# Patient Record
Sex: MALE | Race: BLACK OR AFRICAN AMERICAN | NOT HISPANIC OR LATINO | Employment: FULL TIME | URBAN - METROPOLITAN AREA
[De-identification: names, ages, dates, MRNs, and addresses within clinical notes are randomized per-mention and may not be internally consistent; named-entity substitution may affect disease eponyms.]

---

## 2017-05-25 ENCOUNTER — TRANSFERRED RECORDS (OUTPATIENT)
Dept: HEALTH INFORMATION MANAGEMENT | Facility: CLINIC | Age: 32
End: 2017-05-25

## 2020-03-02 ENCOUNTER — HEALTH MAINTENANCE LETTER (OUTPATIENT)
Age: 35
End: 2020-03-02

## 2021-01-20 NOTE — TELEPHONE ENCOUNTER
REFERRAL INFORMATION:    Referring Provider:  N/A    Referring Clinic:  N/A    Reason for Visit/Diagnosis: New Surg, BMI 52       FUTURE VISIT INFORMATION:    Appointment Date: 1/26/2021    Appointment Time: 10:30 AM      NOTES RECORD STATUS  DETAILS   OFFICE NOTE from Referring Provider N/A    OFFICE NOTE from Other Specialists Internal 5/30/12 Office visit with Dr. Joel Wegener ([facility] DISCHARGE SUMMARY/ ED VISITS  N/A    OPERATIVE REPORT N/A    ENDOSCOPY (EGD)  N/A    PERTINENT LABS Internal    PATHOLOGY REPORTS (RELATED) N/A    IMAGING (CT, MRI, US, XR)  N/A

## 2021-01-25 NOTE — PROGRESS NOTES
"Bigg Montez is a 35 year old who is being evaluated via a billable video visit.      The patient has been notified of following:     \"This video visit will be conducted via a call between you and your physician/provider. We have found that certain health care needs can be provided without the need for an in-person physical exam.  This service lets us provide the care you need with a video conversation.  If a prescription is necessary we can send it directly to your pharmacy.  If lab work is needed we can place an order for that and you can then stop by our lab to have the test done at a later time.    Video visits are billed at different rates depending on your insurance coverage.  Please reach out to your insurance provider with any questions.    If during the course of the call the physician/provider feels a video visit is not appropriate, you will not be charged for this service.\"    Patient has given verbal consent for Video visit? Yes  How would you like to obtain your AVS? MyChart  If you are dropped from the video visit, the video invite should be resent to: Text to cell phone: [phone]  Will anyone else be joining your video visit? No  {If patient encounters technical issues they should call [phone] :[id number]}      Video-Visit Details    Type of service:  Video Visit    Video Start Time: {video visit start/end time:152948}   Video End Time: {video visit start/end time:152948}    Originating Location (pt. Location): Home    Distant Location (provider location):  [facility] [place]     Platform used for Video Visit: [employer]    During this virtual visit the patient is located in [place], patient verifies this as the location during the entirety of this visit.     New Bariatric Nutrition Consultation Note    Reason For Visit: Nutrition Assessment    Bigg Montez is a 35 year old presenting today for new bariatric nutrition consult.   Pt is interested in laparoscopic " "sleeve gastrectomy with TBD.  Patient is accompanied by self.  This is pt's first of 3 required nutrition visits prior to surgery.     Pt referred by Brianda Chan NP on January 25, 2021.  Patient with Co-morbidities of obesity including:  Type II DM no  Renal Failure no  Sleep apnea yes  Hypertension yes   Dyslipidemia no  Joint pain no  Back pain no  GERD no   Prediabetes yes    H/o gout    Support System Reviewed With Patient 1/21/2021   Who do you have in your support network that can be available to help you for the first 2 weeks after surgery? My wife   Who can you count on for support throughout your weight loss surgery journey? Kenny ceja       ANTHROPOMETRICS:  Initial weight (12/2018 with PCP): 386 lbs    Estimated body mass index is 51.99 kg/m  as calculated from the following:    Height as of an earlier encounter on 1/26/21: 1.816 m (5' 11.5\").    Weight as of an earlier encounter on 1/26/21: 171.5 kg (378 lb).    Required weight loss goal pre-op: 38 lbs from initial consult weight (goal weight 348 lbs or less before surgery)       1/21/2021   I have tried the following methods to lose weight Watching portions or calories, Exercise, Weight Watchers, Slimfast, Physician directed program       Weight Loss Questions Reviewed With Patient 1/21/2021   How long have you been overweight? Since puberty       SUPPLEMENT INFORMATION:  ***    NUTRITION HISTORY:  Working with RD - decreasing sugar intake, IF ***  Recall Diet Questions Reviewed With Patient 1/21/2021   Describe what you typically consume for breakfast (typical or most recent): Frosted flakes   Describe what you typically consume for lunch (typical or most recent): Burger   Describe what you typically consume for supper (typical or most recent): Chili   Describe what you typically consume as snacks (typical or most recent): Sweets or cookies   How many ounces of water, or other low calorie drinks, do you drink daily (8 oz=1 glass)? 48 oz   How many " ounces of caffeine (coffee, tea, pop) do you drink daily (8 oz=1 glass)? 24 oz   How many ounces of juice, pop, sweet tea, sports drinks, protein drinks, other sweetened drinks, do you drink daily (8 oz=1 glass)? 16 oz   How many ounces of milk do you drink daily (8 oz=1 glass) 8 oz   Please indicate the type of milk: 2%   How often do you drink alcohol? 2-4 times a month   If you do drink alcohol, how many drinks might you have in a day? (one drink = 5 oz. wine, 1 can/bottle of beer, 1 shot liquor) 3 or 4       Eating Habits 1/21/2021   Do you have any dietary restrictions? No   Do you currently binge eat (eat a large amount of food in a short time)? Yes   Are you an emotional eater? Yes   Do you get up to eat after falling asleep? No   What foods do you crave? Sandwiches       ADDITIONAL INFORMATION:  ***    Dining Out History Reviewed With Patient 1/21/2021   How often do you dine out? Nearly every day.   Where do you dine out? (select all that apply) fast food chains, take out   What types of food do you order when you dine out? Protien rice dishes       Physical Activity Reviewed With Patient 1/21/2021   How often do you exercise? 1 to 2 times per week   What is the duration of your exercise (in minutes)? 60+ Minutes   What types of exercise do you do? gym membership, weightlifting   What keeps you from being more active?  Lack of Time         NUTRITION DIAGNOSIS:  Obesity r/t long history of self-monitoring deficit and excessive energy intake aeb BMI >30 kg/m2.    INTERVENTION:  Intervention Provided/Education Provided on post-op diet guidelines, vitamins/minerals essential post-operatively, GI anatomy of bariatric surgeries, ways to help prepare for post-op diet guidelines pre-operatively, portion/calorie-control, mindful eating and sources of protein.  Patient demonstrates understanding. Provided pt with list of goals RD contact information.      Questions Reviewed With Patient 1/21/2021   How ready are you  to make changes regarding your weight? Number 1 = Not ready at all to make changes up to 10 = very ready. 10   How confident are you that you can change? 1 = Not confident that you will be successful making changes up to 10 = very confident. 10       Expected Engagement: { :[id number]}    GOALS:  Relating To Eating:  {UC SURGERY-GOALS RELATED EATIN}    Relating to beverages:  {UC SURGERY GOALS RELATING BEVERAGE:655262522}    Relating to dietary supplements:  {UC SURGERY GOALS DIETARY SUP:377145306}    Relating to activity:  {UC SURGERY GOALS RELATING ACTVITY:637179451}    Relating to cravings:  {UC SURGERY GOALS RELATING CRAVIN}      Time spent with patient: *** minutes.  Angela Granger RD, LD

## 2021-01-26 ENCOUNTER — VIRTUAL VISIT (OUTPATIENT)
Dept: ENDOCRINOLOGY | Facility: CLINIC | Age: 36
End: 2021-01-26
Payer: COMMERCIAL

## 2021-01-26 ENCOUNTER — PRE VISIT (OUTPATIENT)
Dept: ENDOCRINOLOGY | Facility: CLINIC | Age: 36
End: 2021-01-26

## 2021-01-26 VITALS — HEIGHT: 72 IN | BODY MASS INDEX: 42.66 KG/M2 | WEIGHT: 315 LBS

## 2021-01-26 DIAGNOSIS — E66.9 OBESITY: ICD-10-CM

## 2021-01-26 DIAGNOSIS — E66.01 MORBID OBESITY (H): ICD-10-CM

## 2021-01-26 DIAGNOSIS — E66.01 CLASS 3 SEVERE OBESITY DUE TO EXCESS CALORIES WITH SERIOUS COMORBIDITY AND BODY MASS INDEX (BMI) OF 50.0 TO 59.9 IN ADULT (H): Primary | ICD-10-CM

## 2021-01-26 DIAGNOSIS — M1A.09X0 CHRONIC GOUT OF MULTIPLE SITES, UNSPECIFIED CAUSE: ICD-10-CM

## 2021-01-26 DIAGNOSIS — Z71.3 NUTRITIONAL COUNSELING: Primary | ICD-10-CM

## 2021-01-26 DIAGNOSIS — I10 ESSENTIAL HYPERTENSION: ICD-10-CM

## 2021-01-26 DIAGNOSIS — G47.33 OSA (OBSTRUCTIVE SLEEP APNEA): ICD-10-CM

## 2021-01-26 DIAGNOSIS — E66.813 CLASS 3 SEVERE OBESITY DUE TO EXCESS CALORIES WITH SERIOUS COMORBIDITY AND BODY MASS INDEX (BMI) OF 50.0 TO 59.9 IN ADULT (H): Primary | ICD-10-CM

## 2021-01-26 DIAGNOSIS — Z13.6 CARDIOVASCULAR SCREENING; LDL GOAL LESS THAN 160: ICD-10-CM

## 2021-01-26 PROCEDURE — 97802 MEDICAL NUTRITION INDIV IN: CPT | Mod: GT | Performed by: DIETITIAN, REGISTERED

## 2021-01-26 PROCEDURE — 99205 OFFICE O/P NEW HI 60 MIN: CPT | Mod: GT | Performed by: NURSE PRACTITIONER

## 2021-01-26 RX ORDER — TOBRAMYCIN AND DEXAMETHASONE 3; 1 MG/ML; MG/ML
1 SUSPENSION/ DROPS OPHTHALMIC
COMMUNITY
Start: 2019-05-24 | End: 2021-01-26

## 2021-01-26 RX ORDER — DIAZEPAM 10 MG
TABLET ORAL
COMMUNITY
Start: 2020-11-11 | End: 2021-01-26

## 2021-01-26 RX ORDER — LOSARTAN POTASSIUM 100 MG/1
100 TABLET ORAL DAILY
COMMUNITY
Start: 2020-11-10 | End: 2023-02-27

## 2021-01-26 RX ORDER — COLCHICINE 0.6 MG/1
0.6 TABLET ORAL DAILY
COMMUNITY
Start: 2020-11-10 | End: 2023-02-27

## 2021-01-26 ASSESSMENT — MIFFLIN-ST. JEOR: SCORE: 2679.66

## 2021-01-26 ASSESSMENT — PAIN SCALES - GENERAL: PAINLEVEL: MILD PAIN (2)

## 2021-01-26 NOTE — ASSESSMENT & PLAN NOTE
Discussed possibility of increased gout risk with surgery. Patient to discuss with primary care provider about prevention. No prednisone 6 weeks before surgery or 3 months after (used PRN). No naproxen or other NSAID 6 mo after surgery (used PRN).

## 2021-01-26 NOTE — NURSING NOTE
"Chief Complaint   Patient presents with     Consult     New zaheer.       Vitals:    01/26/21 1005   Weight: (!) 171.5 kg (378 lb)   Height: 1.816 m (5' 11.5\")       Body mass index is 51.99 kg/m .                            ESTRELLA ROBERSON, EMT    "

## 2021-01-26 NOTE — PATIENT INSTRUCTIONS
Nutrition Goals:  Relating To Eating:  Consume 3 meals per day  Use the Plate Method to structure your plate   - Reduce rice portions, aim for 1 cup or less. Mix with 50% brown rice.   Avoid snacking between  Eat slowly (20-30 minutes per meal), chewing foods well (25 chews per bite/applesauce consistency)    Relating to beverages:  Take small, frequent sips of fluid all day long between meals  Separate fluids from meals by 30 minutes before, during, and after eating    Relating to dietary supplements:  Start daily multivitamin     Relating to activity:  Increase activity as able    The Plate Method  [email or web address]    Protein Sources for Weight Loss  [email or web address]     Carbohydrates  [email or web address]     Mindful Eating  [email or web address]     Summary of Volumetrics Eating Plan  [email or web address]     Diet Guidelines after Weight Loss Surgery  [email or web address]     *Protein Shake Criteria: no more than 210 Calories, at least 20 grams of protein, and less than 10 grams of sugar     Meal Replacement Shake Options:    Health VL smoothie (160 Calories, 20 g protein)   Premier Protein (160 Calories, 30 g protein)  Slim Fast Advanced Nutrition (180 Calories, 20 g protein)  Muscle Milk, lactose-free, 17 oz bottle (210 Calories, 30 g protein)  Integrated Supplements, no artificial sugars (110 Calories, 20 g protein)  Genepro, unflavored protein powder (60 Calories, 30 g protein)    Meal Replacement Bar Options:   Health [facility] Protein Shake (160 Calories, 15 g protein)  Quest Protein Bars (190 Calories, 20 g protein)  Built Bar (170 Calories, 15-20 g protein)  One Protein Bar (210 calories, 20 g protein)  Vida Signature Protein Bar ([employer]) (190 Calories, 21 g protein)  Pure Protein Bars (180 Calories, 21 g protein)    Frozen Meal Replacements  Healthy Choice  Lean Cuisine  Atkins Meals  Smart Ones

## 2021-01-26 NOTE — ASSESSMENT & PLAN NOTE
Working towards bariatric surgery. Needs 38lb weight loss before surgery. Defer medications for now.

## 2021-01-26 NOTE — LETTER
"2021       RE: Bigg Montez  [address]  [place] [address]     Dear Colleague,    Thank you for referring your patient, Bigg Montez, to the [facility]NAG[facility]INIC [place] at [facility]. Please see a copy of my visit note below.    Darshan is a 35 year old who is being evaluated via a billable video visit.      How would you like to obtain your AVS? MyChart  If the video visit is dropped, the invitation should be resent by: Text to cell phone: [phone]  Will anyone else be joining your video visit? No      Video Start Time: 1036  Video-Visit Details    Type of service:  Video Visit    Video End Time:1106    Originating Location (pt. Location): Home    Distant Location (provider location):  [facility] WE[facility]NAGEMENT [facility]     Platform used for Video Visit: [employer] [place] [place]     New Bariatric Surgery Consultation Note    2021    RE: Bigg Montez  MR#: [id number]  : 1985      Referring provider:       2021   Who referred you? Dr chambers       Chief Complaint/Reason for visit: evaluation for possible weight loss surgery    Dear Wegener, Joel Daniel Irwin, MD (General),    I had the pleasure of seeing your patient, Bigg Montez, to evaluate his obesity and consider him for possible weight loss surgery. As you know, Bigg Montez is 35 year old.  He has a height of 5' 11.5\", a weight of 378 lbs 0 oz, and calculated Body mass index is 51.99 kg/m .    Assessment & Plan   Problem List Items Addressed This Visit        Respiratory    TARCEE (obstructive sleep apnea)       Digestive    Class 3 severe obesity due to excess calories with serious comorbidity and body mass index (BMI) of 50.0 to 59.9 in adult (H) - Primary     Working towards bariatric surgery. Needs 38lb weight loss before surgery. Defer medications for now.          Relevant Orders    CBC with platelets    Comprehensive metabolic panel "    Hemoglobin A1c    Lipid panel reflex to direct LDL Fasting    Parathyroid Hormone Intact    Vitamin D Deficiency    TSH with free T4 reflex       Endocrine    Gout     Discussed possibility of increased gout risk with surgery. Patient to discuss with primary care provider about prevention. No prednisone 6 weeks before surgery or 3 months after (used PRN). No naproxen or other NSAID 6 mo after surgery (used PRN).             Circulatory    Essential hypertension    Relevant Medications    losartan (COZAAR) 100 MG tablet       Other    CARDIOVASCULAR SCREENING; LDL GOAL LESS THAN 160           Review of external notes as documented above     61 minutes spent on the date of the encounter doing chart review, history and exam, documentation and further activities as noted above    Bigg Montez is a 35 year old male who presents to clinic today for the following health issues         HISTORY OF PRESENT ILLNESS:  Weight Loss History Reviewed with Patient 1/21/2021   How long have you been overweight? Since puberty   What is the most that you have ever weighed? 398   What is the most weight you have lost? 35   I have tried the following methods to lose weight Watching portions or calories, Exercise, Weight Watchers, Slimfast, Physician directed program   Have you ever had weight loss surgery? No     Has been considering surgery for 3 years   Difficulty losing weight and maintaining weight   Has been working with sister in law who is a dietitian x 6 months   Wants to be able to keep up with kids- 5 months to 10 years old - 4 kids     CO-MORBIDITIES OF OBESITY INCLUDE:     1/21/2021   I have the following health issues associated with obesity: High Blood Pressure      primary care [facility]   Pre-diabetes  Gout- prednisone and naproxen PRN, colchicine, allopurinol both PRN also, has also been adjusting diet   HTN-  Sleep apnea- wears CPAP nightly 4-5 hours of sleep   Reflux- rare - spicy foods and  tomato based products     PAST MEDICAL HISTORY:  No past medical history on file.  No history of blood clots     PAST SURGICAL HISTORY:  Past Surgical History:   Procedure Laterality Date     VASECTOMY N/A 2020       FAMILY HISTORY:   No family history on file.    SOCIAL HISTORY:   Social History Questions Reviewed With Patient 1/21/2021   Which best describes your employment status (select all that apply) I work full-time   Which best describes your marital status:    Do you have children? Yes   Who do you have in your support network that can be available to help you for the first 2 weeks after surgery? My wife   Who can you count on for support throughout your weight loss surgery journey? Kenny ceja   Can you afford 3 meals a day?  Yes   Can you afford 50-60 dollars a month for vitamins? 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 2-4 times a month   If you do drink alcohol, how many drinks might you have in a day? (one drink = 5 oz. wine, 1 can/bottle of beer, 1 shot liquor) 3 or 4   Do you currently use any of the following Nicotine products? cigars   Have you ever used any of the following nicotine products? Cigars   Have you or are you currently using street drugs or prescription strength medication for which you do not have a prescription for? No   Do you have a history of chemical dependency (alcohol or drug abuse)? No     Cigars some days  PSYCHOLOGICAL HISTORY:   Psychological History Reviewed With Patient 1/21/2021   Have you ever attempted suicide? Never.   Have you had thoughts of suicide in the past year? No   Have you ever been hospitalized for mental illness or a suicide attempt? Never.   Do you have a history of chronic pain? No   Have you ever been diagnosed with fibromyalgia? No   Are you currently seeing a therapist or counselor?  No   Are you  currently seeing a psychiatrist? No       ROS:     1/21/2021   Skin:  None of the above   HEENT: None of these   Musculoskeletal: Joint Pain, Arthritis   Cardiovascular: Shortness of breath with activity   Pulmonary: Snoring, People have told me I stop breathing while asleep   Gastrointestinal: None of the above   Genitourinary: None of the above   Hematological: None of the above   Neurological: None of the above       EATING BEHAVIORS:     1/21/2021   Have you or anyone else thought that you had an eating disorder? Yes   If you answered yes to the previous eating disorder question, select the types that apply from this list: Binge Eating   Do you currently binge eat (eat a large amount of food in a short time)? Yes   Are you an emotional eater? Yes   Do you get up to eat after falling asleep? No     Has been working with RD - family member, decreased sugar  Has been intermittent fasting     EXERCISE:     1/21/2021   How often do you exercise? 1 to 2 times per week   What is the duration of your exercise (in minutes)? 60+ Minutes   What types of exercise do you do? gym membership, weightlifting   What keeps you from being more active?  Lack of Time       MEDICATIONS:  Current Outpatient Medications   Medication Sig Dispense Refill     allopurinol (ZYLOPRIM) 300 MG tablet Take 1 tablet (300 mg) by mouth daily 90 tablet 1     colchicine (COLCYRS) 0.6 MG tablet Take 0.6 mg by mouth       losartan (COZAAR) 100 MG tablet Take 100 mg by mouth       naproxen (NAPROSYN) 500 MG tablet Take 1 tablet (500 mg) by mouth 2 times daily (with meals) 180 tablet 2       ALLERGIES:  Allergies   Allergen Reactions     Cats Other (See Comments)     No Clinical Screening - See Comments Unknown     seasonal     Seasonal Allergies        Office Visit on 12/13/2013   Component Date Value Ref Range Status     Sodium 12/13/2013 143  133 - 144 mmol/L Final     Potassium 12/13/2013 4.2  3.4 - 5.3 mmol/L Final     Chloride 12/13/2013 105  94 -  "109 mmol/L Final     Carbon Dioxide 12/13/2013 30  20 - 32 mmol/L Final     Anion Gap 12/13/2013 9  6 - 17 mmol/L Final     Glucose 12/13/2013 90  60 - 99 mg/dL Final     Urea Nitrogen 12/13/2013 12  5 - 24 mg/dL Final     Creatinine 12/13/2013 1.20  0.66 - 1.25 mg/dL Final     GFR Estimate 12/13/2013 72  >60 mL/min/1.7m2 Final     GFR Estimate If Black 12/13/2013 87  >60 mL/min/1.7m2 Final     Calcium 12/13/2013 9.1  8.5 - 10.4 mg/dL Final     Uric Acid 12/13/2013 10.6* 3.5 - 8.5 mg/dL Final       PHYSICAL EXAM:  Objective    Ht 1.816 m (5' 11.5\")   Wt (!) 171.5 kg (378 lb)   BMI 51.99 kg/m    Vitals - Patient Reported  Pain Score: Mild Pain (2)  Pain Loc: Low Back      Physical Exam   GENERAL: Healthy, alert and no distress  EYES: Eyes grossly normal to inspection.  No discharge or erythema, or obvious scleral/conjunctival abnormalities.  RESP: No audible wheeze, cough, or visible cyanosis.  No visible retractions or increased work of breathing.    SKIN: Visible skin clear. No significant rash, abnormal pigmentation or lesions.  NEURO: Cranial nerves grossly intact.  Mentation and speech appropriate for age.  PSYCH: Mentation appears normal, affect normal/bright, judgement and insight intact, normal speech and appearance well-groomed.      In summary, Bigg Montez has Class III obesity with a body mass index of Body mass index is 51.99 kg/m . kg/m2 and the comorbidities stated above. He completed an informational seminar and is a possible candidate for the laparoscopic gastric sleeve.  He will have to complete the following pre-requisites:    Starting weight 12/2018-0 386 with PCP. Received weight loss goal of 38 lb prior to surgery. 248 day of surgery   Dietitian x 3   Have preoperative laboratory tests drawn.  Psychological Evaluation with MMPI and clearance for weight loss surgery.  Letter of clearance from the following primary care (manages TRACEE, HTN, Gout, pre-diabetes). Labs are ordered with him " ([facility]) next week   Smoking cessation - cigars, active but not daily - need nicotine test   Defer medications for now per patient preference     Today in the office we discussed gastric sleeve surgery. Preoperative, perioperative, and postoperative processes, management, and follow up were addressed.  Risks and benefits were outlined including the risk of death, staple line leak (1-2%), PE, DVT, ulcer, worsening GERD, N/V, stricture, hernia, wound infection, weight regain, and vitamin deficiencies. I emphasized exercise and activity along with appropriate food choice as the main foundation for weight loss with surgery providing surgical reinforcement of this.  All questions were answered.  A goal sheet and support group handout were given to the patient.      MEDICATIONS:  Continue current medications without change  CONSULTATION/REFERRAL to - psych consult, may need sleep consult if primary care not comfortable clearing for surgery   FUTURE LABS:       - Schedule a fasting blood draw - will fax to [facility] - has labs next week with PCP   FUTURE APPOINTMENTS:       - Follow-up visit in 2 months       Once the patient has completed the requirements in their task list and there are no further recommendations, the pt will be allowed to see the surgeon of her choice for consultation on the laparoscopic gastric sleeve surgery. Patient verbalizes understanding of the process to surgery and expectations for the postoperative period including the need for lifelong lifestyle changes, vitamin supplementation, and laboratory monitoring.    Sincerely,     Brianda Chan NP      Bariatric Task List  Status:  Is patient a candidate for bariatric surgery?:  patient is a candidate for bariatric surgery -     Cleared to schedule surgeon consult?:    -     Status:    -     Surgeon: Karey  -     Tentative surgery month/year: 4/2021  -        Insurance: Insurance:  [employer]  -     [employer]: PCP Recommendation and  Medical Clearance:    -     HP Referral:  Needed -     Other:    -        Patient Info: Initial Weight:  386 -     Date of Initial Weight/Height:  12/1/2018 - pcp, has been working on weight loss    Goal Weight (lbs):    -     Required Weight Loss:    -     Surgery Type:  sleeve gastrectomy -     Multidisciplinary Meeting:    -        Dietician Visits: Structured weight loss required by insurance?:  structured weight loss required -     Dietician Visit 1:  Needed -     Dietician Visit 2:  Needed -     Dietician Visit 3:  Needed -     Dietician Visit 4:    -     Dietician Visit 5:    -     Dietician Visit 6:    -     Dietician Visit additional:    -     Clearance from dietician to see surgeon?:    -     Dietician Notes:    -        Psychological Evaluation: Psych eval:  Needed -     Therapist letter of support:    -     Psychiatrist letter of support:    -     Establish care with therapist:    -     Complete eating disorder evaluation:    -     Letter of clearance from therapist/eating disorder program:    -     Other:    -        Lab Work: Complete Blood Count:  Needed -     Comprehensive Metabolic Panel:  Needed -     Vitamin D:  Needed -     Hgb A1c:  Needed -     PTH:  Needed -     H. pylori:    -     TSH:  Needed -     Nicotine Testing:    -     Other:  Needed - lipids      Consults/ Clearance: Sleep Medicine:  Needed - patient going to discuss with pcp, who manages cpap   Cardiac:    -     Pain:   -     Dental:    -     Endocrine:    -     Gastroenterology:    -     Vascular Medicine:    -     Hematology:    -     Medical Weight Management:   -     Physical Therapy/Exercise:    -     Nephrology:    -     Neurology:    -     Pulmonology:    -     Rheumatology:    -     Other    -     Other    -     Other    -           Testing: UGI:    -     EGD:    -     Other:    -        PCP: Establish care with PCP:  Completed -     Follow up with PCP:    -     PCP letter of support:  Needed -        Smoking: Quit tobacco  "use (3 months smoke free)?:  Needed -     Quit date:    - cigars       Patient Education:  Information Session:  Completed -     Given \"Making your decision\" handout?:    -     Given support group information?:    -     Attended support group?:    -     Support plan in place?:    -     Research consents signed?:    -        Additional Surgery Requirements: Review Coag plan:    -     HgA1c <8:    -     Inpatient pain consult:    -     Final nicotine screen:    -     Dental work complete:    -     Birth control plan:    -     Other Requirements:    -     Other Requirements:    -        Final Tasks:  Before surgery online class:  Needed -     Before surgery online class website link:  [email or web address]   After surgery online class:    -     After surgery online class website link:  [email or web address]   Nurse visit for weigh-in and information:  Needed -     Pre-assessment clinic visit with anesthesia team for H&P:    -     Final labs (Hgb, plt, T&S, UA):  Needed -        Notes:   -         "

## 2021-01-26 NOTE — PATIENT INSTRUCTIONS
"It was a pleasure meeting with you today.    Thank you for allowing us the privilege of caring for you. We hope we provided you with the excellent service you deserve.   Please let us know if there is anything else we can do for you so that we can be sure you are leaving completely satisfied with your care experience.    To ensure the quality of our services you may be receiving a patient satisfaction survey from an independent patient satisfaction monitoring company.    The greatest compliment you can give is a \"Likely to Recommend\"      Your visit was with Brianda Chan NP today.     Instructions per today's visit:   -talk to primary care about: gout after surgery, clearance for sleep apnea -for anesthesia team    MEDICATIONS:  Continue current medications without change  CONSULTATION/REFERRAL to - psych consult (see ClipMine message), may need sleep consult if primary care not comfortable clearing for surgery   FUTURE LABS:       - Schedule a fasting blood draw - will fax to [facility] - has labs next week with PCP   FUTURE APPOINTMENTS:       - Follow-up visit in 2 months       - See Dr. Eden (surgeon) next month        - dietitian monthly until surgery       To schedule appointments with our team, please call [phone] option #1    Please call during clinic hours Monday through Friday 8:00a - 4:00p if you have questions or you can contact us via [employer] at anytime.      Nurses: [phone]  Fax: [phone]  Surgery Scheduler: [phone]    Please call the hospital at [phone] to speak with our on call MDs if you have urgent needs after hours, during weekends, or holidays.    **Please note if you need to go to the Emergency Room for any reason in the first 90 days after surgery it is important to go to the [facility] ER on the [place] on [facility] [address] off of the [address] off [address]    *For patients who are currently enrolled in our program and have not yet had weight loss " surgery please note*:    You must be at your required goal weight at the time of your Anesthesia clinic visit as well as the day of surgery or your surgery will be canceled. You will not be able to obtain a new surgery date until you have met your goal weight.    Lab results will be communicated through My Chart or letter (if My Chart not used). Please call the clinic if you have not received communication after 1 week or if you have any questions.?    Main follow-up parameters for all bariatric patients     Patients who have undergone Bariatric surgery:    1. Follow-up interval during the first year: ~1 week, 1 month, 3 month, 6 month,12 months.  Every 6 months until 5 years; and then annually thereafter.  The goal of follow-up sessions is to ensure that food intake behavior (choice of food and eating speed) and exercise/activity level are set appropriately.    2. Yearly lab tests ordered by our clinic.      3. The bariatric team should be aware and potentially evaluate all adverse gastrointestinal symptoms (dysphagia, abdominal pain, nausea, vomiting, diarrhea, heartburn, reflux, etc), which can be a sign of complication.  The bariatric surgeons perform general surgery procedures in addition to bariatric surgery (laparoscopic cholecystectomy, etc.)    4. Inability to tolerate textured food (chicken, steak, fish, eggs, beans) is NOT normal and may need to be evaluated by endoscopy performed by the bariatric surgeon.    _____________________________________________________________________________________________________________________________    Meal Replacement Products:    Here is the link to our new e-store where you can purchase our meal replacement products    [facility] E-Store  [email or web address]    The one week starter kit is a great way to sample a variety of products and see what works for you.    If you want more information about the product go to: [email or web address].YesGraph    Free  Shipping for orders over $75     Benefits of meal replacements products:    Portion and calorie control  Improved nutrition  Structured eating  Simplified food choices  Avoid contact with trigger foods  ______________________________________________________________________________________________________________________________    Healthy Lifestyle Support Group   [facility] Weight Management Program  Virtual Support Group Now Available    60 minutes of small group guided discussion, support and resources    Led by Health , Paola Kwok    For questions, and to receive the invite information, contact Paola at [email or web address]    All our welcome!    One Friday per month, 12:30pm to 1:30pm  SELF COMPASSION: July 31st  CREATING MY WELLNESS VISION: August 28th  MINDFULNESS PRACTICES FOR A CALM BODY & MIND: September 25th  MINDFUL EATING TOOLS: October 30th  HEALTHY& HAPPY HOLIDAYS: November 20th  OPEN FORUM: December 18th  _______________________________________________________________________________________________________________________________    Connections: Bariatric Care support group  Due to the Covid-19 restrictions, we will be doing our support group virtually using Microsoft Teams. You will need to get an invitation to the group from Terry Nix, Ph.D., LP at [email or web address] and to learn about using Microsoft Teams. The next meeting is on Tuesday, July 14 between 6:30 and 8 PM and there will be no formal speaker.    This group meets the second Tuesday of each month from 6:30 to 8 PM and will be held again at [facility] in the [address] [place] [address] (A-B room) when the Covid-19 restrictions are lifted. The group is led by Terry Nix, Ph.D., Licensed Psychologist, [facility] Weight Management [facility]. There is no cost for group participation and is open to all [facility] (and those external to this program) pre- and post-operative  bariatric surgery patients as well as their support system.   _________________________________________________________________________________________________________________________________      Interested in working with a health ?  Health coaches work with you to improve your overall health and wellbeing.  They look at the whole person, and may involve discussion of different areas of life, including, but not limited to the four pillars of health (sleep, exercise, nutrition, and stress management). Discuss with your care team if you would like to start working a health .     Health Coaching-3 Pack:      $99 for three health coaching visits    Visits may be done in person or via phone    Coaching is a partnership between the  and the client; Coaches do not prescribe or diagnose    Coaching helps inspire the client to reach his/her personal goals   __________________________________________________________________________________________________________________________________    [place] [facility] Get Moving Program    Our team of physical therapists is trained to help you understand and take control of your condition. They will perform a thorough evaluation to determine your ability for activity and develop a customized plan to fit your goals and physical ability.  Scheduling: Unsure if the Get Moving program is right for you? Discuss the program with your medical provider or diabetes educator. You can also call us at [phone] to ask questions or schedule an appointment.   [facility] Get Moving Program  ______________________________________________________________________________________________________________________  Bluetooth Scale:    We hope to provide you with high quality telephone and virtual healthcare visits while social distancing for COVID-19 is necessary, as well as in the future when virtual visits may be more convenient for you.     Our technology team made it possible  for Bluetooth scales to send weight measurements to our electronic medical record. This allows weights from you weighing at home to securely flow into the medical record, which will improve telephone and virtual visits.   Additionally, studies have shown that adults actually lose more weight when their weights are automatically sent to someone else, and also that this process is not stressful for those adults.    Below is a link for purchasing the scale, with a discount code for our patients. You may call your insurance company to see if they will reimburse you for the cost of the scale, as a piece of durable medical equipment. The scales only go up to a weight of 400 pounds. This is an issue and we are working with the developer on increasing this. We found no scales that go over 400lb that have blue-tooth for connecting to Pa[employer]ie.net.    Scale to purchase: the [employer]  Body  Scale: [email or web address]/body/[email or web address]=aw.ds    Discount Code: We have a discount code for our patients to bring the cost down to $50, the code is: [place]_Scale_20%off    Steps to link the scale to Paixie.net via an Android Phone (you can always disconnect at any point in the future):  1. The order must be placed first before the patient can access Track My Health within [employer].  2. Download Google Fit eze from the Google Play Store   a. Log in or register using your Google account   3. Download the Paixie.net eze from Google Play Store  a. Select add organization   b. Search for [employer] and select it   c. Log into Paixie.net  d. Select Track My Health   e. Select the green connect my account button   f. When prompted log into your Google account   g. Select okay to confirm the account   4. Download the [employer] Health Mate eze from Google Play Store  a. [place] for [employer]   b. Go to profile   c. Tap google fit under the Apps section  d. Select the option to activate  Google Fit integration   e. Select the same Google account   f. Select okay to confirm the account  g.   Steps to link the scale to [employer] via an iPhone (you can always disconnect at any point in the future):  **Note Scandit is not available for download on an iPad**  1. The order must be placed first before the patient can access Track My Health within [employer].  2. Locate the Health moises on your iPhone.  a. Set up your Apple Health account as prompted  b. The Sources page will show Apps that communicate with your Health moises. Once all steps are completed, you should see Sophia Search and JamStarhart listed under the Apps section and your iPhone under the devices section.  i. Select Health Mate  1. Under 'ALLOW  HEALTH MATE  TO WRITE DATA ensure the toggle is on for Weight.  2. This will allow the scale to add your weight to the Apple Health  ii. Select MyChart  1. Under 'ALLOW  Collect.it  TO READ DATA ensure the toggle is on for Weight.  2. This allows Game[employer]ound to grab the weight from Scandit so your provider can see your weights.  3. Download the Game[employer] moises from the Moises Store   a. Select add organization                                                  b. Search for [employer] and select it  c. Log into GameGround  d. Select Track My Health   e. Select the green connect my account button   f. Follow prompts to link your device to [employer].  4. Download the Withings health mate moises in the Moises Store   a. [place] for disco volante   b. Go to profile   c. iPling Health under the Apps section  d. If prompted to allow access with the Health Moises, toggle weight on for read and write access.       Bariatric Task List  Status:  Is patient a candidate for bariatric surgery?:  patient is a candidate for bariatric surgery -     Cleared to schedule surgeon consult?:    -     Status:    -     Surgeon: Karey  -     Tentative surgery month/year: 4/2021  -        Insurance: Insurance:  [employer]  -     Cigna: PCP Recommendation and  Medical Clearance:    -     HP Referral:  Needed -     Other:    -        Patient Info: Initial Weight:  386 -     Date of Initial Weight/Height:  12/1/2018 - pcp, has been working on weight loss    Goal Weight (lbs):    -     Required Weight Loss:    -     Surgery Type:  sleeve gastrectomy -     Multidisciplinary Meeting:    -        Dietician Visits: Structured weight loss required by insurance?:  structured weight loss required -     Dietician Visit 1:  Needed -     Dietician Visit 2:  Needed -     Dietician Visit 3:  Needed -     Dietician Visit 4:    -     Dietician Visit 5:    -     Dietician Visit 6:    -     Dietician Visit additional:    -     Clearance from dietician to see surgeon?:    -     Dietician Notes:    -        Psychological Evaluation: Psych eval:  Needed -     Therapist letter of support:    -     Psychiatrist letter of support:    -     Establish care with therapist:    -     Complete eating disorder evaluation:    -     Letter of clearance from therapist/eating disorder program:    -     Other:    -        Lab Work: Complete Blood Count:  Needed -     Comprehensive Metabolic Panel:  Needed -     Vitamin D:  Needed -     Hgb A1c:  Needed -     PTH:  Needed -     H. pylori:    -     TSH:  Needed -     Nicotine Testing:    -     Other:  Needed - lipids      Consults/ Clearance: Sleep Medicine:  Needed - patient going to discuss with pcp, who manages cpap   Cardiac:    -     Pain:   -     Dental:    -     Endocrine:    -     Gastroenterology:    -     Vascular Medicine:    -     Hematology:    -     Medical Weight Management:   -     Physical Therapy/Exercise:    -     Nephrology:    -     Neurology:    -     Pulmonology:    -     Rheumatology:    -     Other    -     Other    -     Other    -           Testing: UGI:    -     EGD:    -     Other:    -        PCP: Establish care with PCP:  Completed -     Follow up with PCP:    -     PCP letter of support:  Needed -        Smoking: Quit tobacco  "use (3 months smoke free)?:  Needed -     Quit date:    - cigars       Patient Education:  Information Session:  Completed -     Given \"Making your decision\" handout?:    -     Given support group information?:    -     Attended support group?:    -     Support plan in place?:    -     Research consents signed?:    -        Additional Surgery Requirements: Review Coag plan:    -     HgA1c <8:    -     Inpatient pain consult:    -     Final nicotine screen:    -     Dental work complete:    -     Birth control plan:    -     Other Requirements:    -     Other Requirements:    -        Final Tasks:  Before surgery online class:  Needed -     Before surgery online class website link:  [email or web address]   After surgery online class:    -     After surgery online class website link:  [email or web address]   Nurse visit for weigh-in and information:  Needed -     Pre-assessment clinic visit with anesthesia team for H&P:    -     Final labs (Hgb, plt, T&S, UA):  Needed -        Notes:   -          "

## 2021-01-26 NOTE — PROGRESS NOTES
"Darshan is a 35 year old who is being evaluated via a billable video visit.      How would you like to obtain your AVS? MyChart  If the video visit is dropped, the invitation should be resent by: Text to cell phone: [phone]  Will anyone else be joining your video visit? No      Video Start Time: 1036  Video-Visit Details    Type of service:  Video Visit    Video End Time:1106    Originating Location (pt. Location): Home    Distant Location (provider location):  [facility] [place]     Platform used for Video Visit: Mingleverse     New Bariatric Surgery Consultation Note    2021    RE: Bigg Montez  MR#: [id number]  : 1985      Referring provider:       2021   Who referred you? Dr chamebrs       Chief Complaint/Reason for visit: evaluation for possible weight loss surgery    Dear Wegener, Ryan Calles MD (General),    I had the pleasure of seeing your patient, Bigg Montez, to evaluate his obesity and consider him for possible weight loss surgery. As you know, Bigg Montez is 35 year old.  He has a height of 5' 11.5\", a weight of 378 lbs 0 oz, and calculated Body mass index is 51.99 kg/m .    Assessment & Plan   Problem List Items Addressed This Visit        Respiratory    TRACEE (obstructive sleep apnea)       Digestive    Class 3 severe obesity due to excess calories with serious comorbidity and body mass index (BMI) of 50.0 to 59.9 in adult (H) - Primary     Working towards bariatric surgery. Needs 38lb weight loss before surgery. Defer medications for now.          Relevant Orders    CBC with platelets    Comprehensive metabolic panel    Hemoglobin A1c    Lipid panel reflex to direct LDL Fasting    Parathyroid Hormone Intact    Vitamin D Deficiency    TSH with free T4 reflex       Endocrine    Gout     Discussed possibility of increased gout risk with surgery. Patient to discuss with primary care provider about prevention. No prednisone 6 weeks " before surgery or 3 months after (used PRN). No naproxen or other NSAID 6 mo after surgery (used PRN).             Circulatory    Essential hypertension    Relevant Medications    losartan (COZAAR) 100 MG tablet       Other    CARDIOVASCULAR SCREENING; LDL GOAL LESS THAN 160           Review of external notes as documented above     61 minutes spent on the date of the encounter doing chart review, history and exam, documentation and further activities as noted above    Bigg Montez is a 35 year old male who presents to clinic today for the following health issues         HISTORY OF PRESENT ILLNESS:  Weight Loss History Reviewed with Patient 1/21/2021   How long have you been overweight? Since puberty   What is the most that you have ever weighed? 398   What is the most weight you have lost? 35   I have tried the following methods to lose weight Watching portions or calories, Exercise, Weight Watchers, Slimfast, Physician directed program   Have you ever had weight loss surgery? No     Has been considering surgery for 3 years   Difficulty losing weight and maintaining weight   Has been working with sister in law who is a dietitian x 6 months   Wants to be able to keep up with kids- 5 months to 10 years old - 4 kids     CO-MORBIDITIES OF OBESITY INCLUDE:     1/21/2021   I have the following health issues associated with obesity: High Blood Pressure      primary care bony [facility]   Pre-diabetes  Gout- prednisone and naproxen PRN, colchicine, allopurinol both PRN also, has also been adjusting diet   HTN-  Sleep apnea- wears CPAP nightly 4-5 hours of sleep   Reflux- rare - spicy foods and tomato based products     PAST MEDICAL HISTORY:  No past medical history on file.  No history of blood clots     PAST SURGICAL HISTORY:  Past Surgical History:   Procedure Laterality Date     VASECTOMY N/A 2020       FAMILY HISTORY:   No family history on file.    SOCIAL HISTORY:   Social History Questions Reviewed  With Patient 1/21/2021   Which best describes your employment status (select all that apply) I work full-time   Which best describes your marital status:    Do you have children? Yes   Who do you have in your support network that can be available to help you for the first 2 weeks after surgery? My wife   Who can you count on for support throughout your weight loss surgery journey? Kenny valadezazar   Can you afford 3 meals a day?  Yes   Can you afford 50-60 dollars a month for vitamins? 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 2-4 times a month   If you do drink alcohol, how many drinks might you have in a day? (one drink = 5 oz. wine, 1 can/bottle of beer, 1 shot liquor) 3 or 4   Do you currently use any of the following Nicotine products? cigars   Have you ever used any of the following nicotine products? Cigars   Have you or are you currently using street drugs or prescription strength medication for which you do not have a prescription for? No   Do you have a history of chemical dependency (alcohol or drug abuse)? No     Cigars some days  PSYCHOLOGICAL HISTORY:   Psychological History Reviewed With Patient 1/21/2021   Have you ever attempted suicide? Never.   Have you had thoughts of suicide in the past year? No   Have you ever been hospitalized for mental illness or a suicide attempt? Never.   Do you have a history of chronic pain? No   Have you ever been diagnosed with fibromyalgia? No   Are you currently seeing a therapist or counselor?  No   Are you currently seeing a psychiatrist? No       ROS:     1/21/2021   Skin:  None of the above   HEENT: None of these   Musculoskeletal: Joint Pain, Arthritis   Cardiovascular: Shortness of breath with activity   Pulmonary: Snoring, People have told me I stop breathing while asleep   Gastrointestinal: None of the above    Genitourinary: None of the above   Hematological: None of the above   Neurological: None of the above       EATING BEHAVIORS:     1/21/2021   Have you or anyone else thought that you had an eating disorder? Yes   If you answered yes to the previous eating disorder question, select the types that apply from this list: Binge Eating   Do you currently binge eat (eat a large amount of food in a short time)? Yes   Are you an emotional eater? Yes   Do you get up to eat after falling asleep? No     Has been working with RD - family member, decreased sugar  Has been intermittent fasting     EXERCISE:     1/21/2021   How often do you exercise? 1 to 2 times per week   What is the duration of your exercise (in minutes)? 60+ Minutes   What types of exercise do you do? gym membership, weightlifting   What keeps you from being more active?  Lack of Time       MEDICATIONS:  Current Outpatient Medications   Medication Sig Dispense Refill     allopurinol (ZYLOPRIM) 300 MG tablet Take 1 tablet (300 mg) by mouth daily 90 tablet 1     colchicine (COLCYRS) 0.6 MG tablet Take 0.6 mg by mouth       losartan (COZAAR) 100 MG tablet Take 100 mg by mouth       naproxen (NAPROSYN) 500 MG tablet Take 1 tablet (500 mg) by mouth 2 times daily (with meals) 180 tablet 2       ALLERGIES:  Allergies   Allergen Reactions     Cats Other (See Comments)     No Clinical Screening - See Comments Unknown     seasonal     Seasonal Allergies        Office Visit on 12/13/2013   Component Date Value Ref Range Status     Sodium 12/13/2013 143  133 - 144 mmol/L Final     Potassium 12/13/2013 4.2  3.4 - 5.3 mmol/L Final     Chloride 12/13/2013 105  94 - 109 mmol/L Final     Carbon Dioxide 12/13/2013 30  20 - 32 mmol/L Final     Anion Gap 12/13/2013 9  6 - 17 mmol/L Final     Glucose 12/13/2013 90  60 - 99 mg/dL Final     Urea Nitrogen 12/13/2013 12  5 - 24 mg/dL Final     Creatinine 12/13/2013 1.20  0.66 - 1.25 mg/dL Final     GFR Estimate 12/13/2013 72  >60  "mL/min/1.7m2 Final     GFR Estimate If Black 12/13/2013 87  >60 mL/min/1.7m2 Final     Calcium 12/13/2013 9.1  8.5 - 10.4 mg/dL Final     Uric Acid 12/13/2013 10.6* 3.5 - 8.5 mg/dL Final       PHYSICAL EXAM:  Objective    Ht 1.816 m (5' 11.5\")   Wt (!) 171.5 kg (378 lb)   BMI 51.99 kg/m    Vitals - Patient Reported  Pain Score: Mild Pain (2)  Pain Loc: Low Back      Physical Exam   GENERAL: Healthy, alert and no distress  EYES: Eyes grossly normal to inspection.  No discharge or erythema, or obvious scleral/conjunctival abnormalities.  RESP: No audible wheeze, cough, or visible cyanosis.  No visible retractions or increased work of breathing.    SKIN: Visible skin clear. No significant rash, abnormal pigmentation or lesions.  NEURO: Cranial nerves grossly intact.  Mentation and speech appropriate for age.  PSYCH: Mentation appears normal, affect normal/bright, judgement and insight intact, normal speech and appearance well-groomed.      In summary, Bigg Montez has Class III obesity with a body mass index of Body mass index is 51.99 kg/m . kg/m2 and the comorbidities stated above. He completed an informational seminar and is a possible candidate for the laparoscopic gastric sleeve.  He will have to complete the following pre-requisites:    Starting weight 12/2018-0 386 with PCP. Received weight loss goal of 38 lb prior to surgery. 248 day of surgery   Dietitian x 3   Have preoperative laboratory tests drawn.  Psychological Evaluation with MMPI and clearance for weight loss surgery.  Letter of clearance from the following primary care (manages TRACEE, HTN, Gout, pre-diabetes). [facility] are ordered with him ([facility]) next week   Smoking cessation - cigars, active but not daily - need nicotine test   Defer medications for now per patient preference     Today in the office we discussed gastric sleeve surgery. Preoperative, perioperative, and postoperative processes, management, and follow up were addressed.  " Risks and benefits were outlined including the risk of death, staple line leak (1-2%), PE, DVT, ulcer, worsening GERD, N/V, stricture, hernia, wound infection, weight regain, and vitamin deficiencies. I emphasized exercise and activity along with appropriate food choice as the main foundation for weight loss with surgery providing surgical reinforcement of this.  All questions were answered.  A goal sheet and support group handout were given to the patient.      MEDICATIONS:  Continue current medications without change  CONSULTATION/REFERRAL to - psych consult, may need sleep consult if primary care not comfortable clearing for surgery   FUTURE LABS:       - Schedule a fasting blood draw - will fax to [employer] - has labs next week with PCP   FUTURE APPOINTMENTS:       - Follow-up visit in 2 months       Once the patient has completed the requirements in their task list and there are no further recommendations, the pt will be allowed to see the surgeon of her choice for consultation on the laparoscopic gastric sleeve surgery. Patient verbalizes understanding of the process to surgery and expectations for the postoperative period including the need for lifelong lifestyle changes, vitamin supplementation, and laboratory monitoring.    Sincerely,     Brianda Chan NP      Bariatric Task List  Status:  Is patient a candidate for bariatric surgery?:  patient is a candidate for bariatric surgery -     Cleared to schedule surgeon consult?:    -     Status:    -     Surgeon: Karey  -     Tentative surgery month/year: 4/2021  -        Insurance: Insurance:  [facility]  -     Heidena: PCP Recommendation and Medical Clearance:    -     HP Referral:  Needed -     Other:    -        Patient Info: Initial Weight:  386 -     Date of Initial Weight/Height:  12/1/2018 - pcp, has been working on weight loss    Goal Weight (lbs):    -     Required Weight Loss:    -     Surgery Type:  sleeve gastrectomy -    "  Multidisciplinary Meeting:    -        Dietician Visits: Structured weight loss required by insurance?:  structured weight loss required -     Dietician Visit 1:  Needed -     Dietician Visit 2:  Needed -     Dietician Visit 3:  Needed -     Dietician Visit 4:    -     Dietician Visit 5:    -     Dietician Visit 6:    -     Dietician Visit additional:    -     Clearance from dietician to see surgeon?:    -     Dietician Notes:    -        Psychological Evaluation: Psych eval:  Needed -     Therapist letter of support:    -     Psychiatrist letter of support:    -     Establish care with therapist:    -     Complete eating disorder evaluation:    -     Letter of clearance from therapist/eating disorder program:    -     Other:    -        Lab Work: Complete Blood Count:  Needed -     Comprehensive Metabolic Panel:  Needed -     Vitamin D:  Needed -     Hgb A1c:  Needed -     PTH:  Needed -     H. pylori:    -     TSH:  Needed -     Nicotine Testing:    -     Other:  Needed - lipids      Consults/ Clearance: Sleep Medicine:  Needed - patient going to discuss with pcp, who manages cpap   Cardiac:    -     Pain:   -     Dental:    -     Endocrine:    -     Gastroenterology:    -     Vascular Medicine:    -     Hematology:    -     Medical Weight Management:   -     Physical Therapy/Exercise:    -     Nephrology:    -     Neurology:    -     Pulmonology:    -     Rheumatology:    -     Other    -     Other    -     Other    -           Testing: UGI:    -     EGD:    -     Other:    -        PCP: Establish care with PCP:  Completed -     Follow up with PCP:    -     PCP letter of support:  Needed -        Smoking: Quit tobacco use (3 months smoke free)?:  Needed -     Quit date:    - cigars       Patient Education:  Information Session:  Completed -     Given \"Making your decision\" handout?:    -     Given support group information?:    -     Attended support group?:    -     Support plan in place?:    -     Research " consents signed?:    -        Additional Surgery Requirements: Review Coag plan:    -     HgA1c <8:    -     Inpatient pain consult:    -     Final nicotine screen:    -     Dental work complete:    -     Birth control plan:    -     Other Requirements:    -     Other Requirements:    -        Final Tasks:  Before surgery online class:  Needed -     Before surgery online class website link:  [email or web address]   After surgery online class:    -     After surgery online class website link:  [email or web address]   Nurse visit for weigh-in and information:  Needed -     Pre-assessment clinic visit with anesthesia team for H&P:    -     Final labs (Hgb, plt, T&S, UA):  Needed -        Notes:   -

## 2021-01-26 NOTE — Clinical Note
NBS. Dr. Eden. I will send packet information since Brandi is out. Patient thinks PCP will sign off on sleep and wants to discuss with him before seeing sleep consult again. TARI

## 2021-01-26 NOTE — LETTER
"1/26/2021       RE: Bigg Montez  [address]  [place] [address]     Dear Colleague,    Thank you for referring your patient, Bigg Montez, to the [facility] [place] at [facility]. Please see a copy of my visit note below.    Bigg Montez is a 35 year old who is being evaluated via a billable video visit.      The patient has been notified of following:     \"This video visit will be conducted via a call between you and your physician/provider. We have found that certain health care needs can be provided without the need for an in-person physical exam.  This service lets us provide the care you need with a video conversation.  If a prescription is necessary we can send it directly to your pharmacy.  If lab work is needed we can place an order for that and you can then stop by our lab to have the test done at a later time.    Video visits are billed at different rates depending on your insurance coverage.  Please reach out to your insurance provider with any questions.    If during the course of the call the physician/provider feels a video visit is not appropriate, you will not be charged for this service.\"    Patient has given verbal consent for Video visit? Yes  How would you like to obtain your AVS? MyChart  If you are dropped from the video visit, the video invite should be resent to: Text to cell phone: [phone]  Will anyone else be joining your video visit? No        Video-Visit Details    Type of service:  Video Visit    Video Start Time: 12:02 PM  Video End Time: 12:34 PM    Originating Location (pt. Location): Home    Distant Location (provider location):  [facility] [place]     Platform used for Video Visit: Imaxio    During this virtual visit the patient is located in [place], patient verifies this as the location during the entirety of this visit.     New Bariatric Nutrition " "Consultation Note    Reason For Visit: Nutrition Assessment    Bigg Montez is a 35 year old presenting today for new bariatric nutrition consult.   Pt is interested in laparoscopic sleeve gastrectomy with TBD.  Patient is accompanied by self.  This is pt's first of 3 required nutrition visits prior to surgery.     Pt referred by Brianda Chan NP on January 25, 2021.  Patient with Co-morbidities of obesity including:  Type II DM no  Renal Failure no  Sleep apnea yes  Hypertension yes   Dyslipidemia no  Joint pain no  Back pain no  GERD no   Prediabetes yes    H/o gout    Support System Reviewed With Patient 1/21/2021   Who do you have in your support network that can be available to help you for the first 2 weeks after surgery? My wife   Who can you count on for support throughout your weight loss surgery journey? Kenny ceja       ANTHROPOMETRICS:  Initial weight (12/2018 with PCP): 386 lbs    Estimated body mass index is 51.99 kg/m  as calculated from the following:    Height as of an earlier encounter on 1/26/21: 1.816 m (5' 11.5\").    Weight as of an earlier encounter on 1/26/21: 171.5 kg (378 lb).    Required weight loss goal pre-op: 38 lbs from initial consult weight (goal weight 348 lbs or less before surgery)       1/21/2021   I have tried the following methods to lose weight Watching portions or calories, Exercise, Weight Watchers, Slimfast, Physician directed program       Weight Loss Questions Reviewed With Patient 1/21/2021   How long have you been overweight? Since puberty       SUPPLEMENT INFORMATION:  Has a daily MVI, stopped for a while to see if flaring gout, plans to restart.      NUTRITION HISTORY:  No known food allergies/intolerances  Working with family member who is an RD - decreasing sugar intake, IF for 21 days (felt better while following). Avoid beef and seafood, and limiting alcohol for gout. Eating a lot of chicken. Does not care for beans, yogurt, cottage cheese or tofu. Enjoys " fried foods, but trying to avoid. Avoiding bread, but does eat a lot of rice (typically occupies most of plate). Using white rice.  Aiming for 30 grams protein per meal.  Typically having 2 meals per day. Meal are at 1pm and 6 pm.   Does not like sugar substitutes    Recall Diet Questions Reviewed With Patient 1/21/2021   Describe what you typically consume for breakfast (typical or most recent): Frosted flakes; steel cut oats with 1 tbsp brown sugar    Describe what you typically consume for lunch (typical or most recent): Burger   Describe what you typically consume for supper (typical or most recent): Chili    Describe what you typically consume as snacks (typical or most recent): Sweets or cookies   How many ounces of water, or other low calorie drinks, do you drink daily (8 oz=1 glass)?  oz (water)   How many ounces of caffeine (coffee, tea, pop) do you drink daily (8 oz=1 glass)? 24 oz (coffee - black mostly, occ latte)    How many ounces of juice, pop, sweet tea, sports drinks, protein drinks, other sweetened drinks, do you drink daily (8 oz=1 glass)? 16 oz (occ arnold gorman 2-3 times per week)   How many ounces of milk do you drink daily (8 oz=1 glass) 8 oz   Please indicate the type of milk: 2%   How often do you drink alcohol? 2-4 times a month (2-3 bourbon on Sunday)   If you do drink alcohol, how many drinks might you have in a day? (one drink = 5 oz. wine, 1 can/bottle of beer, 1 shot liquor) 3 or 4       Eating Habits 1/21/2021   Do you have any dietary restrictions? No   Do you currently binge eat (eat a large amount of food in a short time)? Yes   Are you an emotional eater? Yes - usually when stressed out/off schedule, fast-food   Do you get up to eat after falling asleep? No   What foods do you crave? Sandwiches       ADDITIONAL INFORMATION:  Works as a .     Dining Out History Reviewed With Patient 1/21/2021   How often do you dine out? Nearly every day.   Where do you dine  out? (select all that apply) fast food chains, take out   What types of food do you order when you dine out? Protien rice dishes       Physical Activity Reviewed With Patient 1/21/2021   How often do you exercise? 1 to 2 times per week   What is the duration of your exercise (in minutes)? 60+ Minutes   What types of exercise do you do? gym membership, weightlifting   What keeps you from being more active?  Lack of Time         NUTRITION DIAGNOSIS:  Obesity r/t long history of self-monitoring deficit and excessive energy intake aeb BMI >30 kg/m2.    INTERVENTION:  Intervention Provided/Education Provided on post-op diet guidelines, vitamins/minerals essential post-operatively, GI anatomy of bariatric surgeries, ways to help prepare for post-op diet guidelines pre-operatively, portion/calorie-control, mindful eating and sources of protein. Provided education on the Volumetrics, the Plate Method and meal replacements for pre-op weight loss.  Patient demonstrates understanding. Provided pt with list of goals RD contact information.      Questions Reviewed With Patient 1/21/2021   How ready are you to make changes regarding your weight? Number 1 = Not ready at all to make changes up to 10 = very ready. 10   How confident are you that you can change? 1 = Not confident that you will be successful making changes up to 10 = very confident. 10       Expected Engagement: good    GOALS:  Relating To Eating:  Consume 3 meals per day  Use the Plate Method to structure your plate   - Reduce rice portions, aim for 1 cup or less. Mix with 50% brown rice.   Avoid snacking between  Eat slowly (20-30 minutes per meal), chewing foods well (25 chews per bite/applesauce consistency)    Relating to beverages:  Take small, frequent sips of fluid all day long between meals  Separate fluids from meals by 30 minutes before, during, and after eating    Relating to dietary supplements:  Start daily multivitamin     Relating to  activity:  Increase activity as able    The Plate Method  [email or web address]    Protein Sources for Weight Loss  [email or web address]     Carbohydrates  [email or web address]     Mindful Eating  [email or web address]     Summary of Volumetrics Eating Plan  [email or web address]     Diet Guidelines after Weight Loss Surgery  [email or web address]     *Protein Shake Criteria: no more than 210 Calories, at least 20 grams of protein, and less than 10 grams of sugar     Meal Replacement Shake Options:   [facility] smoothie (160 Calories, 20 g protein)   Premier Protein (160 Calories, 30 g protein)  Slim Fast Advanced Nutrition (180 Calories, 20 g protein)  Muscle Milk, lactose-free, 17 oz bottle (210 Calories, 30 g protein)  Integrated Supplements, no artificial sugars (110 Calories, 20 g protein)  Genepro, unflavored protein powder (60 Calories, 30 g protein)    Meal Replacement Bar Options:  [facility] Protein Shake (160 Calories, 15 g protein)  Quest Protein Bars (190 Calories, 20 g protein)  Built Bar (170 Calories, 15-20 g protein)  One Protein Bar (210 calories, 20 g protein)  [place] Signature Protein Bar ([employer]) (190 Calories, 21 g protein)  Pure Protein Bars (180 Calories, 21 g protein)    Frozen Meal Replacements  Healthy Choice  Lean Cuisine  Atkins Meals  Smart Ones    Angela Granger, BRO, LD

## 2021-01-26 NOTE — PROGRESS NOTES
"Bigg Montez is a 35 year old who is being evaluated via a billable video visit.      The patient has been notified of following:     \"This video visit will be conducted via a call between you and your physician/provider. We have found that certain health care needs can be provided without the need for an in-person physical exam.  This service lets us provide the care you need with a video conversation.  If a prescription is necessary we can send it directly to your pharmacy.  If lab work is needed we can place an order for that and you can then stop by our lab to have the test done at a later time.    Video visits are billed at different rates depending on your insurance coverage.  Please reach out to your insurance provider with any questions.    If during the course of the call the physician/provider feels a video visit is not appropriate, you will not be charged for this service.\"    Patient has given verbal consent for Video visit? Yes  How would you like to obtain your AVS? MyChart  If you are dropped from the video visit, the video invite should be resent to: Text to cell phone: [phone]  Will anyone else be joining your video visit? No        Video-Visit Details    Type of service:  Video Visit    Video Start Time: 12:02 PM  Video End Time: 12:34 PM    Originating Location (pt. Location): Home    Distant Location (provider location):  [facility] [place]     Platform used for Video Visit: [employer]    During this virtual visit the patient is located in [place], patient verifies this as the location during the entirety of this visit.     New Bariatric Nutrition Consultation Note    Reason For Visit: Nutrition Assessment    Bigg Montez is a 35 year old presenting today for new bariatric nutrition consult.   Pt is interested in laparoscopic sleeve gastrectomy with TBD.  Patient is accompanied by self.  This is pt's first of 3 required nutrition visits prior to surgery. " "    Pt referred by Brianda Chan NP on January 25, 2021.  Patient with Co-morbidities of obesity including:  Type II DM no  Renal Failure no  Sleep apnea yes  Hypertension yes   Dyslipidemia no  Joint pain no  Back pain no  GERD no   Prediabetes yes    H/o gout    Support System Reviewed With Patient 1/21/2021   Who do you have in your support network that can be available to help you for the first 2 weeks after surgery? My wife   Who can you count on for support throughout your weight loss surgery journey? Kenny ceja       ANTHROPOMETRICS:  Initial weight (12/2018 with PCP): 386 lbs    Estimated body mass index is 51.99 kg/m  as calculated from the following:    Height as of an earlier encounter on 1/26/21: 1.816 m (5' 11.5\").    Weight as of an earlier encounter on 1/26/21: 171.5 kg (378 lb).    Required weight loss goal pre-op: 38 lbs from initial consult weight (goal weight 348 lbs or less before surgery)       1/21/2021   I have tried the following methods to lose weight Watching portions or calories, Exercise, Weight Watchers, Slimfast, Physician directed program       Weight Loss Questions Reviewed With Patient 1/21/2021   How long have you been overweight? Since puberty       SUPPLEMENT INFORMATION:  Has a daily MVI, stopped for a while to see if flaring gout, plans to restart.      NUTRITION HISTORY:  No known food allergies/intolerances  Working with family member who is an RD - decreasing sugar intake, IF for 21 days (felt better while following). Avoid beef and seafood, and limiting alcohol for gout. Eating a lot of chicken. Does not care for beans, yogurt, cottage cheese or tofu. Enjoys fried foods, but trying to avoid. Avoiding bread, but does eat a lot of rice (typically occupies most of plate). Using white rice.  Aiming for 30 grams protein per meal.  Typically having 2 meals per day. Meal are at 1pm and 6 pm.   Does not like sugar substitutes    Recall Diet Questions Reviewed With Patient " 1/21/2021   Describe what you typically consume for breakfast (typical or most recent): Frosted flakes; steel cut oats with 1 tbsp brown sugar    Describe what you typically consume for lunch (typical or most recent): Burger   Describe what you typically consume for supper (typical or most recent): Chili    Describe what you typically consume as snacks (typical or most recent): Sweets or cookies   How many ounces of water, or other low calorie drinks, do you drink daily (8 oz=1 glass)?  oz (water)   How many ounces of caffeine (coffee, tea, pop) do you drink daily (8 oz=1 glass)? 24 oz (coffee - black mostly, occ latte)    How many ounces of juice, pop, sweet tea, sports drinks, protein drinks, other sweetened drinks, do you drink daily (8 oz=1 glass)? 16 oz (occ arnold gorman 2-3 times per week)   How many ounces of milk do you drink daily (8 oz=1 glass) 8 oz   Please indicate the type of milk: 2%   How often do you drink alcohol? 2-4 times a month (2-3 bourbon on Sunday)   If you do drink alcohol, how many drinks might you have in a day? (one drink = 5 oz. wine, 1 can/bottle of beer, 1 shot liquor) 3 or 4       Eating Habits 1/21/2021   Do you have any dietary restrictions? No   Do you currently binge eat (eat a large amount of food in a short time)? Yes   Are you an emotional eater? Yes - usually when stressed out/off schedule, fast-food   Do you get up to eat after falling asleep? No   What foods do you crave? Sandwiches       ADDITIONAL INFORMATION:  Works as a .     Dining Out History Reviewed With Patient 1/21/2021   How often do you dine out? Nearly every day.   Where do you dine out? (select all that apply) fast food chains, take out   What types of food do you order when you dine out? Protien rice dishes       Physical Activity Reviewed With Patient 1/21/2021   How often do you exercise? 1 to 2 times per week   What is the duration of your exercise (in minutes)? 60+ Minutes   What  types of exercise do you do? gym membership, weightlifting   What keeps you from being more active?  Lack of Time         NUTRITION DIAGNOSIS:  Obesity r/t long history of self-monitoring deficit and excessive energy intake aeb BMI >30 kg/m2.    INTERVENTION:  Intervention Provided/Education Provided on post-op diet guidelines, vitamins/minerals essential post-operatively, GI anatomy of bariatric surgeries, ways to help prepare for post-op diet guidelines pre-operatively, portion/calorie-control, mindful eating and sources of protein. Provided education on the Volumetrics, the Plate Method and meal replacements for pre-op weight loss.  Patient demonstrates understanding. Provided pt with list of goals RD contact information.      Questions Reviewed With Patient 1/21/2021   How ready are you to make changes regarding your weight? Number 1 = Not ready at all to make changes up to 10 = very ready. 10   How confident are you that you can change? 1 = Not confident that you will be successful making changes up to 10 = very confident. 10       Expected Engagement: good    GOALS:  Relating To Eating:  Consume 3 meals per day  Use the Plate Method to structure your plate   - Reduce rice portions, aim for 1 cup or less. Mix with 50% brown rice.   Avoid snacking between  Eat slowly (20-30 minutes per meal), chewing foods well (25 chews per bite/applesauce consistency)    Relating to beverages:  Take small, frequent sips of fluid all day long between meals  Separate fluids from meals by 30 minutes before, during, and after eating    Relating to dietary supplements:  Start daily multivitamin     Relating to activity:  Increase activity as able    The Plate Method  [email or web address]    Protein Sources for Weight Loss  [email or web address]     Carbohydrates  [email or web address]     Mindful Eating  [email or web address]     Summary of Volumetrics Eating  Plan  [email or web address]     Diet Guidelines after Weight Loss Surgery  [email or web address]     *Protein Shake Criteria: no more than 210 Calories, at least 20 grams of protein, and less than 10 grams of sugar     Meal Replacement Shake Options:   [facility] smoothie (160 Calories, 20 g protein)   Premier Protein (160 Calories, 30 g protein)  Slim Fast Advanced Nutrition (180 Calories, 20 g protein)  Muscle Milk, lactose-free, 17 oz bottle (210 Calories, 30 g protein)  Integrated Supplements, no artificial sugars (110 Calories, 20 g protein)  Genepro, unflavored protein powder (60 Calories, 30 g protein)    Meal Replacement Bar Options:  [facility] Protein Shake (160 Calories, 15 g protein)  Quest Protein Bars (190 Calories, 20 g protein)  Built Bar (170 Calories, 15-20 g protein)  One Protein Bar (210 calories, 20 g protein)  [place] Signature Protein Bar ([employer]co) (190 Calories, 21 g protein)  Pure Protein Bars (180 Calories, 21 g protein)    Frozen Meal Replacements  Healthy Choice  Lean Cuisine  Atkins Meals  Smart Ones    Angela Granger RD, LD

## 2021-01-28 ENCOUNTER — TELEPHONE (OUTPATIENT)
Dept: ENDOCRINOLOGY | Facility: CLINIC | Age: 36
End: 2021-01-28

## 2021-01-28 NOTE — TELEPHONE ENCOUNTER
Called [employer] to check if policy has bariatric surgery coverage, no one available to discuss benefits and call was disconnected.   Called patient to see if he had checked if there was coverage and he said there was.   I discussed the [employer] phone calls (5) which need to be completed as part of their criteria for bariatric surgery. States the best time for [employer] to connect with him would be after 3 p.m. [employer] referral submitted electronically today.

## 2021-02-23 ENCOUNTER — TELEPHONE (OUTPATIENT)
Dept: ENDOCRINOLOGY | Facility: CLINIC | Age: 36
End: 2021-02-23

## 2021-02-23 NOTE — TELEPHONE ENCOUNTER
Pt was not on [facility] at time of 1:30 pm visit. Called pt x 2. No answer. Left video visit open until 1:45 pm. Pt did not join. Provided pt with number to reschedule in voicemail and [employer] Message.    Angela Granger RD, LD

## 2021-10-18 ENCOUNTER — TELEPHONE (OUTPATIENT)
Dept: ENDOCRINOLOGY | Facility: CLINIC | Age: 36
End: 2021-10-18

## 2021-10-18 NOTE — TELEPHONE ENCOUNTER
Patient no show for today's video visit. Sent text with link to join. Called pt, no answer. Sent [employer] message, no response. Closed video after 15 mins. Provided pt with number to reschedule.     Angela Granger RD, LD

## 2021-10-19 ENCOUNTER — VIRTUAL VISIT (OUTPATIENT)
Dept: ENDOCRINOLOGY | Facility: CLINIC | Age: 36
End: 2021-10-19
Payer: COMMERCIAL

## 2021-10-19 DIAGNOSIS — E66.9 OBESITY: ICD-10-CM

## 2021-10-19 DIAGNOSIS — E66.01 MORBID OBESITY (H): ICD-10-CM

## 2021-10-19 DIAGNOSIS — Z71.3 NUTRITIONAL COUNSELING: Primary | ICD-10-CM

## 2021-10-19 PROCEDURE — 97803 MED NUTRITION INDIV SUBSEQ: CPT | Mod: GT | Performed by: DIETITIAN, REGISTERED

## 2021-10-19 NOTE — LETTER
"10/19/2021       RE: Bigg Montez  [address]  [place] [address]     Dear Colleague,    Thank you for referring your patient, Bigg Montez, to the [facility] [place] at [facility]. Please see a copy of my visit note below.    Bigg Montez is a 35 year old who is being evaluated via a billable video visit.      The patient has been notified of following:     \"This video visit will be conducted via a call between you and your physician/provider. We have found that certain health care needs can be provided without the need for an in-person physical exam.  This service lets us provide the care you need with a video conversation.  If a prescription is necessary we can send it directly to your pharmacy.  If lab work is needed we can place an order for that and you can then stop by our lab to have the test done at a later time.    Video visits are billed at different rates depending on your insurance coverage.  Please reach out to your insurance provider with any questions.    If during the course of the call the physician/provider feels a video visit is not appropriate, you will not be charged for this service.\"    Patient has given verbal consent for Video visit? Yes  How would you like to obtain your AVS? MyChart  If you are dropped from the video visit, the video invite should be resent to: Text to cell phone: [phone]  Will anyone else be joining your video visit? No        Video-Visit Details    Type of service:  Video Visit    Video Start Time: 8:32 AM  Video End Time: 8:59 AM    Originating Location (pt. Location): Home    Distant Location (provider location):  [facility] [place]     Platform used for Video Visit: [employer]    During this virtual visit the patient is located in [place], patient verifies this as the location during the entirety of this visit.     Bariatric Nutrition " "Consultation Note    Reason For Visit: Nutrition reassessment    Bigg Montez is a 35 year old presenting today for return bariatric nutrition consult.  Pt is interested in laparoscopic sleeve gastrectomy with TBD.  Patient is accompanied by self. This is pt's 2nd of 3 required nutrition visits prior to surgery.     Pt referred by Brianda Chan NP on January 25, 2021.  Patient with Co-morbidities of obesity including:  Type II DM no  Renal Failure no  Sleep apnea yes  Hypertension yes   Dyslipidemia no  Joint pain no  Back pain no  GERD no   Prediabetes yes    H/o gout. Pt reports having a blood clot in mid-sept.     Support System Reviewed With Patient 1/21/2021   Who do you have in your support network that can be available to help you for the first 2 weeks after surgery? My wife   Who can you count on for support throughout your weight loss surgery journey? Kenny ceja       ANTHROPOMETRICS:  Initial weight (12/2018 with PCP): 386 lbs    Estimated body mass index is 51.99 kg/m  as calculated from the following:    Height as of an earlier encounter on 1/26/21: 1.816 m (5' 11.5\").    Weight as of an earlier encounter on 1/26/21: 171.5 kg (378 lb).    Most recent weight check (pt reported, check in mid-Sept): 400 lbs     Required weight loss goal pre-op: 38 lbs from initial consult weight (goal weight 348 lbs or less before surgery)       1/21/2021   I have tried the following methods to lose weight Watching portions or calories, Exercise, Weight Watchers, Slimfast, Physician directed program       Weight Loss Questions Reviewed With Patient 1/21/2021   How long have you been overweight? Since puberty       SUPPLEMENT INFORMATION:  Has a daily MVI (Men's one a day with omega3) triggered gout flare, stopped taking.      NUTRITION HISTORY:  No known food allergies/intolerances    1/26/21 - Working with family member who is an RD - decreasing sugar intake, IF for 21 days (felt better while following). Avoid beef " and seafood, and limiting alcohol for gout. Eating a lot of chicken. Does not care for beans, yogurt, cottage cheese or tofu. Enjoys fried foods, but trying to avoid. Avoiding bread, but does eat a lot of rice (typically occupies most of plate). Using white rice.  Aiming for 30 grams protein per meal.  Typically having 2 meals per day. Meal are at 1pm and 6 pm.   Does not like sugar substitutes.    Today - Pt reports since experiencing blood clot and seeing wt went up to 400 lbs he has started making changes to diet again.  Focusing on less carbs, more water, increasing protein. Snacking on small portion mixed nuts if needed. Trying to stop eating before 7 pm. Eating 2-3 meals + 1 snack per day. Trying to eat breakfast more consistently. Had breakfast twice per week this last week.     Diet Recall:  B: breakfast taco from t-bell or skips; 2 apples    L (11:30 am): 2 cups chicken and veggies with 1.5 cup rice    PM snack: 1/2 cup mixed nuts  D  (5pm): fajitas - 3 flour tortillas + 2.5 cups meat + veggies; salad    Beverages: water (64 oz/day), occ soda       Progress Towards Previous Goals:  Relating To Eating:  Consume 3 meals per day - Improving  Use the Plate Method to structure your plate - Improving   - Reduce rice portions, aim for 1 cup or less. Mix with 50% brown rice.   Avoid snacking between - Improving  Eat slowly (20-30 minutes per meal), chewing foods well (25 chews per bite/applesauce consistency) - Improving     Relating to beverages:  Take small, frequent sips of fluid all day long between meals - Improving  Separate fluids from meals by 30 minutes before, during, and after eating - Not met    Relating to dietary supplements:  Start daily multivitamin - Not met    Relating to activity:  Increase activity as able  Very busy with work, and 4 kids, often feeling exhausted. Difficult at this time to work in increased physical activity.     Recall Diet Questions Reviewed With Patient 1/21/2021   Describe  what you typically consume for breakfast (typical or most recent): Frosted flakes; steel cut oats with 1 tbsp brown sugar    Describe what you typically consume for lunch (typical or most recent): Burger   Describe what you typically consume for supper (typical or most recent): Chili    Describe what you typically consume as snacks (typical or most recent): Sweets or cookies   How many ounces of water, or other low calorie drinks, do you drink daily (8 oz=1 glass)?  oz (water)   How many ounces of caffeine (coffee, tea, pop) do you drink daily (8 oz=1 glass)? 24 oz (coffee - black mostly, occ latte)    How many ounces of juice, pop, sweet tea, sports drinks, protein drinks, other sweetened drinks, do you drink daily (8 oz=1 glass)? 16 oz (occ arnold gorman 2-3 times per week)   How many ounces of milk do you drink daily (8 oz=1 glass) 8 oz   Please indicate the type of milk: 2%   How often do you drink alcohol? 2-4 times a month (2-3 bourbon on Sunday)   If you do drink alcohol, how many drinks might you have in a day? (one drink = 5 oz. wine, 1 can/bottle of beer, 1 shot liquor) 3 or 4       Eating Habits 1/21/2021   Do you have any dietary restrictions? No   Do you currently binge eat (eat a large amount of food in a short time)? Yes   Are you an emotional eater? Yes - usually when stressed out/off schedule, fast-food   Do you get up to eat after falling asleep? No   What foods do you crave? Sandwiches       ADDITIONAL INFORMATION:  Works as a .     Dining Out History Reviewed With Patient 1/21/2021   How often do you dine out? Nearly every day.   Where do you dine out? (select all that apply) fast food chains, take out   What types of food do you order when you dine out? Protien rice dishes       Physical Activity Reviewed With Patient 1/21/2021   How often do you exercise? 1 to 2 times per week   What is the duration of your exercise (in minutes)? 60+ Minutes   What types of exercise do you  do? gym membership, weightlifting   What keeps you from being more active?  Lack of Time         NUTRITION DIAGNOSIS:  Obesity r/t long history of self-monitoring deficit and excessive energy intake aeb BMI >30 kg/m2. - continues    INTERVENTION:  Intervention Provided/Education Provided:  - Reviewed post-op diet guidelines, vitamins/minerals essential post-operatively, ways to help prepare for post-op diet guidelines pre-operatively, portion/calorie-control, mindful eating and sources of protein.   - Praised pt on the changes he has re-focused on this past month. Encouraged continued progress towards changes.   - Discussed meal planning strategy for breakfast and lunch.   Patient demonstrates understanding. Provided pt with list of goals RD contact information.      Questions Reviewed With Patient 1/21/2021   How ready are you to make changes regarding your weight? Number 1 = Not ready at all to make changes up to 10 = very ready. 10   How confident are you that you can change? 1 = Not confident that you will be successful making changes up to 10 = very confident. 10       Expected Engagement: good    GOALS:  Relating To Eating:  Consume 3 meals per day   - May use protein shake/bar as meal replacement  Use the Plate Method to structure your meals   - Reduce rice portions, aim for 1 cup or less. Mix with 50% brown rice.     - Fill remainder of plate with lean protein and non-starchy veggies.   Avoid snacking between meals as able.   Eat slowly (20-30 minutes per meal), chewing foods well (25 chews per bite/applesauce consistency)    Relating to beverages:  Take small, frequent sips of fluid all day long between meals  Separate fluids from meals by 30 minutes before, during, and after eating    Relating to activity:  Increase activity as able    The Plate Method  [email or web address]    Protein Sources for Weight Loss  [email or web address]     Carbohydrates  [email or web address]      Mindful Eating  [email or web address]     Summary of Volumetrics Eating Plan  [email or web address]     Diet Guidelines after Weight Loss Surgery  [email or web address]     Meal Replacement Shake Options:   *Protein Shake Criteria: no more than 210 Calories, at least 20 grams of protein, and less than 10 grams of sugar   [facility] smoothie (160 Calories, 20 g protein)   Premier Protein (160 Calories, 30 g protein)  Slim Fast Advanced Nutrition (180 Calories, 20 g protein)  Muscle Milk, lactose-free, 17 oz bottle (210 Calories, 30 g protein)  Integrated Supplements, no artificial sugars (110 Calories, 20 g protein)  Genepro, unflavored protein powder (60 Calories, 30 g protein)  Boost/Ensure Max (160 calories, 30 gm protein)   [place] Core Power (170 calories, 26 gm protein)    Meal Replacement Bar Options:  [facility] Protein Shake (160 Calories, 15 g protein)  Quest Protein Bars (190 Calories, 20 g protein)  Built Bar (170 Calories, 15-20 g protein)  One Protein Bar (210 calories, 20 g protein)  [place] Signature Protein Bar ([employer]) (190 Calories, 21 g protein)  Pure Protein Bars (180 Calories, 21 g protein)      Angela Granger RD, LD

## 2021-10-19 NOTE — PROGRESS NOTES
"Bigg Montez is a 35 year old who is being evaluated via a billable video visit.      The patient has been notified of following:     \"This video visit will be conducted via a call between you and your physician/provider. We have found that certain health care needs can be provided without the need for an in-person physical exam.  This service lets us provide the care you need with a video conversation.  If a prescription is necessary we can send it directly to your pharmacy.  If lab work is needed we can place an order for that and you can then stop by our lab to have the test done at a later time.    Video visits are billed at different rates depending on your insurance coverage.  Please reach out to your insurance provider with any questions.    If during the course of the call the physician/provider feels a video visit is not appropriate, you will not be charged for this service.\"    Patient has given verbal consent for Video visit? Yes  How would you like to obtain your AVS? MyChart  If you are dropped from the video visit, the video invite should be resent to: Text to cell phone: [phone]  Will anyone else be joining your video visit? No        Video-Visit Details    Type of service:  Video Visit    Video Start Time: 8:32 AM  Video End Time: 8:59 AM    Originating Location (pt. Location): Home    Distant Location (provider location):  [facility] [place]     Platform used for Video Visit: The [employer]    During this virtual visit the patient is located in [place], patient verifies this as the location during the entirety of this visit.     Bariatric Nutrition Consultation Note    Reason For Visit: Nutrition reassessment    Bigg Montez is a 35 year old presenting today for return bariatric nutrition consult.  Pt is interested in laparoscopic sleeve gastrectomy with TBD.  Patient is accompanied by self. This is pt's 2nd of 3 required nutrition visits prior to surgery.     Pt " "referred by Brianda Chan NP on January 25, 2021.  Patient with Co-morbidities of obesity including:  Type II DM no  Renal Failure no  Sleep apnea yes  Hypertension yes   Dyslipidemia no  Joint pain no  Back pain no  GERD no   Prediabetes yes    H/o gout. Pt reports having a blood clot in mid-sept.     Support System Reviewed With Patient 1/21/2021   Who do you have in your support network that can be available to help you for the first 2 weeks after surgery? My wife   Who can you count on for support throughout your weight loss surgery journey? Kenny ceja       ANTHROPOMETRICS:  Initial weight (12/2018 with PCP): 386 lbs    Estimated body mass index is 51.99 kg/m  as calculated from the following:    Height as of an earlier encounter on 1/26/21: 1.816 m (5' 11.5\").    Weight as of an earlier encounter on 1/26/21: 171.5 kg (378 lb).    Most recent weight check (pt reported, check in mid-Sept): 400 lbs     Required weight loss goal pre-op: 38 lbs from initial consult weight (goal weight 348 lbs or less before surgery)       1/21/2021   I have tried the following methods to lose weight Watching portions or calories, Exercise, Weight Watchers, Slimfast, Physician directed program       Weight Loss Questions Reviewed With Patient 1/21/2021   How long have you been overweight? Since puberty       SUPPLEMENT INFORMATION:  Has a daily MVI (Men's one a day with omega3) triggered gout flare, stopped taking.      NUTRITION HISTORY:  No known food allergies/intolerances    1/26/21 - Working with family member who is an RD - decreasing sugar intake, IF for 21 days (felt better while following). Avoid beef and seafood, and limiting alcohol for gout. Eating a lot of chicken. Does not care for beans, yogurt, cottage cheese or tofu. Enjoys fried foods, but trying to avoid. Avoiding bread, but does eat a lot of rice (typically occupies most of plate). Using white rice.  Aiming for 30 grams protein per meal.  Typically having 2 " meals per day. Meal are at 1pm and 6 pm.   Does not like sugar substitutes.    Today - Pt reports since experiencing blood clot and seeing wt went up to 400 lbs he has started making changes to diet again.  Focusing on less carbs, more water, increasing protein. Snacking on small portion mixed nuts if needed. Trying to stop eating before 7 pm. Eating 2-3 meals + 1 snack per day. Trying to eat breakfast more consistently. Had breakfast twice per week this last week.     Diet Recall:  B: breakfast taco from t-bell or skips; 2 apples    L (11:30 am): 2 cups chicken and veggies with 1.5 cup rice    PM snack: 1/2 cup mixed nuts  D  (5pm): fajitas - 3 flour tortillas + 2.5 cups meat + veggies; salad    Beverages: water (64 oz/day), occ soda       Progress Towards Previous Goals:  Relating To Eating:  Consume 3 meals per day - Improving  Use the Plate Method to structure your plate - Improving   - Reduce rice portions, aim for 1 cup or less. Mix with 50% brown rice.   Avoid snacking between - Improving  Eat slowly (20-30 minutes per meal), chewing foods well (25 chews per bite/applesauce consistency) - Improving     Relating to beverages:  Take small, frequent sips of fluid all day long between meals - Improving  Separate fluids from meals by 30 minutes before, during, and after eating - Not met    Relating to dietary supplements:  Start daily multivitamin - Not met    Relating to activity:  Increase activity as able  Very busy with work, and 4 kids, often feeling exhausted. Difficult at this time to work in increased physical activity.     Recall Diet Questions Reviewed With Patient 1/21/2021   Describe what you typically consume for breakfast (typical or most recent): Frosted flakes; steel cut oats with 1 tbsp brown sugar    Describe what you typically consume for lunch (typical or most recent): Rich   Describe what you typically consume for supper (typical or most recent): Chili    Describe what you typically consume  as snacks (typical or most recent): Sweets or cookies   How many ounces of water, or other low calorie drinks, do you drink daily (8 oz=1 glass)?  oz (water)   How many ounces of caffeine (coffee, tea, pop) do you drink daily (8 oz=1 glass)? 24 oz (coffee - black mostly, occ latte)    How many ounces of juice, pop, sweet tea, sports drinks, protein drinks, other sweetened drinks, do you drink daily (8 oz=1 glass)? 16 oz (occ arnold gorman 2-3 times per week)   How many ounces of milk do you drink daily (8 oz=1 glass) 8 oz   Please indicate the type of milk: 2%   How often do you drink alcohol? 2-4 times a month (2-3 bourbon on Sunday)   If you do drink alcohol, how many drinks might you have in a day? (one drink = 5 oz. wine, 1 can/bottle of beer, 1 shot liquor) 3 or 4       Eating Habits 1/21/2021   Do you have any dietary restrictions? No   Do you currently binge eat (eat a large amount of food in a short time)? Yes   Are you an emotional eater? Yes - usually when stressed out/off schedule, fast-food   Do you get up to eat after falling asleep? No   What foods do you crave? Sandwiches       ADDITIONAL INFORMATION:  Works as a .     Dining Out History Reviewed With Patient 1/21/2021   How often do you dine out? Nearly every day.   Where do you dine out? (select all that apply) fast food chains, take out   What types of food do you order when you dine out? Protien rice dishes       Physical Activity Reviewed With Patient 1/21/2021   How often do you exercise? 1 to 2 times per week   What is the duration of your exercise (in minutes)? 60+ Minutes   What types of exercise do you do? gym membership, weightlifting   What keeps you from being more active?  Lack of Time         NUTRITION DIAGNOSIS:  Obesity r/t long history of self-monitoring deficit and excessive energy intake aeb BMI >30 kg/m2. - continues    INTERVENTION:  Intervention Provided/Education Provided:  - Reviewed post-op diet  guidelines, vitamins/minerals essential post-operatively, ways to help prepare for post-op diet guidelines pre-operatively, portion/calorie-control, mindful eating and sources of protein.   - Praised pt on the changes he has re-focused on this past month. Encouraged continued progress towards changes.   - Discussed meal planning strategy for breakfast and lunch.   Patient demonstrates understanding. Provided pt with list of goals RD contact information.      Questions Reviewed With Patient 1/21/2021   How ready are you to make changes regarding your weight? Number 1 = Not ready at all to make changes up to 10 = very ready. 10   How confident are you that you can change? 1 = Not confident that you will be successful making changes up to 10 = very confident. 10       Expected Engagement: good    GOALS:  Relating To Eating:  Consume 3 meals per day   - May use protein shake/bar as meal replacement  Use the Plate Method to structure your meals   - Reduce rice portions, aim for 1 cup or less. Mix with 50% brown rice.     - Fill remainder of plate with lean protein and non-starchy veggies.   Avoid snacking between meals as able.   Eat slowly (20-30 minutes per meal), chewing foods well (25 chews per bite/applesauce consistency)    Relating to beverages:  Take small, frequent sips of fluid all day long between meals  Separate fluids from meals by 30 minutes before, during, and after eating    Relating to activity:  Increase activity as able    The Plate Method  [email or web address]    Protein Sources for Weight Loss  [email or web address]     Carbohydrates  [email or web address]     Mindful Eating  [email or web address]     Summary of Volumetrics Eating Plan  [email or web address]     Diet Guidelines after Weight Loss Surgery  [email or web address]     Meal Replacement Shake Options:   *Protein Shake Criteria: no more than 210 Calories, at least 20 grams of protein,  and less than 10 grams of sugar   [facility] smoothie (160 Calories, 20 g protein)   Premier Protein (160 Calories, 30 g protein)  Slim Fast Advanced Nutrition (180 Calories, 20 g protein)  Muscle Milk, lactose-free, 17 oz bottle (210 Calories, 30 g protein)  Integrated Supplements, no artificial sugars (110 Calories, 20 g protein)  Genepro, unflavored protein powder (60 Calories, 30 g protein)  Boost/Ensure Max (160 calories, 30 gm protein)   Eagle Alpha Core Power (170 calories, 26 gm protein)    Meal Replacement Bar Options:  [facility] Protein Shake (160 Calories, 15 g protein)  Quest Protein Bars (190 Calories, 20 g protein)  Built Bar (170 Calories, 15-20 g protein)  One Protein Bar (210 calories, 20 g protein)  Silva Signature Protein Bar ([employer]) (190 Calories, 21 g protein)  Pure Protein Bars (180 Calories, 21 g protein)      Angela Granger RD, LD

## 2021-10-19 NOTE — PATIENT INSTRUCTIONS
Paul Sextno,    Follow-up with RD in 1 month.     Thank you,    Angela Granger, RD, LD  If you would like to schedule or reschedule an appointment with the RD, please call [phone]    Nutrition Goals  Relating To Eating:  Consume 3 meals per day   - May use protein shake/bar as meal replacement  Use the Plate Method to structure your meals   - Reduce rice portions, aim for 1 cup or less. Mix with 50% brown rice.     - Fill remainder of plate with lean protein and non-starchy veggies.   Avoid snacking between meals as able.   Eat slowly (20-30 minutes per meal), chewing foods well (25 chews per bite/applesauce consistency)    Relating to beverages:  Take small, frequent sips of fluid all day long between meals  Separate fluids from meals by 30 minutes before, during, and after eating    Relating to activity:  Increase activity as able    The Plate Method  [email or web address]    Protein Sources for Weight Loss  [email or web address]     Carbohydrates  [email or web address]     Mindful Eating  [email or web address]     Summary of Volumetrics Eating Plan  [email or web address]     Diet Guidelines after Weight Loss Surgery  [email or web address]     Meal Replacement Shake Options:   *Protein Shake Criteria: no more than 210 Calories, at least 20 grams of protein, and less than 10 grams of sugar   [facility] smoothie (160 Calories, 20 g protein)   Premier Protein (160 Calories, 30 g protein)  Slim Fast Advanced Nutrition (180 Calories, 20 g protein)  Muscle Milk, lactose-free, 17 oz bottle (210 Calories, 30 g protein)  Integrated Supplements, no artificial sugars (110 Calories, 20 g protein)  Genepro, unflavored protein powder (60 Calories, 30 g protein)  Boost/Ensure Max (160 calories, 30 gm protein)   Fairlife Core Power (170 calories, 26 gm protein)    Meal Replacement Bar Options:  [facility] Protein Shake (160 Calories, 15 g protein)  Quest Protein Bars (190  Calories, 20 g protein)  Built Bar (170 Calories, 15-20 g protein)  One Protein Bar (210 calories, 20 g protein)  Silva Signature Protein Bar ([employer]) (190 Calories, 21 g protein)  Pure Protein Bars (180 Calories, 21 g protein)      Interested in working with a health ?  Health coaches work with you to improve your overall health and wellbeing.  They look at the whole person, and may involve discussion of different areas of life, including, but not limited to the four pillars of health (sleep, exercise, nutrition, and stress management). Discuss with your care team if you would like to start working a health .    Health Coaching-3 Pack:    $99 for three health coaching visits    Visits may be done in person or via phone    Coaching is a partnership between the  and the client; Coaches do not prescribe or diagnose    Coaching helps inspire the client to reach his/her personal goals      COMPREHENSIVE WEIGHT MANAGEMENT PROGRAM  VIRTUAL SUPPORT GROUPS    For Support Group Information:      We offer support groups for patients who are working on weight loss and considering, preparing for or have had weight loss surgery.   There is no cost for this opportunity.  You are invited to attend the?Virtual Support Groups?provided by any of the following locations:    1. [facility] via Microsoft Teams with Cynthia Mcacll RN  2.   [place] via [employer] with Terry Nix, PhD, LP  3.   [place] via [employer] with Paola Franks RN  4.   [facility] via Microsoft Teams with Paola Kwok [facility]    The following Support Group information can also be found on our website:  [email or web address]      [facility] Weight Loss Surgery Support Group    [facility] Weight Loss Surgery Support Group  The support group is a patient-lead forum that meets monthly to share experiences, encouragement and education. It is open to  "those who have had weight loss surgery, are scheduled for surgery, and those who are considering surgery.   WHEN: This group meets on the 3rd Wednesday of each month from 5:00PM - 6:00PM virtually using Microsoft Teams.   FACILITATOR: Led by Cynthia Mccall, the program's Clinical Coordinator.   TO REGISTER: Please contact the clinic via [employer] D[employer] by Chantilly Lace or call the nurse line directly at [phone] to inform our staff that you would like an invite sent to you and to let us know the email you would like the invite sent to. Prior to the meeting, a link with directions on how to join the meeting will be sent to you.    2021 Meetings  August 18: \"Let's Talk\" a time for the group to share.  September 15: \"Let's Talk\" a time for the group to share.  October 20: \"Let's Talk\" a time for the group to share.  November 17: Jacey Calvert RD, HELEN \"Protein, Metabolism and Meal Planning\"  December 15: Gonzalo Sewell RD, LD will speak, \"Recipe Modification\"    [facility] Support Groups    Connections: Bariatric Care Support Group?  This is open to all [facility] (and those external to this program) pre- and post- operative bariatric surgery patients as well as their support system.   WHEN: This group meets the 2nd Tuesday of each month from 6:30 PM - 8:00 PM virtually using Microsoft Teams.   FACILITATOR: Led by Terry Nix, Ph.D who is a Licensed Psychologist with the [facility] Comprehensive Weight Management Program.   TO REGISTER: Please send an email to Terry Nix, Ph.D., LP at?[email or web address]?if you would like an invitation to the group and to learn about using Microsoft Teams.    2021 Meetings  August 10: Open Forum  September 14: Guest Speaker: Paola Franks, RN,CBN, CIC, [facility] Comprehensive Weight Management Program, \"Your Hospital Stay and Post-Operative Compliance\"  October 12: Open Forum  November 9: Guest Speaker: Shalonda Briscoe RD, LD, [facility] " "[facility] Comprehensive Weight Management Program,\"Holiday Eating\".  December 14: Guest Speaker Elva Garvin MD, MPH, [facility], [facility], \"Body Contouring Surgery for Bariatric Surgery Patients\"     Connections: Post-Operative Bariatric Surgery Support Group  This is a support group for [facility] bariatric patients (and those external to [facility]) who have had bariatric surgery and are at least 3 months post-surgery.  WHEN: This support group meets the 4th Wednesday of the month from 11:00 AM - 12:00 PM virtually using Microsoft Teams.   FACILITATOR: Led by Certified Bariatric Nurse, Paola Franks RN.   TO REGISTER: Please send an email to Paola at [email or web address] if you would like an invitation to the group and to learn about using Myfacepage.      [facility] Healthy Lifestyle Virtual Support Group    Healthy Lifestyle Virtual Support Group?  This is 60 minutes of small group guided discussion, support and resources. All are welcome who want a healthy lifestyle.  WHEN: This group meets monthly on a Friday from 12:30 PM - to 1:30 PM virtually using Microsoft Teams.   FACILITATOR: Led by National Board Certified Health , Paola Kwok [facility].   TO REGISTER: Please send an email to Paola [email or web address] to receive monthly invites to the group or if you have any questions about having a health .  Prior to the meeting, a link with directions on how to join the meeting will be sent to you.    2021 Meetings  August 27: Open Forum  September 24: Sleep and the 7 Types of Rest  October 29: Open Forum  November 19: Gratitude     December 10: Open Forum                            "

## 2021-11-05 ENCOUNTER — TELEPHONE (OUTPATIENT)
Dept: ENDOCRINOLOGY | Facility: CLINIC | Age: 36
End: 2021-11-05

## 2021-11-05 NOTE — TELEPHONE ENCOUNTER
LVM that 11/16 video appt with Brianda Chan needs rescheduling due to provider unavailable at that time. R/S to next available with Brianda or any of the [facility] providers. Sent mychart and mailed letter.

## 2021-11-16 ENCOUNTER — TELEPHONE (OUTPATIENT)
Dept: ENDOCRINOLOGY | Facility: CLINIC | Age: 36
End: 2021-11-16

## 2021-11-16 NOTE — TELEPHONE ENCOUNTER
Patient no show for today's video visit. Sent text with link to join. Called pt, no answer. Sent [employer]ir message, no response. Closed video after 15 mins. Provided pt with number to reschedule.     Angela Granger RD, LD

## 2021-12-14 NOTE — PROGRESS NOTES
"Bigg Montez is a 35 year old who is being evaluated via a billable video visit.      The patient has been notified of following:     \"This video visit will be conducted via a call between you and your physician/provider. We have found that certain health care needs can be provided without the need for an in-person physical exam.  This service lets us provide the care you need with a video conversation.  If a prescription is necessary we can send it directly to your pharmacy.  If lab work is needed we can place an order for that and you can then stop by our lab to have the test done at a later time.    Video visits are billed at different rates depending on your insurance coverage.  Please reach out to your insurance provider with any questions.    If during the course of the call the physician/provider feels a video visit is not appropriate, you will not be charged for this service.\"    Patient has given verbal consent for Video visit? Yes  How would you like to obtain your AVS? MyChart  If you are dropped from the video visit, the video invite should be resent to: Text to cell phone: [phone]  Will anyone else be joining your video visit? No        Video-Visit Details    Type of service:  Video Visit    Video Start Time: 10:00 AM   Video End Time: 10:23  AM    Originating Location (pt. Location): Home    Distant Location (provider location):  [facility] [place]     Platform used for Video Visit: [employer]    During this virtual visit the patient is located in [place], patient verifies this as the location during the entirety of this visit.     Bariatric Nutrition Consultation Note    Reason For Visit: Nutrition reassessment    Bigg Montez is a 35 year old presenting today for return bariatric nutrition consult.  Pt is interested in laparoscopic sleeve gastrectomy with TBD.  Patient is accompanied by self. This is pt's 3rd of 3 required nutrition visits prior to surgery. " "Recommending monthly follow-up until surgery for continued weight loss and post-op diet education.     Pt referred by Brianda Chan NP on January 25, 2021.  Patient with Co-morbidities of obesity including:  Type II DM no  Renal Failure no  Sleep apnea yes  Hypertension yes   Dyslipidemia no  Joint pain no  Back pain no  GERD no   Prediabetes yes    H/o gout. Pt reports having a blood clot in mid-sept.     Support System Reviewed With Patient 1/21/2021   Who do you have in your support network that can be available to help you for the first 2 weeks after surgery? My wife   Who can you count on for support throughout your weight loss surgery journey? Kenny ceja       ANTHROPOMETRICS:  Initial weight (12/2018 with PCP): 386 lbs    Estimated body mass index is 51.99 kg/m  as calculated from the following:    Height as of an earlier encounter on 1/26/21: 1.816 m (5' 11.5\").    Weight as of an earlier encounter on 1/26/21: 171.5 kg (378 lb).    Most recent weight:  Estimated body mass index is 55.7 kg/m  as calculated from the following:    Height as of an earlier encounter on 12/16/21: 1.816 m (5' 11.5\").    Weight as of an earlier encounter on 12/16/21: 183.7 kg (405 lb).      Required weight loss goal pre-op: 38 lbs from initial consult weight (goal weight 348 lbs or less before surgery)       1/21/2021   I have tried the following methods to lose weight Watching portions or calories, Exercise, Weight Watchers, Slimfast, Physician directed program       Weight Loss Questions Reviewed With Patient 1/21/2021   How long have you been overweight? Since puberty       SUPPLEMENT INFORMATION:  Has a daily MVI (Men's one a day with omega3) triggered gout flare, stopped taking.      NUTRITION HISTORY:  No known food allergies/intolerances    1/26/21 - Working with family member who is an RD - decreasing sugar intake, IF for 21 days (felt better while following). Avoid beef and seafood, and limiting alcohol for gout. Eating a " lot of chicken. Does not care for yogurt, cottage cheese or tofu. Enjoys fried foods, but trying to avoid. Avoiding bread, but does eat a lot of rice (typically occupies most of plate). Using white rice.  Aiming for 30 grams protein per meal.  Typically having 2 meals per day. Meal are at 1pm and 6 pm.   Does not like sugar substitutes.    10/19/21 - Pt reports since experiencing blood clot and seeing wt went up to 400 lbs he has started making changes to diet again.  Focusing on less carbs, more water, increasing protein. Snacking on small portion mixed nuts if needed. Trying to stop eating before 7 pm. Eating 2-3 meals + 1 snack per day. Trying to eat breakfast more consistently. Had breakfast twice per week this last week.     Today - Pt states he is feeling more motivated this month than ever. Less overall stress, helps him be able to focus on himself. Wants to focus on less fried foods, bringing lunch to work instead of eating out. States gout has been under control, thinking about re-introducing beef.     Diet Recall:  B: protein shake  L (11:30 am): eating out more  PM snack: 1/2 cup mixed nuts  D  (5pm): chicken; rosen beans.   Beverages: water (64 oz/day), occ soda       Progress Towards Previous Goals:  Relating To Eating:  Consume 3 meals per day - Met, continues   - May use protein shake/bar as meal replacement  Use the Plate Method to structure your meals   - Reduce rice portions, aim for 1 cup or less. Mix with 50% brown rice.     - Fill remainder of plate with lean protein and non-starchy veggies.   Avoid snacking between meals as able. - None lately d/t busy schedule. Picks kids up after work, has dinner with family, wife goes to work afterwards, and pt is responsible for getting kids to sleep.   Eat slowly (20-30 minutes per meal), chewing foods well (25 chews per bite/applesauce consistency) - Improving, checking in with himself for fullness.    Relating to beverages:  Take small, frequent sips of  fluid all day long between meals - Challenging at times, doesn't want to go to the bathroom.   Separate fluids from meals by 30 minutes before, during, and after eating - States he needs to get back to practicing this    Relating to activity:  Increase activity as able - Less this month. Injured knee a couple weeks ago.    Eating Habits 1/21/2021   Do you have any dietary restrictions? No   Do you currently binge eat (eat a large amount of food in a short time)? Yes   Are you an emotional eater? Yes - usually when stressed out/off schedule, fast-food   Do you get up to eat after falling asleep? No   What foods do you crave? Sandwiches       ADDITIONAL INFORMATION:  Works as a . Has plans to start working for himself in 2022 to be able to have more time to himself and work on health goals.  Pt goal to improve knee and heart health.    Dining Out History Reviewed With Patient 1/21/2021   How often do you dine out? Nearly every day.   Where do you dine out? (select all that apply) fast food chains, take out   What types of food do you order when you dine out? Protien rice dishes       Physical Activity Reviewed With Patient 1/21/2021   How often do you exercise? 1 to 2 times per week   What is the duration of your exercise (in minutes)? 60+ Minutes   What types of exercise do you do? gym membership, weightlifting   What keeps you from being more active?  Lack of Time         NUTRITION DIAGNOSIS:  Obesity r/t long history of self-monitoring deficit and excessive energy intake aeb BMI >30 kg/m2. - continues    INTERVENTION:  Intervention Provided/Education Provided:  - Reviewed post-op diet guidelines, ways to help prepare for post-op diet guidelines pre-operatively, portion/calorie-control, mindful eating and sources of lean protein.   - Discussed strategy for portion control - measuring out less than would historically, eating slowly, planning meals ahead of time.   - Discussed meal planning strategy for  lunch. Provided pt with recipe resources via [email or web address] message.    - Co-developed goals to work towards using motivational interviewing  Patient demonstrates understanding. Provided pt with list of goals RD contact information.      Questions Reviewed With Patient 1/21/2021   How ready are you to make changes regarding your weight? Number 1 = Not ready at all to make changes up to 10 = very ready. 10   How confident are you that you can change? 1 = Not confident that you will be successful making changes up to 10 = very confident. 10       Expected Engagement: good    GOALS:  1. Avoid fried foods - try baked, grilled, poached, steamed, sauteed instead.  2. Bring lunch to work from home - E.g. sandwich and fruit  3. Eat slowly (20-30 minutes per meal), chewing foods well (25 chews per bite/applesauce consistency)  4. Portion out less than you normally would. Try serving 50% of what you would historically.     Relating to beverages:  1. Take small, frequent sips of fluid all day long between meals  2. Separate fluids from meals by 30 minutes before, during, and after eating      Diet Guidelines after Weight-loss Surgery  [email or web address]     Portion Sizes after Weight Loss Surgery  [email or web address]    Your Stage 1 Diet: Clear Liquids  [email or web address]     Your Stage 2 Diet: Low-fat Full Liquids  [email or web address]     Your Stage 3 Diet: Pureed Foods  [email or web address]     Pureed Pleasures  [email or web address]    Your Stage 4 Diet: Soft Foods  [email or web address]    Your Stage 5 Diet: Regular Foods  [email or web address]    Keeping Track of Your Fluids  [email or web address]    Supplements after Weight Loss Surgery  [email or web address]        Angela Granger RD, LD

## 2021-12-15 NOTE — PROGRESS NOTES
During this virtual visit the patient is located in [place], patient verifies this as the location during the entirety of this visit.     Darshan is a 36 year old who is being evaluated via a billable video visit.      How would you like to obtain your AVS? MyChart  If the video visit is dropped, the invitation should be resent by: Send to e-mail at: [email or web address]  Will anyone else be joining your video visit? No      Video Start Time: 0813  Video-Visit Details    Type of service:  Video Visit    Video End Time:0837    Originating Location (pt. Location): Home    Distant Location (provider location):  [facility] [place]     Platform used for Video Visit: Harshil Horton NREMT

## 2021-12-16 ENCOUNTER — VIRTUAL VISIT (OUTPATIENT)
Dept: ENDOCRINOLOGY | Facility: CLINIC | Age: 36
End: 2021-12-16
Payer: COMMERCIAL

## 2021-12-16 ENCOUNTER — CARE COORDINATION (OUTPATIENT)
Dept: ENDOCRINOLOGY | Facility: CLINIC | Age: 36
End: 2021-12-16

## 2021-12-16 VITALS — HEIGHT: 72 IN | BODY MASS INDEX: 42.66 KG/M2 | WEIGHT: 315 LBS

## 2021-12-16 DIAGNOSIS — E66.9 OBESITY: ICD-10-CM

## 2021-12-16 DIAGNOSIS — E66.01 CLASS 3 SEVERE OBESITY DUE TO EXCESS CALORIES WITH SERIOUS COMORBIDITY AND BODY MASS INDEX (BMI) OF 50.0 TO 59.9 IN ADULT (H): Primary | ICD-10-CM

## 2021-12-16 DIAGNOSIS — E66.813 CLASS 3 SEVERE OBESITY DUE TO EXCESS CALORIES WITH SERIOUS COMORBIDITY AND BODY MASS INDEX (BMI) OF 50.0 TO 59.9 IN ADULT (H): Primary | ICD-10-CM

## 2021-12-16 DIAGNOSIS — Z71.3 NUTRITIONAL COUNSELING: Primary | ICD-10-CM

## 2021-12-16 PROCEDURE — 97803 MED NUTRITION INDIV SUBSEQ: CPT | Mod: GT | Performed by: DIETITIAN, REGISTERED

## 2021-12-16 PROCEDURE — 99215 OFFICE O/P EST HI 40 MIN: CPT | Mod: GT | Performed by: NURSE PRACTITIONER

## 2021-12-16 ASSESSMENT — MIFFLIN-ST. JEOR: SCORE: 2797.13

## 2021-12-16 NOTE — LETTER
2021       RE: Bigg Montez  [address]  [place] [address]     Dear Colleague,    Thank you for referring your patient, Bigg Montez, to the [facility] [place] [facility]. Please see a copy of my visit note below.    During this virtual visit the patient is located in [place], patient verifies this as the location during the entirety of this visit.     Darshan is a 36 year old who is being evaluated via a billable video visit.      How would you like to obtain your AVS? MyChart  If the video visit is dropped, the invitation should be resent by: Send to e-mail at: [email or web address]  Will anyone else be joining your video visit? No      Video Start Time: 813  Video-Visit Details    Type of service:  Video Visit    Video End Time:837    Originating Location (pt. Location): Home    Distant Location (provider location):  [facility]     Platform used for Video Visit: [employer]arshil Horton NREMT          Pre-Bariatric Surgery Note    Wegener, Joel Daniel Irwin    Date: 2021     RE: Bigg Montez    MR#: [id number]   : 1985   Date of Visit: Dec 16, 2021    REASON FOR VISIT: Preoperative evaluation for possible weight loss surgery    Dear Dr. Wegener, Joel Daniel Irwin,    I had the pleasure of seeing your patient, Bigg Montez, in my preoperative bariatric clinic.    As you know, he is morbidly obese and considering weight loss surgery to treat obesity in association with his medical conditions of obesity.  His consult weight was 378.  He has gained 27 pounds since his consult weight. He has not met his required pre-surgery weight. Please refer to initial consult note from date 2021 for patient's weight history and co-morbidities.    Deferred medications at consult. Wt 2021 402lb   Gout-allopurinol   Acute DVT hx 2021 - was prescribed  mayuriquis but caused fatigue so stopped. Repeat ultrasound ordered, Hem/Onc consult scheduled 12/20     Purchased first home in march. Was focused mostly on that through September.     Busy schedule     Craves fried food- emotional attachment. Difficult to not snack on whatever he is making his 2yo. Protein shake for breakfast. Lunch - purchases something. Dinner at home- eating out more in the last 6/7 months from the move- habit now.       Assessment & Plan   Problem List Items Addressed This Visit        Digestive    Class 3 severe obesity due to excess calories with serious comorbidity and body mass index (BMI) of 50.0 to 59.9 in adult (H) - Primary     27lb weight gain since consult 1/2021. Lots of good stress in the last year but this has distracted him from focusing on weight and prep for surgery. Feels like he can now focus on this. Would be interested in discussing AOM at this time as well.     Describes difficulty avoiding snacking when food is around- especially when he is feeding 2yo. Describes strong cravings and consuming larger portions of food. Has been eating out a lot more recently due to moving homes. Does have some hunger between meals. Prediabetes with A1C most recently (1/2021). Would like to recheck this. Would like to consider ozempic or wegovy to help with insulin resistance in addition to weight loss. Patient agrees. No hx pancreatitis or fam hx medullary thyroid cancer. No reflux.     New DVT September 2021- has follow up next week with hematology. Will add hematology clearance for surgery. His gout was uncontrolled at that time as well but now, no flare in 2 months with compliance with allopurinol.     Plan:  Labs soon  GLP1 start   Lauren Bloch MTM Pharmacist 6-8 weeks  Follow up with me 3 months   Dietitian monthly   [facility] health coaching            Relevant Orders    CBC with platelets    Comprehensive metabolic panel    Hemoglobin A1c    Parathyroid Hormone Intact    Vitamin  "D Deficiency    Med Therapy Management Referral           Review of external notes as documented above     43 minutes spent on the date of the encounter doing chart review, history and exam, documentation and further activities per the note  0956}      Reviewed tasklist with patient     Most recent weights:  Wt Readings from Last 4 Encounters:   12/16/21 (!) 183.7 kg (405 lb)   01/26/21 (!) 171.5 kg (378 lb)   05/27/14 (!) 160.6 kg (354 lb)   03/11/14 (!) 162.9 kg (359 lb 3.2 oz)         ROS    History reviewed. No pertinent past medical history.    Past Surgical History:   Procedure Laterality Date     VASECTOMY N/A 2020       Current Outpatient Medications   Medication     allopurinol (ZYLOPRIM) 300 MG tablet     colchicine (COLCYRS) 0.6 MG tablet     losartan (COZAAR) 100 MG tablet     naproxen (NAPROSYN) 500 MG tablet     No current facility-administered medications for this visit.       Allergies   Allergen Reactions     Cats Other (See Comments)     No Clinical Screening - See Comments Unknown     seasonal     Seasonal Allergies        Office Visit on 12/13/2013   Component Date Value Ref Range Status     Sodium 12/13/2013 143  133 - 144 mmol/L Final     Potassium 12/13/2013 4.2  3.4 - 5.3 mmol/L Final     Chloride 12/13/2013 105  94 - 109 mmol/L Final     Carbon Dioxide 12/13/2013 30  20 - 32 mmol/L Final     Anion Gap 12/13/2013 9  6 - 17 mmol/L Final     Glucose 12/13/2013 90  60 - 99 mg/dL Final     Urea Nitrogen 12/13/2013 12  5 - 24 mg/dL Final     Creatinine 12/13/2013 1.20  0.66 - 1.25 mg/dL Final     GFR Estimate 12/13/2013 72  >60 mL/min/1.7m2 Final     GFR Estimate If Black 12/13/2013 87  >60 mL/min/1.7m2 Final     Calcium 12/13/2013 9.1  8.5 - 10.4 mg/dL Final     Uric Acid 12/13/2013 10.6* 3.5 - 8.5 mg/dL Final       PHYSICAL EXAM:  Objective    Ht 1.816 m (5' 11.5\")   Wt (!) 183.7 kg (405 lb)   BMI 55.70 kg/m           Vitals:  No vitals were obtained today due to virtual visit.    Physical " Exam   GENERAL: Healthy, alert and no distress  EYES: Eyes grossly normal to inspection.  No discharge or erythema, or obvious scleral/conjunctival abnormalities.  RESP: No audible wheeze, cough, or visible cyanosis.  No visible retractions or increased work of breathing.    SKIN: Visible skin clear. No significant rash, abnormal pigmentation or lesions.  NEURO: Cranial nerves grossly intact.  Mentation and speech appropriate for age.  PSYCH: Mentation appears normal, affect normal/bright, judgement and insight intact, normal speech and appearance well-groomed.    Sleeve Gastrectomy: Risks and Side Effects    The complications or risks of surgery include but are not limited to: death, heart attack, infection in the surgical site (wound infection), abdomen (abscess), bladder (urinary tract infection), lungs (pneumonia), clots in legs (deep vein thrombosis) or lungs (pulmonary emboli),  injury to the bowels or other organs, bowel obstruction, hernia at the incision and gastrointestional bleeding.    More specific risks related to vertical sleeve gastrectomy were detailed at the bariatric informational seminar and include the following: leak at the vertical sleeve staple line, leak at the anastomoses,  nausea, vomiting, and dehydration for several months,  adhesions causing bowel obstruction, rapid weight loss causing a higher rate of gallstone formation during the first 6 months after surgery, decreased absorption of vitamins and protein because of the reduced stomach size, weight regain if inappropriate food intake occurs, stricture, injury to other organs, hernia,  and ulcers.       Side effects of bariatric surgery include but are not limited to: abdominal pain, cramping, bloating, constipation, nausea, vomiting, diarrhea, difficulty swallowing,  dehydration, hair loss, excess skin, protein, iron and vitamin deficiencies, heartburn, transfer of addictions, increased anxiety and worsening depression.       I  emphasized exercise and activity behavior along with appropriate food choice as the main foundation for weight loss with surgery providing surgical reinforcement of the appropriate behavior set.        Review of general surgery weight loss process    1. Complete preoperative requirements, including weight loss.  Final weight check to confirm MANDATORY weight loss requirement must be documented on a clinic scale.    2. Discuss prior authorization with .    3. History and physical evaluation by PCP of PAC clinic within 30 days of surgery date, preoperative class, and weight check (weigh-in visit) to be scheduled by patient.  Pre-anesthesia clinic for risk evaluation to be scheduled by anesthesia clinic.    4. We cannot guarantee that patient will qualify for surgery unless all preoperative requirements are met, prior authorization from primary insurance company is granted, and insurance changes do not occur.    5. It is possible for patients to regain all weight after weight loss surgery unless they follow guidelines prescribed by our bariatric center.    6. All patients with gastrointestinal complaints after weight loss surgery must have complaints conveyed to the bariatric team for appropriate treatment.    7. Vitamin deficiencies may develop post-bariatric surgery and annual laboratory testing should be performed.    8. Persistent nausea/vomiting after bariatric surgery entails risk of thiamine deficiency and should be treated early.  Vitamin B12 deficiency may develop, especially after gastric bypass surgery and must be recognized.        If you have any questions about our plans please don't hesitate to contact me.    Sincerely,    Brianda Chan NP

## 2021-12-16 NOTE — PATIENT INSTRUCTIONS
Paul Sexton,    Follow-up with RD in 1 month.     Thank you,    Angela Granger, RD, LD  If you would like to schedule or reschedule an appointment with the RD, please call [phone]    Nutrition Goals  1. Avoid fried foods - try baked, grilled, poached, steamed, sauteed instead.  2. Bring lunch to work from home - E.g. sandwich and fruit  3. Eat slowly (20-30 minutes per meal), chewing foods well (25 chews per bite/applesauce consistency)  4. Portion out less than you normally would. Try serving 50% of what you would historically.     Relating to beverages:  1. Take small, frequent sips of fluid all day long between meals  2. Separate fluids from meals by 30 minutes before, during, and after eating      Diet Guidelines after Weight-loss Surgery  [email or web address]     Portion Sizes after Weight Loss Surgery  [email or web address]    Your Stage 1 Diet: Clear Liquids  [email or web address]     Your Stage 2 Diet: Low-fat Full Liquids  [email or web address]     Your Stage 3 Diet: Pureed Foods  [email or web address]     Pureed Pleasures  [email or web address]://[email or web address]    Your Stage 4 Diet: Soft Foods  [email or web address]    Your Stage 5 Diet: Regular Foods  [email or web address]    Keeping Track of Your Fluids  [email or web address]    Supplements after Weight Loss Surgery  [email or web address]            Interested in working with a health ?  Health coaches work with you to improve your overall health and wellbeing.  They look at the whole person, and may involve discussion of different areas of life, including, but not limited to the four pillars of health (sleep, exercise, nutrition, and stress management). Discuss with your care team if you would like to start working a health .    Health Coaching-3 Pack:    $99 for three health coaching visits    Visits may be done in person or via phone    Coaching is a partnership between  "the  and the client; Coaches do not prescribe or diagnose    Coaching helps inspire the client to reach his/her personal goals      COMPREHENSIVE WEIGHT MANAGEMENT PROGRAM  VIRTUAL SUPPORT GROUPS    For Support Group Information:      We offer support groups for patients who are working on weight loss and considering, preparing for or have had weight loss surgery.   There is no cost for this opportunity.  You are invited to attend the?Virtual Support Groups?provided by any of the following locations:    1. [facility] via Microsoft Teams with Cynthia Mccall RN  2.   [place] via Social Strategy 1 with Terry Nix, PhD, LP  3.   [place] via Social Strategy 1 with Paola Franks RN  4.   [facility] via Microsoft Teams with Paola Kwok [facility]    The following Support Group information can also be found on our website:  [email or web address]      [facility] Weight Loss Surgery Support Group    [facility] Weight Loss Surgery Support Group  The support group is a patient-lead forum that meets monthly to share experiences, encouragement and education. It is open to those who have had weight loss surgery, are scheduled for surgery, and those who are considering surgery.   WHEN: This group meets on the 3rd Wednesday of each month from 5:00PM - 6:00PM virtually using Microsoft Teams.   FACILITATOR: Led by Cynthia Mccall RD, LD, RN, the program's Clinical Coordinator.   TO REGISTER: Please contact the clinic via [place] or call the nurse line directly at [phone] to inform our staff that you would like an invite sent to you and to let us know the email you would like the invite sent to. Prior to the meeting, a link with directions on how to join the meeting will be sent to you.    2021 Meetings  August 18: \"Let's Talk\" a time for the group to share.  September 15: \"Let's Talk\" a time for the group to share.  October 20: \"Let's " "Talk\" a time for the group to share.  November 17: Guest Speaker: Jacey Calvert RD, LD \"Protein, Metabolism and Meal Planning\"  December 15: Guest Speaker: Gonzalo Sewell RD, LD will speak, \"Recipe Modification\"    2022 Meetings  January 19 February 16 March 16: Guest Speakers: Anastacia Strauss, PhD, and Meme Marquis PsyD,  April 20  May 18  Melly 15    [facility] [place] [facility] Support Groups    Connections: Bariatric Care Support Group?  This is open to all [facility] (and those external to this program) pre- and post- operative bariatric surgery patients as well as their support system.   WHEN: This group meets the 2nd Tuesday of each month from 6:30 PM - 8:00 PM virtually using Microsoft Teams.   FACILITATOR: Led by Terry Nix, Ph.D who is a Licensed Psychologist with the [facility] Comprehensive Weight Management Program.   TO REGISTER: Please send an email to Terry Nix, Ph.D.,  at?[email or web address]?if you would like an invitation to the group and to learn about using Microsoft Teams.    2021 Meetings  August 10: Open Forum  September 14: Guest Speaker: Paola Franks RN,CBN, [facility], [facility] [place] [facility], \"Your Hospital Stay and Post-Operative Compliance\"  October 12: Open Forum  November 9: Guest Speaker: Shalonda Briscoe RD,HELEN, [facility] [place] [facility],\"Holiday Eating\".  December 14: Guest Speaker Elva Garvin MD, MPH, [facility], [facility], \"Body Contouring Surgery for Bariatric Surgery Patients\"     2022 Meetings  January 11  February 8  March 8  April 12  May 10  Melly 14    Connections: Post-Operative Bariatric Surgery Support Group  This is a support group for [facility] bariatric patients (and those external to [facility]) who have had bariatric surgery and are at least 3 months post-surgery.  WHEN: This support group meets the 4th Wednesday of the " "month from 11:00 AM - 12:00 PM virtually using Microsoft Teams.   FACILITATOR: Led by Certified Bariatric Nurse, Paola Franks RN.   TO REGISTER: Please send an email to Paola at [email or web address] if you would like an invitation to the group and to learn about using Microsoft Teams.    2022 Meetings  January 26  February 23  March 23  April 27  May 25  Melly 22    [facility] Healthy Lifestyle Virtual Support Group    Healthy Lifestyle Virtual Support Group?  This is 60 minutes of small group guided discussion, support and resources. All are welcome who want a healthy lifestyle.  WHEN: This group meets monthly on a Friday from 12:30 PM - 1:30 PM virtually using Microsoft Teams.   FACILITATOR: Led by National Board Certified Health and , Paola Kwok [facility].   TO REGISTER: Please send an email to Paola at?[email or web address] to receive monthly invites to the group or if you have any questions about having a health .  Prior to the meeting, a link with directions on how to join the meeting will be sent to you.    2021 Meetings August 27: Open Forum  September 24: Sleep and the 7 Types of Rest  October 29: Open Forum  November 19: Gratitude     December 10: Open Forum    2022 Meetings January 21: Carol Diallo MS, RN, CIC, CBN, \"Healthy Habits\"  February 25: Open Forum  March 18: \"Setting Limits and Boundaries\"  April 29: Dagmar Jones RD, \"Meal Planning Made Easy\"  May 20: Open Forum  June: To be determined          "

## 2021-12-16 NOTE — Clinical Note
Please fax labs to where patient will be having Ultrasound tomorrow- see care everywhere-  specialty center. Please send patient letter for clearance for hematology/ oncology. Please fax that letter to hematologist at [facility] where he will be seen 12/20. Thanks! I'll watch for labs and will be ordering GLP1 based on lab results.

## 2021-12-16 NOTE — PATIENT INSTRUCTIONS
"Thank you for allowing us the privilege of caring for you. We hope we provided you with the excellent service you deserve.   Please let us know if there is anything else we can do for you so that we can be sure you are completely satisfied with your care experience.    To ensure the quality of our services you may be receiving a patient satisfaction survey from an independent patient satisfaction monitoring company.    The greatest compliment you can give is a \"Likely to Recommend\"    Your visit was with Brianda Chan NP today.    Instructions per today's visit:     Paul Montez, it was great to visit with you today.  Here is a review of our visit.  If our clinic scheduler is not able to reach you please call [phone] to schedule your next appointments.    Plan:  -labs when you get ultrasound tomorrow  -hematologist clearance   -will order GLP1 medication - see information below once labs are complete   -Lauren Bloch MTM Pharmacist  In 6 weeks after start medication   -follow up with me in 3 months       Information about Video Visits with [facility]: video visit information  _________________________________________________________________________________________________________________________________________________________  Important contact and scheduling information:  Please call our contact center at [phone] to schedule your next appointments.  To find a lab location near you, please call [phone].  For any nursing questions or concerns call Shey Valladares LPN at [phone] or Brandi Cazares RN at [phone]  Please call during clinic hours Monday through Friday 8:00a - 4:00p if you have questions or you can contact us via [employer] at anytime and we will reply during clinic hours.    Lab results will be communicated through My Chart or letter (if My Chart not used). Please call the clinic if you have not received communication after 1 week or if you have any questions.?  Clinic Fax: " [phone]  _________________________________________________________________________________________________________________________________________________________  Meal Replacement Products:    Here is the link to our new e-store where you can purchase our meal replacement products     [employer] [place] E-Store  [employer]/store    The one week starter kit is a great way to sample a variety of products and see what works for you.    If you want more information about the product go to: Fresh Steps Meals  [employer]    If you are an employee or [facility] or  [employer] [place] please contact your care team for a 10% estore discount    Free Shipping for orders over $75     Benefits of meal replacements products:    Portion and calorie control  Improved nutrition  Structured eating  Simplified food choices  Avoid contact with trigger foods  _________________________________________________________________________________________________________________________________________________________  Interested in working with a health ?  Health coaches work with you to improve your overall health and wellbeing.  They look at the whole person, and may involve discussion of different areas of life, including, but not limited to the four pillars of health (sleep, exercise, nutrition, and stress management). Discuss with your care team if you would like to start working a health .  Health Coaching-3 Pack: Schedule by calling [phone]    $99 for three health coaching visits    Visits may be done in person or via phone    Coaching is a partnership between the  and the client; Coaches do not prescribe or diagnose    Coaching helps inspire the client to reach his/her personal goals   _________________________________________________________________________________________________________________________________________________________  24 Week Healthy Lifestyle Plan:    Our  mission in the 24-week Healthy Lifestyle Plan is to provide you with individualized care by giving you the tools, education and support you need to lose weight and maintain a healthy lifestyle. In your 24-week journey, you ll be supported by a dedicated weight loss team that includes registered dietitians, medical weight management providers, health coaches, and nurses -- all with special expertise in weight loss -- to help you every step of the way.     Monthly meetings with your registered dietician or medical weight management provider help to review your progress, update your care plan, and make any adjustments needed to ensure success. Between these visits, weekly and bi-weekly health  visits will help you focus on the four pillars of weight loss -- stress, sleep, nutrition, and exercise -- and how you can best adapt each to achieve sustainable weight loss results.    In addition, you will be given exclusive access to online wellbeing classes through [employer].  Your initial visit will be with a medical weight management provider who will help to understand your weight loss goals and ensure this program is the right fit for you. Please let our team know if you are interested in the 24 week plan by sending a message to your care team or calling [phone] to schedule.  _________________________________________________________________________________________________________________________________________________________    COMPREHENSIVE WEIGHT MANAGEMENT PROGRAM  VIRTUAL SUPPORT GROUPS    For Support Group Information:      We offer support groups for patients who are working on weight loss and considering, preparing for or have had weight loss surgery.   There is no cost for this opportunity.  You are invited to attend the?Virtual Support Groups?provided by any of the following locations:    1. [place] via [employer] Teams with Cynthia Mccall RN  2.   [place] via [employer] Teams with Terry Nix, PhD,  "  3.   [place] via [place] Teams with Paola Franks RN  4.   [facility] via [employer] Teams with Y[facility]EL [facility]    The following Support Group information can also be found on our website:  [email or web address]      [facility] Weight Loss Surgery Support Group    [facility] Weight Loss Surgery Support Group  The support group is a patient-lead forum that meets monthly to share experiences, encouragement and education. It is open to those who have had weight loss surgery, are scheduled for surgery, and those who are considering surgery.   WHEN: This group meets on the 3rd Wednesday of each month from 5:00PM - 6:00PM virtually using Microsoft Teams.   FACILITATOR: Led by Cynthia Mccall, the program's Clinical Coordinator.   TO REGISTER: Please contact the clinic via "[employer]" or call the nurse line directly at [phone] to inform our staff that you would like an invite sent to you and to let us know the email you would like the invite sent to. Prior to the meeting, a link with directions on how to join the meeting will be sent to you.    2021 Meetings  August 18: \"Let's Talk\" a time for the group to share.  September 15: \"Let's Talk\" a time for the group to share.  October 20: \"Let's Talk\" a time for the group to share.  November 17: Jacey Calvert RD, HELEN \"Protein, Metabolism and Meal Planning\"  December 15: Gonzalo Sewell RD, LD will speak, \"Recipe Modification\"    [facility] Specialty [facility] Support Groups    Connections: Bariatric Care Support Group?  This is open to all [facility] (and those external to this program) pre- and post- operative bariatric surgery patients as well as their support system.   WHEN: This group meets the 2nd Tuesday of each month from 6:30 PM - 8:00 PM virtually using Microsoft Teams.   FACILITATOR: Led by Terry Nix, Ph.D who is a Licensed " "Psychologist with the [facility] Comprehensive Weight Management Program.   TO REGISTER: Please send an email to Terry Nix, Ph.D., LP at?[email or web address]?if you would like an invitation to the group and to learn about using Microsoft Teams.    2021 Meetings  August 10: Open Forum  September 14: Guest Speaker: Paola Franks RN,CBN, CIC, [facility] Comprehensive Weight Management Program, \"Your Hospital Stay and Post-Operative Compliance\"  October 12: Open Forum  November 9: Guest Speaker: Shalonda Briscoe RD,LD, [facility] Comprehensive Weight Management Program,\"Holiday Eating\".  December 14: Guest Speaker Elva Garvin MD, MPH, [facility], [facility], \"Body Contouring Surgery for Bariatric Surgery Patients\"     Connections: Post-Operative Bariatric Surgery Support Group  This is a support group for [facility] bariatric patients (and those external to [facility]) who have had bariatric surgery and are at least 3 months post-surgery.  WHEN: This support group meets the 4th Wednesday of the month from 11:00 AM - 12:00 PM virtually using Microsoft Teams.   FACILITATOR: Led by Certified Bariatric Nurse, Paola Franks RN.   TO REGISTER: Please send an email to Paola at [email or web address] if you would like an invitation to the group and to learn about using [employer].      [facility] Healthy Lifestyle Virtual Support Group    Healthy Lifestyle Virtual Support Group?  This is 60 minutes of small group guided discussion, support and resources. All are welcome who want a healthy lifestyle.  WHEN: This group meets monthly on a Friday from 12:30 PM - to 1:30 PM virtually using Microsoft Teams.   FACILITATOR: Led by National Board Certified Health , Paola Kwok, [facility].   TO REGISTER: Please send an email to Paola [email or web address] to receive monthly invites to the group or if you have any questions " about having a health .  Prior to the meeting, a link with directions on how to join the meeting will be sent to you.    2021 Meetings  August 27: Open Forum  September 24: Sleep and the 7 Types of Rest  October 29: Open Forum  November 19: Gratitude     December 10: Open Forum    2022 Meetings January 21: Healthy Habits  February 25: Open Forum  March 18: Setting limits and Boundaries  April 29: Nutrition  May 20: Open Forum  ____________________________________________________________________________________________________________________________________________________________________________  [facility] Get Moving Program  Our team of physical therapists is trained to help you understand and take control of your condition. They will perform a thorough evaluation to determine your ability for activity and develop a customized plan to fit your goals and physical ability.  Scheduling: Unsure if the Get Moving program is right for you? Discuss the program with your medical provider or diabetes educator. You can also call us at [phone] to ask questions or schedule an appointment.   [facility] Get Moving Program  ____________________________________________________________________________________________________________________________________________________________________________  [facility] Diabetes Prevention Program (DPP)  If you have prediabetes and Medicare please contact us via [employer] to learn more about the Diabetes Prevention Program (DPP)  Program Details:  [facility] offers the year-long Diabetes Prevention Program (DPP). The program helps you to make lifestyle changes that prevent or delay type 2 diabetes by supporting healthy eating, increased physical activity, stress reduction and use of coping skills.   On average, previous [facility] DPP cohorts have lost and maintained at least 5% of their starting weight throughout the program and averaged more than  150 minutes of physical activity per week.  Participants meet weekly for one-hour group sessions over sixteen weeks, every other week for the next 8 weeks, and monthly for the last six months.   A year-long maintenance program is also available for participants who complete the first year.   Location & Cost:   During the COVID-19 Public Health Emergency, the program is offered virtually. When in-person classes can resume, they will be held at [facility].  For people with Medicare, the program is covered in full. A self-pay option will also be available for those with non-Medicare insurance plans.   _________________________________________________________________________________________________________________________________________________________  Bluetooth Scale:    We hope to provide you with high quality virtual healthcare visits while social distancing for COVID-19 is necessary, as well as in the future when virtual visits may be more convenient for you.     Our technology team made it possible for Bluetooth scales to send weight measurements to our electronic medical record. This allows weights from you weighing at home to securely flow into the medical record, which will improve telephone and virtual visits.   Additionally, studies have shown that adults actually lose more weight when their weights are automatically sent to someone else, and also that this process is not stressful for those adults.    Below is a link for purchasing the scale, with a discount code for our patients. You may call your insurance company to see if they will reimburse you for the cost of the scale, as a piece of durable medical equipment. The scales only go up to a weight of 400 pounds. This is an issue and we are working with the developer on increasing this. We found no scales that go over 400lb that have blue-tooth for connecting to MyChart.    Scale to purchase: the [employer]  Body  Scale:  https://www.better..[employer]/us/en/body/shop?gclid=[email or web address]&gclsrc=aw.ds    Discount Code: We have a discount code for our patients to bring the cost down to $50, Discount code is: [place]_Scale_20%off      Thank you,   [facility] Comprehensive Weight Management Team

## 2021-12-16 NOTE — PROGRESS NOTES
Bariatric Task List updated.  Bariatric information/clearance letters sent to patient via [employer].  Bariatric Task List    Fax:  Please fax all paperwork to:   -     Status:  Is patient a candidate for bariatric surgery?:  patient is a candidate for bariatric surgery -     Cleared to schedule surgeon consult?:    -     Status:    -     Surgeon: Karey  -     Tentative surgery month/year: 4/2021  -        Insurance: Insurance:  [employer]  -      Contact insurance to discuss coverage:   -       [employer]: PCP Recommendation and Medical Clearance:    -     HP Referral:  Needed -      Advanced beneficiary notification (ABN) for Medicare patients for RD visits   and surgery:   -      Weight history:   -     Other:    -        Patient Info: Initial Weight:  386 -     Date of Initial Weight/Height:  12/1/2018 - pcp, has been working on weight loss    Goal Weight (lbs):    -     Required Weight Loss:    -     Surgery Type:  sleeve gastrectomy -     Multidisciplinary Meeting:    -        Dietician Visits: Structured weight loss required by insurance?:  structured weight loss required -     Dietician Visit 1:  Completed - 1/26/21    Dietician Visit 2:  Completed - 10/19/21 KB   Dietician Visit 3:  Completed - 12/16/21    Dietician Visit 4:    -     Dietician Visit 5:    -     Dietician Visit 6:    -     Dietician Visit additional:  Needed - monthly until surgery   Clearance from dietician to see surgeon?:    -     Dietician Notes:    -        Psychological Evaluation: Psych eval:  Needed - List and letter sent to pt 12/16/21 -AS   Therapist letter of support:    -     Psychiatrist letter of support:    -     Establish care with therapist:    -     Complete eating disorder evaluation:    -     Letter of clearance from therapist/eating disorder program:    -     Other:    -        Lab Work: Complete Blood Count:  Needed - Ordered 12/16/21 - AS   Comprehensive Metabolic Panel:  Needed - Ordered 12/16/21 - AS   Vitamin  "D:  Needed - Ordered 12/16/21 - AS   PTH:  Needed - Ordered 12/16/21 - AS   Hgb A1c:  Needed - Ordered 12/16/21 - AS    Lipids:   -      TSH ([place], [facility], [place] MA):   -       Ferritin:   -       Folate:   -       Testosterone, Total and Free:   -     Thiamine:   -     Vitamin A:   -     Vitamin B12:   -     Zinc:   -     C-peptide:   -     H. pylori:    -     MRSA (2 swabs, minimum 48 hours apart):   -     Nicotine Testing:    -     Recheck Vitamin D:   -     Other:  Needed - lipids - ordered 12/16/21 -AS      Consults/ Clearance Sleep Medicine:  Needed - patient going to discuss with pcp, who manages cpap;   Cardiac:    -     Pain:   -     Dental:    -     Endocrine:    -     Gastroenterology:    -     Vascular Medicine:    -     Hematology:  Needed - hx of blood clot- 9/2021; Letter faxed to Dr. Boyd ([phone]) 12/16/21 -   Medical Weight Management:   -     Physical Therapy/Exercise:    -     Nephrology:    -     Neurology:    -     Pulmonology:    -     Rheumatology:    -     Other:    -     Other:    -     Other:    -        Testing: UGI:    -     EGD:    -     Sleep Study:   -     Other:   -     Other:    -        PCP: Establish care with PCP:  Completed -     Follow up with PCP:    -     PCP letter of support:  Needed - Letter sent to pt 12/16/21 -AS      Stopping Smoking/ Alcohol Use: Quit tobacco use (3 months smoke free)?:  Needed -     Quit date:    - cigars    Quit alcohol use:   -     Quit date:   -     Other:   -     Quit date:   -        Patient Education:  Information Session:  Completed -     Given \"Making your decision\" handout?:    -     Given \"A Roadmap to you Weight Loss Surgery\" handout?:   -     Given \"Get Well Loop\" information?:   -     Given support group information?:    -     Attended support group?:    -     Support plan in place?:    -     Research consents signed?:    -     Avoid NSAIDS/ Alternate Plan for Pain:   -        Additional Surgery Requirements: Review Coag plan:    -   "   HgA1c <8:    -     Inpatient pain consult:    -     Final nicotine screen:    -     Dental work complete:    -     Birth control plan:    -     Gallstone prevention plan (Actigall for 6 months postop):   -     Other:   -     Other:   -        Final Tasks:  Before surgery online class:  Needed -     Before surgery online class website link:  [email or web address]   After surgery online class:    -     After surgery online class website link:  [email or web address]   Nurse visit per clinic:  Needed -     History and Physical per clinic:   -     Final labs per clinic: Needed -     Chest xray per clinic:   -     Electrocardiogram (ECG) per clinic:   -     Other:   -        Notes:   -

## 2021-12-16 NOTE — PROGRESS NOTES
Pre-Bariatric Surgery Note    Wegener, Joel Daniel Irwin    Date: 2021     RE: Bigg Montez    MR#: [id number]   : 1985   Date of Visit: Dec 16, 2021    REASON FOR VISIT: Preoperative evaluation for possible weight loss surgery    Dear Dr. Wegener, Joel Daniel Irwin,    I had the pleasure of seeing your patient, Bigg Montez, in my preoperative bariatric clinic.    As you know, he is morbidly obese and considering weight loss surgery to treat obesity in association with his medical conditions of obesity.  His consult weight was 378.  He has gained 27 pounds since his consult weight. He has not met his required pre-surgery weight. Please refer to initial consult note from date 2021 for patient's weight history and co-morbidities.    Deferred medications at consult. Wt 2021 402lb   Gout-allopurinol   Acute DVT hx 2021 - was prescribed elliquis but caused fatigue so stopped. Repeat ultrasound ordered, Hem/Onc consult scheduled      Purchased first home in march. Was focused mostly on that through September.     Busy schedule     Craves fried food- emotional attachment. Difficult to not snack on whatever he is making his 2yo. Protein shake for breakfast. Lunch - purchases something. Dinner at home- eating out more in the last 6/7 months from the move- habit now.       Assessment & Plan   Problem List Items Addressed This Visit        Digestive    Class 3 severe obesity due to excess calories with serious comorbidity and body mass index (BMI) of 50.0 to 59.9 in adult (H) - Primary     27lb weight gain since consult 2021. Lots of good stress in the last year but this has distracted him from focusing on weight and prep for surgery. Feels like he can now focus on this. Would be interested in discussing AOM at this time as well.     Describes difficulty avoiding snacking when food is around- especially when he is feeding 2yo. Describes strong cravings and consuming larger portions of  food. Has been eating out a lot more recently due to moving homes. Does have some hunger between meals. Prediabetes with A1C most recently (1/2021). Would like to recheck this. Would like to consider ozempic or wegovy to help with insulin resistance in addition to weight loss. Patient agrees. No hx pancreatitis or fam hx medullary thyroid cancer. No reflux.     New DVT September 2021- has follow up next week with hematology. Will add hematology clearance for surgery. His gout was uncontrolled at that time as well but now, no flare in 2 months with compliance with allopurinol.     Plan:  Labs soon  GLP1 start   Lauren Bloch [place] Pharmacist 6-8 weeks  Follow up with me 3 months   Dietitian monthly   [employer] health coaching            Relevant Orders    CBC with platelets    Comprehensive metabolic panel    Hemoglobin A1c    Parathyroid Hormone Intact    Vitamin D Deficiency    Med Therapy Management Referral           Review of external notes as documented above     43 minutes spent on the date of the encounter doing chart review, history and exam, documentation and further activities per the note  [id number]}      Reviewed tasklist with patient     Most recent weights:  Wt Readings from Last 4 Encounters:   12/16/21 (!) 183.7 kg (405 lb)   01/26/21 (!) 171.5 kg (378 lb)   05/27/14 (!) 160.6 kg (354 lb)   03/11/14 (!) 162.9 kg (359 lb 3.2 oz)         ROS    History reviewed. No pertinent past medical history.    Past Surgical History:   Procedure Laterality Date     VASECTOMY N/A 2020       Current Outpatient Medications   Medication     allopurinol (ZYLOPRIM) 300 MG tablet     colchicine (COLCYRS) 0.6 MG tablet     losartan (COZAAR) 100 MG tablet     naproxen (NAPROSYN) 500 MG tablet     No current facility-administered medications for this visit.       Allergies   Allergen Reactions     Cats Other (See Comments)     No Clinical Screening - See Comments Unknown     seasonal     Seasonal Allergies        Office Visit  "on 12/13/2013   Component Date Value Ref Range Status     Sodium 12/13/2013 143  133 - 144 mmol/L Final     Potassium 12/13/2013 4.2  3.4 - 5.3 mmol/L Final     Chloride 12/13/2013 105  94 - 109 mmol/L Final     Carbon Dioxide 12/13/2013 30  20 - 32 mmol/L Final     Anion Gap 12/13/2013 9  6 - 17 mmol/L Final     Glucose 12/13/2013 90  60 - 99 mg/dL Final     Urea Nitrogen 12/13/2013 12  5 - 24 mg/dL Final     Creatinine 12/13/2013 1.20  0.66 - 1.25 mg/dL Final     GFR Estimate 12/13/2013 72  >60 mL/min/1.7m2 Final     GFR Estimate If Black 12/13/2013 87  >60 mL/min/1.7m2 Final     Calcium 12/13/2013 9.1  8.5 - 10.4 mg/dL Final     Uric Acid 12/13/2013 10.6* 3.5 - 8.5 mg/dL Final       PHYSICAL EXAM:  Objective    Ht 1.816 m (5' 11.5\")   Wt (!) 183.7 kg (405 lb)   BMI 55.70 kg/m           Vitals:  No vitals were obtained today due to virtual visit.    Physical Exam   GENERAL: Healthy, alert and no distress  EYES: Eyes grossly normal to inspection.  No discharge or erythema, or obvious scleral/conjunctival abnormalities.  RESP: No audible wheeze, cough, or visible cyanosis.  No visible retractions or increased work of breathing.    SKIN: Visible skin clear. No significant rash, abnormal pigmentation or lesions.  NEURO: Cranial nerves grossly intact.  Mentation and speech appropriate for age.  PSYCH: Mentation appears normal, affect normal/bright, judgement and insight intact, normal speech and appearance well-groomed.    Sleeve Gastrectomy: Risks and Side Effects    The complications or risks of surgery include but are not limited to: death, heart attack, infection in the surgical site (wound infection), abdomen (abscess), bladder (urinary tract infection), lungs (pneumonia), clots in legs (deep vein thrombosis) or lungs (pulmonary emboli),  injury to the bowels or other organs, bowel obstruction, hernia at the incision and gastrointestional bleeding.    More specific risks related to vertical sleeve gastrectomy " were detailed at the bariatric informational seminar and include the following: leak at the vertical sleeve staple line, leak at the anastomoses,  nausea, vomiting, and dehydration for several months,  adhesions causing bowel obstruction, rapid weight loss causing a higher rate of gallstone formation during the first 6 months after surgery, decreased absorption of vitamins and protein because of the reduced stomach size, weight regain if inappropriate food intake occurs, stricture, injury to other organs, hernia,  and ulcers.       Side effects of bariatric surgery include but are not limited to: abdominal pain, cramping, bloating, constipation, nausea, vomiting, diarrhea, difficulty swallowing,  dehydration, hair loss, excess skin, protein, iron and vitamin deficiencies, heartburn, transfer of addictions, increased anxiety and worsening depression.       I emphasized exercise and activity behavior along with appropriate food choice as the main foundation for weight loss with surgery providing surgical reinforcement of the appropriate behavior set.        Review of general surgery weight loss process    1. Complete preoperative requirements, including weight loss.  Final weight check to confirm MANDATORY weight loss requirement must be documented on a clinic scale.    2. Discuss prior authorization with .    3. History and physical evaluation by PCP of PAC clinic within 30 days of surgery date, preoperative class, and weight check (weigh-in visit) to be scheduled by patient.  Pre-anesthesia clinic for risk evaluation to be scheduled by anesthesia clinic.    4. We cannot guarantee that patient will qualify for surgery unless all preoperative requirements are met, prior authorization from primary insurance company is granted, and insurance changes do not occur.    5. It is possible for patients to regain all weight after weight loss surgery unless they follow guidelines prescribed by our bariatric  [place].    6. All patients with gastrointestinal complaints after weight loss surgery must have complaints conveyed to the bariatric team for appropriate treatment.    7. Vitamin deficiencies may develop post-bariatric surgery and annual laboratory testing should be performed.    8. Persistent nausea/vomiting after bariatric surgery entails risk of thiamine deficiency and should be treated early.  Vitamin B12 deficiency may develop, especially after gastric bypass surgery and must be recognized.        If you have any questions about our plans please don't hesitate to contact me.    Sincerely,    Brianda Chan NP

## 2021-12-16 NOTE — LETTER
"12/16/2021       RE: Bigg Montez  [address]  [place] [address]     Dear Colleague,    Thank you for referring your patient, Bigg Montez, to the [facility] [place] at [facility]. Please see a copy of my visit note below.    Bigg Montez is a 35 year old who is being evaluated via a billable video visit.      The patient has been notified of following:     \"This video visit will be conducted via a call between you and your physician/provider. We have found that certain health care needs can be provided without the need for an in-person physical exam.  This service lets us provide the care you need with a video conversation.  If a prescription is necessary we can send it directly to your pharmacy.  If lab work is needed we can place an order for that and you can then stop by our lab to have the test done at a later time.    Video visits are billed at different rates depending on your insurance coverage.  Please reach out to your insurance provider with any questions.    If during the course of the call the physician/provider feels a video visit is not appropriate, you will not be charged for this service.\"    Patient has given verbal consent for Video visit? Yes  How would you like to obtain your AVS? MyChart  If you are dropped from the video visit, the video invite should be resent to: Text to cell phone: [phone]  Will anyone else be joining your video visit? No        Video-Visit Details    Type of service:  Video Visit    Video Start Time: 10:00 AM   Video End Time: 10:23  AM    Originating Location (pt. Location): Home    Distant Location (provider location):  [facility] [place]     Platform used for Video Visit: [place] [employer]    During this virtual visit the patient is located in [place], patient verifies this as the location during the entirety of this visit.     Bariatric " "Nutrition Consultation Note    Reason For Visit: Nutrition reassessment    Bigg Montez is a 35 year old presenting today for return bariatric nutrition consult.  Pt is interested in laparoscopic sleeve gastrectomy with TBD.  Patient is accompanied by self. This is pt's 3rd of 3 required nutrition visits prior to surgery. Recommending monthly follow-up until surgery for continued weight loss and post-op diet education.     Pt referred by Brianda Chan NP on January 25, 2021.  Patient with Co-morbidities of obesity including:  Type II DM no  Renal Failure no  Sleep apnea yes  Hypertension yes   Dyslipidemia no  Joint pain no  Back pain no  GERD no   Prediabetes yes    H/o gout. Pt reports having a blood clot in mid-sept.     Support System Reviewed With Patient 1/21/2021   Who do you have in your support network that can be available to help you for the first 2 weeks after surgery? My wife   Who can you count on for support throughout your weight loss surgery journey? Kenny ceja       ANTHROPOMETRICS:  Initial weight (12/2018 with PCP): 386 lbs    Estimated body mass index is 51.99 kg/m  as calculated from the following:    Height as of an earlier encounter on 1/26/21: 1.816 m (5' 11.5\").    Weight as of an earlier encounter on 1/26/21: 171.5 kg (378 lb).    Most recent weight:  Estimated body mass index is 55.7 kg/m  as calculated from the following:    Height as of an earlier encounter on 12/16/21: 1.816 m (5' 11.5\").    Weight as of an earlier encounter on 12/16/21: 183.7 kg (405 lb).      Required weight loss goal pre-op: 38 lbs from initial consult weight (goal weight 348 lbs or less before surgery)       1/21/2021   I have tried the following methods to lose weight Watching portions or calories, Exercise, Weight Watchers, Slimfast, Physician directed program       Weight Loss Questions Reviewed With Patient 1/21/2021   How long have you been overweight? Since puberty       SUPPLEMENT INFORMATION:  Has a " daily MVI (Men's one a day with omega3) triggered gout flare, stopped taking.      NUTRITION HISTORY:  No known food allergies/intolerances    1/26/21 - Working with family member who is an RD - decreasing sugar intake, IF for 21 days (felt better while following). Avoid beef and seafood, and limiting alcohol for gout. Eating a lot of chicken. Does not care for yogurt, cottage cheese or tofu. Enjoys fried foods, but trying to avoid. Avoiding bread, but does eat a lot of rice (typically occupies most of plate). Using white rice.  Aiming for 30 grams protein per meal.  Typically having 2 meals per day. Meal are at 1pm and 6 pm.   Does not like sugar substitutes.    10/19/21 - Pt reports since experiencing blood clot and seeing wt went up to 400 lbs he has started making changes to diet again.  Focusing on less carbs, more water, increasing protein. Snacking on small portion mixed nuts if needed. Trying to stop eating before 7 pm. Eating 2-3 meals + 1 snack per day. Trying to eat breakfast more consistently. Had breakfast twice per week this last week.     Today - Pt states he is feeling more motivated this month than ever. Less overall stress, helps him be able to focus on himself. Wants to focus on less fried foods, bringing lunch to work instead of eating out. States gout has been under control, thinking about re-introducing beef.     Diet Recall:  B: protein shake  L (11:30 am): eating out more  PM snack: 1/2 cup mixed nuts  D  (5pm): chicken; rosen beans.   Beverages: water (64 oz/day), occ soda       Progress Towards Previous Goals:  Relating To Eating:  Consume 3 meals per day - Met, continues   - May use protein shake/bar as meal replacement  Use the Plate Method to structure your meals   - Reduce rice portions, aim for 1 cup or less. Mix with 50% brown rice.     - Fill remainder of plate with lean protein and non-starchy veggies.   Avoid snacking between meals as able. - None lately d/t busy schedule. Picks  kids up after work, has dinner with family, wife goes to work afterwards, and pt is responsible for getting kids to sleep.   Eat slowly (20-30 minutes per meal), chewing foods well (25 chews per bite/applesauce consistency) - Improving, checking in with himself for fullness.    Relating to beverages:  Take small, frequent sips of fluid all day long between meals - Challenging at times, doesn't want to go to the bathroom.   Separate fluids from meals by 30 minutes before, during, and after eating - States he needs to get back to practicing this    Relating to activity:  Increase activity as able - Less this month. Injured knee a couple weeks ago.    Eating Habits 1/21/2021   Do you have any dietary restrictions? No   Do you currently binge eat (eat a large amount of food in a short time)? Yes   Are you an emotional eater? Yes - usually when stressed out/off schedule, fast-food   Do you get up to eat after falling asleep? No   What foods do you crave? Sandwiches       ADDITIONAL INFORMATION:  Works as a . Has plans to start working for himself in 2022 to be able to have more time to himself and work on health goals.  Pt goal to improve knee and heart health.    Dining Out History Reviewed With Patient 1/21/2021   How often do you dine out? Nearly every day.   Where do you dine out? (select all that apply) fast food chains, take out   What types of food do you order when you dine out? Protien rice dishes       Physical Activity Reviewed With Patient 1/21/2021   How often do you exercise? 1 to 2 times per week   What is the duration of your exercise (in minutes)? 60+ Minutes   What types of exercise do you do? gym membership, weightlifting   What keeps you from being more active?  Lack of Time         NUTRITION DIAGNOSIS:  Obesity r/t long history of self-monitoring deficit and excessive energy intake aeb BMI >30 kg/m2. - continues    INTERVENTION:  Intervention Provided/Education Provided:  - Reviewed  post-op diet guidelines, ways to help prepare for post-op diet guidelines pre-operatively, portion/calorie-control, mindful eating and sources of lean protein.   - Discussed strategy for portion control - measuring out less than would historically, eating slowly, planning meals ahead of time.   - Discussed meal planning strategy for lunch. Provided pt with recipe resources via [employer] message.    - Co-developed goals to work towards using motivational interviewing  Patient demonstrates understanding. Provided pt with list of goals RD contact information.      Questions Reviewed With Patient 1/21/2021   How ready are you to make changes regarding your weight? Number 1 = Not ready at all to make changes up to 10 = very ready. 10   How confident are you that you can change? 1 = Not confident that you will be successful making changes up to 10 = very confident. 10       Expected Engagement: good    GOALS:  1. Avoid fried foods - try baked, grilled, poached, steamed, sauteed instead.  2. Bring lunch to work from home - E.g. sandwich and fruit  3. Eat slowly (20-30 minutes per meal), chewing foods well (25 chews per bite/applesauce consistency)  4. Portion out less than you normally would. Try serving 50% of what you would historically.     Relating to beverages:  1. Take small, frequent sips of fluid all day long between meals  2. Separate fluids from meals by 30 minutes before, during, and after eating      Diet Guidelines after Weight-loss Surgery  [email or web address]     Portion Sizes after Weight Loss Surgery  [email or web address]    Your Stage 1 Diet: Clear Liquids  [email or web address]     Your Stage 2 Diet: Low-fat Full Liquids  [email or web address]     Your Stage 3 Diet: Pureed Foods  [email or web address]     Pureed Pleasures  [email or web address]    Your Stage 4 Diet: Soft Foods  [email or web address]    Your Stage 5 Diet: Regular  Foods  [email or web address]    Keeping Track of Your Fluids  [email or web address]    Supplements after Weight Loss Surgery  [email or web address]        Angela Granger RD, LD

## 2021-12-16 NOTE — LETTER
2021      Dear Dr. Umaña:    Thank you for taking the time to see our mutual patient, Bigg Montez, : 1985, for a pre-operative hematology clearance evaluation for bariatric surgery.  Please assist us with the following during your evaluation of our mutual patient:      Initial patient evaluation    Arrange and expedite necessary testing    Guide and facilitate follow-up treatment    Provide a letter stating that the patient is cleared to proceed with bariatric surgery from a hematology standpoint    Indicate what treatment is needed pre-surgery (if any), during inpatient hospitalization and after surgery    The evaluation must confirm that the patient is stable from a hematology standpoint to proceed with the surgery. If you have any questions, feel free to contact us via our Call Center at [phone].  Please fax the evaluation to [phone] or route to Carol Diallo RN via Epic.    Sincerely,    MD Teddy Javed MD Eric Wise, MD Kristi Kopacz, MARILEE Chan, CNP    Mailing Address:  [facility]  [address], [facility] [address], [place] [address]  [email or web address]

## 2021-12-22 DIAGNOSIS — E66.01 MORBID OBESITY (H): ICD-10-CM

## 2021-12-22 DIAGNOSIS — E55.9 VITAMIN D DEFICIENCY: ICD-10-CM

## 2021-12-22 DIAGNOSIS — R73.03 PRE-DIABETES: Primary | ICD-10-CM

## 2021-12-22 NOTE — PATIENT INSTRUCTIONS
MEDICATION STARTED AT THIS APPOINTMENT    We are starting a GLP-1 (Glucagon-like Peptide-1) medication. Examples of this medication include Byetta, Bydureon, or Victoza, etc. The medication chosen will depend on insurance coverage, and can be changed to the medication that is covered under your plan. These medications work the same, in that they can help you lose weight and control your blood sugar. One of the ways it works is by slowing down the rate that food leaves your stomach. You feel hillman and will eat less.  It also helps regulate hormones that can help improve your blood sugars.    Usually short acting insulins or oral agents are stopped or decreased by the doctor at the appointment. Low blood sugars are rare but can happen if patients are on insulin or other oral agents. If you notice consistent low sugars or high sugars, your medication may need to be adjusted after your appointment. If this is the case, please call RN and provide her your blood sugar record from the last 3-4 days. The RN will get in touch with the doctor and call you back/[employer] message with recommendations. We tolerate high sugars for a bit, so if sugars are running 180-200, this is ok. As weight starts dropping the blood sugars should too. If readings are consistently over 200 for 1-2 weeks, then you should call the doctor/nurse.    Types of GLP-1 medications include:    Victoza: Starting dose is 0.6 mg for a week, then 1.2 mg for a week, and then 1.8 mg thereafter (if prescribed by doctor). This medication is given daily. Pen needles need to be ordered also.    Ozempic: Starting dose is 0.25 mg once weekly for 4 weeks, and then increase to 0.5 mg weekly. If after 4 weeks of being on 0.5 mg weekly dosing and still wanting additional weight loss and/or blood sugar benefits, check with your doctor to see if they want to increase the dose to 1 mg weekly. Pen needles come in the Ozempic pen box.    Trulicity: Starting dose is 0.75 mg  once weekly.  If after 1-3 months of being on 0.75 mg weekly and still wanting additional weight loss and/or blood sugar benefits, check with your doctor to see if they want to increase the dose to 1.5 mg weekly.    Bydureon: Starting dose is 2 mg and is given weekly. The patient should pick one day a week and give the medication on that day. Pen needles need to be ordered also.    Byetta: Starting dose is 5 mcg twice daily. This is given for the first month. Check with the doctor after a month to see if they want the dose increased to 10 mcg BID. Pen needles need to be ordered also.     Side effects of GLP- Medications include: The most common side effects are all GI related and consist of: nausea, constipation, diarrhea, burping, or gassiness. Patients are advised to eat slowly and less, and nausea typically passes if people can stick it out.     The risk of pancreatitis (inflammation of the pancreas) has been associated with this type of medication, but is very rare.  If you have had pancreatitis in the past, this medication may not be for you. Please let us know about any past history of pancreas problems.      Symptoms of pancreatitis include: Pain in your upper stomach area which  may travel to your back and be worse after eating. Your stomach area may be tender to the touch.  You may have vomiting or nausea and/or have a fever. If you should develop any of these symptoms, stop the medication and contact your primary care doctor. They will do a blood test to check for pancreatitis.         There is a small chance you may have some low blood sugar after taking the medication.   The signs of low blood sugar are:  o Weakness  o Shaky   o Hungry  o Sweating  o Confusion      See below for ways to treat low blood sugar without adding in lots of extra calories.      Treating Low Blood Sugar    If you have symptoms of low blood sugar (sweating, shaking, dizzy, confused) eat 15 grams of carbs and wait 15  minutes:      Glucose Tabs are best for sugars under 70 -  Dex4 or BD Glucose tablets are good, you will need to take 3-4 of these to equal 15 grams.       One small box of raisins    4 oz fruit juice box or   cup fruit juice    1 small apple    1 small banana      cup canned fruit in water      English muffin or a slice of bread with jelly     1 low fat frozen waffle with sugar-free syrup      cup cottage cheese with   cup frozen or fresh blueberries    1 cup skim or low-fat milk      cup whole grain cereal    4-6 crackers such as Triscuits      This medication is usually covered by insurance for patients with a diagnosis of Type 2 Diabetes. Depending on insurance coverage, the medication may be changed to a different formulary, but they all work in the same way. Sometimes a prior authorization is required, which may take up to 1-2 weeks for an insurance company to make a decision if they will cover the medication. Please be patient, you will be notified either way.     Contact the nurse via [employer] or call [phone] if you have any questions or concerns. (Do not stop taking it if you don't think it's working. For some people it works without them knowing it.)     In order to get refills of this or any medication we prescribe you must be seen in the medical weight mgmt clinic every 2-4 months.

## 2021-12-23 ENCOUNTER — TELEPHONE (OUTPATIENT)
Dept: ENDOCRINOLOGY | Facility: CLINIC | Age: 36
End: 2021-12-23
Payer: COMMERCIAL

## 2021-12-23 NOTE — TELEPHONE ENCOUNTER
Patient notified about A1C, Vitamin D and other labs.  Patient notified that a PA will be submitted for Ozempic as there is currently a shortage for Wegovy.  Also patient to  Vitamin D prescription.  Patient voiced understanding.

## 2021-12-23 NOTE — TELEPHONE ENCOUNTER
"Prior Authorization Retail Medication Request    Medication/Dose: Ozempic    ICD code (if different than what is on RX):      Previously Tried and Failed: Watching portions or calories, Exercise, Weight Watchers, Slimfast, Physician directed program      Rationale: Prediabetes, insulin resistance, high blood pressure, sleep apnea with CPAP, GERD, joint pain    Patient qualifies for use of weight loss medication(s) because they have a BMI of at least 30 kg/m^2.     Estimated body mass index is 55.7 kg/m  as calculated from the following:    Height as of 12/16/21: 1.816 m (5' 11.5\").    Weight as of 12/16/21: 183.7 kg (405 lb).    Insurance Name:    Insurance ID:        Pharmacy Information (if different than what is on RX)  Name: [id number] - [place], [place] - [facility] [address] [facility] [address]    Phone: [phone]     "

## 2021-12-24 NOTE — TELEPHONE ENCOUNTER
Prior Authorization Not Needed per Insurance    Medication: Ozempic-PA Not Needed  Insurance Company: [employer] - Phone [phone] Fax [phone]  Expected CoPay:      Pharmacy Filling the Rx: [facility] #[id number] - [place], [place] - [facility] [address] [facility]  Pharmacy Notified: Yes  Patient Notified: No

## 2021-12-24 NOTE — TELEPHONE ENCOUNTER
Central Prior Authorization Team   Phone: [phone]      PA Initiation    Medication: Ozempic-PA initiated  Insurance Company: "[employer] - Phone [phone] Fax [phone]  Pharmacy Filling the Rx: [facility] #[id number] - [place], [place] - [facility] [address] AT [facility] [address]  Filling Pharmacy Phone: [phone]  Filling Pharmacy Fax:    Start Date: 12/24/2021

## 2021-12-28 DIAGNOSIS — E55.9 VITAMIN D DEFICIENCY: Primary | ICD-10-CM

## 2021-12-30 ENCOUNTER — TELEPHONE (OUTPATIENT)
Dept: ENDOCRINOLOGY | Facility: CLINIC | Age: 36
End: 2021-12-30
Payer: COMMERCIAL

## 2021-12-30 NOTE — TELEPHONE ENCOUNTER
----- Message from Brianda Chan NP sent at 12/30/2021  6:57 AM CST -----  Are you able to call him today about his messages? Thanks!   ----- Message -----  From: Anna Lewis EMT  Sent: 12/29/2021   8:09 AM CST  To: Brianda Chan NP    He has not read the one from me or you recently.    SIMON Rzivi  ----- Message -----  From: Brianda Chan NP  Sent: 12/28/2021   3:45 PM CST  To: SIMON Rizvi    Can you see if he got his [employer] messages? I have a sticky note that he may not use consistently. Thanks!

## 2021-12-30 NOTE — TELEPHONE ENCOUNTER
Called patient to let him know Ozempic was approved. I also told him about his A1C and vitamin D results per Brianda Chan, PHOEBEC. Patient understands the results and will  his prescriptions from his pharmacy.    Anna Lewis, EMT

## 2022-01-14 NOTE — PROGRESS NOTES
"Bigg Montez is a 35 year old who is being evaluated via a billable video visit.      The patient has been notified of following:     \"This video visit will be conducted via a call between you and your physician/provider. We have found that certain health care needs can be provided without the need for an in-person physical exam.  This service lets us provide the care you need with a video conversation.  If a prescription is necessary we can send it directly to your pharmacy.  If lab work is needed we can place an order for that and you can then stop by our lab to have the test done at a later time.    Video visits are billed at different rates depending on your insurance coverage.  Please reach out to your insurance provider with any questions.    If during the course of the call the physician/provider feels a video visit is not appropriate, you will not be charged for this service.\"    Patient has given verbal consent for Video visit? Yes  How would you like to obtain your AVS? MyChart  If you are dropped from the video visit, the video invite should be resent to: Text to cell phone: [phone]  Will anyone else be joining your video visit? No        Video-Visit Details    Type of service:  Video Visit    Video Start Time: 8:32 AM  Video End Time: 8:54 AM    Originating Location (pt. Location): Home    Distant Location (provider location):  [facility] [place]     Platform used for Video Visit: Sumerian    During this virtual visit the patient is located in [place], patient verifies this as the location during the entirety of this visit.     Bariatric Nutrition Consultation Note    Reason For Visit: Nutrition reassessment    Bigg Montez is a 35 year old presenting today for return bariatric nutrition consult.  Pt is interested in laparoscopic sleeve gastrectomy with TBD.  Patient is accompanied by self. This is pt's 3rd of 3 required nutrition visits prior to surgery. " "Recommending monthly follow-up until surgery for continued weight loss and post-op diet education.     Pt referred by Brianda Chan NP on January 25, 2021.  Patient with Co-morbidities of obesity including:  Type II DM no  Renal Failure no  Sleep apnea yes  Hypertension yes   Dyslipidemia no  Joint pain no  Back pain no  GERD no   Prediabetes yes    H/o gout. Pt reports having a blood clot in mid-sept.     Support System Reviewed With Patient 1/21/2021   Who do you have in your support network that can be available to help you for the first 2 weeks after surgery? My wife   Who can you count on for support throughout your weight loss surgery journey? Kenny ceja       ANTHROPOMETRICS:  Initial weight (12/2018 with PCP): 386 lbs    Estimated body mass index is 51.99 kg/m  as calculated from the following:    Height as of an earlier encounter on 1/26/21: 1.816 m (5' 11.5\").    Weight as of an earlier encounter on 1/26/21: 171.5 kg (378 lb).    Most recent weight:  Estimated body mass index is 55.7 kg/m  as calculated from the following:    Height as of 12/16/21: 1.816 m (5' 11.5\").    Weight as of 12/16/21: 183.7 kg (405 lb).    Has not been tracking weight at home.     Required weight loss goal pre-op: 38 lbs from initial consult weight (goal weight 348 lbs or less before surgery)       1/21/2021   I have tried the following methods to lose weight Watching portions or calories, Exercise, Weight Watchers, Slimfast, Physician directed program       Weight Loss Questions Reviewed With Patient 1/21/2021   How long have you been overweight? Since puberty       SUPPLEMENT INFORMATION:  Has a daily MVI (Men's one a day with omega3) triggered gout flare, stopped taking.      NUTRITION HISTORY:  No known food allergies/intolerances    1/26/21 - Working with family member who is an RD - decreasing sugar intake, IF for 21 days (felt better while following). Avoid beef and seafood, and limiting alcohol for gout. Eating a lot of " chicken. Does not care for yogurt, cottage cheese or tofu. Enjoys fried foods, but trying to avoid. Avoiding bread, but does eat a lot of rice (typically occupies most of plate). Using white rice.  Aiming for 30 grams protein per meal.  Typically having 2 meals per day. Meal are at 1pm and 6 pm.   Does not like sugar substitutes.    10/19/21 - Pt reports since experiencing blood clot and seeing wt went up to 400 lbs he has started making changes to diet again.  Focusing on less carbs, more water, increasing protein. Snacking on small portion mixed nuts if needed. Trying to stop eating before 7 pm. Eating 2-3 meals + 1 snack per day. Trying to eat breakfast more consistently. Had breakfast twice per week this last week.     12/16/21 - Pt states he is feeling more motivated this month than ever. Less overall stress, helps him be able to focus on himself. Wants to focus on less fried foods, bringing lunch to work instead of eating out. States gout has been under control, thinking about re-introducing beef.     Labs 12/17/21: HgbA1c 6.2, and vitamin D 10 (L) - started on 50K weekly for 3 months, 2000 units/day there after.     Today - Focusing on cutting out bread and crackers, and sugary drinks. No alcohol. No carbonated drinks. And more water. Occasionally replacing one meal per day with protein shake.     Diet Recall:  B: chicken nuggets; egg bowl  L: eating out; skip  D  (5pm): chicken and rice with soup; fish and fries   Beverages: water (64 oz/day), occ soda       Progress Towards Previous Goals:  1. Avoid fried foods - try baked, grilled, poached, steamed, sauteed instead. - not met  2. Bring lunch to work from home - E.g. sandwich and fruit - Not met  3. Eat slowly (20-30 minutes per meal), chewing foods well (25 chews per bite/applesauce consistency) - Improving  4. Portion out less than you normally would. Try serving 50% of what you would historically. - No conscious change with this    Relating to  beverages:  1. Take small, frequent sips of fluid all day long between meals - Improving  2. Separate fluids from meals by 30 minutes before, during, and after eating - Not met    Eating Habits 1/21/2021   Do you have any dietary restrictions? No   Do you currently binge eat (eat a large amount of food in a short time)? Yes   Are you an emotional eater? Yes - usually when stressed out/off schedule, fast-food   Do you get up to eat after falling asleep? No   What foods do you crave? Sandwiches       ADDITIONAL INFORMATION:  Works as a . Has plans to start working for himself in 2022 to be able to have more time to himself and work on health goals.  Pt goal to improve knee and heart health.    Wants to focus on increased exercise this month - improve joint health and mobility.      Dining Out History Reviewed With Patient 1/21/2021   How often do you dine out? Nearly every day.   Where do you dine out? (select all that apply) fast food chains, take out   What types of food do you order when you dine out? Protien rice dishes       Physical Activity Reviewed With Patient 1/21/2021   How often do you exercise? 1 to 2 times per week   What is the duration of your exercise (in minutes)? 60+ Minutes   What types of exercise do you do? gym membership, weightlifting   What keeps you from being more active?  Lack of Time         NUTRITION DIAGNOSIS:  Obesity r/t long history of self-monitoring deficit and excessive energy intake aeb BMI >30 kg/m2. - continues    INTERVENTION:  Intervention Provided/Education Provided:  - Reviewed post-op diet guidelines, ways to help prepare for post-op diet guidelines pre-operatively, portion/calorie-control, mindful eating and sources of lean protein.   - Reviewed progress towards previous goals  - Encouraged pt work on consistent eating pattern (TID) to help reduce and slow dinner meal.   - Co-developed goals to work towards using motivational interviewing  Patient  demonstrates understanding. Provided pt with list of goals RD contact information.      Questions Reviewed With Patient 1/21/2021   How ready are you to make changes regarding your weight? Number 1 = Not ready at all to make changes up to 10 = very ready. 10   How confident are you that you can change? 1 = Not confident that you will be successful making changes up to 10 = very confident. 10       Expected Engagement: good    GOALS:  1. Continue to avoid bread, sugary beverages and alcohol.  2. Do not skip lunch. Ok to use protein shake if you do not have the time to eat a meal.  3. Eat slowly (20-30 minutes per meal), chewing foods well (25 chews per bite/applesauce consistency)  4. Take small, frequent sips of fluid all day long between meals  5. Separate fluids from meals by 30 minutes before, during, and after eating  6. Increase activity as able.      Diet Guidelines after Weight-loss Surgery  [email or web address]     Portion Sizes after Weight Loss Surgery  [email or web address]    Your Stage 1 Diet: Clear Liquids  [email or web address]     Your Stage 2 Diet: Low-fat Full Liquids  [email or web address]     Your Stage 3 Diet: Pureed Foods  [email or web address]     Pureed Pleasures  [email or web address]    Your Stage 4 Diet: Soft Foods  [email or web address]    Your Stage 5 Diet: Regular Foods  [email or web address]    Keeping Track of Your Fluids  [email or web address]    Supplements after Weight Loss Surgery  [email or web address]        Angela Granger RD, LD

## 2022-01-17 ENCOUNTER — VIRTUAL VISIT (OUTPATIENT)
Dept: ENDOCRINOLOGY | Facility: CLINIC | Age: 37
End: 2022-01-17
Payer: COMMERCIAL

## 2022-01-17 DIAGNOSIS — Z71.3 NUTRITIONAL COUNSELING: Primary | ICD-10-CM

## 2022-01-17 DIAGNOSIS — E66.9 OBESITY: ICD-10-CM

## 2022-01-17 PROCEDURE — 97803 MED NUTRITION INDIV SUBSEQ: CPT | Mod: GT | Performed by: DIETITIAN, REGISTERED

## 2022-01-17 NOTE — PATIENT INSTRUCTIONS
Paul Sexton,    Follow-up with RD in 1 month.     Thank you,    Angela Granger, RD, LD  If you would like to schedule or reschedule an appointment with the RD, please call [phone]    Nutrition Goals  1. Continue to avoid bread, sugary beverages and alcohol.  2. Do not skip lunch. Ok to use protein shake if you do not have the time to eat a meal.  3. Eat slowly (20-30 minutes per meal), chewing foods well (25 chews per bite/applesauce consistency)  4. Take small, frequent sips of fluid all day long between meals  5. Separate fluids from meals by 30 minutes before, during, and after eating  6. Increase activity as able.      Diet Guidelines after Weight-loss Surgery  [email or web address]     Portion Sizes after Weight Loss Surgery  [email or web address]    Your Stage 1 Diet: Clear Liquids  [email or web address]     Your Stage 2 Diet: Low-fat Full Liquids  [email or web address]     Your Stage 3 Diet: Pureed Foods  [email or web address]     Pureed Pleasures  [email or web address]    Your Stage 4 Diet: Soft Foods  [email or web address]    Your Stage 5 Diet: Regular Foods  [email or web address]    Keeping Track of Your Fluids  [email or web address]    Supplements after Weight Loss Surgery  [email or web address]          Interested in working with a health ?  Health coaches work with you to improve your overall health and wellbeing.  They look at the whole person, and may involve discussion of different areas of life, including, but not limited to the four pillars of health (sleep, exercise, nutrition, and stress management). Discuss with your care team if you would like to start working a health .    Health Coaching-3 Pack:    $99 for three health coaching visits    Visits may be done in person or via phone    Coaching is a partnership between the  and the client; Coaches do not prescribe or diagnose    Coaching helps  "inspire the client to reach his/her personal goals      COMPREHENSIVE WEIGHT MANAGEMENT PROGRAM  VIRTUAL SUPPORT GROUPS    For Support Group Information:      We offer support groups for patients who are working on weight loss and considering, preparing for or have had weight loss surgery.   There is no cost for this opportunity.  You are invited to attend the?Virtual Support Groups?provided by any of the following locations:    1. [facility] via Microsoft Teams with Cynthia Mccall RN  2.   [place] via import2 with Terry Nix, PhD, LP  3.   [place] via TerraWi Teams with Paola Franks RN  4.   [facility] via TerraWi Teams with Paola Kwok [facility]    The following Support Group information can also be found on our website:  [email or web address]      [facility] Weight Loss Surgery Support Group    [facility] Weight Loss Surgery Support Group  The support group is a patient-lead forum that meets monthly to share experiences, encouragement and education. It is open to those who have had weight loss surgery, are scheduled for surgery, and those who are considering surgery.   WHEN: This group meets on the 3rd Wednesday of each month from 5:00PM - 6:00PM virtually using Microsoft Teams.   FACILITATOR: Led by Cynthia Mccall RD, LD, RN, the program's Clinical Coordinator.   TO REGISTER: Please contact the clinic via [email or web address] or call the nurse line directly at [phone] to inform our staff that you would like an invite sent to you and to let us know the email you would like the invite sent to. Prior to the meeting, a link with directions on how to join the meeting will be sent to you.    2022 Meetings  January 19: \"Let's Talk\" a time for the group to share.  February 16: \"Let's Talk\" a time for the group to share.  March 16: Guest Speakers: Psychologists, Anastacia Strauss, PhD,LP and Meme Marquis PsyD,LP  April 20: " "Guest Speaker: [facility], [facility],CHES, CPT  May 18: Guest Speaker: DietitianGonzalo, BRO, LP  Melly 15: \"Let's Talk\" a time for the group to share.  July 20: \"Let's Talk\" a time for the group to share.  August 17: TBA  September 21: TBA  October 19: Guest Speaker: Dr Ralph Anguiano MD Pulmonologist and Sleep Medicine Physician, \"Getting a Good Night's Sleep\".  November 16: TBA  December 21: TBA    [facility] Support Groups    Connections: Bariatric Care Support Group?  This is open to all [facility] (and those external to this program) pre- and post- operative bariatric surgery patients as well as their support system.   WHEN: This group meets the 2nd Tuesday of each month from 6:30 PM - 8:00 PM virtually using Microsoft Teams.   FACILITATOR: Led by Terry Nix, Ph.D who is a Licensed Psychologist with the [facility] Comprehensive Weight Management Program.   TO REGISTER: Please send an email to Terry Nix, Ph.D., LP at?[email or web address]?if you would like an invitation to the group and to learn about using Microsoft Teams.    2022 Meetings  January 11: Heather Rodriguez, PharmD, Pharmacy Resident at [facility], \"Medications and Bariatric Surgery\".  February 8: Open Forum  March 8  April 12  May 10  Melly 14    Connections: Post-Operative Bariatric Surgery Support Group  This is a support group for [facility] bariatric patients (and those external to [facility]) who have had bariatric surgery and are at least 3 months post-surgery.  WHEN: This support group meets the 4th Wednesday of the month from 11:00 AM - 12:00 PM virtually using Microsoft Teams.   FACILITATOR: Led by Certified Bariatric Nurse, Paola Franks RN.   TO REGISTER: Please send an email to Paola at [email or web address] if you would like an invitation to the group and to learn about using Microsoft Teams.    2022 Meetings January 26 February " "23  March 23  April 27  May 25  Melly 22    [facility] Healthy Lifestyle Virtual Support Group    Healthy Lifestyle Virtual Support Group?  This is 60 minutes of small group guided discussion, support and resources. All are welcome who want a healthy lifestyle.  WHEN: This group meets monthly on a Friday from 12:30 PM - 1:30 PM virtually using Microsoft Teams.   FACILITATOR: Led by National Board Certified Health and , Paola Kwok [facility].   TO REGISTER: Please send an email to Paola [email or web address] to receive monthly invites to the group or if you have any questions about having a health .  Prior to the meeting, a link with directions on how to join the meeting will be sent to you.    2022 Meetings  January 21: Carol Diallo MS, RN, CIC, CBN, \"Healthy Habits\"  February 25: Open Forum  March 18: \"Setting Limits and Boundaries\"  April 29: Dagmar Jones RD, \"Meal Planning Made Easy\"  May 20: Open Forum  June: To be determined          "

## 2022-01-17 NOTE — LETTER
"1/17/2022       RE: Bigg Montez  [address]  [place] [address]     Dear Colleague,    Thank you for referring your patient, Bigg Montez, to the [facility] [place] at [facility]. Please see a copy of my visit note below.    Bigg Montez is a 35 year old who is being evaluated via a billable video visit.      The patient has been notified of following:     \"This video visit will be conducted via a call between you and your physician/provider. We have found that certain health care needs can be provided without the need for an in-person physical exam.  This service lets us provide the care you need with a video conversation.  If a prescription is necessary we can send it directly to your pharmacy.  If lab work is needed we can place an order for that and you can then stop by our lab to have the test done at a later time.    Video visits are billed at different rates depending on your insurance coverage.  Please reach out to your insurance provider with any questions.    If during the course of the call the physician/provider feels a video visit is not appropriate, you will not be charged for this service.\"    Patient has given verbal consent for Video visit? Yes  How would you like to obtain your AVS? MyChart  If you are dropped from the video visit, the video invite should be resent to: Text to cell phone: [phone]  Will anyone else be joining your video visit? No        Video-Visit Details    Type of service:  Video Visit    Video Start Time: 8:32 AM  Video End Time: 8:54 AM    Originating Location (pt. Location): Home    Distant Location (provider location):  [facility] [place]     Platform used for Video Visit: MeetMoi    During this virtual visit the patient is located in [place], patient verifies this as the location during the entirety of this visit.     Bariatric Nutrition " "Consultation Note    Reason For Visit: Nutrition reassessment    Bigg Montez is a 35 year old presenting today for return bariatric nutrition consult.  Pt is interested in laparoscopic sleeve gastrectomy with TBD.  Patient is accompanied by self. This is pt's 3rd of 3 required nutrition visits prior to surgery. Recommending monthly follow-up until surgery for continued weight loss and post-op diet education.     Pt referred by Brianda Chan NP on January 25, 2021.  Patient with Co-morbidities of obesity including:  Type II DM no  Renal Failure no  Sleep apnea yes  Hypertension yes   Dyslipidemia no  Joint pain no  Back pain no  GERD no   Prediabetes yes    H/o gout. Pt reports having a blood clot in mid-sept.     Support System Reviewed With Patient 1/21/2021   Who do you have in your support network that can be available to help you for the first 2 weeks after surgery? My wife   Who can you count on for support throughout your weight loss surgery journey? Kenny ceja       ANTHROPOMETRICS:  Initial weight (12/2018 with PCP): 386 lbs    Estimated body mass index is 51.99 kg/m  as calculated from the following:    Height as of an earlier encounter on 1/26/21: 1.816 m (5' 11.5\").    Weight as of an earlier encounter on 1/26/21: 171.5 kg (378 lb).    Most recent weight:  Estimated body mass index is 55.7 kg/m  as calculated from the following:    Height as of 12/16/21: 1.816 m (5' 11.5\").    Weight as of 12/16/21: 183.7 kg (405 lb).    Has not been tracking weight at home.     Required weight loss goal pre-op: 38 lbs from initial consult weight (goal weight 348 lbs or less before surgery)       1/21/2021   I have tried the following methods to lose weight Watching portions or calories, Exercise, Weight Watchers, Slimfast, Physician directed program       Weight Loss Questions Reviewed With Patient 1/21/2021   How long have you been overweight? Since puberty       SUPPLEMENT INFORMATION:  Has a daily MVI (Men's " one a day with omega3) triggered gout flare, stopped taking.      NUTRITION HISTORY:  No known food allergies/intolerances    1/26/21 - Working with family member who is an RD - decreasing sugar intake, IF for 21 days (felt better while following). Avoid beef and seafood, and limiting alcohol for gout. Eating a lot of chicken. Does not care for yogurt, cottage cheese or tofu. Enjoys fried foods, but trying to avoid. Avoiding bread, but does eat a lot of rice (typically occupies most of plate). Using white rice.  Aiming for 30 grams protein per meal.  Typically having 2 meals per day. Meal are at 1pm and 6 pm.   Does not like sugar substitutes.    10/19/21 - Pt reports since experiencing blood clot and seeing wt went up to 400 lbs he has started making changes to diet again.  Focusing on less carbs, more water, increasing protein. Snacking on small portion mixed nuts if needed. Trying to stop eating before 7 pm. Eating 2-3 meals + 1 snack per day. Trying to eat breakfast more consistently. Had breakfast twice per week this last week.     12/16/21 - Pt states he is feeling more motivated this month than ever. Less overall stress, helps him be able to focus on himself. Wants to focus on less fried foods, bringing lunch to work instead of eating out. States gout has been under control, thinking about re-introducing beef.     Labs 12/17/21: HgbA1c 6.2, and vitamin D 10 (L) - started on 50K weekly for 3 months, 2000 units/day there after.     Today - Focusing on cutting out bread and crackers, and sugary drinks. No alcohol. No carbonated drinks. And more water. Occasionally replacing one meal per day with protein shake.     Diet Recall:  B: chicken nuggets; egg bowl  L: eating out; skip  D  (5pm): chicken and rice with soup; fish and fries   Beverages: water (64 oz/day), occ soda       Progress Towards Previous Goals:  1. Avoid fried foods - try baked, grilled, poached, steamed, sauteed instead. - not met  2. Bring lunch  to work from home - E.g. sandwich and fruit - Not met  3. Eat slowly (20-30 minutes per meal), chewing foods well (25 chews per bite/applesauce consistency) - Improving  4. Portion out less than you normally would. Try serving 50% of what you would historically. - No conscious change with this    Relating to beverages:  1. Take small, frequent sips of fluid all day long between meals - Improving  2. Separate fluids from meals by 30 minutes before, during, and after eating - Not met    Eating Habits 1/21/2021   Do you have any dietary restrictions? No   Do you currently binge eat (eat a large amount of food in a short time)? Yes   Are you an emotional eater? Yes - usually when stressed out/off schedule, fast-food   Do you get up to eat after falling asleep? No   What foods do you crave? Sandwiches       ADDITIONAL INFORMATION:  Works as a . Has plans to start working for himself in 2022 to be able to have more time to himself and work on health goals.  Pt goal to improve knee and heart health.    Wants to focus on increased exercise this month - improve joint health and mobility.      Dining Out History Reviewed With Patient 1/21/2021   How often do you dine out? Nearly every day.   Where do you dine out? (select all that apply) fast food chains, take out   What types of food do you order when you dine out? Protien rice dishes       Physical Activity Reviewed With Patient 1/21/2021   How often do you exercise? 1 to 2 times per week   What is the duration of your exercise (in minutes)? 60+ Minutes   What types of exercise do you do? gym membership, weightlifting   What keeps you from being more active?  Lack of Time         NUTRITION DIAGNOSIS:  Obesity r/t long history of self-monitoring deficit and excessive energy intake aeb BMI >30 kg/m2. - continues    INTERVENTION:  Intervention Provided/Education Provided:  - Reviewed post-op diet guidelines, ways to help prepare for post-op diet guidelines  pre-operatively, portion/calorie-control, mindful eating and sources of lean protein.   - Reviewed progress towards previous goals  - Encouraged pt work on consistent eating pattern (TID) to help reduce and slow dinner meal.   - Co-developed goals to work towards using motivational interviewing  Patient demonstrates understanding. Provided pt with list of goals RD contact information.      Questions Reviewed With Patient 1/21/2021   How ready are you to make changes regarding your weight? Number 1 = Not ready at all to make changes up to 10 = very ready. 10   How confident are you that you can change? 1 = Not confident that you will be successful making changes up to 10 = very confident. 10       Expected Engagement: good    GOALS:  1. Continue to avoid bread, sugary beverages and alcohol.  2. Do not skip lunch. Ok to use protein shake if you do not have the time to eat a meal.  3. Eat slowly (20-30 minutes per meal), chewing foods well (25 chews per bite/applesauce consistency)  4. Take small, frequent sips of fluid all day long between meals  5. Separate fluids from meals by 30 minutes before, during, and after eating  6. Increase activity as able.      Diet Guidelines after Weight-loss Surgery  [email or web address]     Portion Sizes after Weight Loss Surgery  [email or web address]    Your Stage 1 Diet: Clear Liquids  [email or web address]     Your Stage 2 Diet: Low-fat Full Liquids  [email or web address]     Your Stage 3 Diet: Pureed Foods  [email or web address]     Pureed Pleasures  [email or web address]    Your Stage 4 Diet: Soft Foods  [email or web address]    Your Stage 5 Diet: Regular Foods  [email or web address]    Keeping Track of Your Fluids  [email or web address]    Supplements after Weight Loss Surgery  [email or web address]        Angela Granger RD, LD

## 2022-02-02 ENCOUNTER — VIRTUAL VISIT (OUTPATIENT)
Dept: PHARMACY | Facility: CLINIC | Age: 37
End: 2022-02-02
Attending: NURSE PRACTITIONER
Payer: COMMERCIAL

## 2022-02-02 DIAGNOSIS — E55.9 VITAMIN D DEFICIENCY: ICD-10-CM

## 2022-02-02 DIAGNOSIS — M1A.09X0 CHRONIC GOUT OF MULTIPLE SITES, UNSPECIFIED CAUSE: ICD-10-CM

## 2022-02-02 DIAGNOSIS — I10 ESSENTIAL HYPERTENSION: ICD-10-CM

## 2022-02-02 DIAGNOSIS — I82.4Y9 DEEP VEIN THROMBOSIS (DVT) OF PROXIMAL LOWER EXTREMITY, UNSPECIFIED CHRONICITY, UNSPECIFIED LATERALITY (H): ICD-10-CM

## 2022-02-02 DIAGNOSIS — I82.409 DVT (DEEP VENOUS THROMBOSIS) (H): ICD-10-CM

## 2022-02-02 DIAGNOSIS — E66.01 CLASS 3 SEVERE OBESITY DUE TO EXCESS CALORIES WITH SERIOUS COMORBIDITY AND BODY MASS INDEX (BMI) OF 50.0 TO 59.9 IN ADULT (H): Primary | ICD-10-CM

## 2022-02-02 DIAGNOSIS — E66.813 CLASS 3 SEVERE OBESITY DUE TO EXCESS CALORIES WITH SERIOUS COMORBIDITY AND BODY MASS INDEX (BMI) OF 50.0 TO 59.9 IN ADULT (H): Primary | ICD-10-CM

## 2022-02-02 DIAGNOSIS — G44.209 TENSION HEADACHE: ICD-10-CM

## 2022-02-02 PROCEDURE — 99607 MTMS BY PHARM ADDL 15 MIN: CPT | Performed by: PHARMACIST

## 2022-02-02 PROCEDURE — 99605 MTMS BY PHARM NP 15 MIN: CPT | Performed by: PHARMACIST

## 2022-02-02 RX ORDER — NAPROXEN SODIUM 220 MG
220 TABLET ORAL 2 TIMES DAILY WITH MEALS
COMMUNITY
End: 2023-02-27

## 2022-02-02 RX ORDER — PREDNISONE 10 MG/1
TABLET ORAL
COMMUNITY
Start: 2021-11-29 | End: 2023-02-27

## 2022-02-02 NOTE — Clinical Note
Patient was previously found to have acute DVT but was working with Heme/Onc on if DOAC was still indicated. He at our appt had just had scanning done and was waiting to hear back from them if going to be treating the DVT. I will reach out to see if he has heard anything as I think... how long after DVT can patient have surgery? 6 months? 1 year? I know MI is 1 year but didn't know otherwise.

## 2022-02-02 NOTE — PROGRESS NOTES
Medication Therapy Management (MTM) Encounter    ASSESSMENT:                            Medication Adherence/Access: No issues identified    Obesity: Would benefit from continuation of Ozempic. Can consider increase Ozempic to 1 mg weekly in the near future.     Acute DVT: Unimproved. Would benefit from follow up with Heme/Onc for clarification on Xarelto start.     Hypertension: Stable. blood pressure at goal <140/90 mmHg.     Gout: Stable for now.     Headaches: Stable.     Vitamin D Deficiency: Would benefit from finishing Vitamin D repletion weekly dosing. Then can get vitamin D level repeat to check in.     PLAN:                            1. Continue Ozempic 0.5 mg weekly for now.    2. Follow up with Heme/Onc regarding if starting Xarelto     3. Get refill of Vitamin D, should be taking vitamin D 50,000 international unit(s) weekly for 12 weeks. Then will get lab rechecked to determine status.     4. Follow up with Brianad Chan CNP in March as planned.     Follow-up: 3-4 months or as needed     SUBJECTIVE/OBJECTIVE:                          Bigg Montez is a 36 year old male called for an initial visit. He was referred to me from Brianda Chan CNP.      Reason for visit: comprehensive review of medications, check in on Ozempic start.    Allergies/ADRs: Reviewed in chart  Past Medical History: Reviewed in chart  Tobacco: He reports that he quit smoking about a year ago. His smoking use included cigars. He has never used smokeless tobacco.Tobacco Cessation Action Plan:   Patient was informed of need for negative nicotine test before sleeve gastrectomy surgery.   Alcohol: not currently using    Medication Adherence/Access: Patient takes medications directly from bottles.  Patient takes medications 1 time(s) per day.   Per patient, misses medication 0 times per week.     Obesity:   Ozempic 0.5 mg weekly, has been on this dose for 2 weeks    Followed by Brianda Chan NP, seen 12/16/2021 for Pre-Bariatric Surgery  "Consult, was prescribed Ozempic at that time. Wife helps with dosing. Patient is experiencing the follow side effects: None. He has noticed getting hillman faster, \"mildly noticeable.\" was also doing a \"low carb\" January. No scale at home. He does feel like he may have lost weight.   Diet/Eating Habits: Patient reports eating 2 meals per day plus once snack. Trying to get in 4-5 glasses of water daily. He plans to limit bread. He is trying to refrain from soda use. Has been drinking lemon teas for flavor. Following dietitian monthly.     Current weight today:unknown  Initial Consult Weight: 386 lb   Goal Weight for Surgery: 348 lb     Wt Readings from Last 4 Encounters:   12/16/21 (!) 405 lb (183.7 kg)   01/26/21 (!) 378 lb (171.5 kg)   05/27/14 (!) 354 lb (160.6 kg)   03/11/14 (!) 359 lb 3.2 oz (162.9 kg)     Estimated body mass index is 55.7 kg/m  as calculated from the following:    Height as of 12/16/21: 5' 11.5\" (1.816 m).    Weight as of 12/16/21: 405 lb (183.7 kg).    Acute DVT:      Xarelto 15 mg twice daily for 21 days then 20 mg daily thereafter?    Was started Eliquis after diagnosis of acute DVT 9/2021. Patient had reported that he was started on Eliquis but got fatigue from it so stopped. Since then has followed up for repeat Ultrasound and CT for evaluation. Appears Xarelto was prescribed as of recent. Patient reports that he is not taking. Reports that Oncologist wanted to look at results of CT before determining need for Xarelto.     Hypertension:  Losartan 100 mg daily.      Patient does not self-monitor blood pressure.  Patient reports no current medication side effects.  Last blood pressure 12/13/21: 132/86 mmHg   BP Readings from Last 3 Encounters:   05/27/14 128/83   03/11/14 122/70   01/22/14 118/80     Gout:   Allopurinol 300 mg daily.   Colchicine 0.6 mg twice daily     Patient reports no current pain concerns. Last gouty attack was late November - left foot. Patient is experiencing the " following medication side effects: none. Reports that prednisone is the only thing that helps with the pain. NSAIDs were not typically helpful before. If he is out of prednisone then he will use naproxen OTC.      Care Everywhere Labs: 12/13/21 Uric acid 6.1  Uric Acid   Date Value Ref Range Status   12/13/2013 10.6 (H) 3.5 - 8.5 mg/dL Final     Headaches:   Naproxen 220 mg as needed     Will use OTC Naproxen if needed for headache. He doesn't get headaches often.     Vitamin D Deficiency:  Vitamin D 50,000 international unit(s) weekly     Reports that he completed 4 weeks and thought that he completed therapy. Was unaware of refills.     Care Everywhere 12/17/21:   Vitamin D 10   PTH 66    ----------------      I spent 30 minutes with this patient today. A copy of the visit note was provided to the patient's provider(s).    The patient was sent via Baihe a summary of these recommendations.     Lauren Bloch, PharmD, BCACP   Medication Therapy Management Pharmacist   [facility] [place]    Telemedicine Visit Details  Type of service:  Telephone visit  Start Time: 4:05 PM  End Time: 4:35 PM  Originating Location (patient location): Home  Distant Location (provider location):  [facility]     Medication Therapy Recommendations  Vitamin D deficiency    Current Medication: vitamin D3 (CHOLECALCIFEROL) 1.25 MG (14818 UT) capsule   Rationale: Does not understand instructions - Adherence - Adherence   Recommendation: Provide Adherence Intervention   Status: Patient Agreed - Adherence/Education

## 2022-02-09 ENCOUNTER — TELEPHONE (OUTPATIENT)
Dept: PHARMACY | Facility: CLINIC | Age: 37
End: 2022-02-09
Payer: COMMERCIAL

## 2022-02-09 NOTE — TELEPHONE ENCOUNTER
Spoke to patient to offer f/u scheduling with Lauren Bloch. Patient was not by his calendar and will call back to schedule at a later time. Should follow up in about 3 months around 5/2/22.

## 2022-02-09 NOTE — PATIENT INSTRUCTIONS
Recommendations from today's MTM visit:                                                    MTM (medication therapy management) is a service provided by a clinical pharmacist designed to help you get the most of out of your medicines.   Today we reviewed what your medicines are for, how to know if they are working, that your medicines are safe and how to make your medicine regimen as easy as possible.    1. Continue Ozempic 0.5 mg weekly for now.    2. Follow up with Heme/Onc regarding if starting Xarelto     3. Get refill of Vitamin D, should be taking vitamin D 50,000 international unit(s) weekly for 12 weeks. Then will get lab rechecked to determine status.     4. Follow up with Brianda Chan CNP in March as planned.     Follow-up: 3-4 months or as needed       It was great to speak with you today.  I value your experience and would be very thankful for your time with providing feedback on our clinic survey. You may receive a survey via email or text message in the next few days.       My Clinical Pharmacist's contact information:                                                      Please feel free to contact me with any questions or concerns you have.      Lauren Bloch, PharmD  Medication Therapy Management Pharmacist   [facility] [place]

## 2022-02-15 NOTE — PROGRESS NOTES
"Bigg Montez is a 36 year old male who is being evaluated via a billable telephone visit. Pt could not get video connected, converted to phone.    The patient has been notified of following:     \"This telephone visit will be conducted via a call between you and your physician/provider. We have found that certain health care needs can be provided without the need for a physical exam.  This service lets us provide the care you need with a short phone conversation.  If a prescription is necessary we can send it directly to your pharmacy.  If lab work is needed we can place an order for that and you can then stop by our lab to have the test done at a later time.    Telephone visits are billed at different rates depending on your insurance coverage. During this emergency period, for some insurers they may be billed the same as an in-person visit.  Please reach out to your insurance provider with any questions.    If during the course of the call the physician/provider feels a telephone visit is not appropriate, you will not be charged for this service.\"    Patient has given verbal consent for Telephone visit?  Yes    How would you like to obtain your AVS? MyChart    Phone call duration: 18 minutes    During this virtual visit the patient is located in [place], patient verifies this as the location during the entirety of this visit.       Bariatric Nutrition Consultation Note    Reason For Visit: Nutrition reassessment    Bigg Montez is a 36 year old presenting today for return bariatric nutrition consult.  Pt is interested in laparoscopic sleeve gastrectomy with TBD.  Patient is accompanied by self. This is pt's 3rd of 3 required nutrition visits prior to surgery. Recommending monthly follow-up until surgery for continued weight loss and post-op diet education.     Pt referred by Brianda Chan NP on January 25, 2021.  Patient with Co-morbidities of obesity including:  Type II DM no  Renal Failure no  Sleep apnea " "yes  Hypertension yes   Dyslipidemia no  Joint pain no  Back pain no  GERD no   Prediabetes yes    H/o gout. Pt reports having a blood clot in mid-sept. 2021     Support System Reviewed With Patient 1/21/2021   Who do you have in your support network that can be available to help you for the first 2 weeks after surgery? My wife   Who can you count on for support throughout your weight loss surgery journey? Kenny valadezazar       ANTHROPOMETRICS:  Initial weight (12/2018 with PCP): 386 lbs    Estimated body mass index is 51.99 kg/m  as calculated from the following:    Height as of an earlier encounter on 1/26/21: 1.816 m (5' 11.5\").    Weight as of an earlier encounter on 1/26/21: 171.5 kg (378 lb).    Most recent weight:  Estimated body mass index is 55.7 kg/m  as calculated from the following:    Height as of 12/16/21: 1.816 m (5' 11.5\").    Weight as of 12/16/21: 183.7 kg (405 lb).    Has not been tracking weight at home. Feels like he is losing weight, but has not checked recently. Plans to schedule weight check this weekend, or get a scale for home.     Required weight loss goal pre-op: 38 lbs from initial consult weight (goal weight 348 lbs or less before surgery)       1/21/2021   I have tried the following methods to lose weight Watching portions or calories, Exercise, Weight Watchers, Slimfast, Physician directed program       Weight Loss Questions Reviewed With Patient 1/21/2021   How long have you been overweight? Since puberty       SUPPLEMENT INFORMATION:  Has a daily MVI (Men's one a day with omega3) triggered gout flare, stopped taking.      NUTRITION HISTORY:  No known food allergies/intolerances    1/26/21 - Working with family member who is an RD - decreasing sugar intake, IF for 21 days (felt better while following). Avoid beef and seafood, and limiting alcohol for gout. Eating a lot of chicken. Does not care for yogurt, cottage cheese or tofu. Enjoys fried foods, but trying to avoid. Avoiding " bread, but does eat a lot of rice (typically occupies most of plate). Using white rice.  Aiming for 30 grams protein per meal.  Typically having 2 meals per day. Meal are at 1pm and 6 pm.   Does not like sugar substitutes.    10/19/21 - Pt reports since experiencing blood clot and seeing wt went up to 400 lbs he has started making changes to diet again.  Focusing on less carbs, more water, increasing protein. Snacking on small portion mixed nuts if needed. Trying to stop eating before 7 pm. Eating 2-3 meals + 1 snack per day. Trying to eat breakfast more consistently. Had breakfast twice per week this last week.     12/16/21 - Pt states he is feeling more motivated this month than ever. Less overall stress, helps him be able to focus on himself. Wants to focus on less fried foods, bringing lunch to work instead of eating out. States gout has been under control, thinking about re-introducing beef.     Labs 12/17/21: HgbA1c 6.2, and vitamin D 10 (L) - started on 50K weekly for 3 months, 2000 units/day there after.     1/17/21 - Focusing on cutting out bread and crackers, and sugary drinks. No alcohol. No carbonated drinks. And more water. Occasionally replacing one meal per day with protein shake.     Today - Started Ozempic, reports less cravings, less appetite, smaller portion sizes. Working on increasing water.  Started doing 16/8 IF, eating between 12 pm- 8pm. Eating 2 meals within that window. Following recipe and meal prep accounts for meal ideas.     Diet Recall:  B: skip with IF  L: 2 pm slice of pizza   D 5pm: beef yordy and peace  Beverages: water (64 oz/day), occ soda. SF energy drinks.         Progress Towards Previous Goals:  1. Continue to avoid bread, sugary beverages and alcohol. - Continues to avoid sugar-beverages and alcohol. Less bread overall, focusing more filling up protein.  2. Do not skip lunch. Ok to use protein shake if you do not have the time to eat a meal. - Has premier protein   3. Eat  slowly (20-30 minutes per meal), chewing foods well (25 chews per bite/applesauce consistency) - Improving  4. Take small, frequent sips of fluid all day long between meals - Less since starting ozempic, just forgets to sip.   5. Separate fluids from meals by 30 minutes before, during, and after eating - Improving  6. Increase activity as able. - Really busy with work currently.       ADDITIONAL INFORMATION:  Works as a . Has plans to start working for himself in 2022 to be able to have more time to himself and work on health goals.  Pt goal to improve knee and heart health.    Wants to focus on increased exercise this month - improve joint health and mobility.      Dining Out History Reviewed With Patient 1/21/2021   How often do you dine out? Nearly every day.   Where do you dine out? (select all that apply) fast food chains, take out   What types of food do you order when you dine out? Protien rice dishes       Physical Activity Reviewed With Patient 1/21/2021   How often do you exercise? 1 to 2 times per week   What is the duration of your exercise (in minutes)? 60+ Minutes   What types of exercise do you do? gym membership, weightlifting   What keeps you from being more active?  Lack of Time         NUTRITION DIAGNOSIS:  Obesity r/t long history of self-monitoring deficit and excessive energy intake aeb BMI >30 kg/m2. - continues    INTERVENTION:  Intervention Provided/Education Provided:  - Reviewed post-op diet guidelines, ways to help prepare for post-op diet guidelines pre-operatively, portion/calorie-control, mindful eating and sources of lean protein.   - Reviewed progress towards previous goals  - Encouraged pt continue protein and veggie focused meal with moderated starches. Discussed using protein shakes as meal replacements as need to keep meal pattern and calorie reduction consistent.   - Co-developed goals to work towards using motivational interviewing  Patient demonstrates  "understanding. Provided pt with list of goals RD contact information.      Questions Reviewed With Patient 1/21/2021   How ready are you to make changes regarding your weight? Number 1 = Not ready at all to make changes up to 10 = very ready. 10   How confident are you that you can change? 1 = Not confident that you will be successful making changes up to 10 = very confident. 10       Expected Engagement: good    GOALS:  1. Do some meal prep this weekend. Protein and veggie focused.    - Protein shake as a back up  2. Use a small plate for meals (9\" or less). Wait 10 mins before going back for seconds.   3. Eat slowly (20-30 minutes per meal), chewing foods well (25 chews per bite/applesauce consistency)  4. Take small, frequent sips of fluid all day long between meals. Consume 64+ oz/day.   5. Do not drink with meals. Wait 30 mons before drinking again.   6. Increase activity as able.      Diet Guidelines after Weight-loss Surgery  [email or web address]     Portion Sizes after Weight Loss Surgery  [email or web address]    Your Stage 1 Diet: Clear Liquids  [email or web address]     Your Stage 2 Diet: Low-fat Full Liquids  [email or web address]     Your Stage 3 Diet: Pureed Foods  [email or web address]     Pureed Pleasures  [email or web address]    Your Stage 4 Diet: Soft Foods  [email or web address]    Your Stage 5 Diet: Regular Foods  [email or web address]    Keeping Track of Your Fluids  [email or web address]    Supplements after Weight Loss Surgery  [email or web address]        Angela Granger RD, HELEN  "

## 2022-02-17 ENCOUNTER — VIRTUAL VISIT (OUTPATIENT)
Dept: ENDOCRINOLOGY | Facility: CLINIC | Age: 37
End: 2022-02-17
Payer: COMMERCIAL

## 2022-02-17 DIAGNOSIS — E66.9 OBESITY: ICD-10-CM

## 2022-02-17 DIAGNOSIS — Z71.3 NUTRITIONAL COUNSELING: Primary | ICD-10-CM

## 2022-02-17 PROCEDURE — 97803 MED NUTRITION INDIV SUBSEQ: CPT | Mod: GT | Performed by: DIETITIAN, REGISTERED

## 2022-02-17 NOTE — LETTER
"2/17/2022       RE: Bigg Montez  [address]  [place] [address]     Dear Colleague,    Thank you for referring your patient, Bigg Montez, to the [facility] [place] at [facility]. Please see a copy of my visit note below.    Bigg Montez is a 36 year old male who is being evaluated via a billable telephone visit. Pt could not get video connected, converted to phone.    The patient has been notified of following:     \"This telephone visit will be conducted via a call between you and your physician/provider. We have found that certain health care needs can be provided without the need for a physical exam.  This service lets us provide the care you need with a short phone conversation.  If a prescription is necessary we can send it directly to your pharmacy.  If lab work is needed we can place an order for that and you can then stop by our lab to have the test done at a later time.    Telephone visits are billed at different rates depending on your insurance coverage. During this emergency period, for some insurers they may be billed the same as an in-person visit.  Please reach out to your insurance provider with any questions.    If during the course of the call the physician/provider feels a telephone visit is not appropriate, you will not be charged for this service.\"    Patient has given verbal consent for Telephone visit?  Yes    How would you like to obtain your AVS? Roddyhart    Phone call duration: 18 minutes    During this virtual visit the patient is located in [place], patient verifies this as the location during the entirety of this visit.       Bariatric Nutrition Consultation Note    Reason For Visit: Nutrition reassessment    Bigg Montez is a 36 year old presenting today for return bariatric nutrition consult.  Pt is interested in laparoscopic sleeve gastrectomy with TBD.  Patient is accompanied by " "self. This is pt's 3rd of 3 required nutrition visits prior to surgery. Recommending monthly follow-up until surgery for continued weight loss and post-op diet education.     Pt referred by Brianda Chan NP on January 25, 2021.  Patient with Co-morbidities of obesity including:  Type II DM no  Renal Failure no  Sleep apnea yes  Hypertension yes   Dyslipidemia no  Joint pain no  Back pain no  GERD no   Prediabetes yes    H/o gout. Pt reports having a blood clot in mid-sept. 2021     Support System Reviewed With Patient 1/21/2021   Who do you have in your support network that can be available to help you for the first 2 weeks after surgery? My wife   Who can you count on for support throughout your weight loss surgery journey? Kenny ceja       ANTHROPOMETRICS:  Initial weight (12/2018 with PCP): 386 lbs    Estimated body mass index is 51.99 kg/m  as calculated from the following:    Height as of an earlier encounter on 1/26/21: 1.816 m (5' 11.5\").    Weight as of an earlier encounter on 1/26/21: 171.5 kg (378 lb).    Most recent weight:  Estimated body mass index is 55.7 kg/m  as calculated from the following:    Height as of 12/16/21: 1.816 m (5' 11.5\").    Weight as of 12/16/21: 183.7 kg (405 lb).    Has not been tracking weight at home. Feels like he is losing weight, but has not checked recently. Plans to schedule weight check this weekend, or get a scale for home.     Required weight loss goal pre-op: 38 lbs from initial consult weight (goal weight 348 lbs or less before surgery)       1/21/2021   I have tried the following methods to lose weight Watching portions or calories, Exercise, Weight Watchers, Slimfast, Physician directed program       Weight Loss Questions Reviewed With Patient 1/21/2021   How long have you been overweight? Since puberty       SUPPLEMENT INFORMATION:  Has a daily MVI (Men's one a day with omega3) triggered gout flare, stopped taking.      NUTRITION HISTORY:  No known food " allergies/intolerances    1/26/21 - Working with family member who is an RD - decreasing sugar intake, IF for 21 days (felt better while following). Avoid beef and seafood, and limiting alcohol for gout. Eating a lot of chicken. Does not care for yogurt, cottage cheese or tofu. Enjoys fried foods, but trying to avoid. Avoiding bread, but does eat a lot of rice (typically occupies most of plate). Using white rice.  Aiming for 30 grams protein per meal.  Typically having 2 meals per day. Meal are at 1pm and 6 pm.   Does not like sugar substitutes.    10/19/21 - Pt reports since experiencing blood clot and seeing wt went up to 400 lbs he has started making changes to diet again.  Focusing on less carbs, more water, increasing protein. Snacking on small portion mixed nuts if needed. Trying to stop eating before 7 pm. Eating 2-3 meals + 1 snack per day. Trying to eat breakfast more consistently. Had breakfast twice per week this last week.     12/16/21 - Pt states he is feeling more motivated this month than ever. Less overall stress, helps him be able to focus on himself. Wants to focus on less fried foods, bringing lunch to work instead of eating out. States gout has been under control, thinking about re-introducing beef.     Labs 12/17/21: HgbA1c 6.2, and vitamin D 10 (L) - started on 50K weekly for 3 months, 2000 units/day there after.     1/17/21 - Focusing on cutting out bread and crackers, and sugary drinks. No alcohol. No carbonated drinks. And more water. Occasionally replacing one meal per day with protein shake.     Today - Started Ozempic, reports less cravings, less appetite, smaller portion sizes. Working on increasing water.  Started doing 16/8 IF, eating between 12 pm- 8pm. Eating 2 meals within that window. Following recipe and meal prep accounts for meal ideas.     Diet Recall:  B: skip with IF  L: 2 pm slice of pizza   D 5pm: beef yordy and peace  Beverages: water (64 oz/day), occ soda. SF energy  drinks.         Progress Towards Previous Goals:  1. Continue to avoid bread, sugary beverages and alcohol. - Continues to avoid sugar-beverages and alcohol. Less bread overall, focusing more filling up protein.  2. Do not skip lunch. Ok to use protein shake if you do not have the time to eat a meal. - Has premier protein   3. Eat slowly (20-30 minutes per meal), chewing foods well (25 chews per bite/applesauce consistency) - Improving  4. Take small, frequent sips of fluid all day long between meals - Less since starting ozempic, just forgets to sip.   5. Separate fluids from meals by 30 minutes before, during, and after eating - Improving  6. Increase activity as able. - Really busy with work currently.       ADDITIONAL INFORMATION:  Works as a . Has plans to start working for himself in 2022 to be able to have more time to himself and work on health goals.  Pt goal to improve knee and heart health.    Wants to focus on increased exercise this month - improve joint health and mobility.      Dining Out History Reviewed With Patient 1/21/2021   How often do you dine out? Nearly every day.   Where do you dine out? (select all that apply) fast food chains, take out   What types of food do you order when you dine out? Protien rice dishes       Physical Activity Reviewed With Patient 1/21/2021   How often do you exercise? 1 to 2 times per week   What is the duration of your exercise (in minutes)? 60+ Minutes   What types of exercise do you do? gym membership, weightlifting   What keeps you from being more active?  Lack of Time         NUTRITION DIAGNOSIS:  Obesity r/t long history of self-monitoring deficit and excessive energy intake aeb BMI >30 kg/m2. - continues    INTERVENTION:  Intervention Provided/Education Provided:  - Reviewed post-op diet guidelines, ways to help prepare for post-op diet guidelines pre-operatively, portion/calorie-control, mindful eating and sources of lean protein.   - Reviewed  "progress towards previous goals  - Encouraged pt continue protein and veggie focused meal with moderated starches. Discussed using protein shakes as meal replacements as need to keep meal pattern and calorie reduction consistent.   - Co-developed goals to work towards using motivational interviewing  Patient demonstrates understanding. Provided pt with list of goals RD contact information.      Questions Reviewed With Patient 1/21/2021   How ready are you to make changes regarding your weight? Number 1 = Not ready at all to make changes up to 10 = very ready. 10   How confident are you that you can change? 1 = Not confident that you will be successful making changes up to 10 = very confident. 10       Expected Engagement: good    GOALS:  1. Do some meal prep this weekend. Protein and veggie focused.    - Protein shake as a back up  2. Use a small plate for meals (9\" or less). Wait 10 mins before going back for seconds.   3. Eat slowly (20-30 minutes per meal), chewing foods well (25 chews per bite/applesauce consistency)  4. Take small, frequent sips of fluid all day long between meals. Consume 64+ oz/day.   5. Do not drink with meals. Wait 30 mons before drinking again.   6. Increase activity as able.      Diet Guidelines after Weight-loss Surgery  [email or web address]     Portion Sizes after Weight Loss Surgery  [email or web address]    Your Stage 1 Diet: Clear Liquids  [email or web address]     Your Stage 2 Diet: Low-fat Full Liquids  [email or web address]     Your Stage 3 Diet: Pureed Foods  [email or web address]     Pureed Pleasures  [email or web address]    Your Stage 4 Diet: Soft Foods  [email or web address]    Your Stage 5 Diet: Regular Foods  [email or web address]    Keeping Track of Your Fluids  [email or web address]    Supplements after Weight Loss Surgery  [email or web address]        Angela Granger RD, LD    "

## 2022-02-17 NOTE — PATIENT INSTRUCTIONS
"Paul Sexton,    Follow-up with RD in 1 month.     Thank you,    Angela Granger, RD, LD  If you would like to schedule or reschedule an appointment with the RD, please call [phone]    Nutrition Goals  1. Do some meal prep this weekend. Protein and veggie focused.    - Protein shake as a back up  2. Use a small plate for meals (9\" or less). Wait 10 mins before going back for seconds.   3. Eat slowly (20-30 minutes per meal), chewing foods well (25 chews per bite/applesauce consistency)  4. Take small, frequent sips of fluid all day long between meals. Consume 64+ oz/day.   5. Do not drink with meals. Wait 30 mons before drinking again.   6. Increase activity as able.      Diet Guidelines after Weight-loss Surgery  [email or web address]     Portion Sizes after Weight Loss Surgery  [email or web address]    Your Stage 1 Diet: Clear Liquids  [email or web address]     Your Stage 2 Diet: Low-fat Full Liquids  [email or web address]     Your Stage 3 Diet: Pureed Foods  [email or web address]     Pureed Pleasures  [email or web address]    Your Stage 4 Diet: Soft Foods  [email or web address]    Your Stage 5 Diet: Regular Foods  [email or web address]    Keeping Track of Your Fluids  [email or web address]    Supplements after Weight Loss Surgery  [email or web address]      Interested in working with a health ?  Health coaches work with you to improve your overall health and wellbeing.  They look at the whole person, and may involve discussion of different areas of life, including, but not limited to the four pillars of health (sleep, exercise, nutrition, and stress management). Discuss with your care team if you would like to start working a health .    Health Coaching-3 Pack:    $99 for three health coaching visits    Visits may be done in person or via phone    Coaching is a partnership between the  and the client; Coaches do " "not prescribe or diagnose    Coaching helps inspire the client to reach his/her personal goals      COMPREHENSIVE WEIGHT MANAGEMENT PROGRAM  VIRTUAL SUPPORT GROUPS    For Support Group Information:      We offer support groups for patients who are working on weight loss and considering, preparing for or have had weight loss surgery.   There is no cost for this opportunity.  You are invited to attend the?Virtual Support Groups?provided by any of the following locations:    1. [facility] via Microsoft Teams with Cynthia Mccall RN  2.   [place] via [place] with Terry Nix, PhD, LP  3.   [place] via [place] with Paola Franks RN  4.   [facility] via [address] Teams with Paola Kwok, [facility]    The following Support Group information can also be found on our website:  [email or web address]      [facility] Weight Loss Surgery Support Group    [facility] Weight Loss Surgery Support Group  The support group is a patient-lead forum that meets monthly to share experiences, encouragement and education. It is open to those who have had weight loss surgery, are scheduled for surgery, and those who are considering surgery.   WHEN: This group meets on the 3rd Wednesday of each month from 5:00PM - 6:00PM virtually using Microsoft Teams.   FACILITATOR: Led by Cynthia Mccall RD, LD, RN, the program's Clinical Coordinator.   TO REGISTER: Please contact the clinic via 20:[employer] Mobile or call the nurse line directly at [phone] to inform our staff that you would like an invite sent to you and to let us know the email you would like the invite sent to. Prior to the meeting, a link with directions on how to join the meeting will be sent to you.    2022 Meetings  January 19: \"Let's Talk\" a time for the group to share.  February 16: \"Let's Talk\" a time for the group to share.  March 16: Guest Speakers: Psychologists, Anastacia Strauss, PhD,LP " "and Meme Marquis PsyD,  April 20: Guest Speaker: Health , Hope Maya, CHC,CHES, CPT  May 18: Guest Speaker: DietitianGonzalo RD, LP  Melly 15: \"Let's Talk\" a time for the group to share.  July 20: \"Let's Talk\" a time for the group to share.  August 17: TBA  September 21: TBA  October 19: Guest Speaker: Dr Ralph Anguiano MD Pulmonologist and Sleep Medicine Physician, \"Getting a Good Night's Sleep\".  November 16: TBA  December 21: TBA    [facility] Support Groups    Connections: Bariatric Care Support Group?  This is open to all [facility] (and those external to this program) pre- and post- operative bariatric surgery patients as well as their support system.   WHEN: This group meets the 2nd Tuesday of each month from 6:30 PM - 8:00 PM virtually using Microsoft Teams.   FACILITATOR: Led by Terry Nix, Ph.D who is a Licensed Psychologist with the [facility] Comprehensive Weight Management Program.   TO REGISTER: Please send an email to Terry Nix, Ph.D.,  at?[email or web address]?if you would like an invitation to the group and to learn about using Microsoft Teams.    2022 Meetings  January 11: Heather Rodriguez, PharmD, Pharmacy Resident at [facility], \"Medications and Bariatric Surgery\".  February 8: Open Forum  March 8  April 12  May 10  Melly 14    Connections: Post-Operative Bariatric Surgery Support Group  This is a support group for [facility] bariatric patients (and those external to [facility]) who have had bariatric surgery and are at least 3 months post-surgery.  WHEN: This support group meets the 4th Wednesday of the month from 11:00 AM - 12:00 PM virtually using Microsoft Teams.   FACILITATOR: Led by Certified Bariatric Nurse, Paola Franks RN.   TO REGISTER: Please send an email to Paola at [email or web address] if you would like an invitation to the group and to learn about using [employer]" "Teams.    2022 Meetings  January 26  February 23  March 23  April 27  May 25  Melly 22    [facility] Healthy Lifestyle Virtual Support Group    Healthy Lifestyle Virtual Support Group?  This is 60 minutes of small group guided discussion, support and resources. All are welcome who want a healthy lifestyle.  WHEN: This group meets monthly on a Friday from 12:30 PM - 1:30 PM virtually using Microsoft Teams.   FACILITATOR: Led by National Board Certified Health and , Paola Kwok, [facility].   TO REGISTER: Please send an email to Paola [email or web address] to receive monthly invites to the group or if you have any questions about having a health .  Prior to the meeting, a link with directions on how to join the meeting will be sent to you.    2022 Meetings  January 21: Carol Diallo MS, RN, CIC, CBN, \"Healthy Habits\"  February 25: Open Forum  March 18: \"Setting Limits and Boundaries\"  April 29: Dagmar Jones RD, \"Meal Planning Made Easy\"  May 20: Open Forum  June: To be determined          "

## 2022-03-15 NOTE — PROGRESS NOTES
"Bigg Montez is a 36 year old male who is being evaluated via a billable video visit.      The patient has been notified of following:     \"This video visit will be conducted via a call between you and your physician/provider. We have found that certain health care needs can be provided without the need for an in-person physical exam.  This service lets us provide the care you need with a video conversation.  If a prescription is necessary we can send it directly to your pharmacy.  If lab work is needed we can place an order for that and you can then stop by our lab to have the test done at a later time.    Video visits are billed at different rates depending on your insurance coverage.  Please reach out to your insurance provider with any questions.    If during the course of the call the physician/provider feels a video visit is not appropriate, you will not be charged for this service.\"    Patient has given verbal consent for Video visit? Yes  How would you like to obtain your AVS? MyChart  Will anyone else be joining your video visit? No  {If patient encounters technical issues they should call [phone]      Video-Visit Details    Type of service:  Video Visit    Video Start Time: 7:36 AM   Video End Time: 7:58 AM    Originating Location (pt. Location): Home    Distant Location (provider location):  [facility] [place]     Platform used for Video Visit: [employer]    During this virtual visit the patient is located in [place], patient verifies this as the location during the entirety of this visit.         Bariatric Nutrition Consultation Note    Reason For Visit: Nutrition reassessment    Bigg Montez is a 36 year old presenting today for return bariatric nutrition consult.  Pt is interested in laparoscopic sleeve gastrectomy with TBD.  Patient is accompanied by self. Patient has completed minimum required nutrition visits prior to surgery. Recommending monthly follow-up " "until surgery for continued weight loss and post-op diet education.     Pt referred by Brianda Chan NP on January 25, 2021.  Patient with Co-morbidities of obesity including:  Type II DM no  Renal Failure no  Sleep apnea yes  Hypertension yes   Dyslipidemia no  Joint pain no  Back pain no  GERD no   Prediabetes yes    H/o gout. Pt reports having a blood clot in mid-sept. 2021     Support System Reviewed With Patient 1/21/2021   Who do you have in your support network that can be available to help you for the first 2 weeks after surgery? My wife   Who can you count on for support throughout your weight loss surgery journey? Kenny ceja       ANTHROPOMETRICS:  Initial weight (12/2018 with PCP): 386 lbs    Estimated body mass index is 51.99 kg/m  as calculated from the following:    Height as of an earlier encounter on 1/26/21: 1.816 m (5' 11.5\").    Weight as of an earlier encounter on 1/26/21: 171.5 kg (378 lb).    Most recent weight:  Estimated body mass index is 55.7 kg/m  as calculated from the following:    Height as of 12/16/21: 1.816 m (5' 11.5\").    Weight as of 12/16/21: 183.7 kg (405 lb).    Has not been tracking weight at home. Feels like he is losing weight, but has not checked recently. Plans to schedule weight check this weekend, or get a scale for home.     Required weight loss goal pre-op: 38 lbs from initial consult weight (goal weight 348 lbs or less before surgery)       1/21/2021   I have tried the following methods to lose weight Watching portions or calories, Exercise, Weight Watchers, Slimfast, Physician directed program       Weight Loss Questions Reviewed With Patient 1/21/2021   How long have you been overweight? Since puberty       SUPPLEMENT INFORMATION:  Has a daily MVI (Men's one a day with omega3) triggered gout flare, stopped taking.      NUTRITION HISTORY:  No known food allergies/intolerances    1/26/21 - Working with family member who is an RD - decreasing sugar intake, IF for 21 " days (felt better while following). Avoid beef and seafood, and limiting alcohol for gout. Eating a lot of chicken. Does not care for yogurt, cottage cheese or tofu. Enjoys fried foods, but trying to avoid. Avoiding bread, but does eat a lot of rice (typically occupies most of plate). Using white rice.  Aiming for 30 grams protein per meal.  Typically having 2 meals per day. Meal are at 1pm and 6 pm.   Does not like sugar substitutes.    10/19/21 - Pt reports since experiencing blood clot and seeing wt went up to 400 lbs he has started making changes to diet again.  Focusing on less carbs, more water, increasing protein. Snacking on small portion mixed nuts if needed. Trying to stop eating before 7 pm. Eating 2-3 meals + 1 snack per day. Trying to eat breakfast more consistently. Had breakfast twice per week this last week.     12/16/21 - Pt states he is feeling more motivated this month than ever. Less overall stress, helps him be able to focus on himself. Wants to focus on less fried foods, bringing lunch to work instead of eating out. States gout has been under control, thinking about re-introducing beef.     Labs 12/17/21: HgbA1c 6.2, and vitamin D 10 (L) - started on 50K weekly for 3 months, 2000 units/day there after.     1/17/21 - Focusing on cutting out bread and crackers, and sugary drinks. No alcohol. No carbonated drinks. And more water. Occasionally replacing one meal per day with protein shake.     Feb 2022 - Started Ozempic, reports less cravings, less appetite, smaller portion sizes. Working on increasing water.  Started doing 16/8 IF, eating between 12 pm- 8pm. Eating 2 meals within that window. Following recipe and meal prep accounts for meal ideas.     Today - Work has been really busy. Relying on eating out more often - [employer], Pizza, Pasta.     Diet Recall:  B: skip with IF  L: 2 pm slice of pizza   D 5pm: beef yordy and peace  Beverages: water (64 oz/day), occ soda. SF energy drinks.      "    Progress Towards Previous Goals:  1. Do some meal prep this weekend. Protein and veggie focused. - Not met   - Protein shake as a back up  2. Use a small plate for meals (9\" or less). Wait 10 mins before going back for seconds. - Improving  3. Eat slowly (20-30 minutes per meal), chewing foods well (25 chews per bite/applesauce consistency) - Improving   4. Take small, frequent sips of fluid all day long between meals. Consume 64+ oz/day. - Met, has recently been working on meeting hydration    5. Do not drink with meals. Wait 30 mons before drinking again. - Not met  6. Increase activity as able. - No change      ADDITIONAL INFORMATION:  Works as a . Has plans to start working for himself in 2022 to be able to have more time to himself and work on health goals.  Pt goal to improve knee and heart health.    Wants to focus on increased exercise this month - improve joint health and mobility.      Dining Out History Reviewed With Patient 1/21/2021   How often do you dine out? Nearly every day.   Where do you dine out? (select all that apply) fast food chains, take out   What types of food do you order when you dine out? Protien rice dishes       Physical Activity Reviewed With Patient 1/21/2021   How often do you exercise? 1 to 2 times per week   What is the duration of your exercise (in minutes)? 60+ Minutes   What types of exercise do you do? gym membership, weightlifting   What keeps you from being more active?  Lack of Time         NUTRITION DIAGNOSIS:  Obesity r/t long history of self-monitoring deficit and excessive energy intake aeb BMI >30 kg/m2. - continues    INTERVENTION:  Intervention Provided/Education Provided:  - Reviewed post-op diet guidelines, ways to help prepare for post-op diet guidelines pre-operatively, portion/calorie-control, mindful eating and sources of lean protein.   - Reviewed progress towards previous goals  - Discussed tips for healthy eating when dining out  - " Discussed strategy for meal planning  - Co-developed goals to work towards using motivational interviewing  Patient demonstrates understanding. Provided pt with list of goals RD contact information.      Questions Reviewed With Patient 1/21/2021   How ready are you to make changes regarding your weight? Number 1 = Not ready at all to make changes up to 10 = very ready. 10   How confident are you that you can change? 1 = Not confident that you will be successful making changes up to 10 = very confident. 10       Expected Engagement: good    GOALS:  1. Order groceries   2. Put meals together at night for next day   3. Home work outs/stretches - twice per day    - Schedule into your day (put alert into your phone)       Diet Guidelines after Weight-loss Surgery  [email or web address]     Portion Sizes after Weight Loss Surgery  [email or web address]    Your Stage 1 Diet: Clear Liquids  [email or web address]     Your Stage 2 Diet: Low-fat Full Liquids  [email or web address]     Your Stage 3 Diet: Pureed Foods  [email or web address]     Pureed Pleasures  [email or web address]    Your Stage 4 Diet: Soft Foods  [email or web address]    Your Stage 5 Diet: Regular Foods  [email or web address]    Keeping Track of Your Fluids  [email or web address]    Supplements after Weight Loss Surgery  [email or web address]        Angela Granger RD, LD

## 2022-03-16 ENCOUNTER — VIRTUAL VISIT (OUTPATIENT)
Dept: ENDOCRINOLOGY | Facility: CLINIC | Age: 37
End: 2022-03-16
Payer: COMMERCIAL

## 2022-03-16 VITALS — HEIGHT: 72 IN | BODY MASS INDEX: 55.7 KG/M2

## 2022-03-16 DIAGNOSIS — E55.9 VITAMIN D DEFICIENCY: ICD-10-CM

## 2022-03-16 DIAGNOSIS — R73.03 PRE-DIABETES: ICD-10-CM

## 2022-03-16 DIAGNOSIS — E66.813 CLASS 3 SEVERE OBESITY DUE TO EXCESS CALORIES WITH SERIOUS COMORBIDITY AND BODY MASS INDEX (BMI) OF 50.0 TO 59.9 IN ADULT (H): Primary | ICD-10-CM

## 2022-03-16 DIAGNOSIS — E66.01 CLASS 3 SEVERE OBESITY DUE TO EXCESS CALORIES WITH SERIOUS COMORBIDITY AND BODY MASS INDEX (BMI) OF 50.0 TO 59.9 IN ADULT (H): Primary | ICD-10-CM

## 2022-03-16 PROCEDURE — 99213 OFFICE O/P EST LOW 20 MIN: CPT | Mod: GT | Performed by: NURSE PRACTITIONER

## 2022-03-16 RX ORDER — SEMAGLUTIDE 1.34 MG/ML
1 INJECTION, SOLUTION SUBCUTANEOUS
Qty: 3 ML | Refills: 3 | Status: SHIPPED | OUTPATIENT
Start: 2022-03-16 | End: 2023-02-27

## 2022-03-16 NOTE — PROGRESS NOTES
During this virtual visit the patient is located in [place], patient verifies this as the location during the entirety of this visit.     Darshan is a 36 year old who is being evaluated via a billable video visit.      How would you like to obtain your AVS? MyChart  If the video visit is dropped, the invitation should be resent by: Send to: [phone]  Will anyone else be joining your video visit? No      Video Start Time: 1101  Video-Visit Details    Type of service:  Video Visit    Video End Time:1114    Originating Location (pt. Location): Home    Distant Location (provider location):  [facility] [place]     Platform used for Video Visit: Harshil Horton NREMT

## 2022-03-16 NOTE — LETTER
3/16/2022       RE: Bigg Montez  [address]  [place] [address]     Dear Colleague,    Thank you for referring your patient, Bigg Montez, to the [facility] [place] [facility]. Please see a copy of my visit note below.    During this virtual visit the patient is located in [place], patient verifies this as the location during the entirety of this visit.     Darshan is a 36 year old who is being evaluated via a billable video visit.      How would you like to obtain your AVS? MyChart  If the video visit is dropped, the invitation should be resent by: Send to: [phone]  Will anyone else be joining your video visit? No      Video Start Time:   Video-Visit Details    Type of service:  Video Visit    Video End Time:    Originating Location (pt. Location): Home    Distant Location (provider location):  [facility]     Platform used for Video Visit: Harshil Horton NREMT          Pre-Bariatric Surgery Note    Wegener, Joel Daniel Irwin    Date: 3/16/2022     RE: Bigg Montez    MR#: [id number]   : 1985   Date of Visit: Mar 16, 2022    REASON FOR VISIT: Preoperative evaluation for possible weight loss surgery    Dear Dr. Wegener, Joel Daniel Irwin,    I had the pleasure of seeing your patient, Bigg Montez, in my preoperative bariatric clinic.    As you know, he is morbidly obese and considering weight loss surgery to treat obesity in association with his medical conditions of obesity.  His consult weight was 378lb. He has lost ? pounds since his consult weight. He has not met his required pre-surgery weight. Please refer to initial consult note from date 2021 for patient's weight history and co-morbidities.    4 kids 11 to 2yo    Wt 402 2021 PCP clinic     GLP1 start (ozempic). 2022- Lauren Bloch [facility] Pharmacist  For follow up on  ozempic- discussed increase to 1mg but did not increase.   -has noticed smaller portions but doesn't notice appetite suppression consistently     Labs 12/17/2021- care everywhere   A1C 6.2   Low vitamin D     Assessment & Plan   Problem List Items Addressed This Visit        Digestive    Class 3 severe obesity due to excess calories with serious comorbidity and body mass index (BMI) of 50.0 to 59.9 in adult (H) - Primary     Tolerating ozempic 0.5mg without adverse side effects. Finding it helps with smaller portions and less hunger but less so over time. Felt he initially lost weight but then feels like he has had recent weight regain. No recent weights. Will increase ozempic to 1mg. Encourage updated weight.     Continues to be very busy and finding it difficult to prioritize himself. Discussed this and how it impacts his ability to get to surgery at his timeline (would like surgery in July) given the requirements before surgery. Discussed how after surgery the commitments with his health don't go away and so figuring out how to prioritize before surgery can help with long term success. Declines additional help with this.     Plan:  Weight check when able   Increase ozempic to 1mg   Continue to work on weight loss   Need clearance from oncology - blood clot   Need clearance for sleep from primary care   Need letter of support from primary care   Follow up with dietitian   Follow up in 2 months          Relevant Medications    Semaglutide, 1 MG/DOSE, (OZEMPIC, 1 MG/DOSE,) 4 MG/3ML SOPN    Vitamin D deficiency    Relevant Medications    Semaglutide, 1 MG/DOSE, (OZEMPIC, 1 MG/DOSE,) 4 MG/3ML SOPN       Endocrine    Pre-diabetes    Relevant Medications    Semaglutide, 1 MG/DOSE, (OZEMPIC, 1 MG/DOSE,) 4 MG/3ML SOPN           Review of external notes as documented above       36 minutes spent on the date of the encounter doing chart review, history and exam, documentation and further activities per the  "note  0956}      Reviewed tasklist with patient     Most recent weights:  Wt Readings from Last 4 Encounters:   12/16/21 (!) 183.7 kg (405 lb)   01/26/21 (!) 171.5 kg (378 lb)   05/27/14 (!) 160.6 kg (354 lb)   03/11/14 (!) 162.9 kg (359 lb 3.2 oz)         ROS    No past medical history on file.    Past Surgical History:   Procedure Laterality Date     VASECTOMY N/A 2020       Current Outpatient Medications   Medication     Semaglutide, 1 MG/DOSE, (OZEMPIC, 1 MG/DOSE,) 4 MG/3ML SOPN     allopurinol (ZYLOPRIM) 300 MG tablet     colchicine (COLCYRS) 0.6 MG tablet     losartan (COZAAR) 100 MG tablet     naproxen sodium (ANAPROX) 220 MG tablet     predniSONE (DELTASONE) 10 MG tablet     semaglutide (OZEMPIC) 2 MG/1.5ML SOPN pen     vitamin D3 (CHOLECALCIFEROL) 1.25 MG (75081 UT) capsule     vitamin D3 (CHOLECALCIFEROL) 1.25 MG (90497 UT) capsule     No current facility-administered medications for this visit.       Allergies   Allergen Reactions     Cats Other (See Comments)     No Clinical Screening - See Comments Unknown     seasonal     Seasonal Allergies        Office Visit on 12/13/2013   Component Date Value Ref Range Status     Sodium 12/13/2013 143  133 - 144 mmol/L Final     Potassium 12/13/2013 4.2  3.4 - 5.3 mmol/L Final     Chloride 12/13/2013 105  94 - 109 mmol/L Final     Carbon Dioxide 12/13/2013 30  20 - 32 mmol/L Final     Anion Gap 12/13/2013 9  6 - 17 mmol/L Final     Glucose 12/13/2013 90  60 - 99 mg/dL Final     Urea Nitrogen 12/13/2013 12  5 - 24 mg/dL Final     Creatinine 12/13/2013 1.20  0.66 - 1.25 mg/dL Final     GFR Estimate 12/13/2013 72  >60 mL/min/1.7m2 Final     GFR Estimate If Black 12/13/2013 87  >60 mL/min/1.7m2 Final     Calcium 12/13/2013 9.1  8.5 - 10.4 mg/dL Final     Uric Acid 12/13/2013 10.6 (A) 3.5 - 8.5 mg/dL Final       PHYSICAL EXAM:  Objective    Ht 1.816 m (5' 11.5\")   BMI 55.70 kg/m           Vitals:  No vitals were obtained today due to virtual visit.    Physical Exam "   GENERAL: Healthy, alert and no distress  EYES: Eyes grossly normal to inspection.  No discharge or erythema, or obvious scleral/conjunctival abnormalities.  RESP: No audible wheeze, cough, or visible cyanosis.  No visible retractions or increased work of breathing.    SKIN: Visible skin clear. No significant rash, abnormal pigmentation or lesions.  NEURO: Cranial nerves grossly intact.  Mentation and speech appropriate for age.  PSYCH: Mentation appears normal, affect normal/bright, judgement and insight intact, normal speech and appearance well-groomed.    Sleeve Gastrectomy: Risks and Side Effects    The complications or risks of surgery include but are not limited to: death, heart attack, infection in the surgical site (wound infection), abdomen (abscess), bladder (urinary tract infection), lungs (pneumonia), clots in legs (deep vein thrombosis) or lungs (pulmonary emboli),  injury to the bowels or other organs, bowel obstruction, hernia at the incision and gastrointestional bleeding.    More specific risks related to vertical sleeve gastrectomy were detailed at the bariatric informational seminar and include the following: leak at the vertical sleeve staple line, leak at the anastomoses,  nausea, vomiting, and dehydration for several months,  adhesions causing bowel obstruction, rapid weight loss causing a higher rate of gallstone formation during the first 6 months after surgery, decreased absorption of vitamins and protein because of the reduced stomach size, weight regain if inappropriate food intake occurs, stricture, injury to other organs, hernia,  and ulcers.       Side effects of bariatric surgery include but are not limited to: abdominal pain, cramping, bloating, constipation, nausea, vomiting, diarrhea, difficulty swallowing,  dehydration, hair loss, excess skin, protein, iron and vitamin deficiencies, heartburn, transfer of addictions, increased anxiety and worsening depression.       I emphasized  exercise and activity behavior along with appropriate food choice as the main foundation for weight loss with surgery providing surgical reinforcement of the appropriate behavior set.        Review of general surgery weight loss process    1. Complete preoperative requirements, including weight loss.  Final weight check to confirm MANDATORY weight loss requirement must be documented on a clinic scale.    2. Discuss prior authorization with .    3. History and physical evaluation by PCP of PAC clinic within 30 days of surgery date, preoperative class, and weight check (weigh-in visit) to be scheduled by patient.  Pre-anesthesia clinic for risk evaluation to be scheduled by anesthesia clinic.    4. We cannot guarantee that patient will qualify for surgery unless all preoperative requirements are met, prior authorization from primary insurance company is granted, and insurance changes do not occur.    5. It is possible for patients to regain all weight after weight loss surgery unless they follow guidelines prescribed by our bariatric center.    6. All patients with gastrointestinal complaints after weight loss surgery must have complaints conveyed to the bariatric team for appropriate treatment.    7. Vitamin deficiencies may develop post-bariatric surgery and annual laboratory testing should be performed.    8. Persistent nausea/vomiting after bariatric surgery entails risk of thiamine deficiency and should be treated early.  Vitamin B12 deficiency may develop, especially after gastric bypass surgery and must be recognized.        If you have any questions about our plans please don't hesitate to contact me.    Sincerely,    Brianda Chan NP

## 2022-03-16 NOTE — PATIENT INSTRUCTIONS
"Thank you for allowing us the privilege of caring for you. We hope we provided you with the excellent service you deserve.   Please let us know if there is anything else we can do for you so that we can be sure you are completely satisfied with your care experience.    To ensure the quality of our services you may be receiving a patient satisfaction survey from an independent patient satisfaction monitoring company.    The greatest compliment you can give is a \"Likely to Recommend\"    Your visit was with Brianda Chan NP today.    Instructions per today's visit:     Paul Montez, it was great to visit with you today.  Here is a review of our visit.  If our clinic scheduler is not able to reach you please call [phone] to schedule your next appointments.    Plan:  Weight check when able   Increase ozempic to 1mg   Continue to work on weight loss   Need clearance from oncology - blood clot   Need clearance for sleep from primary care   Need letter of support from primary care   Follow up with dietitian   Follow up in 2 months       Information about Video Visits with [facility]: video visit information  _________________________________________________________________________________________________________________________________________________________  If you are asked by your clinic team to have your blood pressure checked:  [facility] do offer several locations for blood pressure checks. Please follow the below link to schedule an appointment. Scheduling an appointment at the pharmacy for a blood pressure check is now preferred.    Appointment Plus ([email or web address]_)  _________________________________________________________________________________________________________________________________________________________  Important contact and scheduling information:  Please call our contact center at [phone] to schedule your next appointments.  To find a lab location near you, please " call [phone].  For any nursing questions or concerns call Shey Valladares LPN at [phone] or Brandi Cazares RN at [phone]  Please call during clinic hours Monday through Friday 8:00a - 4:00p if you have questions or you can contact us via Nubityhart at anytime and we will reply during clinic hours.    Lab results will be communicated through My Chart or letter (if My Chart not used). Please call the clinic if you have not received communication after 1 week or if you have any questions.?  Clinic Fax: [phone]    _________________________________________________________________________________________________________________________________________________________  Meal Replacement Products:    Here is the link to our new e-store where you can purchase our meal replacement products    [facility] E-Store  [email or web address]    The one week starter kit is a great way to sample a variety of products and see what works for you.    If you want more information about the product go to: Fresh Steps [employer]Tiempo Listo.Rew[employer] Gateway    If you are an employee or [facility] or [facility] please contact your care team for a 10% estore discount    Free Shipping for orders over $75     Benefits of meal replacements products:    Portion and calorie control  Improved nutrition  Structured eating  Simplified food choices  Avoid contact with trigger foods  _________________________________________________________________________________________________________________________________________________________  Interested in working with a health ?  Health coaches work with you to improve your overall health and wellbeing.  They look at the whole person, and may involve discussion of different areas of life, including, but not limited to the four pillars of health (sleep, exercise, nutrition, and stress management). Discuss with your care team if you would like to start working a  health .  Health Coaching-3 Pack: Schedule by calling [phone]    $99 for three health coaching visits    Visits may be done in person or via phone    Coaching is a partnership between the  and the client; Coaches do not prescribe or diagnose    Coaching helps inspire the client to reach his/her personal goals   _________________________________________________________________________________________________________________________________________________________  24 Week Healthy Lifestyle Plan:    Our mission in the 24-week Healthy Lifestyle Plan is to provide you with individualized care by giving you the tools, education and support you need to lose weight and maintain a healthy lifestyle. In your 24-week journey, you ll be supported by a dedicated weight loss team that includes registered dietitians, medical weight management providers, health coaches, and nurses -- all with special expertise in weight loss -- to help you every step of the way.     Monthly meetings with your registered dietician or medical weight management provider help to review your progress, update your care plan, and make any adjustments needed to ensure success. Between these visits, weekly and bi-weekly health  visits will help you focus on the four pillars of weight loss -- stress, sleep, nutrition, and exercise -- and how you can best adapt each to achieve sustainable weight loss results.    In addition, you will be given exclusive access to online wellbeing classes through [employer].  Your initial visit will be with a medical weight management provider who will help to understand your weight loss goals and ensure this program is the right fit for you. Please let our team know if you are interested in the 24 week plan by sending a message to your care team or calling [phone] to  "schedule.  _________________________________________________________________________________________________________________________________________________________    COMPREHENSIVE WEIGHT MANAGEMENT PROGRAM  VIRTUAL SUPPORT GROUPS    For Support Group Information:      We offer support groups for patients who are working on weight loss and considering, preparing for or have had weight loss surgery.   There is no cost for this opportunity.  You are invited to attend the?Virtual Support Groups?provided by any of the following locations:    1. [facility] via Microsoft Teams with Cynthia Mccall RN  2.   [facility] via Nog[employer]om with Terry Nix, PhD, LP  3.   [facility] [employer] with Paola Franks RN  4.   [facility] via Microsoft Teams with Paola Kwok [facility] [place]    The following Support Group information can also be found on our website:  [email or web address]    [facility] Weight Loss Surgery Support Group    [facility] Weight Loss Surgery Support Group  The support group is a patient-lead forum that meets monthly to share experiences, encouragement and education. It is open to those who have had weight loss surgery, are scheduled for surgery, and those who are considering surgery.   WHEN: This group meets on the 3rd Wednesday of each month from 5:00PM - 6:00PM virtually using Microsoft Teams.   FACILITATOR: Led by Cynthia Mccall, BRO, LD, RN, the program's Clinical Coordinator.   TO REGISTER: Please contact the clinic via AudioCompass or call the nurse line directly at [phone] to inform our staff that you would like an invite sent to you and to let us know the email you would like the invite sent to. Prior to the meeting, a link with directions on how to join the meeting will be sent to you.    2022 Meetings  January 19: \"Let's Talk\" a time for the group to share.  February 16: \"Let's Talk\" a time for the group to " "share.  March 16: Guest Speakers: Psychologists, Anastacia Strauss, PhD,LP and Meme Marquis PsIsmael,  April 20: Guest Speaker: Health , Hope Maya, [facility],CHES, [facility]  May 18: Guest Speaker: DietitianGonzalo, BRO, LP  Melly 15: \"Let's Talk\" a time for the group to share.  July 20: \"Let's Talk\" a time for the group to share.  August 17: TBA  September 21: TBA  October 19: Guest Speaker: Dr Ralph Anguiano MD Pulmonologist and Sleep Medicine Physician, \"Getting a Good Night's Sleep\".  November 16: TBA  December 21: TBA    [facility] Support Groups    Connections: Bariatric Care Support Group?  This is open to all [facility] (and those external to this program) pre- and post- operative bariatric surgery patients as well as their support system.   WHEN: This group meets the 2nd Tuesday of each month from 6:30 PM - 8:00 PM virtually using Microsoft Teams.   FACILITATOR: Led by Terry Nix, Ph.D who is a Licensed Psychologist with the [facility] Comprehensive Weight Management Program.   TO REGISTER: Please send an email to Terry Nix, Ph.D.,  at?[email or web address]?if you would like an invitation to the group and to learn about using Microsoft Teams.    2022 Meetings  January 11: Heather Rodriguez, PharmD, Pharmacy Resident at [facility], \"Medications and Bariatric Surgery\".  February 8: Open Forum  March 8: Shon Krishna MD, \"Exercise and Bariatric Surgery\".  April 12  May 10  Melly 14    Connections: Post-Operative Bariatric Surgery Support Group  This is a support group for [facility] bariatric patients (and those external to [facility]) who have had bariatric surgery and are at least 3 months post-surgery.  WHEN: This support group meets the 4th Wednesday of the month from 11:00 AM - 12:00 PM virtually using Microsoft Teams.   FACILITATOR: Led by Certified Bariatric Nurse, Paola Franks RN.   TO REGISTER: Please send an email to Paola" "at [email or web address] if you would like an invitation to the group and to learn about using Microsoft Teams.    2022 Meetings  January 26  February 23  March 23  April 27  May 25  Melly 22    [facility] Healthy Lifestyle Virtual Support Group    Healthy Lifestyle Virtual Support Group?  This is 60 minutes of small group guided discussion, support and resources. All are welcome who want a healthy lifestyle.  WHEN: This group meets monthly on a Friday from 12:30 PM - 1:30 PM virtually using Microsoft Teams.   FACILITATOR: Led by National Board Certified Health and , Paola Kwko [facility].   TO REGISTER: Please send an email to Paola [email or web address] to receive monthly invites to the group or if you have any questions about having a health .  Prior to the meeting, a link with directions on how to join the meeting will be sent to you.    2022 Meetings  January 21: Carol Diallo MS, RN, CIC, CBN, \"Healthy Habits\"  February 25: Open Forum  March 18: \"Setting Limits and Boundaries\"  April 29: Dagmar Jones RD, \"Meal Planning Made Easy\"  May 20: Open Forum  June: To be determined  ____________________________________________________________________________________________________________________________________________________________________________  [facility] Get Moving Program  Our team of physical therapists is trained to help you understand and take control of your condition. They will perform a thorough evaluation to determine your ability for activity and develop a customized plan to fit your goals and physical ability.  Scheduling: Unsure if the Get Moving program is right for you? Discuss the program with your medical provider or diabetes educator. You can also call us at [phone] to ask questions or schedule an appointment.   [facility] Get Moving " Program  ____________________________________________________________________________________________________________________________________________________________________________  [facility] Diabetes Prevention Program (DPP)  If you have prediabetes and Medicare please contact us via Narviihart to learn more about the Diabetes Prevention Program (DPP)  Program Details:  [facility] offers the year-long Diabetes Prevention Program (DPP). The program helps you to make lifestyle changes that prevent or delay type 2 diabetes by supporting healthy eating, increased physical activity, stress reduction and use of coping skills.   On average, previous [facility] DPP cohorts have lost and maintained at least 5% of their starting weight throughout the program and averaged more than 150 minutes of physical activity per week.  Participants meet weekly for one-hour group sessions over sixteen weeks, every other week for the next 8 weeks, and monthly for the last six months.   A year-long maintenance program is also available for participants who complete the first year.   Location & Cost:   During the COVID-19 Public Health Emergency, the program is offered virtually. When in-person classes can resume, they will be held at [facility].  For people with Medicare, the program is covered in full. A self-pay option will also be available for those with non-Medicare insurance plans.   _________________________________________________________________________________________________________________________________________________________  Bluetooth Scale:    We hope to provide you with high quality virtual healthcare visits while social distancing for COVID-19 is necessary, as well as in the future when virtual visits may be more convenient for you.     Our technology team made it possible for Bluetooth scales to send weight measurements to our electronic medical record. This allows  weights from you weighing at home to securely flow into the medical record, which will improve telephone and virtual visits.   Additionally, studies have shown that adults actually lose more weight when their weights are automatically sent to someone else, and also that this process is not stressful for those adults.    Below is a link for purchasing the scale, with a discount code for our patients. You may call your insurance company to see if they will reimburse you for the cost of the scale, as a piece of durable medical equipment. The scales only go up to a weight of 400 pounds. This is an issue and we are working with the developer on increasing this. We found no scales that go over 400lb that have blue-tooth for connecting to [employer].    Scale to purchase: the [employer]Spring  Body  Scale: [email or web address].ds    Discount Code: We have a discount code for our patients to bring the cost down to $50, Discount code is: [place]_Scale_20%off  _______________________________________________________________________________________________________________________________________________________________________________    To work with a Behavioral Health Psychologist:    Call to schedule:    Alton Pierre - [phone]  Elisa Rose - [phone]  Ashlie Portillo - [phone]  Debi Gomes - [phone]   Aracelis aCrlson PhD (cannot accept Medicare) [phone]        Thank you,   [facility] Comprehensive Weight Management Team

## 2022-03-16 NOTE — ASSESSMENT & PLAN NOTE
Tolerating ozempic 0.5mg without adverse side effects. Finding it helps with smaller portions and less hunger but less so over time. Felt he initially lost weight but then feels like he has had recent weight regain. No recent weights. Will increase ozempic to 1mg. Encourage updated weight.     Continues to be very busy and finding it difficult to prioritize himself. Discussed this and how it impacts his ability to get to surgery at his timeline (would like surgery in July) given the requirements before surgery. Discussed how after surgery the commitments with his health don't go away and so figuring out how to prioritize before surgery can help with long term success. Declines additional help with this.     Plan:  Weight check when able   Increase ozempic to 1mg   Continue to work on weight loss   Need clearance from oncology - blood clot   Need clearance for sleep from primary care   Need letter of support from primary care   Follow up with dietitian   Follow up in 2 months

## 2022-03-16 NOTE — PROGRESS NOTES
Pre-Bariatric Surgery Note    Wegener, Joel Daniel Irwin    Date: 3/16/2022     RE: Bigg Montez    MR#: [id number]   : 1985   Date of Visit: Mar 16, 2022    REASON FOR VISIT: Preoperative evaluation for possible weight loss surgery    Dear Dr. Wegener, Joel Daniel Irwin,    I had the pleasure of seeing your patient, Bigg Montez, in my preoperative bariatric clinic.    As you know, he is morbidly obese and considering weight loss surgery to treat obesity in association with his medical conditions of obesity.  His consult weight was 378lb. He has lost ? pounds since his consult weight. He has not met his required pre-surgery weight. Please refer to initial consult note from date 2021 for patient's weight history and co-morbidities.    4 kids 11 to 2yo    Wt 402 2021 PCP clinic     GLP1 start (ozempic). 2022- Lauren Bloch [facility] Pharmacist  For follow up on ozempic- discussed increase to 1mg but did not increase.   -has noticed smaller portions but doesn't notice appetite suppression consistently     Labs 2021- care everywhere   A1C 6.2   Low vitamin D     Assessment & Plan   Problem List Items Addressed This Visit        Digestive    Class 3 severe obesity due to excess calories with serious comorbidity and body mass index (BMI) of 50.0 to 59.9 in adult (H) - Primary     Tolerating ozempic 0.5mg without adverse side effects. Finding it helps with smaller portions and less hunger but less so over time. Felt he initially lost weight but then feels like he has had recent weight regain. No recent weights. Will increase ozempic to 1mg. Encourage updated weight.     Continues to be very busy and finding it difficult to prioritize himself. Discussed this and how it impacts his ability to get to surgery at his timeline (would like surgery in July) given the requirements before surgery. Discussed how after surgery the commitments with his health don't go away and so figuring out how to  prioritize before surgery can help with long term success. Declines additional help with this.     Plan:  Weight check when able   Increase ozempic to 1mg   Continue to work on weight loss   Need clearance from oncology - blood clot   Need clearance for sleep from primary care   Need letter of support from primary care   Follow up with dietitian   Follow up in 2 months          Relevant Medications    Semaglutide, 1 MG/DOSE, (OZEMPIC, 1 MG/DOSE,) 4 MG/3ML SOPN    Vitamin D deficiency    Relevant Medications    Semaglutide, 1 MG/DOSE, (OZEMPIC, 1 MG/DOSE,) 4 MG/3ML SOPN       Endocrine    Pre-diabetes    Relevant Medications    Semaglutide, 1 MG/DOSE, (OZEMPIC, 1 MG/DOSE,) 4 MG/3ML SOPN           Review of external notes as documented above       36 minutes spent on the date of the encounter doing chart review, history and exam, documentation and further activities per the note  0956}      Reviewed tasklist with patient     Most recent weights:  Wt Readings from Last 4 Encounters:   12/16/21 (!) 183.7 kg (405 lb)   01/26/21 (!) 171.5 kg (378 lb)   05/27/14 (!) 160.6 kg (354 lb)   03/11/14 (!) 162.9 kg (359 lb 3.2 oz)         ROS    No past medical history on file.    Past Surgical History:   Procedure Laterality Date     VASECTOMY N/A 2020       Current Outpatient Medications   Medication     Semaglutide, 1 MG/DOSE, (OZEMPIC, 1 MG/DOSE,) 4 MG/3ML SOPN     allopurinol (ZYLOPRIM) 300 MG tablet     colchicine (COLCYRS) 0.6 MG tablet     losartan (COZAAR) 100 MG tablet     naproxen sodium (ANAPROX) 220 MG tablet     predniSONE (DELTASONE) 10 MG tablet     semaglutide (OZEMPIC) 2 MG/1.5ML SOPN pen     vitamin D3 (CHOLECALCIFEROL) 1.25 MG (47509 UT) capsule     vitamin D3 (CHOLECALCIFEROL) 1.25 MG (03156 UT) capsule     No current facility-administered medications for this visit.       Allergies   Allergen Reactions     Cats Other (See Comments)     No Clinical Screening - See Comments Unknown     seasonal     Seasonal  "Allergies        Office Visit on 12/13/2013   Component Date Value Ref Range Status     Sodium 12/13/2013 143  133 - 144 mmol/L Final     Potassium 12/13/2013 4.2  3.4 - 5.3 mmol/L Final     Chloride 12/13/2013 105  94 - 109 mmol/L Final     Carbon Dioxide 12/13/2013 30  20 - 32 mmol/L Final     Anion Gap 12/13/2013 9  6 - 17 mmol/L Final     Glucose 12/13/2013 90  60 - 99 mg/dL Final     Urea Nitrogen 12/13/2013 12  5 - 24 mg/dL Final     Creatinine 12/13/2013 1.20  0.66 - 1.25 mg/dL Final     GFR Estimate 12/13/2013 72  >60 mL/min/1.7m2 Final     GFR Estimate If Black 12/13/2013 87  >60 mL/min/1.7m2 Final     Calcium 12/13/2013 9.1  8.5 - 10.4 mg/dL Final     Uric Acid 12/13/2013 10.6 (A) 3.5 - 8.5 mg/dL Final       PHYSICAL EXAM:  Objective    Ht 1.816 m (5' 11.5\")   BMI 55.70 kg/m           Vitals:  No vitals were obtained today due to virtual visit.    Physical Exam   GENERAL: Healthy, alert and no distress  EYES: Eyes grossly normal to inspection.  No discharge or erythema, or obvious scleral/conjunctival abnormalities.  RESP: No audible wheeze, cough, or visible cyanosis.  No visible retractions or increased work of breathing.    SKIN: Visible skin clear. No significant rash, abnormal pigmentation or lesions.  NEURO: Cranial nerves grossly intact.  Mentation and speech appropriate for age.  PSYCH: Mentation appears normal, affect normal/bright, judgement and insight intact, normal speech and appearance well-groomed.    Sleeve Gastrectomy: Risks and Side Effects    The complications or risks of surgery include but are not limited to: death, heart attack, infection in the surgical site (wound infection), abdomen (abscess), bladder (urinary tract infection), lungs (pneumonia), clots in legs (deep vein thrombosis) or lungs (pulmonary emboli),  injury to the bowels or other organs, bowel obstruction, hernia at the incision and gastrointestional bleeding.    More specific risks related to vertical sleeve " gastrectomy were detailed at the bariatric informational seminar and include the following: leak at the vertical sleeve staple line, leak at the anastomoses,  nausea, vomiting, and dehydration for several months,  adhesions causing bowel obstruction, rapid weight loss causing a higher rate of gallstone formation during the first 6 months after surgery, decreased absorption of vitamins and protein because of the reduced stomach size, weight regain if inappropriate food intake occurs, stricture, injury to other organs, hernia,  and ulcers.       Side effects of bariatric surgery include but are not limited to: abdominal pain, cramping, bloating, constipation, nausea, vomiting, diarrhea, difficulty swallowing,  dehydration, hair loss, excess skin, protein, iron and vitamin deficiencies, heartburn, transfer of addictions, increased anxiety and worsening depression.       I emphasized exercise and activity behavior along with appropriate food choice as the main foundation for weight loss with surgery providing surgical reinforcement of the appropriate behavior set.        Review of general surgery weight loss process    1. Complete preoperative requirements, including weight loss.  Final weight check to confirm MANDATORY weight loss requirement must be documented on a clinic scale.    2. Discuss prior authorization with .    3. History and physical evaluation by PCP of PAC clinic within 30 days of surgery date, preoperative class, and weight check (weigh-in visit) to be scheduled by patient.  Pre-anesthesia clinic for risk evaluation to be scheduled by anesthesia clinic.    4. We cannot guarantee that patient will qualify for surgery unless all preoperative requirements are met, prior authorization from primary insurance company is granted, and insurance changes do not occur.    5. It is possible for patients to regain all weight after weight loss surgery unless they follow guidelines prescribed by  our bariatric center.    6. All patients with gastrointestinal complaints after weight loss surgery must have complaints conveyed to the bariatric team for appropriate treatment.    7. Vitamin deficiencies may develop post-bariatric surgery and annual laboratory testing should be performed.    8. Persistent nausea/vomiting after bariatric surgery entails risk of thiamine deficiency and should be treated early.  Vitamin B12 deficiency may develop, especially after gastric bypass surgery and must be recognized.        If you have any questions about our plans please don't hesitate to contact me.    Sincerely,    Brianda Chan NP

## 2022-03-16 NOTE — NURSING NOTE
Chief Complaint   Patient presents with     Follow Up     Pre-surgery with medication.       Vitals:       There is no height or weight on file to calculate BMI.      Lorenzo Horton, EMT  Surgery Clinic

## 2022-03-17 ENCOUNTER — VIRTUAL VISIT (OUTPATIENT)
Dept: ENDOCRINOLOGY | Facility: CLINIC | Age: 37
End: 2022-03-17
Payer: COMMERCIAL

## 2022-03-17 DIAGNOSIS — Z71.3 NUTRITIONAL COUNSELING: Primary | ICD-10-CM

## 2022-03-17 DIAGNOSIS — E66.9 OBESITY: ICD-10-CM

## 2022-03-17 PROCEDURE — 97803 MED NUTRITION INDIV SUBSEQ: CPT | Mod: GT | Performed by: DIETITIAN, REGISTERED

## 2022-03-17 NOTE — LETTER
"3/17/2022       RE: Bigg Montez  [address]  [place] [address]     Dear Colleague,    Thank you for referring your patient, Bigg Montez, to the [facility] [place] at [facility]. Please see a copy of my visit note below.    Bigg Montez is a 36 year old male who is being evaluated via a billable video visit.      The patient has been notified of following:     \"This video visit will be conducted via a call between you and your physician/provider. We have found that certain health care needs can be provided without the need for an in-person physical exam.  This service lets us provide the care you need with a video conversation.  If a prescription is necessary we can send it directly to your pharmacy.  If lab work is needed we can place an order for that and you can then stop by our lab to have the test done at a later time.    Video visits are billed at different rates depending on your insurance coverage.  Please reach out to your insurance provider with any questions.    If during the course of the call the physician/provider feels a video visit is not appropriate, you will not be charged for this service.\"    Patient has given verbal consent for Video visit? Yes  How would you like to obtain your AVS? MyChart  Will anyone else be joining your video visit? No  {If patient encounters technical issues they should call [phone]      Video-Visit Details    Type of service:  Video Visit    Video Start Time: 7:36 AM   Video End Time: 7:58 AM    Originating Location (pt. Location): Home    Distant Location (provider location):  [facility] [place]     Platform used for Video Visit: [employer]    During this virtual visit the patient is located in [place], patient verifies this as the location during the entirety of this visit.         Bariatric Nutrition Consultation Note    Reason For " "Visit: Nutrition reassessment    Bigg Montez is a 36 year old presenting today for return bariatric nutrition consult.  Pt is interested in laparoscopic sleeve gastrectomy with TBD.  Patient is accompanied by self. Patient has completed minimum required nutrition visits prior to surgery. Recommending monthly follow-up until surgery for continued weight loss and post-op diet education.     Pt referred by Brianda Chan NP on January 25, 2021.  Patient with Co-morbidities of obesity including:  Type II DM no  Renal Failure no  Sleep apnea yes  Hypertension yes   Dyslipidemia no  Joint pain no  Back pain no  GERD no   Prediabetes yes    H/o gout. Pt reports having a blood clot in mid-sept. 2021     Support System Reviewed With Patient 1/21/2021   Who do you have in your support network that can be available to help you for the first 2 weeks after surgery? My wife   Who can you count on for support throughout your weight loss surgery journey? Kenny brenna       ANTHROPOMETRICS:  Initial weight (12/2018 with PCP): 386 lbs    Estimated body mass index is 51.99 kg/m  as calculated from the following:    Height as of an earlier encounter on 1/26/21: 1.816 m (5' 11.5\").    Weight as of an earlier encounter on 1/26/21: 171.5 kg (378 lb).    Most recent weight:  Estimated body mass index is 55.7 kg/m  as calculated from the following:    Height as of 12/16/21: 1.816 m (5' 11.5\").    Weight as of 12/16/21: 183.7 kg (405 lb).    Has not been tracking weight at home. Feels like he is losing weight, but has not checked recently. Plans to schedule weight check this weekend, or get a scale for home.     Required weight loss goal pre-op: 38 lbs from initial consult weight (goal weight 348 lbs or less before surgery)       1/21/2021   I have tried the following methods to lose weight Watching portions or calories, Exercise, Weight Watchers, Slimfast, Physician directed program       Weight Loss Questions Reviewed With Patient " 1/21/2021   How long have you been overweight? Since puberty       SUPPLEMENT INFORMATION:  Has a daily MVI (Men's one a day with omega3) triggered gout flare, stopped taking.      NUTRITION HISTORY:  No known food allergies/intolerances    1/26/21 - Working with family member who is an RD - decreasing sugar intake, IF for 21 days (felt better while following). Avoid beef and seafood, and limiting alcohol for gout. Eating a lot of chicken. Does not care for yogurt, cottage cheese or tofu. Enjoys fried foods, but trying to avoid. Avoiding bread, but does eat a lot of rice (typically occupies most of plate). Using white rice.  Aiming for 30 grams protein per meal.  Typically having 2 meals per day. Meal are at 1pm and 6 pm.   Does not like sugar substitutes.    10/19/21 - Pt reports since experiencing blood clot and seeing wt went up to 400 lbs he has started making changes to diet again.  Focusing on less carbs, more water, increasing protein. Snacking on small portion mixed nuts if needed. Trying to stop eating before 7 pm. Eating 2-3 meals + 1 snack per day. Trying to eat breakfast more consistently. Had breakfast twice per week this last week.     12/16/21 - Pt states he is feeling more motivated this month than ever. Less overall stress, helps him be able to focus on himself. Wants to focus on less fried foods, bringing lunch to work instead of eating out. States gout has been under control, thinking about re-introducing beef.     Labs 12/17/21: HgbA1c 6.2, and vitamin D 10 (L) - started on 50K weekly for 3 months, 2000 units/day there after.     1/17/21 - Focusing on cutting out bread and crackers, and sugary drinks. No alcohol. No carbonated drinks. And more water. Occasionally replacing one meal per day with protein shake.     Feb 2022 - Started Ozempic, reports less cravings, less appetite, smaller portion sizes. Working on increasing water.  Started doing 16/8 IF, eating between 12 pm- 8pm. Eating 2 meals  "within that window. Following recipe and meal prep accounts for meal ideas.     Today - Work has been really busy. Relying on eating out more often - [employer], Pizza, Pasta.     Diet Recall:  B: skip with IF  L: 2 pm slice of pizza   D 5pm: beef yordy and peace  Beverages: water (64 oz/day), occ soda. SF energy drinks.         Progress Towards Previous Goals:  1. Do some meal prep this weekend. Protein and veggie focused. - Not met   - Protein shake as a back up  2. Use a small plate for meals (9\" or less). Wait 10 mins before going back for seconds. - Improving  3. Eat slowly (20-30 minutes per meal), chewing foods well (25 chews per bite/applesauce consistency) - Improving   4. Take small, frequent sips of fluid all day long between meals. Consume 64+ oz/day. - Met, has recently been working on meeting hydration    5. Do not drink with meals. Wait 30 mons before drinking again. - Not met  6. Increase activity as able. - No change      ADDITIONAL INFORMATION:  Works as a . Has plans to start working for himself in 2022 to be able to have more time to himself and work on health goals.  Pt goal to improve knee and heart health.    Wants to focus on increased exercise this month - improve joint health and mobility.      Dining Out History Reviewed With Patient 1/21/2021   How often do you dine out? Nearly every day.   Where do you dine out? (select all that apply) fast food chains, take out   What types of food do you order when you dine out? Protien rice dishes     Physical Activity Reviewed With Patient 1/21/2021   How often do you exercise? 1 to 2 times per week   What is the duration of your exercise (in minutes)? 60+ Minutes   What types of exercise do you do? gym membership, weightlifting   What keeps you from being more active?  Lack of Time     NUTRITION DIAGNOSIS:  Obesity r/t long history of self-monitoring deficit and excessive energy intake aeb BMI >30 kg/m2. - " continues    INTERVENTION:  Intervention Provided/Education Provided:  - Reviewed post-op diet guidelines, ways to help prepare for post-op diet guidelines pre-operatively, portion/calorie-control, mindful eating and sources of lean protein.   - Reviewed progress towards previous goals  - Discussed tips for healthy eating when dining out  - Discussed strategy for meal planning  - Co-developed goals to work towards using motivational interviewing  Patient demonstrates understanding. Provided pt with list of goals RD contact information.      Questions Reviewed With Patient 1/21/2021   How ready are you to make changes regarding your weight? Number 1 = Not ready at all to make changes up to 10 = very ready. 10   How confident are you that you can change? 1 = Not confident that you will be successful making changes up to 10 = very confident. 10     Expected Engagement: good    GOALS:  1. Order groceries   2. Put meals together at night for next day   3. Home work outs/stretches - twice per day    - Schedule into your day (put alert into your phone)     Diet Guidelines after Weight-loss Surgery  [email or web address]     Portion Sizes after Weight Loss Surgery  [email or web address]    Your Stage 1 Diet: Clear Liquids  [email or web address]     Your Stage 2 Diet: Low-fat Full Liquids  [email or web address]     Your Stage 3 Diet: Pureed Foods  [email or web address]     Pureed Pleasures  [email or web address]    Your Stage 4 Diet: Soft Foods  [email or web address]    Your Stage 5 Diet: Regular Foods  [email or web address]    Keeping Track of Your Fluids  [email or web address]    Supplements after Weight Loss Surgery  [email or web address]        Angela Granger RD, LD

## 2022-03-17 NOTE — PATIENT INSTRUCTIONS
Paul Sexton,    Follow-up with RD in 1 month.     Thank you,    Angela Granger, RD, LD  If you would like to schedule or reschedule an appointment with the RD, please call [phone]    Nutrition Goals  1. Order groceries   2. Put meals together at night for next day   3. Home work outs/stretches - twice per day    - Schedule into your day (put alert into your phone)       Diet Guidelines after Weight-loss Surgery  [email or web address]     Portion Sizes after Weight Loss Surgery  [email or web address]    Your Stage 1 Diet: Clear Liquids  [email or web address]     Your Stage 2 Diet: Low-fat Full Liquids  [email or web address]     Your Stage 3 Diet: Pureed Foods  [email or web address]     Pureed Pleasures  [email or web address]    Your Stage 4 Diet: Soft Foods  [email or web address]    Your Stage 5 Diet: Regular Foods  [email or web address]    Keeping Track of Your Fluids  [email or web address]    Supplements after Weight Loss Surgery  [email or web address]      Interested in working with a health ?  Health coaches work with you to improve your overall health and wellbeing.  They look at the whole person, and may involve discussion of different areas of life, including, but not limited to the four pillars of health (sleep, exercise, nutrition, and stress management). Discuss with your care team if you would like to start working a health .    Health Coaching-3 Pack:    $99 for three health coaching visits    Visits may be done in person or via phone    Coaching is a partnership between the  and the client; Coaches do not prescribe or diagnose    Coaching helps inspire the client to reach his/her personal goals      COMPREHENSIVE WEIGHT MANAGEMENT PROGRAM  VIRTUAL SUPPORT GROUPS    For Support Group Information:      We offer support groups for patients who are working on weight loss and considering, preparing for or  "have had weight loss surgery.   There is no cost for this opportunity.  You are invited to attend the?Virtual Support Groups?provided by any of the following locations:    1. [facility] via Microsoft Teams with Cynthia Mccall RN  2.   [place] via LoudClick with Terry Nix, PhD, LP  3.   [place] via LoudClick with Paola Franks RN  4.   [facility] via [employer] Teams with Paola Kwok [facility]    The following Support Group information can also be found on our website:  [email or web address]      [facility] Weight Loss Surgery Support Group    [facility] Weight Loss Surgery Support Group  The support group is a patient-lead forum that meets monthly to share experiences, encouragement and education. It is open to those who have had weight loss surgery, are scheduled for surgery, and those who are considering surgery.   WHEN: This group meets on the 3rd Wednesday of each month from 5:00PM - 6:00PM virtually using Microsoft Teams.   FACILITATOR: Led by Cynthia Mccall RD, LD, RN, the program's Clinical Coordinator.   TO REGISTER: Please contact the clinic via Going My Way or call the nurse line directly at [phone] to inform our staff that you would like an invite sent to you and to let us know the email you would like the invite sent to. Prior to the meeting, a link with directions on how to join the meeting will be sent to you.    2022 Meetings  January 19: \"Let's Talk\" a time for the group to share.  February 16: \"Let's Talk\" a time for the group to share.  March 16: Guest Speakers: Psychologists, Anastacia Strauss, PhD,LP and Meme Marquis, PsIsmael,LP  April 20: Guest Speaker: Health , Hope Maya, [facility],CHES, CPT  May 18: Guest Speaker: DietitianGonzalo, BRO, LP  Melly 15: \"Let's Talk\" a time for the group to share.  July 20: \"Let's Talk\" a time for the group to share.  August 17: TBA  September 21: " "TBA  October 19: Guest Speaker: Dr Ralph Anguiano MD Pulmonologist and Sleep Medicine Physician, \"Getting a Good Night's Sleep\".  November 16: TBA  December 21: TBA    [facility] Support Groups    Connections: Bariatric Care Support Group?  This is open to all [facility] (and those external to this program) pre- and post- operative bariatric surgery patients as well as their support system.   WHEN: This group meets the 2nd Tuesday of each month from 6:30 PM - 8:00 PM virtually using Microsoft Teams.   FACILITATOR: Led by Terry Nix, Ph.D who is a Licensed Psychologist with the [facility] Comprehensive Weight Management Program.   TO REGISTER: Please send an email to Terry Nix, Ph.D., LP at?[email or web address]?if you would like an invitation to the group and to learn about using Microsoft Teams.    2022 Meetings  January 11: Heather Rodriguez, PharmD, Pharmacy Resident at [facility], \"Medications and Bariatric Surgery\".  February 8: Open Forum  March 8  April 12  May 10  Melly 14    Connections: Post-Operative Bariatric Surgery Support Group  This is a support group for [facility] bariatric patients (and those external to [facility]) who have had bariatric surgery and are at least 3 months post-surgery.  WHEN: This support group meets the 4th Wednesday of the month from 11:00 AM - 12:00 PM virtually using Microsoft Teams.   FACILITATOR: Led by Certified Bariatric Nurse, Paola Franks RN.   TO REGISTER: Please send an email to Paola at [email or web address] if you would like an invitation to the group and to learn about using Microsoft Teams.    2022 Meetings  January 26  February 23  March 23  April 27  May 25  Melly 22    [facility] Healthy Lifestyle Virtual Support Group    Healthy Lifestyle Virtual Support Group?  This is 60 minutes of small group guided discussion, support and resources. " "All are welcome who want a healthy lifestyle.  WHEN: This group meets monthly on a Friday from 12:30 PM - 1:30 PM virtually using Microsoft Teams.   FACILITATOR: Led by National Board Certified Health and , Paola Kwok [facility].   TO REGISTER: Please send an email to Paola [email or web address] to receive monthly invites to the group or if you have any questions about having a health .  Prior to the meeting, a link with directions on how to join the meeting will be sent to you.    2022 Meetings  January 21: Carol Diallo MS, RN, CIC, CBN, \"Healthy Habits\"  February 25: Open Forum  March 18: \"Setting Limits and Boundaries\"  April 29: Dagmar Jones RD, \"Meal Planning Made Easy\"  May 20: Open Forum  Melly: To be determined          "

## 2022-03-21 ENCOUNTER — TELEPHONE (OUTPATIENT)
Dept: ENDOCRINOLOGY | Facility: CLINIC | Age: 37
End: 2022-03-21
Payer: COMMERCIAL

## 2023-02-27 ENCOUNTER — OFFICE VISIT (OUTPATIENT)
Dept: FAMILY MEDICINE | Facility: CLINIC | Age: 38
End: 2023-02-27
Payer: COMMERCIAL

## 2023-02-27 ENCOUNTER — ANCILLARY PROCEDURE (OUTPATIENT)
Dept: GENERAL RADIOLOGY | Facility: CLINIC | Age: 38
End: 2023-02-27
Attending: STUDENT IN AN ORGANIZED HEALTH CARE EDUCATION/TRAINING PROGRAM
Payer: COMMERCIAL

## 2023-02-27 VITALS
OXYGEN SATURATION: 100 % | TEMPERATURE: 97.5 F | BODY MASS INDEX: 44.1 KG/M2 | HEART RATE: 80 BPM | DIASTOLIC BLOOD PRESSURE: 88 MMHG | HEIGHT: 71 IN | SYSTOLIC BLOOD PRESSURE: 134 MMHG | WEIGHT: 315 LBS | RESPIRATION RATE: 18 BRPM

## 2023-02-27 DIAGNOSIS — Z11.4 SCREENING FOR HIV (HUMAN IMMUNODEFICIENCY VIRUS): ICD-10-CM

## 2023-02-27 DIAGNOSIS — G47.33 OSA (OBSTRUCTIVE SLEEP APNEA): ICD-10-CM

## 2023-02-27 DIAGNOSIS — Z00.00 ROUTINE GENERAL MEDICAL EXAMINATION AT A HEALTH CARE FACILITY: Primary | ICD-10-CM

## 2023-02-27 DIAGNOSIS — E66.813 CLASS 3 SEVERE OBESITY DUE TO EXCESS CALORIES WITH SERIOUS COMORBIDITY AND BODY MASS INDEX (BMI) OF 50.0 TO 59.9 IN ADULT (H): ICD-10-CM

## 2023-02-27 DIAGNOSIS — I10 ESSENTIAL HYPERTENSION: ICD-10-CM

## 2023-02-27 DIAGNOSIS — E55.9 VITAMIN D DEFICIENCY: ICD-10-CM

## 2023-02-27 DIAGNOSIS — M1A.09X0 CHRONIC GOUT OF MULTIPLE SITES, UNSPECIFIED CAUSE: ICD-10-CM

## 2023-02-27 DIAGNOSIS — Z13.220 SCREENING FOR HYPERLIPIDEMIA: ICD-10-CM

## 2023-02-27 DIAGNOSIS — Z11.59 NEED FOR HEPATITIS C SCREENING TEST: ICD-10-CM

## 2023-02-27 DIAGNOSIS — Z23 NEED FOR COVID-19 VACCINE: ICD-10-CM

## 2023-02-27 DIAGNOSIS — E66.01 CLASS 3 SEVERE OBESITY DUE TO EXCESS CALORIES WITH SERIOUS COMORBIDITY AND BODY MASS INDEX (BMI) OF 50.0 TO 59.9 IN ADULT (H): ICD-10-CM

## 2023-02-27 DIAGNOSIS — R73.03 PRE-DIABETES: ICD-10-CM

## 2023-02-27 DIAGNOSIS — Z13.1 SCREENING FOR DIABETES MELLITUS: ICD-10-CM

## 2023-02-27 LAB
ALBUMIN SERPL BCG-MCNC: 4.2 G/DL (ref 3.5–5.2)
ALP SERPL-CCNC: 78 U/L (ref 40–129)
ALT SERPL W P-5'-P-CCNC: 19 U/L (ref 10–50)
ANION GAP SERPL CALCULATED.3IONS-SCNC: 13 MMOL/L (ref 7–15)
AST SERPL W P-5'-P-CCNC: 23 U/L (ref 10–50)
BILIRUB SERPL-MCNC: 0.3 MG/DL
BUN SERPL-MCNC: 11.4 MG/DL (ref 6–20)
CALCIUM SERPL-MCNC: 9.3 MG/DL (ref 8.6–10)
CHLORIDE SERPL-SCNC: 104 MMOL/L (ref 98–107)
CHOLEST SERPL-MCNC: 163 MG/DL
CREAT SERPL-MCNC: 0.99 MG/DL (ref 0.67–1.17)
DEPRECATED HCO3 PLAS-SCNC: 25 MMOL/L (ref 22–29)
GFR SERPL CREATININE-BSD FRML MDRD: >90 ML/MIN/1.73M2
GLUCOSE SERPL-MCNC: 66 MG/DL (ref 70–99)
HBA1C MFR BLD: 5.7 % (ref 0–5.6)
HDLC SERPL-MCNC: 44 MG/DL
LDLC SERPL CALC-MCNC: 100 MG/DL
NONHDLC SERPL-MCNC: 119 MG/DL
POTASSIUM SERPL-SCNC: 3.6 MMOL/L (ref 3.4–5.3)
PROT SERPL-MCNC: 7.2 G/DL (ref 6.4–8.3)
SODIUM SERPL-SCNC: 142 MMOL/L (ref 136–145)
TRIGL SERPL-MCNC: 94 MG/DL
TSH SERPL DL<=0.005 MIU/L-ACNC: 1.95 UIU/ML (ref 0.3–4.2)
URATE SERPL-MCNC: 6 MG/DL (ref 3.4–7)

## 2023-02-27 PROCEDURE — 0134A COVID-19 VACCINE BIVALENT BOOSTER 18+ (MODERNA): CPT | Performed by: STUDENT IN AN ORGANIZED HEALTH CARE EDUCATION/TRAINING PROGRAM

## 2023-02-27 PROCEDURE — 83036 HEMOGLOBIN GLYCOSYLATED A1C: CPT | Performed by: STUDENT IN AN ORGANIZED HEALTH CARE EDUCATION/TRAINING PROGRAM

## 2023-02-27 PROCEDURE — 99214 OFFICE O/P EST MOD 30 MIN: CPT | Mod: 25 | Performed by: STUDENT IN AN ORGANIZED HEALTH CARE EDUCATION/TRAINING PROGRAM

## 2023-02-27 PROCEDURE — 80061 LIPID PANEL: CPT | Performed by: STUDENT IN AN ORGANIZED HEALTH CARE EDUCATION/TRAINING PROGRAM

## 2023-02-27 PROCEDURE — 99385 PREV VISIT NEW AGE 18-39: CPT | Mod: 25 | Performed by: STUDENT IN AN ORGANIZED HEALTH CARE EDUCATION/TRAINING PROGRAM

## 2023-02-27 PROCEDURE — 91313 COVID-19 VACCINE BIVALENT BOOSTER 18+ (MODERNA): CPT | Performed by: STUDENT IN AN ORGANIZED HEALTH CARE EDUCATION/TRAINING PROGRAM

## 2023-02-27 PROCEDURE — 84443 ASSAY THYROID STIM HORMONE: CPT | Performed by: STUDENT IN AN ORGANIZED HEALTH CARE EDUCATION/TRAINING PROGRAM

## 2023-02-27 PROCEDURE — 82306 VITAMIN D 25 HYDROXY: CPT | Performed by: STUDENT IN AN ORGANIZED HEALTH CARE EDUCATION/TRAINING PROGRAM

## 2023-02-27 PROCEDURE — 73630 X-RAY EXAM OF FOOT: CPT | Mod: TC | Performed by: RADIOLOGY

## 2023-02-27 PROCEDURE — 80053 COMPREHEN METABOLIC PANEL: CPT | Performed by: STUDENT IN AN ORGANIZED HEALTH CARE EDUCATION/TRAINING PROGRAM

## 2023-02-27 PROCEDURE — 36415 COLL VENOUS BLD VENIPUNCTURE: CPT | Performed by: STUDENT IN AN ORGANIZED HEALTH CARE EDUCATION/TRAINING PROGRAM

## 2023-02-27 PROCEDURE — 84550 ASSAY OF BLOOD/URIC ACID: CPT | Performed by: STUDENT IN AN ORGANIZED HEALTH CARE EDUCATION/TRAINING PROGRAM

## 2023-02-27 RX ORDER — LOSARTAN POTASSIUM 100 MG/1
100 TABLET ORAL DAILY
Qty: 90 TABLET | Refills: 3 | Status: SHIPPED | OUTPATIENT
Start: 2023-02-27 | End: 2024-03-21

## 2023-02-27 RX ORDER — PREDNISONE 10 MG/1
TABLET ORAL
Status: CANCELLED | OUTPATIENT
Start: 2023-02-27

## 2023-02-27 RX ORDER — ALLOPURINOL 300 MG/1
300 TABLET ORAL DAILY
Qty: 90 TABLET | Refills: 1 | Status: SHIPPED | OUTPATIENT
Start: 2023-02-27 | End: 2023-09-11

## 2023-02-27 RX ORDER — SEMAGLUTIDE 1.34 MG/ML
1 INJECTION, SOLUTION SUBCUTANEOUS
Qty: 3 ML | Refills: 3 | Status: CANCELLED | OUTPATIENT
Start: 2023-02-27

## 2023-02-27 RX ORDER — COLCHICINE 0.6 MG/1
0.6 TABLET ORAL DAILY
Qty: 90 TABLET | Refills: 3 | Status: SHIPPED | OUTPATIENT
Start: 2023-02-27 | End: 2023-12-27

## 2023-02-27 RX ORDER — PREDNISONE 20 MG/1
TABLET ORAL
Qty: 20 TABLET | Refills: 0 | Status: SHIPPED | OUTPATIENT
Start: 2023-02-27 | End: 2023-07-17

## 2023-02-27 ASSESSMENT — ENCOUNTER SYMPTOMS
WEAKNESS: 0
MYALGIAS: 0
PARESTHESIAS: 0
FEVER: 0
HEMATOCHEZIA: 0
PALPITATIONS: 0
NERVOUS/ANXIOUS: 0
DIZZINESS: 0
SHORTNESS OF BREATH: 0
FREQUENCY: 0
COUGH: 0
NAUSEA: 0
CHILLS: 0
CONSTIPATION: 0
JOINT SWELLING: 1
ABDOMINAL PAIN: 0
EYE PAIN: 0
ARTHRALGIAS: 1
SORE THROAT: 0
HEADACHES: 0
DIARRHEA: 0
HEARTBURN: 0
DYSURIA: 0

## 2023-02-27 NOTE — PROGRESS NOTES
Lab Results   Component Value Date    CR 1.20 12/13/2013    ALT 47 09/17/2012    GFRESTIMATED 72 12/13/2013       Health Maintenance:    STD Screening: ***    Immunizations: ***    HIV screening ages 15-65 (USPSTF guidelines, CDC guidelines are 13-64): none on file. ***    Advance Directive: *** not on file. Information provided 2/27/2023***.    Colorectal Cancer Screening - *** No family hx. Routine screening.     Hep C screening 18-79: none on file. ***    AAA screening for men 65-75 with smoking hx: ***    Lung Cancer Screening (annual screening 50 to 80 years who have a 20 pack-year smoking history and currently smoke or have quit within the past 15 years): Risks and benefits of screening, including early detection and high false positive rates, discussion with ***, who has elected to *** screening.    Prostate cancer with PSA (55 - 69 years, individualized decision). Discussed the potential benefits and harms of screening,  pt is willing to do the scan , will do the scan. ***

## 2023-02-28 ENCOUNTER — TELEPHONE (OUTPATIENT)
Dept: FAMILY MEDICINE | Facility: CLINIC | Age: 38
End: 2023-02-28
Payer: COMMERCIAL

## 2023-02-28 DIAGNOSIS — E66.01 CLASS 3 SEVERE OBESITY DUE TO EXCESS CALORIES WITH SERIOUS COMORBIDITY AND BODY MASS INDEX (BMI) OF 50.0 TO 59.9 IN ADULT (H): Primary | ICD-10-CM

## 2023-02-28 DIAGNOSIS — E66.813 CLASS 3 SEVERE OBESITY DUE TO EXCESS CALORIES WITH SERIOUS COMORBIDITY AND BODY MASS INDEX (BMI) OF 50.0 TO 59.9 IN ADULT (H): Primary | ICD-10-CM

## 2023-02-28 LAB — DEPRECATED CALCIDIOL+CALCIFEROL SERPL-MC: 12 UG/L (ref 20–75)

## 2023-02-28 NOTE — TELEPHONE ENCOUNTER
Central Prior Authorization Team   Phone: [phone]      PRIOR AUTHORIZATION DENIED    Medication: semaglutide (OZEMPIC) 2 MG/1.5ML SOPN pen - EPA DENIED    Denial Date: 2/27/2023    Denial Rational:       Appeal Information:

## 2023-03-03 ENCOUNTER — DOCUMENTATION ONLY (OUTPATIENT)
Dept: OTHER | Facility: CLINIC | Age: 38
End: 2023-03-03
Payer: COMMERCIAL

## 2023-03-03 PROBLEM — R73.03 PREDIABETES: Status: ACTIVE | Noted: 2022-03-16

## 2023-03-03 RX ORDER — ERGOCALCIFEROL 1.25 MG/1
50000 CAPSULE, LIQUID FILLED ORAL WEEKLY
Qty: 12 CAPSULE | Refills: 0 | Status: SHIPPED | OUTPATIENT
Start: 2023-03-03 | End: 2023-07-17

## 2023-03-07 DIAGNOSIS — E66.01 CLASS 3 SEVERE OBESITY DUE TO EXCESS CALORIES WITH SERIOUS COMORBIDITY AND BODY MASS INDEX (BMI) OF 50.0 TO 59.9 IN ADULT (H): ICD-10-CM

## 2023-03-07 DIAGNOSIS — E66.813 CLASS 3 SEVERE OBESITY DUE TO EXCESS CALORIES WITH SERIOUS COMORBIDITY AND BODY MASS INDEX (BMI) OF 50.0 TO 59.9 IN ADULT (H): ICD-10-CM

## 2023-03-08 NOTE — TELEPHONE ENCOUNTER
Medication request duplicate. Rx filled 3/6/23      Matilda Mosqueda, RN, BSN  3/8/2023 at 5:20 PM  [facility]

## 2023-03-10 DIAGNOSIS — M1A.09X0 CHRONIC GOUT OF MULTIPLE SITES, UNSPECIFIED CAUSE: ICD-10-CM

## 2023-03-10 DIAGNOSIS — I10 ESSENTIAL HYPERTENSION: ICD-10-CM

## 2023-03-10 RX ORDER — ALLOPURINOL 300 MG/1
300 TABLET ORAL DAILY
Qty: 90 TABLET | Refills: 1 | OUTPATIENT
Start: 2023-03-10

## 2023-03-10 RX ORDER — COLCHICINE 0.6 MG/1
0.6 TABLET ORAL DAILY
Qty: 90 TABLET | Refills: 3 | OUTPATIENT
Start: 2023-03-10

## 2023-03-10 RX ORDER — LOSARTAN POTASSIUM 100 MG/1
100 TABLET ORAL DAILY
Qty: 90 TABLET | Refills: 3 | OUTPATIENT
Start: 2023-03-10

## 2023-03-10 RX ORDER — PREDNISONE 20 MG/1
TABLET ORAL
Qty: 20 TABLET | Refills: 0 | OUTPATIENT
Start: 2023-03-10

## 2023-03-10 NOTE — TELEPHONE ENCOUNTER
Medication Question or Refill    Contacts       Type Contact Phone/Fax    03/10/2023 12:58 PM CST Phone (Incoming) Darshan Montez (Self) [phone] (H)          What medication are you calling about (include dose and sig)?:   allopurinol (ZYLOPRIM) 300 MG tablet  colchicine (COLCYRS) 0.6 MG tablet  losartan (COZAAR) 100 MG tablet  predniSONE (DELTASONE) 20 MG tablet    Preferred Pharmacy:   [id number] - [place] [address] AT [facility] [address]  [address]  [facility] [address]  Phone: [phone] Fax: [phone]      Controlled Substance Agreement on file:   CSA -- Patient Level:    CSA: None found at the patient level.       Who prescribed the medication?: Thor    Do you need a refill? Yes    When did you use the medication last? n/a    Patient offered an appointment? No    Do you have any questions or concerns?  Yes: Patient states he told provider the wrong pharmacy ([facility]) to send his prescriptions to when he saw her on 2/27/23. [facility]. [facility] to Dr. Dinh but she believes it was a duplicate so request was denied.     Patient is calling to let provider know to send prescriptions to [facility].      Could we send this information to you [facility] or would you prefer to receive a phone call?:   Patient would prefer a phone call   Okay to leave a detailed message?: Yes at Cell number on file:    Telephone Information:   Mobile [phone]

## 2023-03-13 ENCOUNTER — TELEPHONE (OUTPATIENT)
Dept: FAMILY MEDICINE | Facility: CLINIC | Age: 38
End: 2023-03-13
Payer: COMMERCIAL

## 2023-03-13 NOTE — TELEPHONE ENCOUNTER
Per Edna: Patient's plan does not cover this medication (Wegovy). Please call plan at [phone] to initiate prior auth.

## 2023-03-15 NOTE — TELEPHONE ENCOUNTER
General Call    Contacts       Type Contact Phone/Fax    03/13/2023 01:55 PM CDT Fax (Incoming) [facility][id number] [place], [place] - [address] AT [facility] [address] (Pharmacy) [phone]        Reason for Call:  Pt needs PA for WEgovy below.  Pt was on Omprepazole.   The Wegovy is for .5 and needs to be increased to 1.5    What are your questions or concerns:  Insurance is not covering the Wegovy .  See message from RN /Refill team    Could we send this information to you in [employer] or would you prefer to receive a phone call?:   Patient would prefer a phone call       Okay to leave a detailed message?: Yes at Home number on file [phone] (home)    Call taken on 3/15/23 at 5 pm by SHERRY Harris

## 2023-03-16 NOTE — TELEPHONE ENCOUNTER
Central Prior Authorization Team   Phone: [phone]      PA Initiation    Medication: wegovy  Insurance Company: [employer] - Phone [phone] Fax [phone]  Pharmacy Filling the Rx: [id number] [place], [place] - [facility] AT [facility] [address]  Filling Pharmacy Phone: [phone]  Filling Pharmacy Fax:    Start Date: 3/16/2023

## 2023-03-17 NOTE — TELEPHONE ENCOUNTER
PRIOR AUTHORIZATION DENIED    Medication: Wegovy-DENIED    Denial Date: 3/16/2023    Denial Rational:           Appeal Information:     If provider would like to appeal please review the plan's reasons for denial listed above. Please utilize that information to complete letter and provide specific, detailed clinical information/rationale of your patient's health status to address their denial reasons.

## 2023-03-17 NOTE — TELEPHONE ENCOUNTER
Called patient to inform him that he does not meet criteria for appeal per Dr. Dinh. He states he will try to get wegovy from another provider at another clinic and disconnected call.     Reyna Obrien RN BSN  [facility]

## 2023-03-17 NOTE — TELEPHONE ENCOUNTER
Contacted patient and relayed message below.  Patient would like Dr Dinh to write an appeals letter for Ozempic or Wegovy.  Patient was on Ozempic for 9 months previously, but due to new insurance went off medication.  Patient is on a wait list to be seen in June 2023 with [facility].    Message routed to Dr Dinh for appeals letter.    Katelyn España RN  [facility]        Dr Dinh  Please call patient to inform him that Wegovy was denied by his insurance.  We could also consider starting him on metformin, which is approved for prediabetes and can help with weight loss.  Otherwise if he is interested in other weight loss medications, please follow-up with the weight loss clinic, regarding weight and other possible medications.       PA has been denied, please see rational.  If provider would like to appeal please review the plan's reasons for denial listed. Please utilize that information to complete letter and provide specific, detailed clinical information/rationale of your patient's health status to address their denial reasons and place letter in patient's chart. If done with encounter, please notify the patient and close the encounter.     Thank you,   Central PA Team

## 2023-03-27 NOTE — PROGRESS NOTES
"Bigg Montez is a 37 year old male who is being evaluated via a billable video visit.      The patient has been notified of following:     \"This video visit will be conducted via a call between you and your physician/provider. We have found that certain health care needs can be provided without the need for an in-person physical exam.  This service lets us provide the care you need with a video conversation.  If a prescription is necessary we can send it directly to your pharmacy.  If lab work is needed we can place an order for that and you can then stop by our lab to have the test done at a later time.    Video visits are billed at different rates depending on your insurance coverage.  Please reach out to your insurance provider with any questions.    If during the course of the call the physician/provider feels a video visit is not appropriate, you will not be charged for this service.\"    Patient has given verbal consent for Video visit? Yes  How would you like to obtain your AVS? MyChart  If you are dropped from the video visit, the video invite should be resent to: Text to cell phone: [phone]  Will anyone else be joining your video visit? No  {If patient encounters technical issues they should call [phone]      Video-Visit Details    Type of service:  Video Visit    Video Start Time: 8:50 AM   Video End Time: 8:58 AM    Originating Location (pt. Location): Home    Distant Location (provider location):  Offsite (providers home) [facility] [place]     Platform used for Video Visit: [facility]    During this virtual visit the patient is located in [place], patient verifies this as the location during the entirety of this visit.           Bariatric Nutrition Consultation Note    Reason For Visit: Nutrition reassessment    Bigg Montez is a 37 year old presenting today for return bariatric nutrition consult.  Pt is interested in laparoscopic sleeve gastrectomy with TBD.  " "Patient is accompanied by self. This is patients 1st of 3 required RD visits. Last seen by RD over one year ago.     Pt referred by Brianda Chan NP on January 25, 2021.  Patient with Co-morbidities of obesity including:  Type II DM no  Renal Failure no  Sleep apnea yes  Hypertension yes   Dyslipidemia no  Joint pain no  Back pain no  GERD no   Prediabetes yes    H/o gout. Pt reports having a blood clot in mid-sept. 2021     Support System Reviewed With Patient 1/21/2021   Who do you have in your support network that can be available to help you for the first 2 weeks after surgery? My wife   Who can you count on for support throughout your weight loss surgery journey? Kenny ceja       ANTHROPOMETRICS:  Initial weight (12/2018 with PCP): 386 lbs    Most recent weight:  Estimated body mass index is 54.8 kg/m  as calculated from the following:    Height as of 2/27/23: 1.815 m (5' 11.46\").    Weight as of 2/27/23: 180.5 kg (398 lb).    Required weight loss goal pre-op: 38 lbs from initial consult weight (goal weight 348 lbs or less before surgery)       1/21/2021   I have tried the following methods to lose weight Watching portions or calories, Exercise, Weight Watchers, Slimfast, Physician directed program       Weight Loss Questions Reviewed With Patient 1/21/2021   How long have you been overweight? Since puberty       SUPPLEMENT INFORMATION:  Did not discuss today.      NUTRITION HISTORY:  No known food allergies/intolerances  H/o gout  Last seen by RD 1 year ago (3/17/22)    Pt only wanting to check in with how he can get back on track with surgery process and get added onto RD schedule.      ADDITIONAL INFORMATION:  Just started a new job.     Dining Out History Reviewed With Patient 1/21/2021   How often do you dine out? Nearly every day.   Where do you dine out? (select all that apply) fast food chains, take out   What types of food do you order when you dine out? Protien rice dishes       Physical Activity " Reviewed With Patient 1/21/2021   How often do you exercise? 1 to 2 times per week   What is the duration of your exercise (in minutes)? 60+ Minutes   What types of exercise do you do? gym membership, weightlifting   What keeps you from being more active? Lack of Time         NUTRITION DIAGNOSIS:  Obesity r/t long history of self-monitoring deficit and excessive energy intake aeb BMI >30 kg/m2. - continues    INTERVENTION:  Intervention Provided/Education Provided:  - Reviewed weight loss goal for surgery.   - Provided pt with list of diet handouts and nutrition goals below.   - Scheduled monthly RD visits for next 3 months.     Questions Reviewed With Patient 1/21/2021   How ready are you to make changes regarding your weight? Number 1 = Not ready at all to make changes up to 10 = very ready. 10   How confident are you that you can change? 1 = Not confident that you will be successful making changes up to 10 = very confident. 10       Expected Engagement: good    GOALS:  1.Goal weight for surgery: 348 lbs or less  2. Restart practicing surgery diet guidelines - chewing thoroughly, not drinking with meals, avoiding caffeine/carbonatino/calorie-containing beverages.   3. Consume 3 high-protein meals daily. Limit starches (bread, pasta rice) to 1/4 of plate or less. Fill out large section of plate with non-starchy veggies.   4. Limit/avoid snacking between meals.     Post-op Diet Handouts:  Diet Guidelines after Weight-loss Surgery  [email or web address]     Your Stage 1 Diet: Clear Liquids  [email or web address]     Your Stage 2 Diet: Low-fat Full Liquids  [email or web address]     Your Stage 3 Diet: Pureed Foods  [email or web address]     Pureed Pleasures  [email or web address]://[email or web address]    Your Stage 4 Diet: Soft Foods  [email or web address]    Your Stage 5 Diet: Regular Foods  [email or web address]    Supplements after Sleeve Gastrectomy, Gastric Bypass or Single  Anastomosis Duodenal Switch  [email or web address]    Keeping Track of Fluids  [email or web address]          Angela Granger RD, LD

## 2023-03-28 ENCOUNTER — VIRTUAL VISIT (OUTPATIENT)
Dept: ENDOCRINOLOGY | Facility: CLINIC | Age: 38
End: 2023-03-28

## 2023-03-28 DIAGNOSIS — E66.813 CLASS 3 SEVERE OBESITY DUE TO EXCESS CALORIES WITH SERIOUS COMORBIDITY AND BODY MASS INDEX (BMI) OF 50.0 TO 59.9 IN ADULT (H): ICD-10-CM

## 2023-03-28 DIAGNOSIS — Z71.3 NUTRITIONAL COUNSELING: Primary | ICD-10-CM

## 2023-03-28 DIAGNOSIS — E66.01 CLASS 3 SEVERE OBESITY DUE TO EXCESS CALORIES WITH SERIOUS COMORBIDITY AND BODY MASS INDEX (BMI) OF 50.0 TO 59.9 IN ADULT (H): ICD-10-CM

## 2023-03-28 PROCEDURE — 99207 PR NO CHARGE LOS: CPT | Mod: VID | Performed by: DIETITIAN, REGISTERED

## 2023-03-28 PROCEDURE — 97803 MED NUTRITION INDIV SUBSEQ: CPT | Mod: VID | Performed by: DIETITIAN, REGISTERED

## 2023-03-28 NOTE — LETTER
Date:March 28, 2023      Provider requested that no letter be sent. Do not send.       [facility]

## 2023-03-28 NOTE — PATIENT INSTRUCTIONS
Paul Sexton,    Follow-up with RD on 4/25    Thank you,    Angela Granger, RD, LD  If you would like to schedule or reschedule an appointment with the RD, please call [phone]    Nutrition Goals  1.Goal weight for surgery: 348 lbs or less  2. Restart practicing surgery diet guidelines - chewing thoroughly, not drinking with meals, avoiding caffeine/carbonatino/calorie-containing beverages.   3. Consume 3 high-protein meals daily. Limit starches (bread, pasta rice) to 1/4 of plate or less. Fill out large section of plate with non-starchy veggies.   4. Limit/avoid snacking between meals.     Post-op Diet Handouts:  Diet Guidelines after Weight-loss Surgery  [email or web address]     Your Stage 1 Diet: Clear Liquids  [email or web address]     Your Stage 2 Diet: Low-fat Full Liquids  [email or web address]     Your Stage 3 Diet: Pureed Foods  [email or web address]     Pureed Pleasures  [email or web address]    Your Stage 4 Diet: Soft Foods  [email or web address]    Your Stage 5 Diet: Regular Foods  [email or web address]    Supplements after Sleeve Gastrectomy, Gastric Bypass or Single Anastomosis Duodenal Switch  [email or web address]    Keeping Track of Fluids  [email or web address]            COMPREHENSIVE WEIGHT MANAGEMENT PROGRAM  VIRTUAL SUPPORT GROUPS    For Support Group Information:      We offer support groups for patients who are working on weight loss and considering, preparing for or have had weight loss surgery.   There is no cost for this opportunity.  You are invited to attend the?Virtual Support Groups?provided by any of the following locations:    [facility] via [employer] Teams with Cynthia Amor RN  2.   [place] via [employer] with Terry Nix, PhD, LP  3.   [place] via [employer] Teams with Paola Franks RN  4.   [facility] via [employer] Teams with Paola Kwok, [facility]    The following Support Group  "information can also be found on our website:  [email or web address]    [email or web address]    [facility] Weight Loss Surgery Support Group    [facility] Weight Loss Surgery Support Group  The support group is a patient-lead forum that meets monthly to share experiences, encouragement and education. It is open to those who have had weight loss surgery, are scheduled for surgery, or are considering surgery.   WHEN: This group meets on the 3rd Wednesday of each month from 5:00PM - 6:00PM virtually using Microsoft Teams.   FACILITATOR: Led by Cynthia Mccall RD, LD, RN, the program's Clinical Coordinator.   TO REGISTER: Please contact the clinic via [employer] or call the nurse line directly at [phone] to inform our staff that you would like an invite sent to you and to let us know the email you would like the invite sent to. Prior to the meeting, a link with directions on how to join the meeting will be sent to you.    2023 Meetings (speakers to be determined)  January 18: \"Let's Talk\" a time for the group to share.  February 15: \"Let's Talk\" a time for the group to share.  March 15: \"Let's Talk\" a time for the group to share.  April 19: \"Let's Talk\" a time for the group to share.  May 17: \"Let's Talk\" a time for the group to share.  June 21: \"Let's Talk\" a time for the group to share.    [facility] Support Groups    Connections Bariatric Care Support Group?  This is open to all pre- and post- operative bariatric surgery patients as well as their support system.   WHEN: This group meets the 2nd Tuesday of each month from 6:30 PM - 8:00 PM virtually using Microsoft Teams.   FACILITATOR: Led by Terry Nix, Ph.D who is a Licensed Psychologist with the [facility] Comprehensive Weight Management Program.   TO " "REGISTER: Please send an email to Terry Nix, Ph.D., LP at?[email or web address]?if you would like an invitation to the group and to learn about using Microsoft Teams.    2023 Meetings  January 10: Speedy Hoffman, PharmD, Pharmacy Resident, \"Medications and Bariatric Surgery\"  February 14:  March 14:  April 11:  May 9:  June 11:    Connections Post-Operative Bariatric Surgery Support Group  This is a support group for [facility] bariatric patients (and those external to [facility]) who have had bariatric surgery and are at least 3 months post-surgery.  WHEN: This support group meets the 4th Wednesday of the month from 11:00 AM - 12:00 PM virtually using Microsoft Teams.   FACILITATOR: Led by Certified Bariatric Nurse, Paola Franks RN.   TO REGISTER: Please send an email to Paola at [email or web address] if you would like an invitation to the group and to learn about using Microsoft Teams.    2023 Meetings  January 25  February 22  March 22  April 26  May 24  Melly 28      [facility] Healthy Lifestyle Virtual Support Group    Healthy Lifestyle Virtual Support Group?  This is 60 minutes of small group guided discussion, support and resources. All are welcome who want a healthy lifestyle.  WHEN: This group meets monthly on a Friday from 12:30 PM - 1:30 PM virtually using Microsoft Teams.   FACILITATOR: Led by National Board Certified Health and , Paola Kwok [facility].   TO REGISTER: Please send an email to Paola [email or web address] to receive monthly invites to the group or if you have any questions about having a health .  Prior to the meeting, a link with directions on how to join the meeting will be sent to you.    2023 Meetings January 20: \"Let's Talk\" a time for the group to share  February 17: Guest Speaker, Dagmar Jones RD, Registered Dietician, \"Tips to Maximize Your Metabolism\"  March 17: Let's Talk\" a time for the group " "to share  April 14: Guest Speaker, Liane Obrien RD, Registered Dietician, \"Heart Health\"  May 19: \"Let's Talk\" a time for the group to share  June: To be announced.            "

## 2023-03-28 NOTE — LETTER
"3/28/2023       RE: Bigg Montez  [address]  [place] [address]     Dear Colleague,    Thank you for referring your patient, Bigg Montez, to the [facility] [place] [facility]. Please see a copy of my visit note below.    Bigg Montez is a 37 year old male who is being evaluated via a billable video visit.      The patient has been notified of following:     \"This video visit will be conducted via a call between you and your physician/provider. We have found that certain health care needs can be provided without the need for an in-person physical exam.  This service lets us provide the care you need with a video conversation.  If a prescription is necessary we can send it directly to your pharmacy.  If lab work is needed we can place an order for that and you can then stop by our lab to have the test done at a later time.    Video visits are billed at different rates depending on your insurance coverage.  Please reach out to your insurance provider with any questions.    If during the course of the call the physician/provider feels a video visit is not appropriate, you will not be charged for this service.\"    Patient has given verbal consent for Video visit? Yes  How would you like to obtain your AVS? MyChart  If you are dropped from the video visit, the video invite should be resent to: Text to cell phone: [phone]  Will anyone else be joining your video visit? No  {If patient encounters technical issues they should call [phone]      Video-Visit Details    Type of service:  Video Visit    Video Start Time: 8:50 AM   Video End Time: 8:58 AM    Originating Location (pt. Location): Home    Distant Location (provider location):  Offsite (providers home) [facility]     Platform used for Video Visit: SendGrid    During this virtual visit the patient is located in " "[place], patient verifies this as the location during the entirety of this visit.           Bariatric Nutrition Consultation Note    Reason For Visit: Nutrition reassessment    Bigg Montez is a 37 year old presenting today for return bariatric nutrition consult.  Pt is interested in laparoscopic sleeve gastrectomy with TBD.  Patient is accompanied by self. This is patients 1st of 3 required RD visits. Last seen by RD over one year ago.     Pt referred by Brianda Chan NP on January 25, 2021.  Patient with Co-morbidities of obesity including:  Type II DM no  Renal Failure no  Sleep apnea yes  Hypertension yes   Dyslipidemia no  Joint pain no  Back pain no  GERD no   Prediabetes yes    H/o gout. Pt reports having a blood clot in mid-sept. 2021     Support System Reviewed With Patient 1/21/2021   Who do you have in your support network that can be available to help you for the first 2 weeks after surgery? My wife   Who can you count on for support throughout your weight loss surgery journey? Kenny ceja       ANTHROPOMETRICS:  Initial weight (12/2018 with PCP): 386 lbs    Most recent weight:  Estimated body mass index is 54.8 kg/m  as calculated from the following:    Height as of 2/27/23: 1.815 m (5' 11.46\").    Weight as of 2/27/23: 180.5 kg (398 lb).    Required weight loss goal pre-op: 38 lbs from initial consult weight (goal weight 348 lbs or less before surgery)       1/21/2021   I have tried the following methods to lose weight Watching portions or calories, Exercise, Weight Watchers, Slimfast, Physician directed program       Weight Loss Questions Reviewed With Patient 1/21/2021   How long have you been overweight? Since puberty       SUPPLEMENT INFORMATION:  Did not discuss today.      NUTRITION HISTORY:  No known food allergies/intolerances  H/o gout  Last seen by RD 1 year ago (3/17/22)    Pt only wanting to check in with how he can get back on track with surgery process and get added onto RD schedule.  "     ADDITIONAL INFORMATION:  Just started a new job.     Dining Out History Reviewed With Patient 1/21/2021   How often do you dine out? Nearly every day.   Where do you dine out? (select all that apply) fast food chains, take out   What types of food do you order when you dine out? Protien rice dishes       Physical Activity Reviewed With Patient 1/21/2021   How often do you exercise? 1 to 2 times per week   What is the duration of your exercise (in minutes)? 60+ Minutes   What types of exercise do you do? gym membership, weightlifting   What keeps you from being more active? Lack of Time         NUTRITION DIAGNOSIS:  Obesity r/t long history of self-monitoring deficit and excessive energy intake aeb BMI >30 kg/m2. - continues    INTERVENTION:  Intervention Provided/Education Provided:  - Reviewed weight loss goal for surgery.   - Provided pt with list of diet handouts and nutrition goals below.   - Scheduled monthly RD visits for next 3 months.     Questions Reviewed With Patient 1/21/2021   How ready are you to make changes regarding your weight? Number 1 = Not ready at all to make changes up to 10 = very ready. 10   How confident are you that you can change? 1 = Not confident that you will be successful making changes up to 10 = very confident. 10       Expected Engagement: good    GOALS:  1.Goal weight for surgery: 348 lbs or less  2. Restart practicing surgery diet guidelines - chewing thoroughly, not drinking with meals, avoiding caffeine/carbonatino/calorie-containing beverages.   3. Consume 3 high-protein meals daily. Limit starches (bread, pasta rice) to 1/4 of plate or less. Fill out large section of plate with non-starchy veggies.   4. Limit/avoid snacking between meals.     Post-op Diet Handouts:  Diet Guidelines after Weight-loss Surgery  [email or web address]     Your Stage 1 Diet: Clear Liquids  [email or web address]     Your Stage 2 Diet: Low-fat Full  Liquids  [email or web address]     Your Stage 3 Diet: Pureed Foods  [email or web address]     Pureed Pleasures  [email or web address]://[email or web address]    Your Stage 4 Diet: Soft Foods  [email or web address]    Your Stage 5 Diet: Regular Foods  [email or web address]    Supplements after Sleeve Gastrectomy, Gastric Bypass or Single Anastomosis Duodenal Switch  [email or web address]    Keeping Track of Fluids  [email or web address]          Angela Granger RD, LD      Again, thank you for allowing me to participate in the care of your patient.      Sincerely,    Angela Granger RD

## 2023-04-24 NOTE — PROGRESS NOTES
"Bigg Montez is a 37 year old male who is being evaluated via a billable video visit.      The patient has been notified of following:     \"This video visit will be conducted via a call between you and your physician/provider. We have found that certain health care needs can be provided without the need for an in-person physical exam.  This service lets us provide the care you need with a video conversation.  If a prescription is necessary we can send it directly to your pharmacy.  If lab work is needed we can place an order for that and you can then stop by our lab to have the test done at a later time.    Video visits are billed at different rates depending on your insurance coverage.  Please reach out to your insurance provider with any questions.    If during the course of the call the physician/provider feels a video visit is not appropriate, you will not be charged for this service.\"    Patient has given verbal consent for Video visit? Yes  How would you like to obtain your AVS? MyChart  If you are dropped from the video visit, the video invite should be resent to: Text to cell phone: [phone]  Will anyone else be joining your video visit? No  {If patient encounters technical issues they should call [phone]      Video-Visit Details    Type of service:  Video Visit    Video Start Time: 3:08 PM  Video End Time: 3:28 PM    Originating Location (pt. Location): Home    Distant Location (provider location):  Offsite (providers home) [facility] [place]     Platform used for Video Visit: Periscope    During this virtual visit the patient is located in [place], patient verifies this as the location during the entirety of this visit.           Bariatric Nutrition Consultation Note    Reason For Visit: Nutrition reassessment    Bigg Montez is a 37 year old presenting today for return bariatric nutrition consult.  Pt is interested in laparoscopic sleeve gastrectomy with TBD.  " "Patient is accompanied by self. This is patients 2nd of 3 required RD visits.     Pt referred by Brianda Chan NP on January 25, 2021.  Patient with Co-morbidities of obesity including:  Type II DM no  Renal Failure no  Sleep apnea yes  Hypertension yes   Dyslipidemia no  Joint pain no  Back pain no  GERD no   Prediabetes yes    H/o gout. Pt reports having a blood clot in mid-sept. 2021 1/21/2021    11:14 PM   Support System Reviewed With Patient   Who do you have in your support network that can be available to help you for the first 2 weeks after surgery? My wife   Who can you count on for support throughout your weight loss surgery journey? Kenny valadezazar       ANTHROPOMETRICS:  Initial weight (12/2018 with PCP): 386 lbs    Most recent weight:  Estimated body mass index is 54.8 kg/m  as calculated from the following:    Height as of 2/27/23: 1.815 m (5' 11.46\").    Weight as of 2/27/23: 180.5 kg (398 lb).     Just bought a scale at home, has not checked recently.     Required weight loss goal pre-op: 38 lbs from initial consult weight (goal weight 348 lbs or less before surgery)        1/21/2021    11:14 PM   --   I have tried the following methods to lose weight Watching portions or calories    Exercise    Weight Watchers    Slimfast    Physician directed program           1/21/2021    11:14 PM   Weight Loss Questions Reviewed With Patient   How long have you been overweight? Since puberty       SUPPLEMENT INFORMATION:  Did not discuss today.      NUTRITION HISTORY:  No known food allergies/intolerances  H/o gout  Last seen by RD 1 year ago (3/17/22)    Working on eating consist daytime meals, instead of one large meal. No longer eating fast-food. Barriers to change: motivation to eat during the day. Working with therapist on positive diet/lifestyle change.     Diet Recall:  Breakfast: breakfast burrito  Lunch: chicken quesadilla; salad; [place]  Dinner: Family meal - 3 chicken tacos   Beverages: " water, sugar-free energy drink (1 can/day)     Progress Towards Previous Goals:  1.Goal weight for surgery: 348 lbs or less  2. Restart practicing surgery diet guidelines - chewing thoroughly, not drinking with meals, avoiding caffeine/carbonatino/calorie-containing beverages. - Improving, has restarted   3. Consume 3 high-protein meals daily. Limit starches (bread, pasta rice) to 1/4 of plate or less. Fill out large section of plate with non-starchy veggies. - Improving   4. Limit/avoid snacking between meals. - Improving     ADDITIONAL INFORMATION:  Just started a new job.         1/21/2021    11:14 PM   Dining Out History Reviewed With Patient   How often do you dine out? Nearly every day.   Where do you dine out? (select all that apply) fast food chains    take out   What types of food do you order when you dine out? Protien rice dishes           1/21/2021    11:14 PM   Physical Activity Reviewed With Patient   How often do you exercise? 1 to 2 times per week   What is the duration of your exercise (in minutes)? 60+ Minutes   What types of exercise do you do? gym membership    weightlifting   What keeps you from being more active? Lack of Time         NUTRITION DIAGNOSIS:  Obesity r/t long history of self-monitoring deficit and excessive energy intake aeb BMI >30 kg/m2. - ongoing    INTERVENTION:  Intervention Provided/Education Provided:  - Reviewed weight loss goal for surgery.   - Reviewed bariatric diet guidelines   - Provided pt with list of diet handouts and nutrition goals below.         1/21/2021    11:14 PM   Questions Reviewed With Patient   How ready are you to make changes regarding your weight? Number 1 = Not ready at all to make changes up to 10 = very ready. 10   How confident are you that you can change? 1 = Not confident that you will be successful making changes up to 10 = very confident. 10       Expected Engagement: good    GOALS:  1.Goal weight for surgery: 348 lbs or less  2. Restart  practicing surgery diet guidelines - chewing thoroughly, not drinking with meals, avoiding caffeine/carbonatino/calorie-containing beverages.   3. Consume 3 high-protein meals daily. Limit starches (bread, pasta rice) to 1/4 of plate or less. Fill out large section of plate with non-starchy veggies.   4. Limit/avoid snacking between meals.   5. Gym 3-4 times per week  6. Call [phone] to update insurance and leave message for Patsy Diallo that you want to review the surgery task list.       Post-op Diet Handouts:  Diet Guidelines after Weight-loss Surgery  [email or web address]     Your Stage 1 Diet: Clear Liquids  [email or web address]     Your Stage 2 Diet: Low-fat Full Liquids  [email or web address]     Your Stage 3 Diet: Pureed Foods  [email or web address]     Pureed Pleasures  [email or web address]    Your Stage 4 Diet: Soft Foods  [email or web address]    Your Stage 5 Diet: Regular Foods  [email or web address]    Supplements after Sleeve Gastrectomy, Gastric Bypass or Single Anastomosis Duodenal Switch  [email or web address]    Keeping Track of Fluids  [email or web address]          Angela Granger RD, HELEN

## 2023-04-25 ENCOUNTER — VIRTUAL VISIT (OUTPATIENT)
Dept: ENDOCRINOLOGY | Facility: CLINIC | Age: 38
End: 2023-04-25

## 2023-04-25 DIAGNOSIS — E66.813 CLASS 3 SEVERE OBESITY DUE TO EXCESS CALORIES WITH SERIOUS COMORBIDITY AND BODY MASS INDEX (BMI) OF 50.0 TO 59.9 IN ADULT (H): ICD-10-CM

## 2023-04-25 DIAGNOSIS — E66.01 CLASS 3 SEVERE OBESITY DUE TO EXCESS CALORIES WITH SERIOUS COMORBIDITY AND BODY MASS INDEX (BMI) OF 50.0 TO 59.9 IN ADULT (H): ICD-10-CM

## 2023-04-25 DIAGNOSIS — Z71.3 NUTRITIONAL COUNSELING: Primary | ICD-10-CM

## 2023-04-25 PROCEDURE — 97803 MED NUTRITION INDIV SUBSEQ: CPT | Mod: VID | Performed by: DIETITIAN, REGISTERED

## 2023-04-25 PROCEDURE — 99207 PR NO CHARGE LOS: CPT | Mod: VID | Performed by: DIETITIAN, REGISTERED

## 2023-04-25 NOTE — LETTER
"4/25/2023       RE: Bigg Montez  [address]  [place] [address]     Dear Colleague,    Thank you for referring your patient, Bigg Montez, to the [facility] [place] at [facility]. Please see a copy of my visit note below.    Bigg Montez is a 37 year old male who is being evaluated via a billable video visit.      The patient has been notified of following:     \"This video visit will be conducted via a call between you and your physician/provider. We have found that certain health care needs can be provided without the need for an in-person physical exam.  This service lets us provide the care you need with a video conversation.  If a prescription is necessary we can send it directly to your pharmacy.  If lab work is needed we can place an order for that and you can then stop by our lab to have the test done at a later time.    Video visits are billed at different rates depending on your insurance coverage.  Please reach out to your insurance provider with any questions.    If during the course of the call the physician/provider feels a video visit is not appropriate, you will not be charged for this service.\"    Patient has given verbal consent for Video visit? Yes  How would you like to obtain your AVS? MyChart  If you are dropped from the video visit, the video invite should be resent to: Text to cell phone: [phone]  Will anyone else be joining your video visit? No  {If patient encounters technical issues they should call [phone]      Video-Visit Details    Type of service:  Video Visit    Video Start Time: 3:08 PM  Video End Time: 3:28 PM    Originating Location (pt. Location): Home    Distant Location (provider location):  Offsite (providers home) [facility]     Platform used for Video Visit: Gorb    During this virtual visit the patient is located in " "[place], patient verifies this as the location during the entirety of this visit.           Bariatric Nutrition Consultation Note    Reason For Visit: Nutrition reassessment    Bigg Montez is a 37 year old presenting today for return bariatric nutrition consult.  Pt is interested in laparoscopic sleeve gastrectomy with TBD.  Patient is accompanied by self. This is patients 2nd of 3 required RD visits.     Pt referred by Brianda Chan NP on January 25, 2021.  Patient with Co-morbidities of obesity including:  Type II DM no  Renal Failure no  Sleep apnea yes  Hypertension yes   Dyslipidemia no  Joint pain no  Back pain no  GERD no   Prediabetes yes    H/o gout. Pt reports having a blood clot in mid-sept. 2021 1/21/2021    11:14 PM   Support System Reviewed With Patient   Who do you have in your support network that can be available to help you for the first 2 weeks after surgery? My wife   Who can you count on for support throughout your weight loss surgery journey? Kenny ceja       ANTHROPOMETRICS:  Initial weight (12/2018 with PCP): 386 lbs    Most recent weight:  Estimated body mass index is 54.8 kg/m  as calculated from the following:    Height as of 2/27/23: 1.815 m (5' 11.46\").    Weight as of 2/27/23: 180.5 kg (398 lb).     Just bought a scale at home, has not checked recently.     Required weight loss goal pre-op: 38 lbs from initial consult weight (goal weight 348 lbs or less before surgery)        1/21/2021    11:14 PM   --   I have tried the following methods to lose weight Watching portions or calories    Exercise    Weight Watchers    Slimfast    Physician directed program           1/21/2021    11:14 PM   Weight Loss Questions Reviewed With Patient   How long have you been overweight? Since puberty       SUPPLEMENT INFORMATION:  Did not discuss today.      NUTRITION HISTORY:  No known food allergies/intolerances  H/o gout  Last seen by RD 1 year ago (3/17/22)    Working on eating consist " daytime meals, instead of one large meal. No longer eating fast-food. Barriers to change: motivation to eat during the day. Working with therapist on positive diet/lifestyle change.     Diet Recall:  Breakfast: breakfast burrilm  Lunch: chicken quesadilla; salad; [place]  Dinner: Family meal - 3 chicken tacos   Beverages: water, sugar-free energy drink (1 can/day)     Progress Towards Previous Goals:  1.Goal weight for surgery: 348 lbs or less  2. Restart practicing surgery diet guidelines - chewing thoroughly, not drinking with meals, avoiding caffeine/carbonatino/calorie-containing beverages. - Improving, has restarted   3. Consume 3 high-protein meals daily. Limit starches (bread, pasta rice) to 1/4 of plate or less. Fill out large section of plate with non-starchy veggies. - Improving   4. Limit/avoid snacking between meals. - Improving     ADDITIONAL INFORMATION:  Just started a new job.         1/21/2021    11:14 PM   Dining Out History Reviewed With Patient   How often do you dine out? Nearly every day.   Where do you dine out? (select all that apply) fast food chains    take out   What types of food do you order when you dine out? Protien rice dishes           1/21/2021    11:14 PM   Physical Activity Reviewed With Patient   How often do you exercise? 1 to 2 times per week   What is the duration of your exercise (in minutes)? 60+ Minutes   What types of exercise do you do? gym membership    weightlifting   What keeps you from being more active? Lack of Time         NUTRITION DIAGNOSIS:  Obesity r/t long history of self-monitoring deficit and excessive energy intake aeb BMI >30 kg/m2. - ongoing    INTERVENTION:  Intervention Provided/Education Provided:  - Reviewed weight loss goal for surgery.   - Reviewed bariatric diet guidelines   - Provided pt with list of diet handouts and nutrition goals below.         1/21/2021    11:14 PM   Questions Reviewed With Patient   How ready are you to make changes  regarding your weight? Number 1 = Not ready at all to make changes up to 10 = very ready. 10   How confident are you that you can change? 1 = Not confident that you will be successful making changes up to 10 = very confident. 10       Expected Engagement: good    GOALS:  1.Goal weight for surgery: 348 lbs or less  2. Restart practicing surgery diet guidelines - chewing thoroughly, not drinking with meals, avoiding caffeine/carbonatino/calorie-containing beverages.   3. Consume 3 high-protein meals daily. Limit starches (bread, pasta rice) to 1/4 of plate or less. Fill out large section of plate with non-starchy veggies.   4. Limit/avoid snacking between meals.   5. Gym 3-4 times per week  6. Call [phone] to update insurance and leave message for Patsy Diallo that you want to review the surgery task list.       Post-op Diet Handouts:  Diet Guidelines after Weight-loss Surgery  [email or web address]     Your Stage 1 Diet: Clear Liquids  [email or web address]     Your Stage 2 Diet: Low-fat Full Liquids  [email or web address]     Your Stage 3 Diet: Pureed Foods  [email or web address]     Pureed Pleasures  [email or web address]://[employer]/[email or web address]    Your Stage 4 Diet: Soft Foods  [email or web address]    Your Stage 5 Diet: Regular Foods  [email or web address]    Supplements after Sleeve Gastrectomy, Gastric Bypass or Single Anastomosis Duodenal Switch  [email or web address]://[email or web address]/[email or web address]    Keeping Track of Fluids  [email or web address]          Angela Granger RD, LD

## 2023-04-26 NOTE — PATIENT INSTRUCTIONS
Paul Sexton,    Follow-up with RD on 5/16    Thank you,    Angela Granger, RD, LD  If you would like to schedule or reschedule an appointment with the RD, please call [phone]    Nutrition Goals  1.Goal weight for surgery: 348 lbs or less  2. Restart practicing surgery diet guidelines - chewing thoroughly, not drinking with meals, avoiding caffeine/carbonatino/calorie-containing beverages.   3. Consume 3 high-protein meals daily. Limit starches (bread, pasta rice) to 1/4 of plate or less. Fill out large section of plate with non-starchy veggies.   4. Limit/avoid snacking between meals.   5. Gym 3-4 times per week  6. Call [phone] to update insurance and leave message for Patsy Rodriguezpson that you want to review the surgery task list.       Post-op Diet Handouts:  Diet Guidelines after Weight-loss Surgery  [email or web address]     Your Stage 1 Diet: Clear Liquids  [email or web address]     Your Stage 2 Diet: Low-fat Full Liquids  [email or web address]     Your Stage 3 Diet: Pureed Foods  [email or web address]     Pureed Pleasures  [email or web address]://[email or web address]/[email or web address]    Your Stage 4 Diet: Soft Foods  [email or web address]    Your Stage 5 Diet: Regular Foods  [email or web address]    Supplements after Sleeve Gastrectomy, Gastric Bypass or Single Anastomosis Duodenal Switch  [email or web address]://[email or web address]    Keeping Track of Fluids  [email or web address]          COMPREHENSIVE WEIGHT MANAGEMENT PROGRAM  VIRTUAL SUPPORT GROUPS    For Support Group Information:      We offer support groups for patients who are working on weight loss and considering, preparing for or have had weight loss surgery.   There is no cost for this opportunity.  You are invited to attend the?Virtual Support Groups?provided by any of the following locations:    [facility] via [employer] Teams with Cynthia Amor RN  2.   [place] via [employer] Teams with Terry Nix, PhD, LP  3.    "[employer] via [employer] Teams with Paola Franks RN  4.   [facility] via [employer] Teams with Paola Kwok [facility]    The following Support Group information can also be found on our website:  [email or web address]-support-groups    [email or web address]-loss-[email or web address]-weight-loss-surgery-support-groups    [facility] Weight Loss Surgery Support Group    [facility] Weight Loss Surgery Support Group  The support group is a patient-lead forum that meets monthly to share experiences, encouragement and education. It is open to those who have had weight loss surgery, are scheduled for surgery, or are considering surgery.   WHEN: This group meets on the 3rd Wednesday of each month from 5:00PM - 6:00PM virtually using [employer] Teams.   FACILITATOR: Led by Cynthia Mccall RD, HELEN, RN, the program's Clinical Coordinator.   TO REGISTER: Please contact the clinic via [employer] or call the nurse line directly at [phone] to inform our staff that you would like an invite sent to you and to let us know the email you would like the invite sent to. Prior to the meeting, a link with directions on how to join the meeting will be sent to you.    2023 Meetings (speakers to be determined)  January 18: \"Let's Talk\" a time for the group to share.  February 15: \"Let's Talk\" a time for the group to share.  March 15: \"Let's Talk\" a time for the group to share.  April 19: \"Let's Talk\" a time for the group to share.  May 17: \"Let's Talk\" a time for the group to share.  June 21: \"Let's Talk\" a time for the group to share.    [facility] [place] Support Groups    [facility] Bariatric Care Support Group?  This is open to all pre- and post- operative bariatric surgery patients as well as their support system.   WHEN: This group meets the 2nd Tuesday of each month from 6:30 PM - 8:00 PM virtually using [employer]ESS" "Teams.   FACILITATOR: Led by Terry Nix, Ph.D who is a Licensed Psychologist with the [facility] Comprehensive Weight Management Program.   TO REGISTER: Please send an email to Terry Nix, Ph.D.,  at?[email or web address]?if you would like an invitation to the group and to learn about using Microsoft Teams.    2023 Meetings  January 10: Speedy Hoffman, PharmD, Pharmacy Resident, \"Medications and Bariatric Surgery\"  February 14:  March 14:  April 11:  May 9:  June 11:    [facility] Post-Operative Bariatric Surgery Support Group  This is a support group for [facility] bariatric patients (and those external to [facility]) who have had bariatric surgery and are at least 3 months post-surgery.  WHEN: This support group meets the 4th Wednesday of the month from 11:00 AM - 12:00 PM virtually using Microsoft Teams.   FACILITATOR: Led by Certified Bariatric Nurse, Paola Franks RN.   TO REGISTER: Please send an email to Paola at [email or web address] if you would like an invitation to the group and to learn about using Microsoft Teams.    2023 Meetings  January 25  February 22  March 22  April 26  May 24  Melly 28      [facility] Healthy Lifestyle Virtual Support Group    Healthy Lifestyle Virtual Support Group?  This is 60 minutes of small group guided discussion, support and resources. All are welcome who want a healthy lifestyle.  WHEN: This group meets monthly on a Friday from 12:30 PM - 1:30 PM virtually using Microsoft Teams.   FACILITATOR: Led by National Board Certified Health and , Paola Kwok [facility].   TO REGISTER: Please send an email to Paola [email or web address] to receive monthly invites to the group or if you have any questions about having a health .  Prior to the meeting, a link with directions on how to join the meeting will be sent to you.    2023 Meetings January 20: \"Let's Talk\" a time for the group " "to share  February 17: Guest Speaker, Dagmar Jones RD, Registered Dietician, \"Tips to Maximize Your Metabolism\"  March 17: Let's Talk\" a time for the group to share  April 14: Guest Speaker, Liane Obrien RD, Registered Dietician, \"Heart Health\"  May 19: \"Let's Talk\" a time for the group to share  June: To be announced.            "

## 2023-04-27 NOTE — TELEPHONE ENCOUNTER
Central Prior Authorization Team  Phone: [phone]    Received message from liaison requesting denial letter for the wegovy so an appeal can be started. I do not have access to the denial letter.   Called insurance and asked for them to re-fax it to us- insurance rep has sent it again. Will forward to liasaloni Munoz when received.

## 2023-04-28 ENCOUNTER — TELEPHONE (OUTPATIENT)
Dept: ENDOCRINOLOGY | Facility: CLINIC | Age: 38
End: 2023-04-28
Payer: COMMERCIAL

## 2023-04-28 NOTE — LETTER
2023    To: Jamesdeacon    RE: Bigg Montez  [address]  [place] [address]  : 1985  MRN: [id number]  Policy #:   Case/Reference:   Member ID: [id number]     Payor: -[employer] Ph: [phone]     Benefit plan: [facility] Ph: [phone]     Group number: [id number]     Member effective dates: from 23        To Whom It May Concern,    I am writing on behalf of my patient, Bigg Montez to document the medical necessity of Wegovy for the treatment of obesity. This letter provides information about the patient's medical history and diagnosis and a statement summarizing my treatment rationale.     Summary of Patient History and Diagnosis   Class 3 severe obesity due to excess calories with serious comorbidity and body mass index (BMI) of 50.0 to 59.9 in adult (H)  Pre-diabetes    He is morbidly obese and considering weight loss surgery to treat obesity in association with his medical conditions of obesity. He has not met his required pre-surgery weight.         Treatment Rationale  Phentermine contraindicated history of hypertension    Start Wegovy          Duration  Up to 12 months      Summary  In summary, Wegovy is medically necessary for this patient s medical condition. Please call my office at [phone] if I can provide you with any additional information to approve my request. I look forward to receiving your timely response and approval of this request.     Sincerely,    Brianda Chan CNP

## 2023-04-28 NOTE — TELEPHONE ENCOUNTER
PA Initiation    Medication: Wegovy 0.25  Insurance Company: [employer] ([facility]) - Phone [phone] Fax [phone]  Pharmacy Filling the Rx:    Filling Pharmacy Phone:    Filling Pharmacy Fax:    Start Date: 4/28/2023    Key: [id number]

## 2023-04-28 NOTE — TELEPHONE ENCOUNTER
Medication Appeal Request    Please initiate an appeal for the requested medication: Letter of Appeal    Has a letter of medical necessity been completed in EPIC?   yes    Any additional lab values/information to include?     Would you like to include any research articles?               If yes please include the hyperlink(s) below or fax to    [phone] for Specialty/Retail               [phone] for Infusion/Clinic Administered.                Include the patients name and MRN on the fax cover sheet.

## 2023-04-29 ENCOUNTER — HEALTH MAINTENANCE LETTER (OUTPATIENT)
Age: 38
End: 2023-04-29

## 2023-05-01 NOTE — TELEPHONE ENCOUNTER
Wegovy denied, patient needs to try and fail phentermine for 12 consecutive weeks with modified lifestyle changes. I seen you wrote and appeal letter but doesn't answer why patient can't be on phentermine. Please rewrite.

## 2023-05-01 NOTE — TELEPHONE ENCOUNTER
PRIOR AUTHORIZATION DENIED    Medication: Wegovy 0.25    Denial Date: 4/27/2023    Denial Rational:     Appeal Information:

## 2023-05-02 ENCOUNTER — TELEPHONE (OUTPATIENT)
Dept: ENDOCRINOLOGY | Facility: CLINIC | Age: 38
End: 2023-05-02
Payer: COMMERCIAL

## 2023-05-02 NOTE — TELEPHONE ENCOUNTER
Patient: Sukhwinder Schulz Jr. Date: 6/15/2017   : 1951    66 year old male      OUTPATIENT WOUND CARE PROGRESS NOTE      Chief Complaint:  RLE wound      Wound/Ulcer Present:    None    Additional Wound Category:  None     Maximum Baseline Ambulatory Status:  Ambulates unassisted    History of Present Illness:  This is a 66 year old male with history of diastolic CHF, CAD, type 2 diabetes, hypertension, dyslipidemia, atrial flutter/fibrillation, stage III chronic kidney disease who presents to the hospital with worsening lower extremity swelling. BNP normal. Chest x-ray showed mild pulmonary vascular congestion.   Received IV Lasix per cardiology recommendation.    Denies any chest pain, orthopnea, PND, fever, nausea, vomiting, erythema.   Has mild cough since bronchitis couple months ago. No open wound or drainage. Does have a >quarter sized intact blister on the right leg.     Interval History:  Last seen by our service on 16 for leg bulla while admitted.  Presents again today with a recurrent RLE wound.  Started as bulla in , when his edema was uncontrolled.  Has been using neosporin and a new 20-30 mmHg compression stocking.    Current Treatment Regimen:  Dressing:  neosporin   Frequency:  Every other day   Changed by:  Patient    Review of Systems:  Pertinent items are noted in HPI (history of present illness).    Past Medical History:   Diagnosis Date   • Anemia    • Arthritis    • Blood clot associated with vein wall inflammation    • BPH (benign prostatic hypertrophy)    • Chronic diastolic CHF (congestive heart failure) (CMS/[facility]) 14 Echo - I/IV diastolic dysfunction, EF 71% 16 - Echo - EF 60%, severely increased LV wall thickness, I/IV diastolic dysfunction    • Chronic pain    • CKD (chronic kidney disease)    • Congestive cardiac failure (CMS/HCC) 2014   • Coronary artery disease    • Diabetes Mellitus    • Diabetic neuropathy (CMS/[facility])    • Difficulty  Medication Appeal Request    Please initiate an appeal for the requested medication: Second Level Appeal    Has a letter of medical necessity been completed in EPIC?   yes    Any additional lab values/information to include?     Would you like to include any research articles?               If yes please include the hyperlink(s) below or fax to    [phone] for Specialty/Retail               [phone] for Infusion/Clinic Administered.                Include the patients name and MRN on the fax cover sheet.     initiating walking    • DVT (deep venous thrombosis) (CMS/[facility]) 7/25/2014    right IJ   • Erectile dysfunction    • Esophageal reflux    • Gout    • Hyperlipidemia    • Hypertension    • MI (myocardial infarction) (CMS/[facility]) 2006 and 2009    Pt has normal coronaries per cardiac cath   in 05/2013. NSTEMI 5/2013.   • Obesity    • Onychomycosis    • Otitis media    • Rash    • Sleep apnea     use CPAP   • Von Willebrand disease (CMS/[facility]) 1991     Past Surgical History:   Procedure Laterality Date   • CARDIOVERSION  1/31/2014    [facility] for AFlutter, new onset   • COLONOSCOPY DIAGNOSTIC  10/13/14    Affi 5yr recall, 2 polyps tubular adenoma    • CORONARY ANGIOGRAM - CV  5/2/2013    normal coronaries, EF 75%   • ECHO SANTA  1/31/2014    EF 55%, no shunts, no thrombus, marked LA enlargement   • ECHOCARDIOGRAM  1/30/2014    [facility], EF 72%    • EP ABLATION  7/10/2014    AFL ablation   • HAND/FINGER SURGERY UNLISTED      Unspecified Hand/Finger procedure right thumb   • PTCA     • SERVICE TO GASTROENTEROLOGY     • SHOULDER SURG PROC UNLISTED  1999    Unspecified Shoulder Procedure right, rotator cuff,    • US EXTREMITY VENOUS DOPPLER BILATERAL  1/30/2014    [facility], no DVT     Social History     Social History   • Marital status:      Spouse name: N/A   • Number of children: 2   • Years of education: N/A     Occupational History   •  [employer]     retired 2013     Social History Main Topics   • Smoking status: Former Smoker     Years: 14.00     Types: Pipe     Quit date: 9/11/1980   • Smokeless tobacco: Former User   • Alcohol use 0.0 oz/week     0 Standard drinks or equivalent per week      Comment: occasional beer rare   • Drug use: No      Comment:  years ago hashish, marijuana. quit 1986   • Sexual activity: Yes     Partners: Male     Other Topics Concern   • Not on file     Social History Narrative    Lives with wife in a home, independent     Family History    Problem Relation Age of Onset   • Diabetes Father    • Stroke Mother    • Cancer Sister    • Cancer Sister        Current Outpatient Prescriptions   Medication Sig   • Loperamide HCl 2 MG Chew Tab Chew 2 mg by mouth every 8 hours as needed (diarrhea). 2 tabs PO x1, then 1 tab PO after each loose stool; Max: 4 tabs/24h   • sulfamethoxazole-trimethoprim (BACTRIM DS) 800-160 MG per tablet Take 1 tablet by mouth 2 times daily for 14 days.   • furosemide (LASIX) 40 MG tablet Take 2 tablets by mouth two times daily   • insulin lispro (HUMALOG KWIKPEN) 100 UNIT/ML pen-injector Inject 45 units with first meal at 1200 and 40 units with second meal at 6:30 PM.   • doxazosin (CARDURA) 8 MG tablet Take 1 tablet by mouth  nightly   • BYDUREON 2 MG pen-injector Inject 2 mg into the skin  once a week.   • MITIGARE 0.6 MG capsule Take 1 capsule by mouth two times daily   • atorvastatin (LIPITOR) 20 MG tablet TAKE 1 TABLET BY MOUTH AT BEDTIME   • gabapentin (NEURONTIN) 100 MG capsule Gradually add from 1 -3 capsules to total 300 mg dose   • amLODIPine (NORVASC) 10 MG tablet Take 1 tablet by mouth daily.   • lidocaine (LIDODERM) 5 % patch APPLY TOPICALLY TO BOTH KNEES IN THE MORNING AND REMOVE AT BEDTIME   • insulin glargine (BASAGLAR KWIKPEN) 100 UNIT/ML pen-injector Inject 80 Units into the skin nightly.   • gabapentin (NEURONTIN) 400 MG capsule Take 1 capsule by mouth 3  times daily   • metoPROLOL (LOPRESSOR) 25 MG tablet TAKE 3 TABLETS BY MOUTH EVERY 12 HOURS   • allopurinol (ZYLOPRIM) 300 MG tablet Take 1 tablet by mouth daily.   • minoxidil (LONITEN) 10 MG tablet TAKE 1 TABLET BY MOUTH TWICE DAILY( IN THE MORNING AND THE EVENING)   • tamsulosin (FLOMAX) 0.4 MG Cap Take 1 capsule by mouth daily after a meal.   • pantoprazole (PROTONIX) 40 MG tablet Take 1 tablet by mouth daily.   • exenatide ER (BYDUREON) 2 MG pen-injector Inject 2 mg into the skin once a week.   • metolazone (ZAROXOLYN) 2.5 MG tablet Take 1 tablet by mouth  twice a week. (Patient taking differently: Take 2.5 mg by mouth once a week. )   • metoPROLOL 75 MG Tab Take 75 mg by mouth every 12 hours.   • polyethylene glycol (GLYCOLAX, MIRALAX) packet Take 17 g by mouth daily.   • bisacodyl (DULCOLAX) 10 MG suppository Place 10 mg rectally daily as needed for Constipation.   • potassium chloride (K-DUR, KLOR-CON) 10 MEQ CR tablet Take 10 mEq by mouth 2 times daily.   • acetaminophen (TYLENOL) 325 MG tablet Take 2 tablets by mouth every 4 hours as needed for Pain.   • Cholecalciferol (VITAMIN D) 2000 UNITS Cap Take 1 capsule by mouth every morning.   • fluticasone (FLONASE) 50 MCG/ACT nasal spray Spray 2 sprays in each nostril daily. Indications: sinus congestion   • Luliconazole 1 % Cream Apply 1 application topically daily.   • Efinaconazole 10 % Solution Apply 1 application topically daily. Apply to affected nails daily   • aspirin 81 MG chewable tablet Chew 1 tablet by mouth daily.   • ferrous sulfate 325 (65 FE) MG tablet Take 325 mg by mouth daily (with breakfast).    • Pyridoxine HCl (VITAMIN B-6) 50 MG tablet Take 50 mg by mouth daily.   • Meclizine HCl 25 MG tablet TAKE 1-2 TABLETS BY MOUTH THREE TIMES A DAY AS NEEDED FOR DIZZINESS     No current facility-administered medications for this encounter.         ALLERGIES: no known allergies.    OBJECTIVE:  Vital Signs:    Visit Vitals   • /67 (BP Location: [facility] [place], Patient Position: Sitting)   • Pulse 77   • Temp 97.8 °F (36.6 °C) (Oral)   • Resp 18   • Ht 6' 3\" (1.905 m)   • Wt (!) 154.2 kg   • BMI 42.5 kg/m2         Physical Exam:  General appearance: Appears stated age, Alert, well-developed, well-nourished, oriented to person, place, time and situation, in no distress and cooperative  Head:   normocephalic without obvious abnormality  Eye:  conjunctivae/sclerae normal. No erythema, edema or exudate.  Mouth:   moist and patent  Pulmonary: normal respiratory effort  Extremities: see below  Skin: normal     BLE lower  legs with mild edema.  Tiny superficial stasis wound RLE with overlying crust.  No cellulitis or purulence.  Periwound with scarring indicative of previous wounds.        Wound Bed Quality:  none      Theresa-wound Quality:    None    Additional Descriptors:  none    Wound Measurements Per Flowsheet:     Wound Leg Right Lower;Anterior Blister-Wound Length (cm): 3 cm  Wound Leg Right Lower;Anterior Blister-Wound Width (cm): 3.9 cm  Wound Leg Right Lower;Anterior Blister-Depth (cm): 0.1 cm     Wound Leg Right Lower;Anterior Blister-Wound Bed % Granulated: 40 %     Wound Leg Right Lower;Anterior Blister-Wound Bed % Necrotic Tissue Slough: 60 %                                           PROCEDURE:  Debridement: Selective Non-Excisional Debridement of Non-viable Tissue.  Informed consent obtained.  Time out was completed.  Patient, procedure, and site verified.  Anesthesia-  None  Size-  Less than or equal to 20 sq cm  Instrument-  Curette  Non-viable tissue removed-  Fibrin/Slough  Hemostasis achieved-  Pressure  Patient procedure was-  tolerated well, no complications.  Patient stable upon completion of procedure.  Wound dimensions unchanged post procedure.    Procedure was Performed by:  Physician     Laboratory assessments reviewed:  No results found for: PAB   Albumin (g/dL)   Date Value   06/03/2017 3.2 (L)   01/20/2017 3.4 (L)   11/26/2016 2.2 (L)      No results available in last 24 hours    Lab Results   Component Value Date    WBC 4.5 06/03/2017    GLUCOSE 181 (H) 05/11/2017    HGBA1C 9.6 05/26/2017    CRP 15.8 (H) 11/06/2016    RESR >120 (H) 11/06/2016    CREATININE 2.17 (H) 05/11/2017    GFRA 36 05/11/2017    GFRNA 31 05/11/2017        Culture results:  Specimen Description (no units)   Date Value   11/09/2016 SWAB ABSCESS BUTTOCKS LEFT   11/07/2016 BLOOD, PERIPHERAL  LT HAND   09/19/2014 SYNOVIAL FLUID KNEE LEFT   07/25/2014 BLOOD, PERIPHERAL LEFT AC    CULTURE (no units)   Date Value   11/09/2016 RARE  STREPTOCOCCUS AGALACTIAE (STREP GROUP B) (P)   11/09/2016 (P)    VERY RARE CLOSTRIDIUM PERFRINGENS Note: Clostridium species typically respond to empiric therapy with amp/sulbact, pip/tazo, metronidazole or meropenem.  May be Penicillin resistant.   11/09/2016 (CRITICAL/ALERT VALUE) (C)   11/09/2016 (P)    CLOSTRIDIUM PERFRINGENS CALLED TO, READ BACK AND CONFIRMED TO BILLIE DANIEL AT 1325 ON 11.13.2016 BY [id number]        Diagnostic Assessments Reviewed:  Vascular Studies:  Venous duplex study 7/24/2016    IMPRESSION:  No evidence of deep venous thrombosis in either lower extremity.       Nutritional Assessment:  Prealbumin and/or Albumin reviewed    Wound treatment goals are palliative:  No    DIAGNOSES:  Bilateral lower leg edema (minimal) with small stasis wound RLE    Medical Decision Making:  Recurrent RLE stasis wound due to uncontrolled edema.  Debrided minimally today.    Wound now nearly healed with neosporin and 20-30 mmHg compression stocking.    Continue current regimen.    Continue to work on improving glucose control.    RTC prn.      Plan of Care:  Advanced Wound Care Recommendations:  See above  Percent Wound Closure from consult:  Na  Care plan to augment wound closure:  Not applicable.  see above    Patient stable. All questions were answered.     Pérez Boyer MD  [phone] ([phone] (p)

## 2023-05-02 NOTE — LETTER
May 2, 2023        To: Aury    RE: Bigg Montez  [address]  [place] [address]  : 1985  MRN: [id number]  Policy #:   Case/Reference:   Member ID: [id number]     Payor: -[employer]UR[employer] Ph: [phone]     Benefit plan: [facility] Ph: [phone]     Group number: [id number]     Member effective dates: from 23        To Whom It May Concern,    I am writing on behalf of my patient, Bigg Montez to document the medical necessity of Wegovy for the treatment of obesity. This letter provides information about the patient's medical history and diagnosis and a statement summarizing my treatment rationale.     Summary of Patient History and Diagnosis  Class 3 severe obesity due to excess calories with serious comorbidity and body mass index (BMI) of 50.0 to 59.9 in adult (H)  Pre-diabetes    He is morbidly obese and considering weight loss surgery to treat obesity in association with his medical conditions of obesity. He has not met his required pre-surgery weight.     Treatment Rationale    **Phentermine contraindicated history of hypertension**    Start Wegovy          Duration  Up to 12 months        Summary  In summary, Wegovy is medically necessary for this patient s medical condition. Please call my office at [phone] if I can provide you with any additional information to approve my request. I look forward to receiving your timely response and approval of this request.      Sincerely,     Brianda Chan CNP

## 2023-05-03 NOTE — TELEPHONE ENCOUNTER
Medication Appeal Initiation    We have initiated an appeal for the requested medication:  Medication: Wegovy 0.25  Appeal Start Date:  5/3/2023  Insurance Company: [employer] - Phone [phone] Fax [phone]  Comments:  Faxing appeal to [phone], going to follow up at [phone]

## 2023-05-09 NOTE — TELEPHONE ENCOUNTER
MEDICATION APPEAL APPROVED    Medication: Wegovy 0.25  Authorization Effective Date: 4/4/2023  Authorization Expiration Date: 11/4/2023  Approved Dose/Quantity: 2ml/28 days   Reference #: [id number]   Insurance Company: [employer] - Phone [phone] Fax [phone]  Expected CoPay:       CoPay Card Available:      Foundation Assistance Needed:    Which Pharmacy is filling the prescription (Not needed for infusion/clinic administered):

## 2023-05-15 NOTE — PROGRESS NOTES
"Bigg Montez is a 37 year old male who is being evaluated via a billable video visit.      The patient has been notified of following:     \"This video visit will be conducted via a call between you and your physician/provider. We have found that certain health care needs can be provided without the need for an in-person physical exam.  This service lets us provide the care you need with a video conversation.  If a prescription is necessary we can send it directly to your pharmacy.  If lab work is needed we can place an order for that and you can then stop by our lab to have the test done at a later time.    Video visits are billed at different rates depending on your insurance coverage.  Please reach out to your insurance provider with any questions.    If during the course of the call the physician/provider feels a video visit is not appropriate, you will not be charged for this service.\"    Patient has given verbal consent for Video visit? Yes  How would you like to obtain your AVS? MyChart  If you are dropped from the video visit, the video invite should be resent to: Text to cell phone: [phone]  Will anyone else be joining your video visit? No  {If patient encounters technical issues they should call [phone]      Video-Visit Details    Type of service:  Video Visit    Video Start Time: 3:33 PM  Video End Time: 3:52 PM    Originating Location (pt. Location): Home    Distant Location (provider location):  Offsite (providers home) [facility] [place]     Platform used for Video Visit: [employer]    During this virtual visit the patient is located in [place], patient verifies this as the location during the entirety of this visit.           Bariatric Nutrition Consultation Note    Reason For Visit: Nutrition reassessment    Bigg Montez is a 37 year old presenting today for return bariatric nutrition consult.  Pt is interested in laparoscopic sleeve gastrectomy with TBD.  " "Patient is accompanied by self. This is patients 3rd of 3 required RD visits.     Pt referred by Brianda Chan NP on January 25, 2021.  Patient with Co-morbidities of obesity including:  Type II DM no  Renal Failure no  Sleep apnea yes  Hypertension yes   Dyslipidemia no  Joint pain no  Back pain no  GERD no   Prediabetes yes    H/o gout. Pt reports having a blood clot in mid-sept. 2021 1/21/2021    11:14 PM   Support System Reviewed With Patient   Who do you have in your support network that can be available to help you for the first 2 weeks after surgery? My wife   Who can you count on for support throughout your weight loss surgery journey? Kenny brenna       ANTHROPOMETRICS:  Initial weight (12/2018 with PCP): 386 lbs    Most recent weight:  Estimated body mass index is 54.8 kg/m  as calculated from the following:    Height as of 2/27/23: 1.815 m (5' 11.46\").    Weight as of 2/27/23: 180.5 kg (398 lb).     Current weight: 388 lbs (-10 lbs over last 2 months)     Required weight loss goal pre-op: 38 lbs from initial consult weight (goal weight 348 lbs or less before surgery)        1/21/2021    11:14 PM   --   I have tried the following methods to lose weight Watching portions or calories    Exercise    Weight Watchers    Slimfast    Physician directed program           1/21/2021    11:14 PM   Weight Loss Questions Reviewed With Patient   How long have you been overweight? Since puberty       SUPPLEMENT INFORMATION:  Did not discuss today.      NUTRITION HISTORY:  No known food allergies/intolerances  H/o gout    Pt reports today, he started Wegovy. Experiencing some nausea. Not interested in food as much anymore. Finding he is only eating one meal daily.     Diet Recall:  Breakfast: skip  Lunch: [place] turkey sub  Dinner: skip  Beverages: water, sugar-free energy drink (1 can/day)     Progress Towards Previous Goals:  1.Goal weight for surgery: 348 lbs or less - Improving  2. Restart practicing " surgery diet guidelines - chewing thoroughly, not drinking with meals, avoiding caffeine/carbonatino/calorie-containing beverages. - Not met  3. Consume 3 high-protein meals daily. Limit starches (bread, pasta rice) to 1/4 of plate or less. Fill out large section of plate with non-starchy veggies. - Not met  4. Limit/avoid snacking between meals. - Met, continues   5. Gym 3-4 times per week - Improving   6. Call [phone] to update insurance and leave message for Patsy Rodriguezpson that you want to review the surgery task list. - Met    ADDITIONAL INFORMATION:  Just started a new job.         1/21/2021    11:14 PM   Dining Out History Reviewed With Patient   How often do you dine out? Nearly every day.   Where do you dine out? (select all that apply) fast food chains    take out   What types of food do you order when you dine out? Protien rice dishes           1/21/2021    11:14 PM   Physical Activity Reviewed With Patient   How often do you exercise? 1 to 2 times per week   What is the duration of your exercise (in minutes)? 60+ Minutes   What types of exercise do you do? gym membership    weightlifting   What keeps you from being more active? Lack of Time         NUTRITION DIAGNOSIS:  Obesity r/t long history of self-monitoring deficit and excessive energy intake aeb BMI >30 kg/m2. - ongoing    INTERVENTION:  Intervention Provided/Education Provided:  - Reviewed weight loss goal for surgery.   - Reviewed bariatric diet guidelines   - Discussed strategies for meeting protein needs  - Encouraged low-fat diet, smaller portions and increased hydration to help manage nausea   - Provided pt with list of diet handouts and nutrition goals below.         1/21/2021    11:14 PM   Questions Reviewed With Patient   How ready are you to make changes regarding your weight? Number 1 = Not ready at all to make changes up to 10 = very ready. 10   How confident are you that you can change? 1 = Not confident that you will be successful  making changes up to 10 = very confident. 10       Expected Engagement: good    GOALS:  1.Goal weight for surgery: 348 lbs or less  2. Practice surgery diet guidelines - chewing thoroughly, not drinking with meals, avoiding caffeine/carbonatino/calorie-containing beverages.   3. Consume 3 servings of protein daily   - Greek yogurt (Oikos Triple Zeros, Two Good, Yoplait 100s), meat/cheese (2 slice deli meat + 1 slice part-skim cheese), turkey meat stick, low-fat cottage cheese, protein shake/bar  4. Portion out half of entree  5. Gym 3-4 times per week    Post-op Diet Handouts:  Diet Guidelines after Weight-loss Surgery  [email or web address]     Your Stage 1 Diet: Clear Liquids  [email or web address]     Your Stage 2 Diet: Low-fat Full Liquids  [email or web address]     Your Stage 3 Diet: Pureed Foods  [email or web address]     Pureed Pleasures  [email or web address]    Your Stage 4 Diet: Soft Foods  [email or web address]    Your Stage 5 Diet: Regular Foods  [email or web address]    Supplements after Sleeve Gastrectomy, Gastric Bypass or Single Anastomosis Duodenal Switch  [email or web address]    Keeping Track of Fluids  [email or web address]      Time spent with patient: 19 mins    Angela Granger RD, LD

## 2023-05-16 ENCOUNTER — VIRTUAL VISIT (OUTPATIENT)
Dept: ENDOCRINOLOGY | Facility: CLINIC | Age: 38
End: 2023-05-16
Payer: COMMERCIAL

## 2023-05-16 DIAGNOSIS — Z71.3 NUTRITIONAL COUNSELING: Primary | ICD-10-CM

## 2023-05-16 DIAGNOSIS — E66.813 CLASS 3 SEVERE OBESITY DUE TO EXCESS CALORIES WITH SERIOUS COMORBIDITY AND BODY MASS INDEX (BMI) OF 50.0 TO 59.9 IN ADULT (H): ICD-10-CM

## 2023-05-16 DIAGNOSIS — E66.01 CLASS 3 SEVERE OBESITY DUE TO EXCESS CALORIES WITH SERIOUS COMORBIDITY AND BODY MASS INDEX (BMI) OF 50.0 TO 59.9 IN ADULT (H): ICD-10-CM

## 2023-05-16 PROCEDURE — 97803 MED NUTRITION INDIV SUBSEQ: CPT | Mod: 95 | Performed by: DIETITIAN, REGISTERED

## 2023-05-16 PROCEDURE — 99207 PR NO CHARGE LOS: CPT | Mod: 95 | Performed by: DIETITIAN, REGISTERED

## 2023-05-16 NOTE — PATIENT INSTRUCTIONS
Paul Sexton,    Follow-up with RD on 6/13    Thank you,    Angela Granger, RD, LD  If you would like to schedule or reschedule an appointment with the RD, please call [phone]    Nutrition Goals  1.Goal weight for surgery: 348 lbs or less  2. Practice surgery diet guidelines - chewing thoroughly, not drinking with meals, avoiding caffeine/carbonatino/calorie-containing beverages.   3. Consume 3 servings of protein daily   - Greek yogurt (Oikos Triple Zeros, Two Good, Yoplait 100s), meat/cheese (2 slice deli meat + 1 slice part-skim cheese), turkey meat stick, low-fat cottage cheese, protein shake/bar  4. Portion out half of entree  5. Gym 3-4 times per week    Post-op Diet Handouts:  Diet Guidelines after Weight-loss Surgery  [email or web address]     Your Stage 1 Diet: Clear Liquids  [email or web address]     Your Stage 2 Diet: Low-fat Full Liquids  [email or web address]     Your Stage 3 Diet: Pureed Foods  [email or web address]     Pureed Pleasures  [email or web address]    Your Stage 4 Diet: Soft Foods  [email or web address]    Your Stage 5 Diet: Regular Foods  [email or web address]    Supplements after Sleeve Gastrectomy, Gastric Bypass or Single Anastomosis Duodenal Switch  [email or web address]    Keeping Track of Fluids  [email or web address]          COMPREHENSIVE WEIGHT MANAGEMENT PROGRAM  VIRTUAL SUPPORT GROUPS    For Support Group Information:      We offer support groups for patients who are working on weight loss and considering, preparing for or have had weight loss surgery.   There is no cost for this opportunity.  You are invited to attend the?Virtual Support Groups?provided by any of the following locations:    [facility] via Musicraiser Teams with Cynthia Amor RN  2.   [place] via Musicraiser Teams with Terry Nix, PhD, LP  3.   [place] via Pipette with Paola Franks RN  4.   [facility] via  "Foodist Teams with Paola Kwok [facility]    The following Support Group information can also be found on our website:  [email or web address]    [email or web address]    [facility] Weight Loss Surgery Support Group    [facility] Weight Loss Surgery Support Group  The support group is a patient-lead forum that meets monthly to share experiences, encouragement and education. It is open to those who have had weight loss surgery, are scheduled for surgery, or are considering surgery.   WHEN: This group meets on the 3rd Wednesday of each month from 5:00PM - 6:00PM virtually using Microsoft Teams.   FACILITATOR: Led by Cynthia Mccall RD, HELEN, RN, the program's Clinical Coordinator.   TO REGISTER: Please contact the clinic via [facility] or call the nurse line directly at [phone] to inform our staff that you would like an invite sent to you and to let us know the email you would like the invite sent to. Prior to the meeting, a link with directions on how to join the meeting will be sent to you.    2023 Meetings   April 19: Guest Speaker, Gonzalo Sewell, BRO, LD, \"Maintaining Weight Loss after Bariatric Surgery\".  May 17: \"Let's Talk\" a time for the group to share.  June 21: \"Let's Talk\" a time for the group to share.  July 19  August 16  September 20  October 18  November 15  December 20    [facility] Support Groups    [facility] Bariatric Care Support Group?  This is open to all pre- and post- operative bariatric surgery patients as well as their support system.   WHEN: This group meets the 2nd Tuesday of each month from 6:30 PM - 8:00 PM virtually using Microsoft Teams.   FACILITATOR: Led by Terry Nix, Ph.D who is a Licensed Psychologist with the [facility] Comprehensive Weight Management Program.   TO REGISTER: " "Please send an email to Terry Nix, Ph.D., LP at?[email or web address]?if you would like an invitation to the group and to learn about using Microsoft Teams.    2023 Meetings April 11: Guest speaker, Leigh Ann Handy MD, Bariatrician, [facility] Comprehensive Weight Management Program, \"Injectable Weight Loss Medications\".  May 9  Melly 13  July 11  August 8  September 12  October 10  November 14  December 12    [facility] Post-Operative B[facility]ric Surgery Support Group  This is a support group for [facility] bariatric patients (and those external to [facility]) who have had bariatric surgery and are at least 3 months post-surgery.  WHEN: This support group meets the 4th Wednesday of the month from 11:00 AM - 12:00 PM virtually using Microsoft Teams.   FACILITATOR: Led by Certified Bariatric Nurse, Paola Franks RN.   TO REGISTER: Please send an email to Paola at [email or web address] if you would like an invitation to the group and to learn about using Microsoft Teams.    2023 Meetings  April 26  May 24  Melly 28  July 26  August 23  September 27  October 25  November 22  December 27    [facility]   Healthy Lifestyle Virtual Support Group    Healthy Lifestyle Virtual Support Group?  This is 60 minutes of small group guided discussion, support and resources. All are welcome who want a healthy lifestyle.  WHEN: This group meets monthly on a Friday from 12:30 PM - 1:30 PM virtually using Microsoft Teams.  This group will meet the first Friday of the month beginning In July  FACILITATOR: Led by National Board Certified Health and , Paola Kwok [facility].   TO REGISTER: Please send an email to Paola [email or web address] to receive monthly invites to the group or if you have any questions about having a health .  Prior to the meeting, a link with directions on how to join the meeting will be sent to you.    2023 Meetings  May 19: " Let's Talk  June 9: Create Your Coaching Toolkit: Learn How to  Yourself  July 7: Let's Talk  August 4: Benefits of Fiber with BRO Penny  September 1: Show and Tell (share your aps, podcasts, recipes, hacks, books)  October 6 :Let's Talk  November 3: Introduction to Mindfulness   December 1: Let's Talk

## 2023-06-13 ENCOUNTER — VIRTUAL VISIT (OUTPATIENT)
Dept: ENDOCRINOLOGY | Facility: CLINIC | Age: 38
End: 2023-06-13
Payer: COMMERCIAL

## 2023-06-13 VITALS — WEIGHT: 315 LBS | HEIGHT: 72 IN | BODY MASS INDEX: 42.66 KG/M2

## 2023-06-13 DIAGNOSIS — E66.813 CLASS 3 SEVERE OBESITY DUE TO EXCESS CALORIES WITH SERIOUS COMORBIDITY AND BODY MASS INDEX (BMI) OF 50.0 TO 59.9 IN ADULT (H): ICD-10-CM

## 2023-06-13 DIAGNOSIS — E55.9 VITAMIN D DEFICIENCY: ICD-10-CM

## 2023-06-13 DIAGNOSIS — E66.01 CLASS 3 SEVERE OBESITY DUE TO EXCESS CALORIES WITH SERIOUS COMORBIDITY AND BODY MASS INDEX (BMI) OF 50.0 TO 59.9 IN ADULT (H): ICD-10-CM

## 2023-06-13 DIAGNOSIS — Z71.3 NUTRITIONAL COUNSELING: Primary | ICD-10-CM

## 2023-06-13 PROCEDURE — 97803 MED NUTRITION INDIV SUBSEQ: CPT | Mod: 95 | Performed by: DIETITIAN, REGISTERED

## 2023-06-13 PROCEDURE — 99207 PR NO CHARGE LOS: CPT | Mod: 95 | Performed by: DIETITIAN, REGISTERED

## 2023-06-13 RX ORDER — GLUCOSAMINE HCL 500 MG
2 TABLET ORAL DAILY
Qty: 120 TABLET | Refills: 0 | Status: SHIPPED | OUTPATIENT
Start: 2023-06-13 | End: 2024-02-27

## 2023-06-13 ASSESSMENT — PAIN SCALES - GENERAL: PAINLEVEL: NO PAIN (0)

## 2023-06-13 NOTE — NURSING NOTE
Is the patient currently in the state of [place]? YES    Visit mode:VIDEO    If the visit is dropped, the patient can be reconnected by: VIDEO VISIT: Text to cell phone: [phone]    Will anyone else be joining the visit? NO      How would you like to obtain your AVS? MyChart    Are changes needed to the allergy or medication list? NO    Reason for visit: KRISTINE Mariano on 6/13/2023 at 2:40 PM

## 2023-06-13 NOTE — LETTER
"6/13/2023       RE: Bigg Montez  [address]  [place] [address]     Dear Colleague,    Thank you for referring your patient, Bigg Montez, to the [facility] WEIGHT MANAGEMENT CLINIC [place] [facility]. Please see a copy of my visit note below.    Bigg Montez is a 37 year old male who is being evaluated via a billable video visit.      The patient has been notified of following:     \"This video visit will be conducted via a call between you and your physician/provider. We have found that certain health care needs can be provided without the need for an in-person physical exam.  This service lets us provide the care you need with a video conversation.  If a prescription is necessary we can send it directly to your pharmacy.  If lab work is needed we can place an order for that and you can then stop by our lab to have the test done at a later time.    Video visits are billed at different rates depending on your insurance coverage.  Please reach out to your insurance provider with any questions.    If during the course of the call the physician/provider feels a video visit is not appropriate, you will not be charged for this service.\"    Patient has given verbal consent for Video visit? Yes  How would you like to obtain your AVS? MyChart  If you are dropped from the video visit, the video invite should be resent to: Text to cell phone: [phone]  Will anyone else be joining your video visit? No  {If patient encounters technical issues they should call [phone]      Video-Visit Details    Type of service:  Video Visit    Video Start Time: 3:03 PM  Video End Time: 3:25 PM    Originating Location (pt. Location): Home    Distant Location (provider location):  Offsite (providers home) [facility] WEIGHT MANAGEMENT [facility]     Platform used for Video Visit: Arg[employer] Insights    During this virtual visit the patient is located in " "[place], patient verifies this as the location during the entirety of this visit.           Bariatric Nutrition Consultation Note    Reason For Visit: Nutrition reassessment    Bigg Montez is a 37 year old presenting today for return bariatric nutrition consult.  Pt is interested in laparoscopic sleeve gastrectomy with TBD.  Patient is accompanied by self. This is patients 4th nutrition visit prior to surgery.     Pt referred by Brianda Chan NP on January 25, 2021.  Patient with Co-morbidities of obesity including:  Type II DM no  Renal Failure no  Sleep apnea yes  Hypertension yes   Dyslipidemia no  Joint pain no  Back pain no  GERD no   Prediabetes yes    H/o gout. Pt reports having a blood clot in mid-sept. 2021 1/21/2021    11:14 PM   Support System Reviewed With Patient   Who do you have in your support network that can be available to help you for the first 2 weeks after surgery? My wife   Who can you count on for support throughout your weight loss surgery journey? Kenny ceja       ANTHROPOMETRICS:  Initial weight (12/2018 with PCP): 386 lbs    Estimated body mass index is 52.72 kg/m  as calculated from the following:    Height as of this encounter: 1.822 m (5' 11.75\").    Weight as of this encounter: 175.1 kg (386 lb). (-13 lbs over last 3 months)     Required weight loss goal pre-op: 38 lbs from initial consult weight (goal weight 348 lbs or less before surgery)        1/21/2021    11:14 PM   --   I have tried the following methods to lose weight Watching portions or calories    Exercise    Weight Watchers    Slimfast    Physician directed program           1/21/2021    11:14 PM   Weight Loss Questions Reviewed With Patient   How long have you been overweight? Since puberty       SUPPLEMENT INFORMATION:  Milk thistle supplement pt reports for gout    Nutrition Related Labs:  Vitamin D 12 (L) on 2/27/23. PCP had ordered pt once weekly D2 (50,000 unit) x 12wk. Pt never picked up prescription, not " filled to preferred pharmacy and would rather take a daily vitamin D supplement. Ordered 6000 units vitamin D3 per protocol.     Medications for Weight Loss:  Wegovy    NUTRITION HISTORY:  No known food allergies/intolerances  H/o gout    Pt reports today, he continues to work on reducing starches (rice, noodles). Increasing fruits, working on increase vegetables. No longer going back for seconds at dinner. Replacing breakfast with protein shake.     Diet Recall:  Breakfast: Fairlife protein shake   Lunch: skip lately for ham/chees roll   Dinner: chicken, fruits   Beverages: water, sugar-free sports drinks, sugar-free energy drink (1 can/day)     Progress Towards Previous Goals:  1.Goal weight for surgery: 348 lbs or less  - Improving   2. Practice surgery diet guidelines - chewing thoroughly, not drinking with meals, avoiding caffeine/carbonatino/calorie-containing beverages.  - Improving   3. Consume 3 servings of protein daily - Met at times   - Greek yogurt (Oikos Triple Zeros, Two Good, Yoplait 100s), meat/cheese (2 slice deli meat + 1 slice part-skim cheese), turkey meat stick, low-fat cottage cheese, protein shake/bar  4. Portion out half of entree - Improving   5. Gym 3-4 times per week - Going 0-1 times per week, plans to go this Thursday.     ADDITIONAL INFORMATION:  Just started a new job.         1/21/2021    11:14 PM   Dining Out History Reviewed With Patient   How often do you dine out? Nearly every day.   Where do you dine out? (select all that apply) fast food chains    take out   What types of food do you order when you dine out? Protien rice dishes           1/21/2021    11:14 PM   Physical Activity Reviewed With Patient   How often do you exercise? 1 to 2 times per week   What is the duration of your exercise (in minutes)? 60+ Minutes   What types of exercise do you do? gym membership    weightlifting   What keeps you from being more active? Lack of Time         NUTRITION DIAGNOSIS:  Obesity r/t  long history of self-monitoring deficit and excessive energy intake aeb BMI >30 kg/m2. - ongoing    INTERVENTION:  Intervention Provided/Education Provided:  - Reviewed weight loss goal for surgery and strategy for pre-op weight loss.   - Reviewed bariatric diet guidelines   - Discussed strategies for meeting protein needs  - Encouraged increased physical activity.   - Ordered vitamin D supplement for repletion per protocol.  - Provided pt with list of diet handouts and nutrition goals below.         1/21/2021    11:14 PM   Questions Reviewed With Patient   How ready are you to make changes regarding your weight? Number 1 = Not ready at all to make changes up to 10 = very ready. 10   How confident are you that you can change? 1 = Not confident that you will be successful making changes up to 10 = very confident. 10       Expected Engagement: good    GOALS:  1.Goal weight for surgery: 348 lbs or less  2. Avoid drinking with meals.   3. Consume 3 servings of protein daily   - Greek yogurt (Oikos Triple Zeros, Two Good, Yoplait 100s), meat/cheese (2 slice deli meat + 1 slice part-skim cheese), turkey meat stick, low-fat cottage cheese, protein shake/bar  5. Gym once per week or more.   6. Start a daily multivitamin   7. Start your vitamin D supplement - 6000 units daily.     Diet Guidelines after Weight-loss Surgery  [email or web address]         Time spent with patient: 22 mins    Angela Granger RD, LD

## 2023-06-13 NOTE — PROGRESS NOTES
"Bigg Montez is a 37 year old male who is being evaluated via a billable video visit.      The patient has been notified of following:     \"This video visit will be conducted via a call between you and your physician/provider. We have found that certain health care needs can be provided without the need for an in-person physical exam.  This service lets us provide the care you need with a video conversation.  If a prescription is necessary we can send it directly to your pharmacy.  If lab work is needed we can place an order for that and you can then stop by our lab to have the test done at a later time.    Video visits are billed at different rates depending on your insurance coverage.  Please reach out to your insurance provider with any questions.    If during the course of the call the physician/provider feels a video visit is not appropriate, you will not be charged for this service.\"    Patient has given verbal consent for Video visit? Yes  How would you like to obtain your AVS? MyChart  If you are dropped from the video visit, the video invite should be resent to: Text to cell phone: [phone]  Will anyone else be joining your video visit? No  {If patient encounters technical issues they should call [phone]      Video-Visit Details    Type of service:  Video Visit    Video Start Time: 3:03 PM  Video End Time: 3:25 PM    Originating Location (pt. Location): Home    Distant Location (provider location):  Offsite (providers home) [facility]NAGEMENT CL[facility] [place]     Platform used for Video Visit: Remotemedical    During this virtual visit the patient is located in [place], patient verifies this as the location during the entirety of this visit.           Bariatric Nutrition Consultation Note    Reason For Visit: Nutrition reassessment    Bigg Montez is a 37 year old presenting today for return bariatric nutrition consult.  Pt is interested in laparoscopic sleeve gastrectomy with TBD.  " "Patient is accompanied by self. This is patients 4th nutrition visit prior to surgery.     Pt referred by Brianda Chan NP on January 25, 2021.  Patient with Co-morbidities of obesity including:  Type II DM no  Renal Failure no  Sleep apnea yes  Hypertension yes   Dyslipidemia no  Joint pain no  Back pain no  GERD no   Prediabetes yes    H/o gout. Pt reports having a blood clot in mid-sept. 2021 1/21/2021    11:14 PM   Support System Reviewed With Patient   Who do you have in your support network that can be available to help you for the first 2 weeks after surgery? My wife   Who can you count on for support throughout your weight loss surgery journey? Kenny brenna       ANTHROPOMETRICS:  Initial weight (12/2018 with PCP): 386 lbs    Estimated body mass index is 52.72 kg/m  as calculated from the following:    Height as of this encounter: 1.822 m (5' 11.75\").    Weight as of this encounter: 175.1 kg (386 lb). (-13 lbs over last 3 months)     Required weight loss goal pre-op: 38 lbs from initial consult weight (goal weight 348 lbs or less before surgery)        1/21/2021    11:14 PM   --   I have tried the following methods to lose weight Watching portions or calories    Exercise    Weight Watchers    Slimfast    Physician directed program           1/21/2021    11:14 PM   Weight Loss Questions Reviewed With Patient   How long have you been overweight? Since puberty       SUPPLEMENT INFORMATION:  Milk thistle supplement pt reports for gout    Nutrition Related Labs:  Vitamin D 12 (L) on 2/27/23. PCP had ordered pt once weekly D2 (50,000 unit) x 12wk. Pt never picked up prescription, not filled to preferred pharmacy and would rather take a daily vitamin D supplement. Ordered 6000 units vitamin D3 per protocol.     Medications for Weight Loss:  Wegovy    NUTRITION HISTORY:  No known food allergies/intolerances  H/o gout    Pt reports today, he continues to work on reducing starches (rice, noodles). Increasing " fruits, working on increase vegetables. No longer going back for seconds at dinner. Replacing breakfast with protein shake.     Diet Recall:  Breakfast: Fairlife protein shake   Lunch: skip lately for ham/chees roll   Dinner: chicken, fruits   Beverages: water, sugar-free sports drinks, sugar-free energy drink (1 can/day)     Progress Towards Previous Goals:  1.Goal weight for surgery: 348 lbs or less  - Improving   2. Practice surgery diet guidelines - chewing thoroughly, not drinking with meals, avoiding caffeine/carbonatino/calorie-containing beverages.  - Improving   3. Consume 3 servings of protein daily - Met at times   - Greek yogurt (Oikos Triple Zeros, Two Good, Yoplait 100s), meat/cheese (2 slice deli meat + 1 slice part-skim cheese), turkey meat stick, low-fat cottage cheese, protein shake/bar  4. Portion out half of entree - Improving   5. Gym 3-4 times per week - Going 0-1 times per week, plans to go this Thursday.     ADDITIONAL INFORMATION:  Just started a new job.         1/21/2021    11:14 PM   Dining Out History Reviewed With Patient   How often do you dine out? Nearly every day.   Where do you dine out? (select all that apply) fast food chains    take out   What types of food do you order when you dine out? Protien rice dishes           1/21/2021    11:14 PM   Physical Activity Reviewed With Patient   How often do you exercise? 1 to 2 times per week   What is the duration of your exercise (in minutes)? 60+ Minutes   What types of exercise do you do? gym membership    weightlifting   What keeps you from being more active? Lack of Time         NUTRITION DIAGNOSIS:  Obesity r/t long history of self-monitoring deficit and excessive energy intake aeb BMI >30 kg/m2. - ongoing    INTERVENTION:  Intervention Provided/Education Provided:  - Reviewed weight loss goal for surgery and strategy for pre-op weight loss.   - Reviewed bariatric diet guidelines   - Discussed strategies for meeting protein needs  -  Encouraged increased physical activity.   - Ordered vitamin D supplement for repletion per protocol.  - Provided pt with list of diet handouts and nutrition goals below.         1/21/2021    11:14 PM   Questions Reviewed With Patient   How ready are you to make changes regarding your weight? Number 1 = Not ready at all to make changes up to 10 = very ready. 10   How confident are you that you can change? 1 = Not confident that you will be successful making changes up to 10 = very confident. 10       Expected Engagement: good    GOALS:  1.Goal weight for surgery: 348 lbs or less  2. Avoid drinking with meals.   3. Consume 3 servings of protein daily   - Greek yogurt (Oikos Triple Zeros, Two Good, Yoplait 100s), meat/cheese (2 slice deli meat + 1 slice part-skim cheese), turkey meat stick, low-fat cottage cheese, protein shake/bar  5. Gym once per week or more.   6. Start a daily multivitamin   7. Start your vitamin D supplement - 6000 units daily.     Diet Guidelines after Weight-loss Surgery  [email or web address]         Time spent with patient: 22 mins    Angela Granger RD, LD

## 2023-06-13 NOTE — PATIENT INSTRUCTIONS
Paul Hawkins,    Follow-up with RD on 7/18    Thank you,    Angela Granger, RD, LD  If you would like to schedule or reschedule an appointment with the RD, please call [phone]    Nutrition Goals  1.Goal weight for surgery: 348 lbs or less  2. Avoid drinking with meals.   3. Consume 3 servings of protein daily   - Greek yogurt (Oikos Triple Zeros, Two Good, Yoplait 100s), meat/cheese (2 slice deli meat + 1 slice part-skim cheese), turkey meat stick, low-fat cottage cheese, protein shake/bar  5. Gym once per week or more.   6. Start a daily multivitamin   7. Start your vitamin D supplement - 6000 units daily.     Diet Guidelines after Weight-loss Surgery  [email or web address]         COMPREHENSIVE WEIGHT MANAGEMENT PROGRAM  VIRTUAL SUPPORT GROUPS    For Support Group Information:      We offer support groups for patients who are working on weight loss and considering, preparing for or have had weight loss surgery.   There is no cost for this opportunity.  You are invited to attend the?Virtual Support Groups?provided by any of the following locations:    [facility] via Room[employer] Travel Teams with Cynthia Amor RN  2.   [place] via Room[employer] Travel Teams with Terry Nix, PhD, LP  3.   [place] via Front Up with Paola Franks RN  4.   [facility] via Roomer Travel Teams with Poala Kwok Count includes the [facility]    The following Support Group information can also be found on our website:  [email or web address]    [email or web address]    [facility] Weight Loss Surgery Support Group    [facility] Weight Loss Surgery Support Group  The support group is a patient-lead forum that meets monthly to share experiences, encouragement and education. It is open to those who have had weight loss surgery, are scheduled for surgery, or are considering surgery.   WHEN: This group meets  "on the 3rd Wednesday of each month from 5:00PM - 6:00PM virtually using Microsoft Teams.   FACILITATOR: Led by Cynthia Mccall RD, LD, RN, the program's Clinical Coordinator.   TO REGISTER: Please contact the clinic via Kelde[employer] or call the nurse line directly at [phone] to inform our staff that you would like an invite sent to you and to let us know the email you would like the invite sent to. Prior to the meeting, a link with directions on how to join the meeting will be sent to you.    2023 Meetings April 19: Guest Speaker, Gonzalo Sewell RD, LD, \"Maintaining Weight Loss after Bariatric Surgery\".  May 17: \"Let's Talk\" a time for the group to share.  June 21: \"Let's Talk\" a time for the group to share.  July 19  August 16  September 20  October 18  November 15  December 20    [facility] Support Groups    Connections Bariatric Care Support Group?  This is open to all pre- and post- operative bariatric surgery patients as well as their support system.   WHEN: This group meets the 2nd Tuesday of each month from 6:30 PM - 8:00 PM virtually using Microsoft Teams.   FACILITATOR: Led by Terry Nix, Ph.D who is a Licensed Psychologist with the [facility] Comprehensive Weight Management Program.   TO REGISTER: Please send an email to Terry Nix, Ph.D., LP at?[email or web address]?if you would like an invitation to the group and to learn about using Microsoft Teams.    2023 Meetings April 11: Guest speaker, Leigh Ann Hanyd MD, Bariatrician, [facility] Comprehensive Weight Management Program, \"Injectable Weight Loss Medications\".  May 9  Melly 13  July 11  August 8  September 12  October 10  November 14  December 12    Connections Post-Operative Bariatric Surgery Support Group  This is a support group for [facility] bariatric patients (and those external to [facility]) who have had bariatric surgery and are at least 3 months " post-surgery.  WHEN: This support group meets the 4th Wednesday of the month from 11:00 AM - 12:00 PM virtually using Microsoft Teams.   FACILITATOR: Led by Certified Bariatric Nurse, Paola Franks RN.   TO REGISTER: Please send an email to Paola at [email or web address] if you would like an invitation to the group and to learn about using Microsoft Teams.    2023 Meetings  April 26  May 24  Melly 28  July 26  August 23  September 27  October 25  November 22  December 27    [facility]   Healthy Lifestyle Virtual Support Group    Healthy Lifestyle Virtual Support Group?  This is 60 minutes of small group guided discussion, support and resources. All are welcome who want a healthy lifestyle.  WHEN: This group meets monthly on a Friday from 12:30 PM - 1:30 PM virtually using Microsoft Teams.  This group will meet the first Friday of the month beginning In July  FACILITATOR: Led by National Board Certified Health and , Paola Kwok [facility] [place].   TO REGISTER: Please send an email to Paola [email or web address] to receive monthly invites to the group or if you have any questions about having a health .  Prior to the meeting, a link with directions on how to join the meeting will be sent to you.    2023 Meetings  May 19: Let's Talk  June 9: Create Your Coaching Toolkit: Learn How to  Yourself  July 7: Let's Talk  August 4: Benefits of Fiber with BRO ePnny  September 1: Show and Tell (share your aps, podcasts, recipes, hacks, books)  October 6 :Let's Talk  November 3: Introduction to Mindfulness   December 1: Let's Talk

## 2023-06-14 ENCOUNTER — TELEPHONE (OUTPATIENT)
Dept: ENDOCRINOLOGY | Facility: CLINIC | Age: 38
End: 2023-06-14

## 2023-06-25 DIAGNOSIS — M1A.09X0 CHRONIC GOUT OF MULTIPLE SITES, UNSPECIFIED CAUSE: ICD-10-CM

## 2023-06-26 RX ORDER — PREDNISONE 20 MG/1
TABLET ORAL
Qty: 20 TABLET | Refills: 0 | OUTPATIENT
Start: 2023-06-26

## 2023-06-26 NOTE — TELEPHONE ENCOUNTER
Routing refill request to provider for review/approval because:  Drug not on the [facility] refill protocol     Last Written Prescription Date:  2/27/2023  Last Fill Quantity: 20,  # refills: 0   Last office visit provider:  2/27/2023     Requested Prescriptions   Pending Prescriptions Disp Refills     predniSONE (DELTASONE) 20 MG tablet [Pharmacy Med Name: PREDNISONE 20MG TABLETS] 20 tablet 0     Sig: TAKE 3 BY MOUTH EVERY DAY FOR 3 DAYS,TAKE 2 BY MOUTH EVERY DAY FOR 3 DAYS, TAKE 1 BY MOUTH FOR 3 DAYS, TAKE O.5 BY MOUTH EVERY DAY FOR 3 DAYS       There is no refill protocol information for this order          DEBRA PUENTES RN 06/26/23 10:17 AM

## 2023-06-26 NOTE — TELEPHONE ENCOUNTER
Pt called back stating that pharmacy told him that Rx was denied. Writer relayed that message was sent to the doctor and awaiting a response. Pt stated he having a really bad gout flare up.

## 2023-06-26 NOTE — TELEPHONE ENCOUNTER
Pt called about getting a refill for pending medication. Writer relayed pharmacy sent a request for medication and will send a message.

## 2023-06-28 NOTE — TELEPHONE ENCOUNTER
Left voicemail for patient to return call to clinic. If patient calls back, please relay provider message below and assist in scheduling appointment if patient wishes to be seen in clinic.

## 2023-06-28 NOTE — TELEPHONE ENCOUNTER
Patient returns call. Writer relay RN/provider's message below.     Dr. Brooks's spot is gone and no other providers have open spots. Pt will go to .      Caller verbalizes understanding and has no further questions.     Marium Hunter CMA ,  [facility]  June 28, 2023 2:45 PM

## 2023-06-28 NOTE — TELEPHONE ENCOUNTER
"Patient returned call to clinic.    1. Patient opted to go to ER/UC unless he can be seen in-clinic today.     2. RN offered \"Same Day\" appointment slot with Dr. Brooks 6/29 at 11:00AM. Patient will try to find transportation and call back if he wants us to schedule this.     3. Patient wondering if Dr. Dinh would be willing to send a prednisone order with refills to pharmacy for future gout flares if he makes an appointment to discuss/evaluate this prior.  "

## 2023-06-28 NOTE — TELEPHONE ENCOUNTER
Left voicemail for patient to return call to clinic. If patient calls back, please relay message from Dr. Dinh in response to patient question:     Patient wondering if Dr. Dinh would be willing to send a prednisone order with refills to pharmacy for future gout flares if he makes an appointment to discuss/evaluate this prior.    Ruth Dinh MD  to Me        6/28/23 10:12 AM  It would not be appropriate. He would need to be seen.

## 2023-07-17 ENCOUNTER — OFFICE VISIT (OUTPATIENT)
Dept: FAMILY MEDICINE | Facility: CLINIC | Age: 38
End: 2023-07-17
Payer: COMMERCIAL

## 2023-07-17 VITALS
BODY MASS INDEX: 52.54 KG/M2 | DIASTOLIC BLOOD PRESSURE: 96 MMHG | SYSTOLIC BLOOD PRESSURE: 157 MMHG | RESPIRATION RATE: 18 BRPM | WEIGHT: 315 LBS | OXYGEN SATURATION: 100 % | TEMPERATURE: 97.7 F | HEART RATE: 85 BPM

## 2023-07-17 DIAGNOSIS — M10.9 ACUTE GOUT OF RIGHT ANKLE, UNSPECIFIED CAUSE: Primary | ICD-10-CM

## 2023-07-17 DIAGNOSIS — M1A.09X0 CHRONIC GOUT OF MULTIPLE SITES, UNSPECIFIED CAUSE: ICD-10-CM

## 2023-07-17 PROCEDURE — 99213 OFFICE O/P EST LOW 20 MIN: CPT | Performed by: FAMILY MEDICINE

## 2023-07-17 RX ORDER — PREDNISONE 20 MG/1
TABLET ORAL
Qty: 20 TABLET | Refills: 0 | Status: SHIPPED | OUTPATIENT
Start: 2023-07-17 | End: 2023-09-11

## 2023-07-17 NOTE — PROGRESS NOTES
Patient presents with:  Arthritis: Rt ankle flare up x 2 week.      Clinical Decision Making:      ICD-10-CM    1. Acute gout of right ankle, unspecified cause  M10.9 CANCELED: Uric acid      2. Chronic gout of multiple sites, unspecified cause  M1A.09X0 predniSONE (DELTASONE) 20 MG tablet     CANCELED: Uric acid        37-year-old male with a history of morbid obesity obstructive sleep apnea and gout and hypertension who is here for 2-week history of worsening right ankle pain.  He admitted to eating shellfish 3 weeks ago and then examined 2 weeks ago.  Has not had a gout flare for a long time.  Does have primary care and rheumatology but he has not seen them for quite some time.  Is on allopurinol and colchicine but symptoms are worsening.  Exam shows morbidly obese man with swelling and tenderness to the medial aspect of his right foot difficult with weightbearing due to pain.  No injury recently.  Consistent with acute gout flare.  We will treat with prednisone tape.  Vies patient to get in with his rheumatologist for follow-up with lab work.  No imaging indicated due to no injury or fall.  Return in 3 to 5 days if not improving.  Patient agreed with plan had no further question.    There are no Patient Instructions on file for this visit.    HPI:  Bigg Montez is a 37 year old male who presents today complaining of   Chief Complaint   Patient presents with     Arthritis     Rt ankle flare up x 2 week.     Was walking on sand 3 weeks ago.   Denies falling or injury.   Getting worse.   Previous gout flares were mostly in toes.     History obtained from the patient.    Problem List:  2022-03: Prediabetes  2022-03: Vitamin D deficiency  2021-01: Class 3 severe obesity due to excess calories with serious   comorbidity and body mass index (BMI) of 50.0 to 59.9 in adult (H)  2021-01: TRACEE (obstructive sleep apnea)  2018-01: Essential hypertension  2013-08: Major depression  2013-07: Anxiety attack  2013-07:  Gout  -: CARDIOVASCULAR SCREENING; LDL GOAL LESS THAN 160      No past medical history on file.    Social History     Tobacco Use     Smoking status: Former     Types: Cigars     Quit date: 2021     Years since quittin.4     Smokeless tobacco: Never   Substance Use Topics     Alcohol use: Yes     Comment: 1 drink weekly       Review of Systems  Constitutional, HEENT, cardiovascular, pulmonary, gi and gu systems are negative, except as otherwise noted.    Vitals:    23 1655   BP: (!) 157/96   Pulse: 85   Resp: 18   Temp: 97.7  F (36.5  C)   TempSrc: Oral   SpO2: 100%   Weight: (!) 174.5 kg (384 lb 11.2 oz)       Physical Exam  GENERAL: healthy, alert and no distress, obese   RESP: lungs clear to auscultation - no rales, rhonchi or wheezes  CV: regular rate and rhythm, normal S1 S2, no S3 or S4, no murmur, click or rub  MS: Right Ankle: skin without erythema or ecchymosis, moderate effusion medially and dorsum of  to palpation over medial aspect. limping  NEURO: Normal strength and tone, mentation intact and speech normal  PSYCH: mentation appears normal, affect normal/bright    Results:  No results found for any visits on 23.      At the end of the encounter, I discussed results, diagnosis, medications. Discussed red flags for immediate return to clinic/ER, as well as indications for follow up if no improvement. Patient understood and agreed to plan. Patient was stable for discharge.

## 2023-07-17 NOTE — PROGRESS NOTES
"Video-Visit Details    Type of service:  Video Visit    Video Start Time: 8:29 AM  Video End Time: 8:48 AM    Originating Location (pt. Location): Home    Distant Location (provider location):  Offsite (providers home) [facility] [place]     Platform used for Video Visit: Well      Bariatric Nutrition Consultation Note    Reason For Visit: Nutrition reassessment    Bigg Montez is a 37 year old presenting today for return bariatric nutrition consult.  Pt is interested in laparoscopic sleeve gastrectomy with TBD.  Patient is accompanied by self. This is patients 5th nutrition visit prior to surgery.     Pt referred by Brianda Chan NP on January 25, 2021.  Patient with Co-morbidities of obesity including:  Type II DM no  Renal Failure no  Sleep apnea yes  Hypertension yes   Dyslipidemia no  Joint pain no  Back pain no  GERD no   Prediabetes yes    H/o gout. Pt reports having a blood clot in mid-sept. 2021 1/21/2021    11:14 PM   Support System Reviewed With Patient   Who do you have in your support network that can be available to help you for the first 2 weeks after surgery? My wife   Who can you count on for support throughout your weight loss surgery journey? Kenny ceja       ANTHROPOMETRICS:  Initial weight (12/2018 with PCP): 386 lbs    Estimated body mass index is 52.67 kg/m  as calculated from the following:    Height as of this encounter: 1.816 m (5' 11.5\").    Weight as of this encounter: 173.7 kg (383 lb). (-3 lbs over last , -3 lbs from initial)     Required weight loss goal pre-op: 38 lbs from initial consult weight (goal weight 348 lbs or less before surgery)        1/21/2021    11:14 PM   --   I have tried the following methods to lose weight Watching portions or calories    Exercise    Weight Watchers    Slimfast    Physician directed program           1/21/2021    11:14 PM   Weight Loss Questions Reviewed With Patient   How long have you been " overweight? Since puberty       SUPPLEMENT INFORMATION:  Milk thistle supplement pt reports for gout    Nutrition Related Labs:  Vitamin D 12 (L) on 2/27/23. PCP had ordered pt once weekly D2 (50,000 unit) x 12wk. Pt never picked up prescription, not filled to preferred pharmacy and would rather take a daily vitamin D supplement. Ordered 6000 units vitamin D3 per protocol.     Started Vitamin D 6000 units daily in June.      Medications for Weight Loss:  Wegovy    NUTRITION HISTORY:  No known food allergies/intolerances  H/o gout    He continues to work on reducing starches (rice, noodles). Increasing fruits, working on increase vegetables. No longer going back for seconds at dinner. Replacing breakfast with protein shake.     Today - Has started going to the gym, and working on increasing his water intake. He is interested in going up on his Wegovy dose. Notices he is not losing weight as well recently, however does continue to trend down. Recently quit his job so his wife could start her career. 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 He is prepping/planning meals at home - more grilling. Dealing with a recent gout flare.     Diet Recall:  Breakfast: Fairlife protein shake   Lunch: PB sandwich; pork belly lettuce wrap   Dinner: chicken and potato; ground turkey; meat, rice, veg   Beverages: water (60 oz), sugar-free sports drinks, sugar-free energy drink (1 can/day)     Progress Towards Previous Goals:  1.Goal weight for surgery: 348 lbs or less - Improving  2. Avoid drinking with meals. - Continues to work towards this goal  3. Consume 3 servings of protein daily - Improving    - Greek yogurt (Oikos Triple Zeros, Two Good, Yoplait 100s), meat/cheese (2 slice deli meat + 1 slice part-skim cheese), turkey meat stick, low-fat cottage cheese, protein shake/bar  5. Gym once per week or more. - Met, continues  6. Start a daily multivitamin - not met  7. Start your vitamin D supplement - 6000 units daily. 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         1/21/2021    11:14 PM   Dining Out History Reviewed With Patient   How often do you dine out? Nearly every day.   Where do you dine out? (select all that apply) fast food chains    take out   What types of food do you order when you dine out? Protien rice dishes           1/21/2021    11:14 PM   Physical Activity Reviewed With Patient   How often do you exercise? 1 to 2 times per week   What is the duration of your exercise (in minutes)? 60+ Minutes   What types of exercise do you do? gym membership    weightlifting   What keeps you from being more active? Lack of Time         NUTRITION DIAGNOSIS:  Obesity r/t long history of self-monitoring deficit and excessive energy intake aeb BMI >30 kg/m2. - ongoing    INTERVENTION:  Intervention Provided/Education Provided:  - Reviewed weight loss goal for surgery and strategy for pre-op weight loss.   - Reviewed bariatric diet guidelines   - Encouraged increased physical activity.   - Praised pt on the progress he has made this month  - Provided pt with list of diet handouts and nutrition goals below.         1/21/2021    11:14 PM   Questions Reviewed With Patient   How ready are you to make changes regarding your weight? Number 1 = Not ready at all to make changes up to 10 = very ready. 10   How confident are you that you can change? 1 = Not confident that you will be successful making changes up to 10 = very confident. 10       Expected Engagement: good    GOALS:  1.Goal weight for surgery: 348 lbs or less  2. Add in more solid protein with breakfast - Cottage cheese, boiled egg, protein shake, lean meat  3. Gym 2-3 times per week.   4. Start a daily multivitamin     5. Schedule visit with Brianda Chan NP     Diet Guidelines after Weight-loss Surgery  [email or web address]       Time spent with patient: 19 mins    Angela Granger RD, LD

## 2023-07-18 ENCOUNTER — VIRTUAL VISIT (OUTPATIENT)
Dept: ENDOCRINOLOGY | Facility: CLINIC | Age: 38
End: 2023-07-18
Payer: COMMERCIAL

## 2023-07-18 VITALS — WEIGHT: 315 LBS | HEIGHT: 72 IN | BODY MASS INDEX: 42.66 KG/M2

## 2023-07-18 DIAGNOSIS — Z71.3 NUTRITIONAL COUNSELING: Primary | ICD-10-CM

## 2023-07-18 DIAGNOSIS — E66.813 CLASS 3 SEVERE OBESITY DUE TO EXCESS CALORIES WITH SERIOUS COMORBIDITY AND BODY MASS INDEX (BMI) OF 50.0 TO 59.9 IN ADULT (H): ICD-10-CM

## 2023-07-18 DIAGNOSIS — E66.01 CLASS 3 SEVERE OBESITY DUE TO EXCESS CALORIES WITH SERIOUS COMORBIDITY AND BODY MASS INDEX (BMI) OF 50.0 TO 59.9 IN ADULT (H): ICD-10-CM

## 2023-07-18 PROCEDURE — 99207 PR NO CHARGE LOS: CPT | Mod: 95 | Performed by: DIETITIAN, REGISTERED

## 2023-07-18 PROCEDURE — 97803 MED NUTRITION INDIV SUBSEQ: CPT | Mod: 95 | Performed by: DIETITIAN, REGISTERED

## 2023-07-18 ASSESSMENT — PAIN SCALES - GENERAL: PAINLEVEL: MODERATE PAIN (4)

## 2023-07-18 NOTE — NURSING NOTE
Is the patient currently in the state of [place]? YES    Visit mode:VIDEO    If the visit is dropped, the patient can be reconnected by: VIDEO VISIT: Text to cell phone: [phone]    Will anyone else be joining the visit? NO      How would you like to obtain your AVS? MyChart    Are changes needed to the allergy or medication list? YES: Pt reports trying to take Wegovy, but out of stock at pharmacy and needs help with what to do    Reason for visit: KRISTINE Mariano on 7/18/2023 at 8:07 AM

## 2023-07-18 NOTE — PATIENT INSTRUCTIONS
Paul Sexton,    Follow-up with RD on 8/29    Thank you,    Angela Granger, BRO, LD  If you would like to schedule or reschedule an appointment with the RD, please call [phone]    Nutrition Goals  1.Goal weight for surgery: 348 lbs or less  2. Add in more solid protein with breakfast - Cottage cheese, boiled egg, protein shake, lean meat  3. Gym 2-3 times per week.   4. Start a daily multivitamin     5. Schedule visit with Brianda Chan NP     Diet Guidelines after Weight-loss Surgery  [email or web address]           COMPREHENSIVE WEIGHT MANAGEMENT PROGRAM  VIRTUAL SUPPORT GROUPS    For Support Group Information:      We offer support groups for patients who are working on weight loss and considering, preparing for or have had weight loss surgery.   There is no cost for this opportunity.  You are invited to attend the?Virtual Support Groups?provided by any of the following locations:    [facility] via Microsoft Teams with Cynthia Amor RN  2.   [place] via Six Month Smiles with Terry Nix, PhD, LP  3.   [place] via [employer] Month Smiles with Paola Franks RN  4.   [facility] via Microsoft Teams with Paola Kwok, [facility]    The following Support Group information can also be found on our website:  [email or web address]    [email or web address]    [facility] Weight Loss Surgery Support Group    [facility] Weight Loss Surgery Support Group  The support group is a patient-lead forum that meets monthly to share experiences, encouragement and education. It is open to those who have had weight loss surgery, are scheduled for surgery, or are considering surgery.   WHEN: This group meets on the 3rd Wednesday of each month from 5:00PM - 6:00PM virtually using Microsoft Teams.   FACILITATOR: Led by Cynthia Mccall RD, HELEN, RN, the program's Clinical  "Coordinator.   TO REGISTER: Please contact the clinic via BrandShield[employer] or call the nurse line directly at [phone] to inform our staff that you would like an invite sent to you and to let us know the email you would like the invite sent to. Prior to the meeting, a link with directions on how to join the meeting will be sent to you.    2023 Meetings April 19: Guest Speaker, Gonzalo Sewell, BRO, LD, \"Maintaining Weight Loss after Bariatric Surgery\".  May 17: \"Let's Talk\" a time for the group to share.  June 21: \"Let's Talk\" a time for the group to share.  July 19  August 16  September 20  October 18  November 15  December 20    [facility] Support Groups    Connections Bariatric Care Support Group?  This is open to all pre- and post- operative bariatric surgery patients as well as their support system.   WHEN: This group meets the 2nd Tuesday of each month from 6:30 PM - 8:00 PM virtually using Microsoft Teams.   FACILITATOR: Led by Terry Nix, Ph.D who is a Licensed Psychologist with the [facility] Comprehensive Weight Management Program.   TO REGISTER: Please send an email to Terry Nix, Ph.D., LP at?[email or web address]?if you would like an invitation to the group and to learn about using Microsoft Teams.    2023 Meetings April 11: Guest speaker, Leigh Ann Handy MD, Bariatrician, [facility] Comprehensive Weight Management Program, \"Injectable Weight Loss Medications\".  May 9  Melly 13  July 11  August 8  September 12  October 10  November 14  December 12    Connections Post-Operative Bariatric Surgery Support Group  This is a support group for [facility] bariatric patients (and those external to [facility]) who have had bariatric surgery and are at least 3 months post-surgery.  WHEN: This support group meets the 4th Wednesday of the month from 11:00 AM - 12:00 PM virtually using Microsoft Teams.   FACILITATOR: Led by Certified Bariatric " Nurse, Paola Franks RN.   TO REGISTER: Please send an email to Paola at [email or web address] if you would like an invitation to the group and to learn about using Microsoft Teams.    2023 Meetings  April 26  May 24  Melly 28  July 26  August 23  September 27  October 25  November 22  December 27    [facility]   Healthy Lifestyle Virtual Support Group    Healthy Lifestyle Virtual Support Group?  This is 60 minutes of small group guided discussion, support and resources. All are welcome who want a healthy lifestyle.  WHEN: This group meets monthly on a Friday from 12:30 PM - 1:30 PM virtually using Microsoft Teams.  This group will meet the first Friday of the month beginning In July  FACILITATOR: Led by National Board Certified Health and , Paola Kwok [facility].   TO REGISTER: Please send an email to Paola [email or web address] to receive monthly invites to the group or if you have any questions about having a health .  Prior to the meeting, a link with directions on how to join the meeting will be sent to you.    2023 Meetings  May 19: Let's Talk  June 9: Create Your Coaching Toolkit: Learn How to  Yourself  July 7: Let's Talk  August 4: Benefits of Fiber with BRO Penny  September 1: Show and Tell (share your aps, podcasts, recipes, hacks, books)  October 6 :Let's Talk  November 3: Introduction to Mindfulness   December 1: Let's Talk

## 2023-07-18 NOTE — LETTER
"7/18/2023       RE: Bigg Montez  [address]  [place] [address]     Dear Colleague,    Thank you for referring your patient, Bigg Montez, to the [facility] [place] at [facility]. Please see a copy of my visit note below.    Video-Visit Details    Type of service:  Video Visit    Video Start Time: 8:29 AM  Video End Time: 8:48 AM    Originating Location (pt. Location): Home    Distant Location (provider location):  Offsite (providers home) [facility] [place]     Platform used for Video Visit: [employer]      Bariatric Nutrition Consultation Note    Reason For Visit: Nutrition reassessment    Bigg Montez is a 37 year old presenting today for return bariatric nutrition consult.  Pt is interested in laparoscopic sleeve gastrectomy with TBD.  Patient is accompanied by self. This is patients 5th nutrition visit prior to surgery.     Pt referred by Brianda Chan NP on January 25, 2021.  Patient with Co-morbidities of obesity including:  Type II DM no  Renal Failure no  Sleep apnea yes  Hypertension yes   Dyslipidemia no  Joint pain no  Back pain no  GERD no   Prediabetes yes    H/o gout. Pt reports having a blood clot in mid-sept. 2021 1/21/2021    11:14 PM   Support System Reviewed With Patient   Who do you have in your support network that can be available to help you for the first 2 weeks after surgery? My wife   Who can you count on for support throughout your weight loss surgery journey? Kenny ceja       ANTHROPOMETRICS:  Initial weight (12/2018 with PCP): 386 lbs    Estimated body mass index is 52.67 kg/m  as calculated from the following:    Height as of this encounter: 1.816 m (5' 11.5\").    Weight as of this encounter: 173.7 kg (383 lb). (-3 lbs over last , -3 lbs from initial)     Required weight loss goal pre-op: 38 lbs from initial consult weight (goal weight 348 " lbs or less before surgery)        1/21/2021    11:14 PM   --   I have tried the following methods to lose weight Watching portions or calories    Exercise    Weight Watchers    [facility]    Physician directed program           1/21/2021    11:14 PM   Weight Loss Questions Reviewed With Patient   How long have you been overweight? Since puberty       SUPPLEMENT INFORMATION:  Milk thistle supplement pt reports for gout    Nutrition Related Labs:  Vitamin D 12 (L) on 2/27/23. PCP had ordered pt once weekly D2 (50,000 unit) x 12wk. Pt never picked up prescription, not filled to preferred pharmacy and would rather take a daily vitamin D supplement. Ordered 6000 units vitamin D3 per protocol.     Started Vitamin D 6000 units daily in June.      Medications for Weight Loss:  Wegovy    NUTRITION HISTORY:  No known food allergies/intolerances  H/o gout    He continues to work on reducing starches (rice, noodles). Increasing fruits, working on increase vegetables. No longer going back for seconds at dinner. Replacing breakfast with protein shake.     Today - Has started going to the gym, and working on increasing his water intake. He is interested in going up on his Wegovy dose. Notices he is not losing weight as well recently, however does continue to trend down. Recently quit his job so his wife could start her career. 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 He is prepping/planning meals at home - more grilling. Dealing with a recent gout flare.     Diet Recall:  Breakfast: Fairlife protein shake   Lunch: PB sandwich; pork belly lettuce wrap   Dinner: chicken and potato; ground turkey; meat, rice, veg   Beverages: water (60 oz), sugar-free sports drinks, sugar-free energy drink (1 can/day)     Progress Towards Previous Goals:  1.Goal weight for surgery: 348 lbs or less - Improving  2. Avoid drinking with meals. - Continues to work towards this goal  3. Consume 3 servings of protein daily - Improving    - Greek  yogurt (Oikos Triple Zeros, Two Good, Yoplait 100s), meat/cheese (2 slice deli meat + 1 slice part-skim cheese), turkey meat stick, low-fat cottage cheese, protein shake/bar  5. Gym once per week or more. - Met, continues  6. Start a daily multivitamin - not met  7. Start your vitamin D supplement - 6000 units daily. 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         1/21/2021    11:14 PM   Dining Out History Reviewed With Patient   How often do you dine out? Nearly every day.   Where do you dine out? (select all that apply) fast food chains    take out   What types of food do you order when you dine out? Protien rice dishes           1/21/2021    11:14 PM   Physical Activity Reviewed With Patient   How often do you exercise? 1 to 2 times per week   What is the duration of your exercise (in minutes)? 60+ Minutes   What types of exercise do you do? gym membership    weightlifting   What keeps you from being more active? Lack of Time         NUTRITION DIAGNOSIS:  Obesity r/t long history of self-monitoring deficit and excessive energy intake aeb BMI >30 kg/m2. - ongoing    INTERVENTION:  Intervention Provided/Education Provided:  - Reviewed weight loss goal for surgery and strategy for pre-op weight loss.   - Reviewed bariatric diet guidelines   - Encouraged increased physical activity.   - Praised pt on the progress he has made this month  - Provided pt with list of diet handouts and nutrition goals below.         1/21/2021    11:14 PM   Questions Reviewed With Patient   How ready are you to make changes regarding your weight? Number 1 = Not ready at all to make changes up to 10 = very ready. 10   How confident are you that you can change? 1 = Not confident that you will be successful making changes up to 10 = very confident. 10       Expected Engagement: good    GOALS:  1.Goal weight for surgery: 348 lbs or less  2. Add in more solid protein with breakfast - Cottage cheese,  boiled egg, protein shake, lean meat  3. Gym 2-3 times per week.   4. Start a daily multivitamin     5. Schedule visit with Brianda Chan NP     Diet Guidelines after Weight-loss Surgery  [email or web address]       Time spent with patient: 19 mins    Angela Granger RD, LD

## 2023-08-15 DIAGNOSIS — E66.01 CLASS 3 SEVERE OBESITY DUE TO EXCESS CALORIES WITH SERIOUS COMORBIDITY AND BODY MASS INDEX (BMI) OF 50.0 TO 59.9 IN ADULT (H): ICD-10-CM

## 2023-08-15 DIAGNOSIS — E66.813 CLASS 3 SEVERE OBESITY DUE TO EXCESS CALORIES WITH SERIOUS COMORBIDITY AND BODY MASS INDEX (BMI) OF 50.0 TO 59.9 IN ADULT (H): ICD-10-CM

## 2023-08-15 RX ORDER — SEMAGLUTIDE 1 MG/.5ML
1 INJECTION, SOLUTION SUBCUTANEOUS
Qty: 2 ML | Refills: 3 | Status: SHIPPED | OUTPATIENT
Start: 2023-08-15 | End: 2023-11-17

## 2023-08-28 NOTE — PROGRESS NOTES
"Video-Visit Details    Type of service:  Video Visit    Video Start Time: 3:31 PM  Video End Time: 3:46 PM    Originating Location (pt. Location): Home    Distant Location (provider location):  Offsite (providers home) [facility] [place]     Platform used for Video Visit: Well          Bariatric Nutrition Consultation Note    Reason For Visit: Nutrition reassessment    Bigg Montez is a 37 year old presenting today for return bariatric nutrition consult.  Pt is interested in laparoscopic sleeve gastrectomy with TBD.  Patient is accompanied by self. This is patients 6th nutrition visit prior to surgery.     Pt referred by Brianda Chan NP on January 25, 2021.  Patient with Co-morbidities of obesity including:  Type II DM no  Renal Failure no  Sleep apnea yes  Hypertension yes   Dyslipidemia no  Joint pain no  Back pain no  GERD no   Prediabetes yes    H/o gout. Pt reports having a blood clot in mid-sept. 2021 1/21/2021    11:14 PM   Support System Reviewed With Patient   Who do you have in your support network that can be available to help you for the first 2 weeks after surgery? My wife   Who can you count on for support throughout your weight loss surgery journey? Kenny ceja       ANTHROPOMETRICS:  Initial weight (12/2018 with PCP): 386 lbs    Estimated body mass index is 51.57 kg/m  as calculated from the following:    Height as of 7/18/23: 1.816 m (5' 11.5\").    Weight as of this encounter: 170.1 kg (375 lb). (-8 lbs over last, -11 lbs from initial)     Required weight loss goal pre-op: 38 lbs from initial consult weight (goal weight 348 lbs or less before surgery)        1/21/2021    11:14 PM   --   I have tried the following methods to lose weight Watching portions or calories    Exercise    Weight Watchers    Slimfast    Physician directed program           1/21/2021    11:14 PM   Weight Loss Questions Reviewed With Patient   How long have you been " overweight? Since puberty       SUPPLEMENT INFORMATION:  Milk thistle supplement pt reports for gout    Nutrition Related Labs:  Vitamin D 12 (L) on 2/27/23. PCP had ordered pt once weekly D2 (50,000 unit) x 12wk. Pt never picked up prescription, not filled to preferred pharmacy and would rather take a daily vitamin D supplement. Ordered 6000 units vitamin D3 per protocol.     Started Vitamin D 6000 units daily in June. He plans to have lab rechecked soon.     Medications for Weight Loss:  Wegovy    NUTRITION HISTORY:  No known food allergies/intolerances  H/o gout    He continues to work on reducing starches (rice, noodles). Increasing fruits, working on increase vegetables. No longer going back for seconds at dinner. Replacing breakfast with protein shake.     7/18/23 RD visit - Has started going to the gym, and working on increasing his water intake. He is interested in going up on his Wegovy dose. Notices he is not losing weight as well recently, however does continue to trend down. Recently quit his job so his wife could start her career. 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 He is prepping/planning meals at home - more grilling. Dealing with a recent gout flare.     Today - Cut out energy drink intake. Went up on Wegovy to 1 mg. On week 3. Noticing some benefit. Typically eating one larger meal and one smaller. Continues to replace breakfast with protein shake.     Diet Recall:  Breakfast: Fairlife protein shake   Lunch: skip or something small  Dinner: grilled meat (chicken/turkey) and veggies and rice   Beverages: water (64 oz), sugar-free sports drinks.     Progress Towards Previous Goals:  1.Goal weight for surgery: 348 lbs or less - Improving  2. Add in more solid protein with breakfast - Cottage cheese, boiled egg, protein shake, lean meat - Improving   3. Gym 2-3 times per week. - Met, continues   4. Start a daily multivitamin   - Not met  5. Schedule visit with Brianda Chan NP - Met, scheduled  in Oct.     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         1/21/2021    11:14 PM   Dining Out History Reviewed With Patient   How often do you dine out? Nearly every day.   Where do you dine out? (select all that apply) fast food chains    take out   What types of food do you order when you dine out? Protien rice dishes           1/21/2021    11:14 PM   Physical Activity Reviewed With Patient   How often do you exercise? 1 to 2 times per week   What is the duration of your exercise (in minutes)? 60+ Minutes   What types of exercise do you do? gym membership    weightlifting   What keeps you from being more active? Lack of Time         NUTRITION DIAGNOSIS:  Obesity r/t long history of self-monitoring deficit and excessive energy intake aeb BMI >30 kg/m2. - improving    INTERVENTION:  Intervention Provided/Education Provided:  - Reviewed weight loss goal for surgery and strategy for pre-op weight loss.   - Reviewed bariatric diet guidelines   - Encouraged increased physical activity.   - Praised pt on the progress he has made this month  - Discussed potential barriers to maintaining positive change and how to overcome.  - Provided pt with list of diet handouts and nutrition goals below.         1/21/2021    11:14 PM   Questions Reviewed With Patient   How ready are you to make changes regarding your weight? Number 1 = Not ready at all to make changes up to 10 = very ready. 10   How confident are you that you can change? 1 = Not confident that you will be successful making changes up to 10 = very confident. 10       Expected Engagement: good    GOALS:  1.Goal weight for surgery: 348 lbs or less  2. Ensure you are consuming lean protein at each meal period - Example Greek yogurt and granola at lunch  3. Gym 3 times per week.   4. Start a daily multivitamin         Diet Guidelines after Weight-loss Surgery  [email or web address]       Time spent with patient: 15 mins    Angela Granger RD,  LD

## 2023-08-29 ENCOUNTER — VIRTUAL VISIT (OUTPATIENT)
Dept: ENDOCRINOLOGY | Facility: CLINIC | Age: 38
End: 2023-08-29
Payer: COMMERCIAL

## 2023-08-29 VITALS — BODY MASS INDEX: 51.57 KG/M2 | WEIGHT: 315 LBS

## 2023-08-29 DIAGNOSIS — E66.01 CLASS 3 SEVERE OBESITY DUE TO EXCESS CALORIES WITH SERIOUS COMORBIDITY AND BODY MASS INDEX (BMI) OF 50.0 TO 59.9 IN ADULT (H): ICD-10-CM

## 2023-08-29 DIAGNOSIS — Z71.3 NUTRITIONAL COUNSELING: Primary | ICD-10-CM

## 2023-08-29 DIAGNOSIS — E66.813 CLASS 3 SEVERE OBESITY DUE TO EXCESS CALORIES WITH SERIOUS COMORBIDITY AND BODY MASS INDEX (BMI) OF 50.0 TO 59.9 IN ADULT (H): ICD-10-CM

## 2023-08-29 PROCEDURE — 99207 PR NO CHARGE LOS: CPT | Mod: 95 | Performed by: DIETITIAN, REGISTERED

## 2023-08-29 PROCEDURE — 97803 MED NUTRITION INDIV SUBSEQ: CPT | Mod: 95 | Performed by: DIETITIAN, REGISTERED

## 2023-08-29 NOTE — PATIENT INSTRUCTIONS
Paul Sexton,    Follow-up with RD on 9/18    Thank you,    Angela Granger, BRO, LD  If you would like to schedule or reschedule an appointment with the RD, please call [phone]    Nutrition Goals  1.Goal weight for surgery: 348 lbs or less  2. Ensure you are consuming lean protein at each meal period - Example Greek yogurt and granola at lunch  3. Gym 3 times per week.   4. Start a daily multivitamin         Diet Guidelines after Weight-loss Surgery  [email or web address]           COMPREHENSIVE WEIGHT MANAGEMENT PROGRAM  VIRTUAL SUPPORT GROUPS    For Support Group Information:      We offer support groups for patients who are working on weight loss and considering, preparing for or have had weight loss surgery.   There is no cost for this opportunity.  You are invited to attend the?Virtual Support Groups?provided by any of the following locations:    [facility] via Microsoft Teams with Cynthia Amor RN  2.   [place] via [employer] with Terry Nix, PhD, LP  3.   [place] via [employer] with Paola Franks RN  4.   [facility] via Microsoft Teams with Paola Kwok [facility]    The following Support Group information can also be found on our website:  [email or web address]    [email or web address]    [facility] Weight Loss Surgery Support Group    [facility] Weight Loss Surgery Support Group  The support group is a patient-lead forum that meets monthly to share experiences, encouragement and education. It is open to those who have had weight loss surgery, are scheduled for surgery, or are considering surgery.   WHEN: This group meets on the 3rd Wednesday of each month from 5:00PM - 6:00PM virtually using Microsoft Teams.   FACILITATOR: Led by Cynthia Mccall RD, HELEN, RN, the program's Clinical Coordinator.   TO REGISTER: Please contact  "the clinic via Yakaz or call the nurse line directly at [phone] to inform our staff that you would like an invite sent to you and to let us know the email you would like the invite sent to. Prior to the meeting, a link with directions on how to join the meeting will be sent to you.    2023 Meetings April 19: Guest Speaker, Gonzalo Sewell, RD, LD, \"Maintaining Weight Loss after Bariatric Surgery\".  May 17: \"Let's Talk\" a time for the group to share.  June 21: \"Let's Talk\" a time for the group to share.  July 19  August 16  September 20  October 18  November 15  December 20    [facility] Support Groups    Connections Bariatric Care Support Group?  This is open to all pre- and post- operative bariatric surgery patients as well as their support system.   WHEN: This group meets the 2nd Tuesday of each month from 6:30 PM - 8:00 PM virtually using Microsoft Teams.   FACILITATOR: Led by Terry Nix, Ph.D who is a Licensed Psychologist with the [facility] Comprehensive Weight Management Program.   TO REGISTER: Please send an email to Terry Nix, Ph.D., LP [email or web address]?if you would like an invitation to the group and to learn about using Microsoft Teams.    2023 Meetings April 11: Guest speaker, Leigh Ann Handy MD, Bariatrician, [facility] Comprehensive Weight Management Program, \"Injectable Weight Loss Medications\".  May 9  Melly 13  July 11  August 8  September 12  October 10  November 14  December 12    Connections Post-Operative Bariatric Surgery Support Group  This is a support group for [facility] bariatric patients (and those external to [facility]) who have had bariatric surgery and are at least 3 months post-surgery.  WHEN: This support group meets the 4th Wednesday of the month from 11:00 AM - 12:00 PM virtually using Microsoft Teams.   FACILITATOR: Led by Certified Bariatric Nurse, Paola Franks RN.   TO REGISTER: Please " send an email to Paola at [email or web address] if you would like an invitation to the group and to learn about using Microsoft Teams.    2023 Meetings  April 26  May 24  Melly 28  July 26  August 23  September 27  October 25  November 22  December 27    [facility]   Healthy Lifestyle Virtual Support Group    Healthy Lifestyle Virtual Support Group?  This is 60 minutes of small group guided discussion, support and resources. All are welcome who want a healthy lifestyle.  WHEN: This group meets monthly on a Friday from 12:30 PM - 1:30 PM virtually using Microsoft Teams.  This group will meet the first Friday of the month beginning In July  FACILITATOR: Led by National Board Certified Health and , Paola Kwok [facility].   TO REGISTER: Please send an email to Paola [email or web address] to receive monthly invites to the group or if you have any questions about having a health .  Prior to the meeting, a link with directions on how to join the meeting will be sent to you.    2023 Meetings  May 19: Let's Talk  June 9: Create Your Coaching Toolkit: Learn How to  Yourself  July 7: Let's Talk  August 4: Benefits of Fiber with BRO Penny  September 1: Show and Tell (share your aps, podcasts, recipes, hacks, books)  October 6 :Let's Talk  November 3: Introduction to Mindfulness   December 1: Let's Talk

## 2023-08-29 NOTE — LETTER
"8/29/2023       RE: Bigg Montez  [address]  [place] [address]     Dear Colleague,    Thank you for referring your patient, Bigg Montez, to the [facility] WEIGHT MANAGEMENT CLINIC [place] at [facility]. Please see a copy of my visit note below.    Video-Visit Details    Type of service:  Video Visit    Video Start Time: 3:31 PM  Video End Time: 3:46 PM    Originating Location (pt. Location): Home    Distant Location (provider location):  Offsite (providers home) [facility] WE[facility] MANAGEMENT CLINIC [place]     Platform used for Video Visit: [employer]          Bariatric Nutrition Consultation Note    Reason For Visit: Nutrition reassessment    Bigg Montez is a 37 year old presenting today for return bariatric nutrition consult.  Pt is interested in laparoscopic sleeve gastrectomy with TBD.  Patient is accompanied by self. This is patients 6th nutrition visit prior to surgery.     Pt referred by Brianda Chan NP on January 25, 2021.  Patient with Co-morbidities of obesity including:  Type II DM no  Renal Failure no  Sleep apnea yes  Hypertension yes   Dyslipidemia no  Joint pain no  Back pain no  GERD no   Prediabetes yes    H/o gout. Pt reports having a blood clot in mid-sept. 2021 1/21/2021    11:14 PM   Support System Reviewed With Patient   Who do you have in your support network that can be available to help you for the first 2 weeks after surgery? My wife   Who can you count on for support throughout your weight loss surgery journey? Kenny ceja       ANTHROPOMETRICS:  Initial weight (12/2018 with PCP): 386 lbs    Estimated body mass index is 51.57 kg/m  as calculated from the following:    Height as of 7/18/23: 1.816 m (5' 11.5\").    Weight as of this encounter: 170.1 kg (375 lb). (-8 lbs over last, -11 lbs from initial)     Required weight loss goal pre-op: 38 lbs from initial consult weight (goal weight 348 lbs " or less before surgery)        1/21/2021    11:14 PM   --   I have tried the following methods to lose weight Watching portions or calories    Exercise    Weight Watchers    [facility]    Physician directed program           1/21/2021    11:14 PM   Weight Loss Questions Reviewed With Patient   How long have you been overweight? Since puberty       SUPPLEMENT INFORMATION:  Milk thistle supplement pt reports for gout    Nutrition Related Labs:  Vitamin D 12 (L) on 2/27/23. PCP had ordered pt once weekly D2 (50,000 unit) x 12wk. Pt never picked up prescription, not filled to preferred pharmacy and would rather take a daily vitamin D supplement. Ordered 6000 units vitamin D3 per protocol.     Started Vitamin D 6000 units daily in June. He plans to have lab rechecked soon.     Medications for Weight Loss:  Wegovy    NUTRITION HISTORY:  No known food allergies/intolerances  H/o gout    He continues to work on reducing starches (rice, noodles). Increasing fruits, working on increase vegetables. No longer going back for seconds at dinner. Replacing breakfast with protein shake.     7/18/23 RD visit - Has started going to the gym, and working on increasing his water intake. He is interested in going up on his Wegovy dose. Notices he is not losing weight as well recently, however does continue to trend down. Recently quit his job so his wife could start her career. 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 He is prepping/planning meals at home - more grilling. Dealing with a recent gout flare.     Today - Cut out energy drink intake. Went up on Wegovy to 1 mg. On week 3. Noticing some benefit. Typically eating one larger meal and one smaller. Continues to replace breakfast with protein shake.     Diet Recall:  Breakfast: Fairlife protein shake   Lunch: skip or something small  Dinner: grilled meat (chicken/turkey) and veggies and rice   Beverages: water (64 oz), sugar-free sports drinks.     Progress Towards Previous  Goals:  1.Goal weight for surgery: 348 lbs or less - Improving  2. Add in more solid protein with breakfast - Cottage cheese, boiled egg, protein shake, lean meat - Improving   3. Gym 2-3 times per week. - Met, continues   4. Start a daily multivitamin   - Not met  5. Schedule visit with Brianda Chan NP - Met, scheduled in Oct.     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         1/21/2021    11:14 PM   Dining Out History Reviewed With Patient   How often do you dine out? Nearly every day.   Where do you dine out? (select all that apply) fast food chains    take out   What types of food do you order when you dine out? Protien rice dishes           1/21/2021    11:14 PM   Physical Activity Reviewed With Patient   How often do you exercise? 1 to 2 times per week   What is the duration of your exercise (in minutes)? 60+ Minutes   What types of exercise do you do? gym membership    weightlifting   What keeps you from being more active? Lack of Time         NUTRITION DIAGNOSIS:  Obesity r/t long history of self-monitoring deficit and excessive energy intake aeb BMI >30 kg/m2. - improving    INTERVENTION:  Intervention Provided/Education Provided:  - Reviewed weight loss goal for surgery and strategy for pre-op weight loss.   - Reviewed bariatric diet guidelines   - Encouraged increased physical activity.   - Praised pt on the progress he has made this month  - Discussed potential barriers to maintaining positive change and how to overcome.  - Provided pt with list of diet handouts and nutrition goals below.         1/21/2021    11:14 PM   Questions Reviewed With Patient   How ready are you to make changes regarding your weight? Number 1 = Not ready at all to make changes up to 10 = very ready. 10   How confident are you that you can change? 1 = Not confident that you will be successful making changes up to 10 = very confident. 10       Expected Engagement: good    GOALS:  1.Goal weight for surgery:  348 lbs or less  2. Ensure you are consuming lean protein at each meal period - Example Greek yogurt and granola at lunch  3. Gym 3 times per week.   4. Start a daily multivitamin         Diet Guidelines after Weight-loss Surgery  [email or web address]       Time spent with patient: 15 mins    Angela Granger RD, LD

## 2023-08-29 NOTE — NURSING NOTE
Is the patient currently in the state of [place]? YES    Visit mode:VIDEO    If the visit is dropped, the patient can be reconnected by: VIDEO VISIT: Text to cell phone:   Telephone Information:   Mobile [phone]       Will anyone else be joining the visit? NO  (If patient encounters technical issues they should call [phone]    How would you like to obtain your AVS? MyChart    Are changes needed to the allergy or medication list? No    Reason for visit: RECHECK and Video Visit    Selene MEJIA

## 2023-09-09 DIAGNOSIS — M1A.09X0 CHRONIC GOUT OF MULTIPLE SITES, UNSPECIFIED CAUSE: ICD-10-CM

## 2023-09-11 ENCOUNTER — E-VISIT (OUTPATIENT)
Dept: FAMILY MEDICINE | Facility: CLINIC | Age: 38
End: 2023-09-11
Payer: COMMERCIAL

## 2023-09-11 DIAGNOSIS — M1A.09X0 CHRONIC GOUT OF MULTIPLE SITES, UNSPECIFIED CAUSE: ICD-10-CM

## 2023-09-11 DIAGNOSIS — M10.9 ACUTE GOUT OF RIGHT ANKLE, UNSPECIFIED CAUSE: Primary | ICD-10-CM

## 2023-09-11 PROCEDURE — 99207 PR NO CHARGE LOS: CPT | Performed by: FAMILY MEDICINE

## 2023-09-11 RX ORDER — ALLOPURINOL 300 MG/1
1 TABLET ORAL DAILY
Qty: 90 TABLET | Refills: 1 | Status: SHIPPED | OUTPATIENT
Start: 2023-09-11 | End: 2023-12-27

## 2023-09-11 RX ORDER — PREDNISONE 20 MG/1
TABLET ORAL
Qty: 20 TABLET | Refills: 0 | Status: SHIPPED | OUTPATIENT
Start: 2023-09-11 | End: 2023-12-27

## 2023-09-11 NOTE — PATIENT INSTRUCTIONS
Thank you for choosing us for your care. I have placed an order for a prescription so that you can start treatment. View your full visit summary for details by clicking on the link below. Your pharmacist will able to address any questions you may have about the medication.     If you're not feeling better within 5-7 days, please schedule an appointment.  You can schedule an appointment right here in [facility], or call [phone]  If the visit is for the same symptoms as your eVisit, we'll refund the cost of your eVisit if seen within seven days.

## 2023-09-18 ENCOUNTER — VIRTUAL VISIT (OUTPATIENT)
Dept: ENDOCRINOLOGY | Facility: CLINIC | Age: 38
End: 2023-09-18
Payer: COMMERCIAL

## 2023-09-18 VITALS — WEIGHT: 315 LBS | BODY MASS INDEX: 44.1 KG/M2 | HEIGHT: 71 IN

## 2023-09-18 DIAGNOSIS — Z71.3 NUTRITIONAL COUNSELING: Primary | ICD-10-CM

## 2023-09-18 DIAGNOSIS — E66.01 CLASS 3 SEVERE OBESITY DUE TO EXCESS CALORIES WITH SERIOUS COMORBIDITY AND BODY MASS INDEX (BMI) OF 50.0 TO 59.9 IN ADULT (H): ICD-10-CM

## 2023-09-18 DIAGNOSIS — E66.813 CLASS 3 SEVERE OBESITY DUE TO EXCESS CALORIES WITH SERIOUS COMORBIDITY AND BODY MASS INDEX (BMI) OF 50.0 TO 59.9 IN ADULT (H): ICD-10-CM

## 2023-09-18 PROCEDURE — 97803 MED NUTRITION INDIV SUBSEQ: CPT | Mod: 95 | Performed by: DIETITIAN, REGISTERED

## 2023-09-18 PROCEDURE — 99207 PR NO CHARGE LOS: CPT | Mod: 95 | Performed by: DIETITIAN, REGISTERED

## 2023-09-18 ASSESSMENT — PAIN SCALES - GENERAL: PAINLEVEL: NO PAIN (0)

## 2023-09-18 NOTE — LETTER
"9/18/2023       RE: Bigg Montez  [address]  [place] [address]     Dear Colleague,    Thank you for referring your patient, Bigg Montez, to the [facility] [place] [facility]. Please see a copy of my visit note below.    Video-Visit Details    Type of service:  Video Visit    Video Start Time: 8:30 AM   Video End Time: 8:52 AM    Originating Location (pt. Location): Home    Distant Location (provider location):  Offsite (providers home) [facility] [place]     Platform used for Video Visit: [employer]          Bariatric Nutrition Consultation Note    Reason For Visit: Nutrition reassessment    Bigg Montez is a 38 year old presenting today for return bariatric nutrition consult.  Pt is interested in laparoscopic sleeve gastrectomy with TBD.  Patient is accompanied by self. This is patients 7th nutrition visit prior to surgery.     Pt referred by Brianda Chan NP on January 25, 2021.  Patient with Co-morbidities of obesity including:  Type II DM no  Renal Failure no  Sleep apnea yes  Hypertension yes   Dyslipidemia no  Joint pain no  Back pain no  GERD no   Prediabetes yes    H/o gout. Pt reports having a blood clot in mid-sept. 2021 1/21/2021    11:14 PM   Support System Reviewed With Patient   Who do you have in your support network that can be available to help you for the first 2 weeks after surgery? My wife   Who can you count on for support throughout your weight loss surgery journey? Kenny ceja       ANTHROPOMETRICS:  Initial weight (12/2018 with PCP): 386 lbs    Estimated body mass index is 51.47 kg/m  as calculated from the following:    Height as of this encounter: 1.803 m (5' 11\").    Weight as of this encounter: 167.4 kg (369 lb). (-6 lbs over last, -17 lbs from initial)     Required weight loss goal pre-op: 38 lbs from initial consult weight (goal weight " 348 lbs or less before surgery)        1/21/2021    11:14 PM   --   I have tried the following methods to lose weight Watching portions or calories    Exercise    Weight Watchers    [facility]    Physician directed program           1/21/2021    11:14 PM   Weight Loss Questions Reviewed With Patient   How long have you been overweight? Since puberty       SUPPLEMENT INFORMATION:  Milk thistle supplement pt reports for gout  Men's One-a-day MVI    Nutrition Related Labs:  Vitamin D 12 (L) on 2/27/23. Started Vitamin D 6000 units daily in June. He plans to have lab rechecked soon.     Medications for Weight Loss:  Wegovy    NUTRITION HISTORY:  No known food allergies/intolerances  H/o gout    He continues to work on reducing starches (rice, noodles). Increasing fruits, working on increase vegetables. No longer going back for seconds at dinner. Intermittently replacing breakfast with protein shake.     7/18/23 - Has started going to the gym, and working on increasing his water intake.     8/29/23 - Cut out energy drink intake. Went up on Wegovy to 1 mg.     Today - Continues Wegovy injections, losing consistently each month. Reduced starch portions. Reports eating ~1.5 meals daily. May have fruit for a snack or [employer] granola bar.     Diet Recall (yesterday):  Breakfast: left-overs from dinner (sabillon goat, rice and beans)   Lunch: left-over chicken wings  Dinner: watermelon   Beverages: water (64 oz), sugar-free sports drinks.     Progress Towards Previous Goals:  1.Goal weight for surgery: 348 lbs or less - Improving   2. Ensure you are consuming lean protein at each meal period - Example Greek yogurt and granola at lunch - Not met but has planned to change this week   3. Gym 3 times per week. - Met, twice per week   4. 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         1/21/2021    11:14 PM   Dining Out History Reviewed With Patient   How often  do you dine out? Nearly every day.   Where do you dine out? (select all that apply) fast food chains    take out   What types of food do you order when you dine out? Protien rice dishes           1/21/2021    11:14 PM   Physical Activity Reviewed With Patient   How often do you exercise? 1 to 2 times per week   What is the duration of your exercise (in minutes)? 60+ Minutes   What types of exercise do you do? gym membership    weightlifting   What keeps you from being more active? Lack of Time         NUTRITION DIAGNOSIS:  Obesity r/t long history of self-monitoring deficit and excessive energy intake aeb BMI >30 kg/m2. - improving    INTERVENTION:  Intervention Provided/Education Provided:  - Reviewed weight loss goal for surgery and strategy for pre-op weight loss.   - Reviewed bariatric diet guidelines   - Encouraged increased physical activity.   - Praised pt on the progress he has made this month  - Discussed potential barriers to maintaining positive change and how to overcome.  - Remind pt he is due for repeat vitamin D lab  - Provided pt with list of diet handouts and nutrition goals below.         1/21/2021    11:14 PM   Questions Reviewed With Patient   How ready are you to make changes regarding your weight? Number 1 = Not ready at all to make changes up to 10 = very ready. 10   How confident are you that you can change? 1 = Not confident that you will be successful making changes up to 10 = very confident. 10       Expected Engagement: good    GOALS:  1.Goal weight for surgery: 348 lbs or less  2. Ensure you are consuming lean protein at each meal period - Example Greek yogurt and granola at lunch   - Consider setting alarms 3 times per day to remind you to eat some protein.   3. Gym 3 times per week.   4. Recheck vitamin D lab      Diet Guidelines after Weight-loss Surgery  [email or web address]       Time spent with patient: 22 mins    Angela Granger RD, LD

## 2023-09-18 NOTE — NURSING NOTE
Is the patient currently in the state of [place]? YES    Visit mode:VIDEO    If the visit is dropped, the patient can be reconnected by: VIDEO VISIT: Text to cell phone:   Telephone Information:   Mobile [phone]       Will anyone else be joining the visit? NO  (If patient encounters technical issues they should call [phone]    How would you like to obtain your AVS? MyChart    Are changes needed to the allergy or medication list? No, Pt stated no changes to allergies, and Pt stated no med changes    Reason for visit: RECHECK (Pre- surg)    Tammi MEJIA

## 2023-09-18 NOTE — PATIENT INSTRUCTIONS
Paul Sexton,    Follow-up with RD on 10/23    Thank you,    Angela Granger, BRO, LD  If you would like to schedule or reschedule an appointment with the RD, please call [phone]    Nutrition Goals  1.Goal weight for surgery: 348 lbs or less  2. Ensure you are consuming lean protein at each meal period - Example Greek yogurt and granola at lunch   - Consider setting alarms 3 times per day to remind you to eat some protein.   3. Gym 3 times per week.   4. Recheck vitamin D lab      Diet Guidelines after Weight-loss Surgery  [email or web address]         COMPREHENSIVE WEIGHT MANAGEMENT PROGRAM  VIRTUAL SUPPORT GROUPS    For Support Group Information:      We offer support groups for patients who are working on weight loss and considering, preparing for or have had weight loss surgery.   There is no cost for this opportunity.  You are invited to attend the?Virtual Support Groups?provided by any of the following locations:    [facility] via Microsoft Teams with Cynthia Amor RN  2.   [place] via [employer]WORK with Terry Nix, PhD, LP  3.   [place] via [employer] with Paola Franks RN  4.   [facility] via Microsoft Teams with Paola Kwok, [facility]    The following Support Group information can also be found on our website:  [email or web address]    [email or web address]    [facility] Weight Loss Surgery Support Group    [facility] Weight Loss Surgery Support Group  The support group is a patient-lead forum that meets monthly to share experiences, encouragement and education. It is open to those who have had weight loss surgery, are scheduled for surgery, or are considering surgery.   WHEN: This group meets on the 3rd Wednesday of each month from 5:00PM - 6:00PM virtually using Microsoft Teams.   FACILITATOR: Led by Cynthia Mccall RD, HELEN,  "RN, the program's Clinical Coordinator.   TO REGISTER: Please contact the clinic via [employer] or call the nurse line directly at [phone] to inform our staff that you would like an invite sent to you and to let us know the email you would like the invite sent to. Prior to the meeting, a link with directions on how to join the meeting will be sent to you.    2023 Meetings April 19: Guest Speaker, Gonzalo Sewell, BRO, LD, \"Maintaining Weight Loss after Bariatric Surgery\".  May 17: \"Let's Talk\" a time for the group to share.  June 21: \"Let's Talk\" a time for the group to share.  July 19  August 16  September 20  October 18  November 15  December 20    [facility] Support Groups    Connections Bariatric Care Support Group?  This is open to all pre- and post- operative bariatric surgery patients as well as their support system.   WHEN: This group meets the 2nd Tuesday of each month from 6:30 PM - 8:00 PM virtually using Microsoft Teams.   FACILITATOR: Led by Terry Nix, Ph.D who is a Licensed Psychologist with the [facility] Comprehensive Weight Management Program.   TO REGISTER: Please send an email to Terry Nix, Ph.D., LP [email or web address]?if you would like an invitation to the group and to learn about using Microsoft Teams.    2023 Meetings April 11: Guest speaker, Leigh Ann Handy MD, Bariatrician, [facility] Management [facility], \"Injectable Weight Loss Medications\".  May 9  Melly 13  July 11  August 8  September 12  October 10  November 14  December 12    Connections Post-Operative Bariatric Surgery Support Group  This is a support group for [facility] bariatric patients (and those external to [facility]) who have had bariatric surgery and are at least 3 months post-surgery.  WHEN: This support group meets the 4th Wednesday of the month from 11:00 AM - 12:00 PM virtually using Microsoft Teams.   FACILITATOR: " Led by Certified Bariatric Nurse, Paola Franks RN.   TO REGISTER: Please send an email to Paola at [email or web address] if you would like an invitation to the group and to learn about using Microsoft Teams.    2023 Meetings  April 26  May 24  Melly 28  July 26  August 23  September 27  October 25  November 22  December 27    [facility]   Healthy Lifestyle Virtual Support Group    Healthy Lifestyle Virtual Support Group?  This is 60 minutes of small group guided discussion, support and resources. All are welcome who want a healthy lifestyle.  WHEN: This group meets monthly on a Friday from 12:30 PM - 1:30 PM virtually using Microsoft Teams.  This group will meet the first Friday of the month beginning In July  FACILITATOR: Led by National Board Certified Health and , Paola Kwok [facility], [facility].   TO REGISTER: Please send an email to Paola [email or web address] to receive monthly invites to the group or if you have any questions about having a health .  Prior to the meeting, a link with directions on how to join the meeting will be sent to you.    2023 Meetings  May 19: Let's Talk  June 9: Create Your Coaching Toolkit: Learn How to  Yourself  July 7: Let's Talk  August 4: Benefits of Fiber with BRO Penny  September 1: Show and Tell (share your aps, podcasts, recipes, hacks, books)  October 6 :Let's Talk  November 3: Introduction to Mindfulness   December 1: Let's Talk

## 2023-09-18 NOTE — PROGRESS NOTES
"Video-Visit Details    Type of service:  Video Visit    Video Start Time: 8:30 AM   Video End Time: 8:52 AM    Originating Location (pt. Location): Home    Distant Location (provider location):  Offsite (providers home) [facility] [place]     Platform used for Video Visit: Well          Bariatric Nutrition Consultation Note    Reason For Visit: Nutrition reassessment    Bigg Montez is a 38 year old presenting today for return bariatric nutrition consult.  Pt is interested in laparoscopic sleeve gastrectomy with TBD.  Patient is accompanied by self. This is patients 7th nutrition visit prior to surgery.     Pt referred by Brianda Chan NP on January 25, 2021.  Patient with Co-morbidities of obesity including:  Type II DM no  Renal Failure no  Sleep apnea yes  Hypertension yes   Dyslipidemia no  Joint pain no  Back pain no  GERD no   Prediabetes yes    H/o gout. Pt reports having a blood clot in mid-sept. 2021 1/21/2021    11:14 PM   Support System Reviewed With Patient   Who do you have in your support network that can be available to help you for the first 2 weeks after surgery? My wife   Who can you count on for support throughout your weight loss surgery journey? Kenny ceja       ANTHROPOMETRICS:  Initial weight (12/2018 with PCP): 386 lbs    Estimated body mass index is 51.47 kg/m  as calculated from the following:    Height as of this encounter: 1.803 m (5' 11\").    Weight as of this encounter: 167.4 kg (369 lb). (-6 lbs over last, -17 lbs from initial)     Required weight loss goal pre-op: 38 lbs from initial consult weight (goal weight 348 lbs or less before surgery)        1/21/2021    11:14 PM   --   I have tried the following methods to lose weight Watching portions or calories    Exercise    Weight Watchers    Slimfast    Physician directed program           1/21/2021    11:14 PM   Weight Loss Questions Reviewed With Patient   How long have you been " overweight? Since puberty       SUPPLEMENT INFORMATION:  Milk thistle supplement pt reports for gout  Men's One-a-day MVI    Nutrition Related Labs:  Vitamin D 12 (L) on 2/27/23. Started Vitamin D 6000 units daily in June. He plans to have lab rechecked soon.     Medications for Weight Loss:  Wegovy    NUTRITION HISTORY:  No known food allergies/intolerances  H/o gout    He continues to work on reducing starches (rice, noodles). Increasing fruits, working on increase vegetables. No longer going back for seconds at dinner. Intermittently replacing breakfast with protein shake.     7/18/23 - Has started going to the gym, and working on increasing his water intake.     8/29/23 - Cut out energy drink intake. Went up on Wegovy to 1 mg.     Today - Continues Wegovy injections, losing consistently each month. Reduced starch portions. Reports eating ~1.5 meals daily. May have fruit for a snack or [employer] granola bar.     Diet Recall (yesterday):  Breakfast: left-overs from dinner (sabillon goat, rice and beans)   Lunch: left-over chicken wings  Dinner: watermelon   Beverages: water (64 oz), sugar-free sports drinks.     Progress Towards Previous Goals:  1.Goal weight for surgery: 348 lbs or less - Improving   2. Ensure you are consuming lean protein at each meal period - Example Greek yogurt and granola at lunch - Not met but has planned to change this week   3. Gym 3 times per week. - Met, twice per week   4. 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         1/21/2021    11:14 PM   Dining Out History Reviewed With Patient   How often do you dine out? Nearly every day.   Where do you dine out? (select all that apply) fast food chains    take out   What types of food do you order when you dine out? Protien rice dishes           1/21/2021    11:14 PM   Physical Activity Reviewed With Patient   How often do you exercise? 1 to 2 times per week   What is the  duration of your exercise (in minutes)? 60+ Minutes   What types of exercise do you do? gym membership    weightlifting   What keeps you from being more active? Lack of Time         NUTRITION DIAGNOSIS:  Obesity r/t long history of self-monitoring deficit and excessive energy intake aeb BMI >30 kg/m2. - improving    INTERVENTION:  Intervention Provided/Education Provided:  - Reviewed weight loss goal for surgery and strategy for pre-op weight loss.   - Reviewed bariatric diet guidelines   - Encouraged increased physical activity.   - Praised pt on the progress he has made this month  - Discussed potential barriers to maintaining positive change and how to overcome.  - Remind pt he is due for repeat vitamin D lab  - Provided pt with list of diet handouts and nutrition goals below.         1/21/2021    11:14 PM   Questions Reviewed With Patient   How ready are you to make changes regarding your weight? Number 1 = Not ready at all to make changes up to 10 = very ready. 10   How confident are you that you can change? 1 = Not confident that you will be successful making changes up to 10 = very confident. 10       Expected Engagement: good    GOALS:  1.Goal weight for surgery: 348 lbs or less  2. Ensure you are consuming lean protein at each meal period - Example Greek yogurt and granola at lunch   - Consider setting alarms 3 times per day to remind you to eat some protein.   3. Gym 3 times per week.   4. Recheck vitamin D lab      Diet Guidelines after Weight-loss Surgery  [email or web address]       Time spent with patient: 22 mins    Angela Granger, BRO, LD

## 2023-09-21 ENCOUNTER — TELEPHONE (OUTPATIENT)
Dept: ENDOCRINOLOGY | Facility: CLINIC | Age: 38
End: 2023-09-21

## 2023-09-21 ENCOUNTER — VIRTUAL VISIT (OUTPATIENT)
Dept: ENDOCRINOLOGY | Facility: CLINIC | Age: 38
End: 2023-09-21
Payer: COMMERCIAL

## 2023-09-21 VITALS — HEIGHT: 71 IN | WEIGHT: 315 LBS | BODY MASS INDEX: 44.1 KG/M2

## 2023-09-21 DIAGNOSIS — E66.01 CLASS 3 SEVERE OBESITY DUE TO EXCESS CALORIES WITH SERIOUS COMORBIDITY AND BODY MASS INDEX (BMI) OF 50.0 TO 59.9 IN ADULT (H): Primary | ICD-10-CM

## 2023-09-21 DIAGNOSIS — R73.03 PRE-DIABETES: ICD-10-CM

## 2023-09-21 DIAGNOSIS — E66.813 CLASS 3 SEVERE OBESITY DUE TO EXCESS CALORIES WITH SERIOUS COMORBIDITY AND BODY MASS INDEX (BMI) OF 50.0 TO 59.9 IN ADULT (H): Primary | ICD-10-CM

## 2023-09-21 PROCEDURE — 99215 OFFICE O/P EST HI 40 MIN: CPT | Mod: VID | Performed by: NURSE PRACTITIONER

## 2023-09-21 RX ORDER — SEMAGLUTIDE 1.7 MG/.75ML
1.7 INJECTION, SOLUTION SUBCUTANEOUS
Qty: 3 ML | Refills: 3 | Status: SHIPPED | OUTPATIENT
Start: 2023-09-21 | End: 2023-11-17

## 2023-09-21 ASSESSMENT — PAIN SCALES - GENERAL: PAINLEVEL: NO PAIN (0)

## 2023-09-21 NOTE — LETTER
2023       RE: Bigg Montez  [address]  [place] [address]     Dear Colleague,    Thank you for referring your patient, Bigg Montez, to the [facility] [place] at [facility]. Please see a copy of my visit note below.      Pre-Bariatric Surgery Note    No Ref-Primary, Physician    Date: 2023     RE: Bigg Montez    MR#: [id number]   : 1985   Date of Visit: Sep 21, 2023    REASON FOR VISIT: Preoperative evaluation for possible weight loss surgery    Dear Dr. Fabian Ref-Primary, Physician,    I had the pleasure of seeing your patient, Bigg Montez, in my preoperative bariatric clinic.    As you know, he has morbid obesity and is considering weight loss surgery to treat obesity in association with his medical conditions of obesity.  His consult weight was  386lb.  He has lost 20 pounds since his consult weight. He has not met his required pre-surgery weight. Please refer to initial consult note from date 2021 for patient's weight history and co-morbidities. Goal weight for surgery 348lb     High weight 402lb (2021)       Last seen with me 3/2022 -   wegovy 1mg-    -reflux initially but this has resolved   -no nausea/ vomiting   -decreased frequency of BM, manageable so far   -decreased appetite- hillman sooner-- smaller portions     Oncology  Sleep- using CPAP, hasn't been seen in years, having hard time getting into sleep medicine. Trying to get back to his old PCP to get them to clear him for sleep    Psych   Dietitians - last seen with Angela Granger- 2023- 7th visit   Labs- 2023 with PCP     DVT 2021     Working on reducing carbs   Working on stopping when satisfied   Weight stable x 3 weeks now   Trying to increase activity   Eating 3-4 times a day - largest meal at dinner time   Protein shake in the AM     Less than 45oz water daily   Not tracking protein         Assessment & Plan    Problem List Items Addressed This Visit          Digestive    Class 3 severe obesity due to excess calories with serious comorbidity and body mass index (BMI) of 50.0 to 59.9 in adult (H) - Primary     17lb from goal. Tolerating wegovy 1mg well. No adverse side effects. Will increase to 1.7mg. Discussed optimizing protein as he is eating less. Reviewed task list.     Feels CPAP is helpful. Difficult with sleep clearance in [facility]. Plans to get new PCP to see if they can do clearance with review of CPAP data.     Keep working on fluids   Miralax daily for constipation  Increase wegovy to 1.7mg  Focus on protein- goal is 80-100g daily   Get psych eval scheduled or send records to us  Establish care with PCP  Get clearance for sleep medicine   Schedule with hematology   Follow up with me 3 months          Relevant Medications    Semaglutide-Weight Management (WEGOVY) 1.7 MG/0.75ML pen    Other Relevant Orders    Adult Oncology/Hematology  Referral    Adult Mental Health  Referral     Other Visit Diagnoses       Pre-diabetes        Relevant Medications    Semaglutide-Weight Management (WEGOVY) 1.7 MG/0.75ML pen    Other Relevant Orders    Adult Oncology/Hematology  Referral    Adult Mental Health  Referral             Reviewed tasklist with patient     Most recent weights:  Wt Readings from Last 4 Encounters:   09/21/23 (!) 166.5 kg (367 lb)   09/18/23 (!) 167.4 kg (369 lb)   08/29/23 (!) 170.1 kg (375 lb)   07/18/23 (!) 173.7 kg (383 lb)         ROS    No past medical history on file.    Past Surgical History:   Procedure Laterality Date    VASECTOMY N/A 2020       Current Outpatient Medications   Medication    Semaglutide-Weight Management (WEGOVY) 1.7 MG/0.75ML pen    allopurinol (ZYLOPRIM) 300 MG tablet    Cholecalciferol (VITAMIN D3) 75 MCG (3000 UT) TABS    colchicine (COLCYRS) 0.6 MG tablet    losartan (COZAAR) 100 MG tablet    predniSONE (DELTASONE) 20 MG tablet     "Semaglutide-Weight Management (WEGOVY) 1 MG/0.5ML pen     No current facility-administered medications for this visit.       Allergies   Allergen Reactions    Cats Other (See Comments)    No Clinical Screening - See Comments Unknown     seasonal    Seasonal Allergies        Office Visit on 02/27/2023   Component Date Value Ref Range Status    Cholesterol 02/27/2023 163  <200 mg/dL Final    Triglycerides 02/27/2023 94  <150 mg/dL Final    Direct Measure HDL 02/27/2023 44  >=40 mg/dL Final    LDL Cholesterol Calculated 02/27/2023 100  <=100 mg/dL Final    Non HDL Cholesterol 02/27/2023 119  <130 mg/dL Final    Sodium 02/27/2023 142  136 - 145 mmol/L Final    Potassium 02/27/2023 3.6  3.4 - 5.3 mmol/L Final    Chloride 02/27/2023 104  98 - 107 mmol/L Final    Carbon Dioxide (CO2) 02/27/2023 25  22 - 29 mmol/L Final    Anion Gap 02/27/2023 13  7 - 15 mmol/L Final    Urea Nitrogen 02/27/2023 11.4  6.0 - 20.0 mg/dL Final    Creatinine 02/27/2023 0.99  0.67 - 1.17 mg/dL Final    Calcium 02/27/2023 9.3  8.6 - 10.0 mg/dL Final    Glucose 02/27/2023 66 (L)  70 - 99 mg/dL Final    Alkaline Phosphatase 02/27/2023 78  40 - 129 U/L Final    AST 02/27/2023 23  10 - 50 U/L Final    ALT 02/27/2023 19  10 - 50 U/L Final    Protein Total 02/27/2023 7.2  6.4 - 8.3 g/dL Final    Albumin 02/27/2023 4.2  3.5 - 5.2 g/dL Final    Bilirubin Total 02/27/2023 0.3  <=1.2 mg/dL Final    GFR Estimate 02/27/2023 >90  >60 mL/min/1.73m2 Final    eGFR calculated using 2021 CKD-EPI equation.    Hemoglobin A1C 02/27/2023 5.7 (H)  0.0 - 5.6 % Final    Normal <5.7%   Prediabetes 5.7-6.4%    Diabetes 6.5% or higher     Note: Adopted from ADA consensus guidelines.    Vitamin D, Total (25-Hydroxy) 02/27/2023 12 (L)  20 - 75 ug/L Final    TSH 02/27/2023 1.95  0.30 - 4.20 uIU/mL Final    Uric Acid 02/27/2023 6.0  3.4 - 7.0 mg/dL Final       PHYSICAL EXAM:  Objective    Ht 1.803 m (5' 10.98\")   Wt (!) 166.5 kg (367 lb)   BMI 51.21 kg/m    Vitals - Patient " Reported  Pain Score: No Pain (0)        Physical Exam   GENERAL: Healthy, alert and no distress  EYES: Eyes grossly normal to inspection.  No discharge or erythema, or obvious scleral/conjunctival abnormalities.  RESP: No audible wheeze, cough, or visible cyanosis.  No visible retractions or increased work of breathing.    SKIN: Visible skin clear. No significant rash, abnormal pigmentation or lesions.  NEURO: Cranial nerves grossly intact.  Mentation and speech appropriate for age.  PSYCH: Mentation appears normal, affect normal/bright, judgement and insight intact, normal speech and appearance well-groomed.    Sleeve Gastrectomy: Risks and Side Effects    The complications or risks of surgery include but are not limited to: death, heart attack, infection in the surgical site (wound infection), abdomen (abscess), bladder (urinary tract infection), lungs (pneumonia), clots in legs (deep vein thrombosis) or lungs (pulmonary emboli),  injury to the bowels or other organs, bowel obstruction, hernia at the incision and gastrointestional bleeding.    More specific risks related to vertical sleeve gastrectomy were detailed at the bariatric informational seminar and include the following: leak at the vertical sleeve staple line, leak at the anastomoses,  nausea, vomiting, and dehydration for several months,  adhesions causing bowel obstruction, rapid weight loss causing a higher rate of gallstone formation during the first 6 months after surgery, decreased absorption of vitamins and protein because of the reduced stomach size, weight regain if inappropriate food intake occurs, stricture, injury to other organs, hernia,  and ulcers.       Side effects of bariatric surgery include but are not limited to: abdominal pain, cramping, bloating, constipation, nausea, vomiting, diarrhea, difficulty swallowing,  dehydration, hair loss, excess skin, protein, iron and vitamin deficiencies, heartburn, transfer of addictions, increased  anxiety and worsening depression.       I emphasized exercise and activity behavior along with appropriate food choice as the main foundation for weight loss with surgery providing surgical reinforcement of the appropriate behavior set.        Review of general surgery weight loss process    1. Complete preoperative requirements, including weight loss.  Final weight check to confirm MANDATORY weight loss requirement must be documented on a clinic scale.    2. Discuss prior authorization with .    3. History and physical evaluation by PCP of PAC clinic within 30 days of surgery date, preoperative class, and weight check (weigh-in visit) to be scheduled by patient.  Pre-anesthesia clinic for risk evaluation to be scheduled by anesthesia clinic.    4. We cannot guarantee that patient will qualify for surgery unless all preoperative requirements are met, prior authorization from primary insurance company is granted, and insurance changes do not occur.    5. It is possible for patients to regain all weight after weight loss surgery unless they follow guidelines prescribed by our bariatric center.    6. All patients with gastrointestinal complaints after weight loss surgery must have complaints conveyed to the bariatric team for appropriate treatment.    7. Vitamin deficiencies may develop post-bariatric surgery and annual laboratory testing should be performed.    8. Persistent nausea/vomiting after bariatric surgery entails risk of thiamine deficiency and should be treated early.  Vitamin B12 deficiency may develop, especially after gastric bypass surgery and must be recognized.        If you have any questions about our plans please don't hesitate to contact me.    Sincerely,    Brianda Chan NP      40 minutes spent by me on the date of the encounter doing chart review, history and exam, documentation and further activities per the note      Virtual Visit Details    Type of service:  Video Visit    Video Start Time: 1132  Video End Time:1159    Originating Location (pt. Location): Home    Distant Location (provider location):  Off-site  Platform used for Video Visit: Harshil

## 2023-09-21 NOTE — PATIENT INSTRUCTIONS
"Paul Sexton, it was nice to meet you today!  Thank you for allowing us the privilege of caring for you. We hope we provided you with the excellent service you deserve.   Please let us know if there is anything else we can do for you so that we can be sure you are completely satisfied with your care experience.    To ensure the quality of our services you may be receiving a patient satisfaction survey from an independent patient satisfaction monitoring company.    The greatest compliment you can give is a \"Likely to Recommend\"    Your visit was with Brianda Chan NP today.    Instructions per today's visit:     Follow up  plan:  Keep working on fluids   Miralax daily for constipation  Increase wegovy to 1.7mg  Focus on protein- goal is 80-100g daily   Get psych eval scheduled or send records to us  Establish care with PCP  Get clearance for sleep medicine   Schedule with hematology   See me in 3 months     ___________________________________________________________________________  Important contact and scheduling information:  Please call our contact center at [phone] to schedule your next appointments.  For any nursing questions or concerns call Shey Valladares LPN at [phone] or Brandi Cazares RN at [phone]  Please call during clinic hours Monday through Friday 8:00a - 4:00p if you have questions or you can contact us via FIZZA at anytime and we will reply during clinic hours.    Lab results will be communicated through My Chart or letter (if My Chart not used). Please call the clinic if you have not received communication after 1 week or if you have any questions.?  Clinic Fax: [phone]  __________________________________________________________________________    If labs were ordered today:    Please make an appointment to have them drawn at your convenience.     To schedule the Lab Appointment using FIZZA:  Select \"Schedule an Appointment\"  Select \"Lab Only\"  For \"A couple of questions\", select " "\"Other\"  For \"Which locations work for you?, select the location and set up the appointment    To schedule by phone call [phone] to schedule a lab only appointment at any [facility] lab.  ___________________________________________________________________________  Work with A Health !  Virtual Sessions are Available through [facility] Management Clinics    To learn more, call to schedule a free, Health  Q&A appointment: [phone]     What is Health Coaching?  Do you know what you are supposed to do, but you just aren't doing it?  Then, HEALTH COACHING may help you!   Get unstuck and move forward with the support of a professionally trained [employer]-HWC (National Board-Certified Health and ) who uses evidence-based approaches to help you move forward with healthy lifestyle changes in the areas of weight loss, stress management and overall well-being.    Health Coaches help you identify goals that will work best for you. Health Coaches provide support and encouragement with overcoming barriers and help you to find inspiration and motivation to lead a healthy lifestyle.    Option one:  Health Coaching 3-Pack; Three, 30-minute Health Coaching Visits, for $99  Visits are done virtually (phone or video)  This is a self pay service; we do not accept insurance for idania coaching.    Option two:   The 24 week Plan; 11 Health Coaching Visits, and a 7 months subscription to [employer]-- on-demand fitness, nutrition and mindfulness classes, for $499 (employee discounts may be available). Participants will also meet regularly with a weight management Medical Provider and a Registered/Licensed Dietician.  This is a self-pay service; we do not accept insurance for health coaching.    To Schedule a free Health  Q&A appointment to learn more,  call [phone].  ____________________________________________________________________    [facility]" [facility]   Healthy Lifestyle Virtual Support Group    Healthy Lifestyle Virtual Support Group?  This is 60 minutes of small group guided discussion, support and resources. All are welcome who want a healthy lifestyle.  WHEN: Starting in July 2023, this group meets the 1st Friday of the month from 12:30 PM - 1:30 PM virtually using Microsoft Teams.    FACILITATOR: Led by National Board Certified Health and , Paola Kwok [facility].   TO REGISTER: Please send an email to Paola [email or web address] to receive monthly invites to the group or if you have any questions about having a health .  Prior to the meeting, a link with directions on how to join the meeting will be sent to you.    2023 Meetings  May 19: Let's Talk  June 9: Create Your Coaching Toolkit: Learn How to  Yourself  July 7: Let's Talk  August 4: Benefits of Fiber with BRO Penny  September 1: Show and Tell (share your aps, podcasts, recipes, hacks, books)  October 6 :Let's Talk  November 3: Introduction to Mindfulness   December 1: Let's Talk    If you would like bariatric surgery specific support group info please let your care team know.         Thank you,   [facility] Comprehensive Weight Management Team

## 2023-09-21 NOTE — TELEPHONE ENCOUNTER
9/21 left  for staff Brianda Thomas NP  [facility] Bariatric Scheduling Registration Pool  See me in 3 months

## 2023-09-21 NOTE — Clinical Note
He fell off radar for a while. Weight is getting closer but clearanes a mess. Says he did psych but I can't find anything. He will look for it.  Uses CPAP but can't get records transferred to [facility] so going to see if his PCP will clear after looking at data Needs New PCP and hematology too.  Updated him so he knows what to do but wanted everyone to be aware. He'd like to have surgery asap now

## 2023-09-21 NOTE — PROGRESS NOTES
Pre-Bariatric Surgery Note    Caren Ref-Primary, Physician    Date: 2023     RE: Bigg Montez    MR#: [id number]   : 1985   Date of Visit: Sep 21, 2023    REASON FOR VISIT: Preoperative evaluation for possible weight loss surgery    Dear Dr. Fabian Ref-Primary, Physician,    I had the pleasure of seeing your patient, Bigg Montez, in my preoperative bariatric clinic.    As you know, he has morbid obesity and is considering weight loss surgery to treat obesity in association with his medical conditions of obesity.  His consult weight was  386lb.  He has lost 20 pounds since his consult weight. He has not met his required pre-surgery weight. Please refer to initial consult note from date 2021 for patient's weight history and co-morbidities. Goal weight for surgery 348lb     High weight 402lb (2021)       Last seen with me 3/2022 -   wegovy 1mg-    -reflux initially but this has resolved   -no nausea/ vomiting   -decreased frequency of BM, manageable so far   -decreased appetite- hillman sooner-- smaller portions     Oncology  Sleep- using CPAP, hasn't been seen in years, having hard time getting into sleep medicine. Trying to get back to his old PCP to get them to clear him for sleep    Psych   Dietitians - last seen with Angela Granger- 2023- 7th visit   Labs- 2023 with PCP     DVT 2021     Working on reducing carbs   Working on stopping when satisfied   Weight stable x 3 weeks now   Trying to increase activity   Eating 3-4 times a day - largest meal at dinner time   Protein shake in the AM     Less than 45oz water daily   Not tracking protein         Assessment & Plan   Problem List Items Addressed This Visit        Digestive    Class 3 severe obesity due to excess calories with serious comorbidity and body mass index (BMI) of 50.0 to 59.9 in adult (H) - Primary     17lb from goal. Tolerating wegovy 1mg well. No adverse side effects. Will increase to 1.7mg. Discussed optimizing protein as  he is eating less. Reviewed task list.     Feels CPAP is helpful. Difficult with sleep clearance in [facility]. Plans to get new PCP to see if they can do clearance with review of CPAP data.     Keep working on fluids   Miralax daily for constipation  Increase wegovy to 1.7mg  Focus on protein- goal is 80-100g daily   Get psych eval scheduled or send records to us  Establish care with PCP  Get clearance for sleep medicine   Schedule with hematology   Follow up with me 3 months          Relevant Medications    Semaglutide-Weight Management (WEGOVY) 1.7 MG/0.75ML pen    Other Relevant Orders    Adult Oncology/Hematology  Referral    Adult Mental Health  Referral   Other Visit Diagnoses     Pre-diabetes        Relevant Medications    Semaglutide-Weight Management (WEGOVY) 1.7 MG/0.75ML pen    Other Relevant Orders    Adult Oncology/Hematology  Referral    Adult Mental Health  Referral           Reviewed tasklist with patient     Most recent weights:  Wt Readings from Last 4 Encounters:   09/21/23 (!) 166.5 kg (367 lb)   09/18/23 (!) 167.4 kg (369 lb)   08/29/23 (!) 170.1 kg (375 lb)   07/18/23 (!) 173.7 kg (383 lb)         ROS    No past medical history on file.    Past Surgical History:   Procedure Laterality Date     VASECTOMY N/A 2020       Current Outpatient Medications   Medication     Semaglutide-Weight Management (WEGOVY) 1.7 MG/0.75ML pen     allopurinol (ZYLOPRIM) 300 MG tablet     Cholecalciferol (VITAMIN D3) 75 MCG (3000 UT) TABS     colchicine (COLCYRS) 0.6 MG tablet     losartan (COZAAR) 100 MG tablet     predniSONE (DELTASONE) 20 MG tablet     Semaglutide-Weight Management (WEGOVY) 1 MG/0.5ML pen     No current facility-administered medications for this visit.       Allergies   Allergen Reactions     Cats Other (See Comments)     No Clinical Screening - See Comments Unknown     seasonal     Seasonal Allergies        Office Visit on 02/27/2023   Component Date Value Ref  "Range Status     Cholesterol 02/27/2023 163  <200 mg/dL Final     Triglycerides 02/27/2023 94  <150 mg/dL Final     Direct Measure HDL 02/27/2023 44  >=40 mg/dL Final     LDL Cholesterol Calculated 02/27/2023 100  <=100 mg/dL Final     Non HDL Cholesterol 02/27/2023 119  <130 mg/dL Final     Sodium 02/27/2023 142  136 - 145 mmol/L Final     Potassium 02/27/2023 3.6  3.4 - 5.3 mmol/L Final     Chloride 02/27/2023 104  98 - 107 mmol/L Final     Carbon Dioxide (CO2) 02/27/2023 25  22 - 29 mmol/L Final     Anion Gap 02/27/2023 13  7 - 15 mmol/L Final     Urea Nitrogen 02/27/2023 11.4  6.0 - 20.0 mg/dL Final     Creatinine 02/27/2023 0.99  0.67 - 1.17 mg/dL Final     Calcium 02/27/2023 9.3  8.6 - 10.0 mg/dL Final     Glucose 02/27/2023 66 (L)  70 - 99 mg/dL Final     Alkaline Phosphatase 02/27/2023 78  40 - 129 U/L Final     AST 02/27/2023 23  10 - 50 U/L Final     ALT 02/27/2023 19  10 - 50 U/L Final     Protein Total 02/27/2023 7.2  6.4 - 8.3 g/dL Final     Albumin 02/27/2023 4.2  3.5 - 5.2 g/dL Final     Bilirubin Total 02/27/2023 0.3  <=1.2 mg/dL Final     GFR Estimate 02/27/2023 >90  >60 mL/min/1.73m2 Final    eGFR calculated using 2021 CKD-EPI equation.     Hemoglobin A1C 02/27/2023 5.7 (H)  0.0 - 5.6 % Final    Normal <5.7%   Prediabetes 5.7-6.4%    Diabetes 6.5% or higher     Note: Adopted from ADA consensus guidelines.     Vitamin D, Total (25-Hydroxy) 02/27/2023 12 (L)  20 - 75 ug/L Final     TSH 02/27/2023 1.95  0.30 - 4.20 uIU/mL Final     Uric Acid 02/27/2023 6.0  3.4 - 7.0 mg/dL Final       PHYSICAL EXAM:  Objective    Ht 1.803 m (5' 10.98\")   Wt (!) 166.5 kg (367 lb)   BMI 51.21 kg/m    Vitals - Patient Reported  Pain Score: No Pain (0)        Physical Exam   GENERAL: Healthy, alert and no distress  EYES: Eyes grossly normal to inspection.  No discharge or erythema, or obvious scleral/conjunctival abnormalities.  RESP: No audible wheeze, cough, or visible cyanosis.  No visible retractions or increased " work of breathing.    SKIN: Visible skin clear. No significant rash, abnormal pigmentation or lesions.  NEURO: Cranial nerves grossly intact.  Mentation and speech appropriate for age.  PSYCH: Mentation appears normal, affect normal/bright, judgement and insight intact, normal speech and appearance well-groomed.    Sleeve Gastrectomy: Risks and Side Effects    The complications or risks of surgery include but are not limited to: death, heart attack, infection in the surgical site (wound infection), abdomen (abscess), bladder (urinary tract infection), lungs (pneumonia), clots in legs (deep vein thrombosis) or lungs (pulmonary emboli),  injury to the bowels or other organs, bowel obstruction, hernia at the incision and gastrointestional bleeding.    More specific risks related to vertical sleeve gastrectomy were detailed at the bariatric informational seminar and include the following: leak at the vertical sleeve staple line, leak at the anastomoses,  nausea, vomiting, and dehydration for several months,  adhesions causing bowel obstruction, rapid weight loss causing a higher rate of gallstone formation during the first 6 months after surgery, decreased absorption of vitamins and protein because of the reduced stomach size, weight regain if inappropriate food intake occurs, stricture, injury to other organs, hernia,  and ulcers.       Side effects of bariatric surgery include but are not limited to: abdominal pain, cramping, bloating, constipation, nausea, vomiting, diarrhea, difficulty swallowing,  dehydration, hair loss, excess skin, protein, iron and vitamin deficiencies, heartburn, transfer of addictions, increased anxiety and worsening depression.       I emphasized exercise and activity behavior along with appropriate food choice as the main foundation for weight loss with surgery providing surgical reinforcement of the appropriate behavior set.        Review of general surgery weight loss process    1.  Complete preoperative requirements, including weight loss.  Final weight check to confirm MANDATORY weight loss requirement must be documented on a clinic scale.    2. Discuss prior authorization with .    3. History and physical evaluation by PCP of PAC clinic within 30 days of surgery date, preoperative class, and weight check (weigh-in visit) to be scheduled by patient.  Pre-anesthesia clinic for risk evaluation to be scheduled by anesthesia clinic.    4. We cannot guarantee that patient will qualify for surgery unless all preoperative requirements are met, prior authorization from primary insurance company is granted, and insurance changes do not occur.    5. It is possible for patients to regain all weight after weight loss surgery unless they follow guidelines prescribed by our bariatric center.    6. All patients with gastrointestinal complaints after weight loss surgery must have complaints conveyed to the bariatric team for appropriate treatment.    7. Vitamin deficiencies may develop post-bariatric surgery and annual laboratory testing should be performed.    8. Persistent nausea/vomiting after bariatric surgery entails risk of thiamine deficiency and should be treated early.  Vitamin B12 deficiency may develop, especially after gastric bypass surgery and must be recognized.        If you have any questions about our plans please don't hesitate to contact me.    Sincerely,    Brianda Chan NP      40 minutes spent by me on the date of the encounter doing chart review, history and exam, documentation and further activities per the note      Virtual Visit Details    Type of service:  Video Visit   Video Start Time: 1132  Video End Time:1159    Originating Location (pt. Location): Home    Distant Location (provider location):  Off-site  Platform used for Video Visit: [employer]

## 2023-09-21 NOTE — NURSING NOTE
Is the patient currently in the state of [place]? NO    Visit mode:VIDEO    If the visit is dropped, the patient can be reconnected by: VIDEO VISIT: Send to e-mail at: [email or web address]    Will anyone else be joining the visit? NO  (If patient encounters technical issues they should call [phone])    How would you like to obtain your AVS? MyChart    Are changes needed to the allergy or medication list? Pt stated no changes to allergies and Pt stated no med changes    Reason for visit: Consult    Fabiola MEJIA

## 2023-09-21 NOTE — ASSESSMENT & PLAN NOTE
17lb from goal. Tolerating wegovy 1mg well. No adverse side effects. Will increase to 1.7mg. Discussed optimizing protein as he is eating less. Reviewed task list.     Feels CPAP is helpful. Difficult with sleep clearance in [facility]. Plans to get new PCP to see if they can do clearance with review of CPAP data.     Keep working on fluids   Miralax daily for constipation  Increase wegovy to 1.7mg  Focus on protein- goal is 80-100g daily   Get psych eval scheduled or send records to us  Establish care with PCP  Get clearance for sleep medicine   Schedule with hematology   Follow up with me 3 months

## 2023-10-16 NOTE — PROGRESS NOTES
[facility]  [address] [place] [id number]  Main: [phone], Fax: [phone]    Patient seen at: [facility] at [address] [place] [address]    Outpatient Visit Note:    Patient: Bigg Montez  MRN: [id number]  : 1985  DANNIE: 2023    Reason for visit:  History of left distal lower extremity DVT back in 2021. Pre-bariatric surgery hematology evaluation.     HPI:  Darshan is a 38 year old male with a history of morbid obesity, recurrent gout, sleep apnea, as well as a left distal lower extremity DVT back in 2021 who is currently not on any anticoagulation therapy, referred by Brianda Chan CNP of bariatric surgery for pre-bariatric surgery hematology consultation. Darshan is being considered for sleeve gastrectomy currently at the bariatric surgery.     Dated back on 2021, he presented to [facility] with a 10 days history of left foot and calf pain. At the time he apparently reportedly had an active gout attack over his left foot. In additional to that, it was reported that he did have some left calf pain. An venous left leg ultrasound was done on 2021 showing an occlusive DVT in the posterior tibial veins from mid-calf to ankle. He was placed on apixaban and a referral was placed for the patient to see Hematology / Oncology. According to the urgent care documentation, this DVT was an unprovoked event.     Unfortunately, from what I can tell, he never saw Hematology / Oncology at the time. It is unclear to me from the available documentation as to how long he was on anticoagulation therapy for his DVT in 2021.     A repeat left leg venous ultrasound was done in 2021 which showed chronic DVT of one left posterior tibial vein from the proximal to mid calf. CT Chest on 2022 showed no pulmonary embolism.     Recently he was seen by Brianda Chan CNP of bariatric surgery for  consideration of possible sleeve gastrectomy surgery. Because of his history of left distal leg DVT, he is referred to our clinic for pre-bariatric surgery consultation.     ROS:  Denies any bleeding complications. Specifically, no frequent epistaxis. No issues with oral mucosal bleeding. Denies any hematuria or blood in stools. Denies any shortness of breath. No chest pain. No cough. No fever.      Medications:  Current Outpatient Medications   Medication    allopurinol (ZYLOPRIM) 300 MG tablet    Cholecalciferol (VITAMIN D3) 75 MCG (3000 UT) TABS    colchicine (COLCYRS) 0.6 MG tablet    losartan (COZAAR) 100 MG tablet    predniSONE (DELTASONE) 20 MG tablet    Semaglutide-Weight Management (WEGOVY) 1 MG/0.5ML pen    Semaglutide-Weight Management (WEGOVY) 1.7 MG/0.75ML pen     No current facility-administered medications for this visit.     Allergies:     Allergies   Allergen Reactions    Cats Other (See Comments)    No Clinical Screening - See Comments Unknown     seasonal    Seasonal Allergies      PmHx:  As above.     Social History:   Deferred.    Family History:  Deferred.    Objective:  Vitals: ***  Exam:   ***      Labs:  Component      Latest Ref Rng 2/27/2023  2:26 PM   Sodium      136 - 145 mmol/L 142    Potassium      3.4 - 5.3 mmol/L 3.6    Chloride      98 - 107 mmol/L 104    Carbon Dioxide (CO2)      22 - 29 mmol/L 25    Anion Gap      7 - 15 mmol/L 13    Urea Nitrogen      6.0 - 20.0 mg/dL 11.4    Creatinine      0.67 - 1.17 mg/dL 0.99    Calcium      8.6 - 10.0 mg/dL 9.3    Glucose      70 - 99 mg/dL 66 (L)    Alkaline Phosphatase      40 - 129 U/L 78    AST      10 - 50 U/L 23    ALT      10 - 50 U/L 19    Protein Total      6.4 - 8.3 g/dL 7.2    Albumin      3.5 - 5.2 g/dL 4.2    Bilirubin Total      <=1.2 mg/dL 0.3    GFR Estimate      >60 mL/min/1.73m2 >90       Imaging:  Reviewed and are as described above.     Assessment:  ***    Plan:  ***    Patient is instructed to call our clinic if ***  should experience any unusual bleeding complications or if *** should need any invasive or surgical procedures in the future. Otherwise, will plan to see *** back in one year for routine follow up.         Philip Alamo PA-C, MPAS  Physician Assistant  [facility].     {2021 E&M time:[id number]}

## 2023-10-23 ENCOUNTER — VIRTUAL VISIT (OUTPATIENT)
Dept: ENDOCRINOLOGY | Facility: CLINIC | Age: 38
End: 2023-10-23
Payer: COMMERCIAL

## 2023-10-23 ENCOUNTER — OFFICE VISIT (OUTPATIENT)
Dept: HEMATOLOGY | Facility: CLINIC | Age: 38
End: 2023-10-23
Attending: NURSE PRACTITIONER
Payer: COMMERCIAL

## 2023-10-23 VITALS
SYSTOLIC BLOOD PRESSURE: 152 MMHG | WEIGHT: 315 LBS | OXYGEN SATURATION: 97 % | DIASTOLIC BLOOD PRESSURE: 84 MMHG | TEMPERATURE: 97.9 F | HEART RATE: 74 BPM | BODY MASS INDEX: 50.47 KG/M2

## 2023-10-23 VITALS — HEIGHT: 72 IN | WEIGHT: 315 LBS | BODY MASS INDEX: 42.66 KG/M2

## 2023-10-23 DIAGNOSIS — E66.813 CLASS 3 SEVERE OBESITY DUE TO EXCESS CALORIES WITH SERIOUS COMORBIDITY AND BODY MASS INDEX (BMI) OF 50.0 TO 59.9 IN ADULT (H): ICD-10-CM

## 2023-10-23 DIAGNOSIS — E66.01 CLASS 3 SEVERE OBESITY DUE TO EXCESS CALORIES WITH SERIOUS COMORBIDITY AND BODY MASS INDEX (BMI) OF 50.0 TO 59.9 IN ADULT (H): ICD-10-CM

## 2023-10-23 DIAGNOSIS — Z71.3 NUTRITIONAL COUNSELING: Primary | ICD-10-CM

## 2023-10-23 DIAGNOSIS — R73.03 PRE-DIABETES: ICD-10-CM

## 2023-10-23 DIAGNOSIS — Z98.84 BARIATRIC SURGERY STATUS: ICD-10-CM

## 2023-10-23 PROCEDURE — 99207 PR NO CHARGE LOS: CPT | Mod: 95 | Performed by: DIETITIAN, REGISTERED

## 2023-10-23 PROCEDURE — 99204 OFFICE O/P NEW MOD 45 MIN: CPT | Performed by: PHYSICIAN ASSISTANT

## 2023-10-23 PROCEDURE — G0463 HOSPITAL OUTPT CLINIC VISIT: HCPCS | Performed by: PHYSICIAN ASSISTANT

## 2023-10-23 PROCEDURE — 97803 MED NUTRITION INDIV SUBSEQ: CPT | Mod: 95 | Performed by: DIETITIAN, REGISTERED

## 2023-10-23 ASSESSMENT — PAIN SCALES - GENERAL: PAINLEVEL: NO PAIN (0)

## 2023-10-23 NOTE — PROGRESS NOTES
"Video-Visit Details    Type of service:  Video Visit    Video Start Time: 10:00 AM    Video End Time: 10:24 AM    Originating Location (pt. Location): Home    Distant Location (provider location):  Offsite (providers home) [facility] [place]     Platform used for Video Visit: Well        Bariatric Nutrition Consultation Note    Reason For Visit: Nutrition reassessment    Bigg Montez is a 38 year old presenting today for return bariatric nutrition consult.  Pt is interested in laparoscopic sleeve gastrectomy with TBD.  Patient is accompanied by self. This is patients 8th nutrition visit prior to surgery.     Pt referred by Brianda Chan NP on January 25, 2021.  Patient with Co-morbidities of obesity including:  Type II DM no  Renal Failure no  Sleep apnea yes  Hypertension yes   Dyslipidemia no  Joint pain no  Back pain no  GERD no   Prediabetes yes    H/o gout. Pt reports having a blood clot in mid-sept. 2021 1/21/2021    11:14 PM   Support System Reviewed With Patient   Who do you have in your support network that can be available to help you for the first 2 weeks after surgery? My wife   Who can you count on for support throughout your weight loss surgery journey? Kenny ceja       ANTHROPOMETRICS:  Initial weight (12/2018 with PCP): 386 lbs    Estimated body mass index is 49.51 kg/m  as calculated from the following:    Height as of this encounter: 1.816 m (5' 11.5\").    Weight as of this encounter: 163.3 kg (360 lb). (-9 lbs over last, -26 lbs from initial)     Required weight loss goal pre-op: 38 lbs from initial consult weight (goal weight 348 lbs or less before surgery)        1/21/2021    11:14 PM   --   I have tried the following methods to lose weight Watching portions or calories    Exercise    Weight Watchers    Slimfast    Physician directed program           1/21/2021    11:14 PM   Weight Loss Questions Reviewed With Patient   How long have you been " overweight? Since puberty       SUPPLEMENT INFORMATION:  Milk thistle supplement pt reports for gout  Men's One-a-day MVI    Nutrition Related Labs:  Vitamin D 12 (L) on 2/27/23. Started Vitamin D 6000 units daily in June. He plans to have lab rechecked soon.     Medications for Weight Loss:  Wegovy    NUTRITION HISTORY:  No known food allergies/intolerances  H/o gout    He continues to work on reducing starches (rice, noodles). Increasing fruits, working on increase vegetables. No longer going back for seconds at dinner. Intermittently replacing breakfast with protein shake.     7/18/23 - Has started going to the gym, and working on increasing his water intake.     8/29/23 - Cut out energy drink intake. Went up on Wegovy to 1 mg.     9/18/23 - Continues Wegovy injections, losing consistently each month. Reduced starch portions. Reports eating ~1.5 meals daily. May have fruit for a snack or [email or web address] granola bar.     9/21/23 Increased Wegovy dose at NP visit    Today - Only tolerates [employer] protein shakes.  As tried many others. Gets stomach ache from others.     Diet Recall (yesterday):  Breakfast: [employer] Protein Shakes   Lunch: left-over chicken wings  Dinner: watermelon   Beverages: water (64 oz), sugar-free sports drinks.     Progress Towards Previous Goals:  1.Goal weight for surgery: 348 lbs or less - Improving   2. Ensure you are consuming lean protein at each meal period - Example Greek yogurt and granola at lunch - Improved    - Consider setting alarms 3 times per day to remind you to eat some protein.   3. Gym 3 times per week. - Has not been able to exercise at the gym as much lately but continues to get increased physical activity otherwise with kids and their activities.   4. 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         1/21/2021    11:14 PM   Dining Out History Reviewed With Patient   How often do you dine out? Nearly every day.    Where do you dine out? (select all that apply) fast food chains    take out   What types of food do you order when you dine out? Protien rice dishes           1/21/2021    11:14 PM   Physical Activity Reviewed With Patient   How often do you exercise? 1 to 2 times per week   What is the duration of your exercise (in minutes)? 60+ Minutes   What types of exercise do you do? gym membership    weightlifting   What keeps you from being more active? Lack of Time       NUTRITION DIAGNOSIS:  Obesity r/t long history of self-monitoring deficit and excessive energy intake aeb BMI >30 kg/m2. - improving    INTERVENTION:  Intervention Provided/Education Provided:  - Reviewed weight loss goal for surgery and strategy for pre-op weight loss.   - Reviewed bariatric diet guidelines   - Encouraged increased physical activity.   - Praised pt on the progress he has made this month  - Discussed potential barriers to maintaining positive change and how to overcome.  - Remind pt he is due for repeat vitamin D lab and other baseline bariatric labs. Reviewed other task list items left.   - Provided pt with list of diet handouts and nutrition goals below.         1/21/2021    11:14 PM   Questions Reviewed With Patient   How ready are you to make changes regarding your weight? Number 1 = Not ready at all to make changes up to 10 = very ready. 10   How confident are you that you can change? 1 = Not confident that you will be successful making changes up to 10 = very confident. 10       Expected Engagement: good    GOALS:  1.Goal weight for surgery: 348 lbs or less  2. Ensure you are consuming lean protein at each meal period - Example Greek yogurt and granola at lunch   - Consider setting alarms 3 times per day to remind you to eat some protein.   3. Gym 3 times per week.   4. Have labs done (vitamin D, vitamin A, PTH and CBC)    Post-op Diet Handouts:  Diet Guidelines after Weight-loss Surgery  [email or web address]     Your Stage  1 Diet: Clear Liquids  [email or web address]     Your Stage 2 Diet: Low-fat Full Liquids  [email or web address]     Your Stage 3 Diet: Pureed Foods  [email or web address]     Pureed Pleasures  [email or web address]    Your Stage 4 Diet: Soft Foods  [email or web address]    Your Stage 5 Diet: Regular Foods  [email or web address]    Supplements after Sleeve Gastrectomy, Gastric Bypass or Single Anastomosis Duodenal Switch  [email or web address]    Keeping Track of Fluids  [email or web address]      Time spent with patient: 24 mins    Angela Granger RD, LD

## 2023-10-23 NOTE — LETTER
"10/23/2023       RE: Bigg Montez  [address]  [place] [address]     Dear Colleague,    Thank you for referring your patient, Bigg Motnez, to the [facility] [place] at [facility]. Please see a copy of my visit note below.    Video-Visit Details    Type of service:  Video Visit    Video Start Time: 10:00 AM    Video End Time: 10:24 AM    Originating Location (pt. Location): Home    Distant Location (provider location):  Offsite (providers home) [facility] [place]     Platform used for Video Visit: [employer]        Bariatric Nutrition Consultation Note    Reason For Visit: Nutrition reassessment    Bigg Montez is a 38 year old presenting today for return bariatric nutrition consult.  Pt is interested in laparoscopic sleeve gastrectomy with TBD.  Patient is accompanied by self. This is patients 8th nutrition visit prior to surgery.     Pt referred by Brianda Chan NP on January 25, 2021.  Patient with Co-morbidities of obesity including:  Type II DM no  Renal Failure no  Sleep apnea yes  Hypertension yes   Dyslipidemia no  Joint pain no  Back pain no  GERD no   Prediabetes yes    H/o gout. Pt reports having a blood clot in mid-sept. 2021 1/21/2021    11:14 PM   Support System Reviewed With Patient   Who do you have in your support network that can be available to help you for the first 2 weeks after surgery? My wife   Who can you count on for support throughout your weight loss surgery journey? Kenny ceja       ANTHROPOMETRICS:  Initial weight (12/2018 with PCP): 386 lbs    Estimated body mass index is 49.51 kg/m  as calculated from the following:    Height as of this encounter: 1.816 m (5' 11.5\").    Weight as of this encounter: 163.3 kg (360 lb). (-9 lbs over last, -26 lbs from initial)     Required weight loss goal pre-op: 38 lbs from initial consult weight (goal " weight 348 lbs or less before surgery)        1/21/2021    11:14 PM   --   I have tried the following methods to lose weight Watching portions or calories    Exercise    Weight Watchers    [facility]    Physician directed program           1/21/2021    11:14 PM   Weight Loss Questions Reviewed With Patient   How long have you been overweight? Since puberty       SUPPLEMENT INFORMATION:  Milk thistle supplement pt reports for gout  Men's One-a-day MVI    Nutrition Related Labs:  Vitamin D 12 (L) on 2/27/23. Started Vitamin D 6000 units daily in June. He plans to have lab rechecked soon.     Medications for Weight Loss:  Wegovy    NUTRITION HISTORY:  No known food allergies/intolerances  H/o gout    He continues to work on reducing starches (rice, noodles). Increasing fruits, working on increase vegetables. No longer going back for seconds at dinner. Intermittently replacing breakfast with protein shake.     7/18/23 - Has started going to the gym, and working on increasing his water intake.     8/29/23 - Cut out energy drink intake. Went up on Wegovy to 1 mg.     9/18/23 - Continues Wegovy injections, losing consistently each month. Reduced starch portions. Reports eating ~1.5 meals daily. May have fruit for a snack or [employer] granola bar.     9/21/23 Increased Wegovy dose at NP visit    Today - Only tolerates [employer] protein shakes.  As tried many others. Gets stomach ache from others.     Diet Recall (yesterday):  Breakfast: [employer] Protein Shakes   Lunch: left-over chicken wings  Dinner: watermelon   Beverages: water (64 oz), sugar-free sports drinks.     Progress Towards Previous Goals:  1.Goal weight for surgery: 348 lbs or less - Improving   2. Ensure you are consuming lean protein at each meal period - Example Greek yogurt and granola at lunch - Improved    - Consider setting alarms 3 times per day to remind you to eat some protein.   3. Gym 3 times per week. - Has not been able to exercise at the gym as  much lately but continues to get increased physical activity otherwise with kids and their activities.   4. 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         1/21/2021    11:14 PM   Dining Out History Reviewed With Patient   How often do you dine out? Nearly every day.   Where do you dine out? (select all that apply) fast food chains    take out   What types of food do you order when you dine out? Protien rice dishes           1/21/2021    11:14 PM   Physical Activity Reviewed With Patient   How often do you exercise? 1 to 2 times per week   What is the duration of your exercise (in minutes)? 60+ Minutes   What types of exercise do you do? gym membership    weightlifting   What keeps you from being more active? Lack of Time       NUTRITION DIAGNOSIS:  Obesity r/t long history of self-monitoring deficit and excessive energy intake aeb BMI >30 kg/m2. - improving    INTERVENTION:  Intervention Provided/Education Provided:  - Reviewed weight loss goal for surgery and strategy for pre-op weight loss.   - Reviewed bariatric diet guidelines   - Encouraged increased physical activity.   - Praised pt on the progress he has made this month  - Discussed potential barriers to maintaining positive change and how to overcome.  - Remind pt he is due for repeat vitamin D lab and other baseline bariatric labs. Reviewed other task list items left.   - Provided pt with list of diet handouts and nutrition goals below.         1/21/2021    11:14 PM   Questions Reviewed With Patient   How ready are you to make changes regarding your weight? Number 1 = Not ready at all to make changes up to 10 = very ready. 10   How confident are you that you can change? 1 = Not confident that you will be successful making changes up to 10 = very confident. 10       Expected Engagement: good    GOALS:  1.Goal weight for surgery: 348 lbs or less  2. Ensure you are consuming lean protein at each meal  period - Example Greek yogurt and granola at lunch   - Consider setting alarms 3 times per day to remind you to eat some protein.   3. Gym 3 times per week.   4. Have labs done (vitamin D, vitamin A, PTH and CBC)    Post-op Diet Handouts:  Diet Guidelines after Weight-loss Surgery  [email or web address]     Your Stage 1 Diet: Clear Liquids  [email or web address]     Your Stage 2 Diet: Low-fat Full Liquids  [email or web address]     Your Stage 3 Diet: Pureed Foods  [email or web address]     Pureed Pleasures  [email or web address]    Your Stage 4 Diet: Soft Foods  [email or web address]    Your Stage 5 Diet: Regular Foods  [email or web address]    Supplements after Sleeve Gastrectomy, Gastric Bypass or Single Anastomosis Duodenal Switch  [email or web address]    Keeping Track of Fluids  [email or web address]      Time spent with patient: 24 mins    Angela Granger RD, LD

## 2023-10-23 NOTE — PATIENT INSTRUCTIONS
Paul Sexton,    Follow-up with RD on 11/20    Thank you,    Angela Granger, RD, LD  If you would like to schedule or reschedule an appointment with the RD, please call [phone]    Nutrition Goals  1.Goal weight for surgery: 348 lbs or less  2. Ensure you are consuming lean protein at each meal period - Example Greek yogurt and granola at lunch   - Consider setting alarms 3 times per day to remind you to eat some protein.   3. Gym 3 times per week.   4. Have labs done (vitamin D, vitamin A, PTH and CBC)    Post-op Diet Handouts:  Diet Guidelines after Weight-loss Surgery  [email or web address]     Your Stage 1 Diet: Clear Liquids  [email or web address]     Your Stage 2 Diet: Low-fat Full Liquids  [email or web address]     Your Stage 3 Diet: Pureed Foods  [email or web address]     Pureed Pleasures  [email or web address]    Your Stage 4 Diet: Soft Foods  [email or web address]    Your Stage 5 Diet: Regular Foods  [email or web address]    Supplements after Sleeve Gastrectomy, Gastric Bypass or Single Anastomosis Duodenal Switch  [email or web address]    Keeping Track of Fluids  [email or web address]          COMPREHENSIVE WEIGHT MANAGEMENT PROGRAM  VIRTUAL SUPPORT GROUPS    For Support Group Information:      We offer support groups for patients who are working on weight loss and considering, preparing for or have had weight loss surgery.   There is no cost for this opportunity.  You are invited to attend the?Virtual Support Groups?provided by any of the following locations:    [facility] via [employer] Teams with Cynthia Amor RN  2.   [place] via [employer] Teams with Terry Nix, PhD, LP  3.   [place] via [employer] Teams with Paola Franks RN  4.   [facility] via [employer] Teams with Paola Kwok, [facility]    The following Support Group information can also be found on our  "website:  [email or web address]    [email or web address]-loss-[email or web address]-support-[email or web address]      [facility] Weight Loss Surgery Support Group    [facility] Weight Loss Surgery Support Group  The support group is a patient-lead forum that meets monthly to share experiences, encouragement and education. It is open to those who have had weight loss surgery, are scheduled for surgery, or are considering surgery.   WHEN: This group meets on the 3rd Wednesday of each month from 5:00PM - 6:00PM virtually using Microsoft Teams.   FACILITATOR: Led by Cynthia Mccall RD, HELEN, RN, the program's Clinical Coordinator.   TO REGISTER: Please contact the clinic via [employer] or call the nurse line directly at [phone] to inform our staff that you would like an invite sent to you and to let us know the email you would like the invite sent to. Prior to the meeting, a link with directions on how to join the meeting will be sent to you.    2023 Meetings   April 19: Guest Speaker, Gonzalo Sewell RD, LD, \"Maintaining Weight Loss after Bariatric Surgery\".  May 17: \"Let's Talk\" a time for the group to share.  June 21: \"Let's Talk\" a time for the group to share.  July 19  August 16  September 20  October 18  November 15  December 20    [facility] Support Groups    Connections Bariatric Care Support Group?  This is open to all pre- and post- operative bariatric surgery patients as well as their support system.   WHEN: This group meets the 2nd Tuesday of each month from 6:30 PM - 8:00 PM virtually using Microsoft Teams.   FACILITATOR: Led by Terry Nix, Ph.D who is a Licensed Psychologist with the [facility] Comprehensive Weight Management Program.   TO REGISTER: Please send an email to Terry Nix, Ph.D., LP at?[email or web address]?if you would like an invitation to " "the group and to learn about using Microsoft Teams.    2023 Meetings  April 11: Guest speaker, Leigh Ann Handy MD, Bariatrician, [facility] Comprehensive Weight Management Program, \"Injectable Weight Loss Medications\".  May 9  Melly 13  July 11  August 8  September 12  October 10  November 14  December 12    Connections Post-Operative Bariatric Surgery Support Group  This is a support group for [facility] bariatric patients (and those external to [facility]) who have had bariatric surgery and are at least 3 months post-surgery.  WHEN: This support group meets the 4th Wednesday of the month from 11:00 AM - 12:00 PM virtually using Microsoft Teams.   FACILITATOR: Led by Certified Bariatric Nurse, Paola Franks RN.   TO REGISTER: Please send an email to Paola at [email or web address] if you would like an invitation to the group and to learn about using Microsoft Teams.    2023 Meetings  April 26  May 24  Melly 28  July 26  August 23  September 27  October 25  November 22  December 27      [facility]   Healthy Lifestyle Group    Healthy Lifestyle Group?  This is a 60 minute virtual coaching group for those who want to lead a healthier lifestyle. Come together to set goals and overcome barriers in a supportive group environment. We will address the four pillars of health--nutrition, exercise, sleep and emotional well-being.  This group is highly recommended for those who are participating in the 24 week Healthy Lifestyle Plan and our Health Coaching sessions.    WHEN: This group meets the first Friday of the month, 12:30 PM - 1:30 PM online, via a zoom meeting.      FACILITATOR: Led by National Board Certified Health and , Paola Kwok, [facility].     TO REGISTER: Please call the Call Center at [phone] to register. You will get an appointment to attend in [place]. Fifteen minutes prior to the meeting, complete the e-check in and you will get the " "link to join the meeting.  There is no charge to attend this group and space is limited.       2023 and 2024 Meeting Topics and Dates:  November 3: Introduction to Mindfulness (Learn simple and effective mindfulness practices and how it can benefit you)  December 8: Let's Talk (guided discussion on our wins and challenges)  January 5: New Years Vision: Manifest your Best 2024! (Guided imagery,  journaling and discussion)  February 2: Let's Talk  March 1: 10 Percent Happier by Brian Chakraborty (Book Bites; a guided discussion on the nuggets of wisdom from favorite wellness books; no need to read the book but highly encouraged)  April 5: Let's Talk  May 3: \"Essentialism; The Disciplined Pursuit of Less by Sheldon Flores (book bites discussion)  June 7: Let's Talk  July 5: NO MEETING, off for the 4th of July Holiday  August 2: The Blue Zones, Secrets for Living a Longer Life by Brian Mendiola (book bites discussion)                         "

## 2023-10-23 NOTE — NURSING NOTE
Is the patient currently in the state of [place]? YES    Visit mode:VIDEO    If the visit is dropped, the patient can be reconnected by: VIDEO VISIT: Text to cell phone:   Telephone Information:   Mobile [phone]       Will anyone else be joining the visit? NO  (If patient encounters technical issues they should call [phone]    How would you like to obtain your AVS? MyChart    Are changes needed to the allergy or medication list? No    Reason for visit: RECHECK    Milady MEJIA

## 2023-10-23 NOTE — PROGRESS NOTES
[facility]  [address] [place] [address]  Phone: [phone], Fax: [phone]    Outpatient Visit Note:    Patient: Bigg Montez  MRN: [id number]  : 1985  DANNIE: Oct 23, 2023    Reason for Consultation:  Bigg Montez is a referred by Brianda Chan NP for anticoagulation recommendations and clearance prior to bariatric surgery.    Assessment/Plan:   In summary, Bigg Montez is a 38 year old man with a past medical history of morbid obesity, gout, and DVT who presents for hematology clearance and anticoagulation recommendation by Brianda Chan NP prior to bariatric surgery.    He has no clear provoking history for his DVT, therefore we would categorize his DVT as unprovoked. There is no family history of VTE, so genetic testing is not indicated at this time. Discussed that based on MEI guidelines he should remain on lifelong anticoagulation. We reviewed anticoagulation options including DOACs, warfarin, and LMWH. He has not tolerated oral anticoagulation, both apixaban and rivaroxaban, due to side effects. He is ultimately not interested in going on chronic anticoagulation.     In regards to surgery, patient has a calculated high risk caprini score indicating need for pharmacologic prophylaxis. Would recommend LMWH, enoxaparin 40 mg to be initiated one day post op if no bleeding complications for 7 days total. He can additionally use his compression stockings for 7 days after surgery. He should remove compression stockings at night.     He was counseled on the signs and symptoms of DVTs and pulmonary embolisms.     The patient is given our center's contact information and is instructed to call if he should have any further questions or concerns.  Otherwise, we will plan on seeing him back as needed.      Patient understands and agrees with the above plan and recommendation.    Ashley Dodge,  PA-S    ----------------------------------------------------------------------------------------------------------------------  History of Present Illness:  Bigg Montez is a 38 year old man with a past medical history of morbid obesity, gout, and DVT who presents for hematology clearance and anticoagulation recommendation by Brianda Chan NP prior to bariatric surgery.    09/13/2021, DVT of left lower extremity diagnosed in urgent care. He presented with 9 days of symptoms involving left lower extremity. He has history of recurrent gout attacks in his feet. He gets flare-ups intermittently and he uses prednisone to treat them. He originally had symptoms of left lower leg numbness and swelling that he attributed to gout. He tried to treat with colchicine, but symptoms worsened. He noticed significant swelling of both left foot and calf/ankle with pain. Due to these symptoms he went to urgent care and he had ultrasound done that showed distal DVT of posterior tibial veins from mid calf to ankle. He was started on treatment with apixaban. He took the medicine for about 10 days. Two days after starting the medicine, the patient noticed shortness of breath as well as chest pain. It felt as if there was heavy pressure on his chest. He attributed his symptoms to apixaban and took himself of off of the medicine. His symptoms resolved after stopping the medicine. He states by day 10 his leg felt back to normal with swelling and pain gone.     He does believe he had a flare of gout concurrently with the DVT, but is unsure of which started first.     He denies any long travel, surgery, illness, trauma, or decrease in activity prior to the event.     12/17/21 Repeat US showed chronic DVT of left posterior tibial vein from the proximal calf to the mid calf.    1/10/2022 - telemedicine visit with heme/onc Renea Ro. He reported persistent shortness of breath with activity at this visit, so a CT chest was  ordered to rule out chronic PE, which was negative. Discussed monitoring chronic DVT vs anticoagulation. Patient chose anticoagulation, and he was started on rivaroxaban 20 mg daily x 3 months. He was recommended to get a repeat US after completing treatment, but this was not done. He discontinued the anticoagulation after 1 month due to symptoms of extreme fatigue.     Patient has no other personal history of pulmonary embolism or DVT. He has 6 sisters and 3 brothers with no VTE history. Parent's without history of VTE. He denies any subsequent problems with lower leg swelling or chest pain/shortness of breath in the interim.     He is pursuing bariatric surgery, probable gastric sleeve, with tentative plans for surgery in January/February. He has plans to meet with the surgeon next month.     No history of varicose veins. He does have compression stockings at home, medical grade, that he uses periodically for his gout flares.     He is not currently on any anticoagulation medications.     Past Medical History:  No past medical history on file.    Past Surgical History:  Past Surgical History:   Procedure Laterality Date    VASECTOMY N/A 2020       Medications:  Current Outpatient Medications   Medication Sig Dispense Refill    allopurinol (ZYLOPRIM) 300 MG tablet TAKE 1 TABLET BY MOUTH EVERY DAY 90 tablet 1    Cholecalciferol (VITAMIN D3) 75 MCG (3000 UT) TABS Take 2 tablets by mouth daily 120 tablet 0    colchicine (COLCYRS) 0.6 MG tablet Take 1 tablet (0.6 mg) by mouth daily 90 tablet 3    losartan (COZAAR) 100 MG tablet Take 1 tablet (100 mg) by mouth daily 90 tablet 3    predniSONE (DELTASONE) 20 MG tablet Take 3 tabs by mouth daily x 3 days, then 2 tabs daily x 3 days, then 1 tab daily x 3 days, then 1/2 tab daily x 3 days. 20 tablet 0    Semaglutide-Weight Management (WEGOVY) 1 MG/0.5ML pen Inject 1 mg Subcutaneous every 7 days 2 mL 3    Semaglutide-Weight Management (WEGOVY) 1.7 MG/0.75ML pen Inject 1.7 mg  Subcutaneous every 7 days 3 mL 3        Allergies:  Allergies   Allergen Reactions    Cats Other (See Comments)    No Clinical Screening - See Comments Unknown     seasonal    Seasonal Allergies        ROS:  Remainder of a comprehensive 14 point ROS is negative unless noted above.    Social History:  Patient works as an . Desk/computer job.  History of cigar use, x 10 years, 1 cigar weekly. No significant alcohol use. Denies any illicit drug use.     Family History:  6 sisters, 3 brothers - no VTE hx  Parents - no VTE hx    Objectives:  BP (!) 152/84 (BP Location: Right arm, Patient Position: Sitting, Cuff Size: Adult Large)   Pulse 74   Temp 97.9  F (36.6  C) (Tympanic)   Wt (!) 166.5 kg (367 lb)   SpO2 97%   BMI 50.47 kg/m    Exam:   Constitutional: Awake, alert, cooperative, in NAD.   Eyes: EOMI, sclera clear, conjunctiva normal.   ENT: Normocephalic, without obvious abnormality, moist mucus membranes   Respiratory: Non-labored breathing.   Cardiovascular: no edema, warm well perfused   Skin: No concerning lesions or rash on exposed areas.   Musculoskeletal: Normal bulk.   Neurologic: Awake, alert & oriented x 3, clear speech, moves all 4 extremities   Psych: appropriate affect     Labs:  Last Comprehensive Metabolic Panel:  Sodium   Date Value Ref Range Status   02/27/2023 142 136 - 145 mmol/L Final   12/13/2013 143 133 - 144 mmol/L Final     Potassium   Date Value Ref Range Status   02/27/2023 3.6 3.4 - 5.3 mmol/L Final   12/13/2013 4.2 3.4 - 5.3 mmol/L Final     Chloride   Date Value Ref Range Status   02/27/2023 104 98 - 107 mmol/L Final   12/13/2013 105 94 - 109 mmol/L Final     Carbon Dioxide   Date Value Ref Range Status   12/13/2013 30 20 - 32 mmol/L Final     Carbon Dioxide (CO2)   Date Value Ref Range Status   02/27/2023 25 22 - 29 mmol/L Final     Anion Gap   Date Value Ref Range Status   02/27/2023 13 7 - 15 mmol/L Final   12/13/2013 9 6 - 17 mmol/L Final     Glucose   Date Value Ref  Range Status   02/27/2023 66 (L) 70 - 99 mg/dL Final   12/13/2013 90 60 - 99 mg/dL Final     Urea Nitrogen   Date Value Ref Range Status   02/27/2023 11.4 6.0 - 20.0 mg/dL Final   12/13/2013 12 5 - 24 mg/dL Final     Creatinine   Date Value Ref Range Status   02/27/2023 0.99 0.67 - 1.17 mg/dL Final   12/13/2013 1.20 0.66 - 1.25 mg/dL Final     GFR Estimate   Date Value Ref Range Status   02/27/2023 >90 >60 mL/min/1.73m2 Final     Comment:     eGFR calculated using 2021 CKD-EPI equation.   12/13/2013 72 >60 mL/min/1.7m2 Final     Calcium   Date Value Ref Range Status   02/27/2023 9.3 8.6 - 10.0 mg/dL Final   12/13/2013 9.1 8.5 - 10.4 mg/dL Final     Bilirubin Total   Date Value Ref Range Status   02/27/2023 0.3 <=1.2 mg/dL Final     Alkaline Phosphatase   Date Value Ref Range Status   02/27/2023 78 40 - 129 U/L Final     ALT   Date Value Ref Range Status   02/27/2023 19 10 - 50 U/L Final   09/17/2012 47 0 - 70 U/L Final     AST   Date Value Ref Range Status   02/27/2023 23 10 - 50 U/L Final     Imaging:  US LLE 9/13/21 - There is deep vein thrombosis which is acute nonocclusive appearing from mid calf to the ankle. The common femoral, profunda femoral, femoral and popliteal veins as well as the peroneal veins and proximal posterior tibial veins are patent and compressible. No superficial thrombophlebitis. No popliteal cyst.     US LLE 12/17/21 - Chronic DVT of one left posterior tibial vein from the proximal to mid calf.     CTA chest 1/12/22 - No pulmonary embolism.     Attestation Note:  Patient was seen and evaluated by this writer along with Lily KEVIN (physician assistant student). Agree with the assessment and plan above.     Darshan is a 38 year old male with a history of morbid obesity and unprovoked left distal leg DVT back in 2021, referred to our clinic for pre-bariatric surgery hematological consultation. Darshan's right distal leg DVT back in 2021 might or might not have been associated with his gout  "flare at the time. Otherwise, this left distal leg DVT was an unprovoked or at most a minimally provoked venous thromboembolic event. At the time, he did not appropriately treated for the DVT as the patient self discontinue anticoagulation after only about 10 days of therapy due to report of \"side effects\" in form of fatigue, chest pain and shortness of breath. Thus it was not surprising that his repeat venous ultrasound back in Dec 2021 showed chronic residual thrombus.     Today we discuss that based on current American Society of Hematology (MEI) guidelines, because of the unprovoked nature of his DVT in 2021, he should consider staying on indefinite anticoagulation therapy. However, the patient strongly against this recommendation and adamantly stated that he will not go on indefinite anticoagulation therapy. Thus, we educated him on signs and symptoms of DVT/PE and instructed him to be vigilant about these signs and symptoms and seek immediate medical attention.     In regard to his bariatric surgery, he is cleared from a hematological standpoint to proceed with planned bariatric surgery. Given his history of DVT, it is our recommendation that he should be on pharmacological DVT/PE prophylaxis for 7 days post operatively. This can be easily achieved by using enoxaparin at 40 mg SubQ Q 24 hours starting the day after his surgery assuming that he has no bleeding complications and continue enoxaparin for 7 days post operatively. I will defer to bariatric surgery team to prescribe this at the time of the patient discharge from the hospital.    At this time, we have no plans to see this patient back on a routine basis.   Thank you for letting us to participate in this patient's care.     50 minutes spent by me on the date of the encounter doing chart review, history and exam, documentation and further activities per the note.    Time IN: 11:31  Time OUT: 12:00      Philip Alamo PA-C, ELZAS  Physician " Assistant  [facility].

## 2023-10-23 NOTE — LETTER
[facility]  [address] [place] [id number]  Phone: [phone], Fax: [phone]    Outpatient Visit Note:    Patient: Bigg Montez  MRN: [id number]  : 1985  DANNIE: Oct 23, 2023    Reason for Consultation:  Bigg Montez is a referred by Brianda Chan NP for anticoagulation recommendations and clearance prior to bariatric surgery.    Assessment/Plan:   In summary, Bigg Montez is a 38 year old man with a past medical history of morbid obesity, gout, and DVT who presents for hematology clearance and anticoagulation recommendation by Brianda Chan NP prior to bariatric surgery.    He has no clear provoking history for his DVT, therefore we would categorize his DVT as unprovoked. There is no family history of VTE, so genetic testing is not indicated at this time. Discussed that based on MEI guidelines he should remain on lifelong anticoagulation. We reviewed anticoagulation options including DOACs, warfarin, and LMWH. He has not tolerated oral anticoagulation, both apixaban and rivaroxaban, due to side effects. He is ultimately not interested in going on chronic anticoagulation.     In regards to surgery, patient has a calculated high risk caprini score indicating need for pharmacologic prophylaxis. Would recommend LMWH, enoxaparin 40 mg to be initiated one day post op if no bleeding complications for 7 days total. He can additionally use his compression stockings for 7 days after surgery. He should remove compression stockings at night.     He was counseled on the signs and symptoms of DVTs and pulmonary embolisms.     The patient is given our center's contact information and is instructed to call if he should have any further questions or concerns.  Otherwise, we will plan on seeing him back as needed.      Patient understands and agrees with the above plan and recommendation.    Ashley Dodge,  PA-S    ----------------------------------------------------------------------------------------------------------------------  History of Present Illness:  Bigg Montez is a 38 year old man with a past medical history of morbid obesity, gout, and DVT who presents for hematology clearance and anticoagulation recommendation by Brianda Chan NP prior to bariatric surgery.    09/13/2021, DVT of left lower extremity diagnosed in urgent care. He presented with 9 days of symptoms involving left lower extremity. He has history of recurrent gout attacks in his feet. He gets flare-ups intermittently and he uses prednisone to treat them. He originally had symptoms of left lower leg numbness and swelling that he attributed to gout. He tried to treat with colchicine, but symptoms worsened. He noticed significant swelling of both left foot and calf/ankle with pain. Due to these symptoms he went to urgent care and he had ultrasound done that showed distal DVT of posterior tibial veins from mid calf to ankle. He was started on treatment with apixaban. He took the medicine for about 10 days. Two days after starting the medicine, the patient noticed shortness of breath as well as chest pain. It felt as if there was heavy pressure on his chest. He attributed his symptoms to apixaban and took himself of off of the medicine. His symptoms resolved after stopping the medicine. He states by day 10 his leg felt back to normal with swelling and pain gone.     He does believe he had a flare of gout concurrently with the DVT, but is unsure of which started first.     He denies any long travel, surgery, illness, trauma, or decrease in activity prior to the event.     12/17/21 Repeat US showed chronic DVT of left posterior tibial vein from the proximal calf to the mid calf.    1/10/2022 - telemedicine visit with heme/onc Renea Ro. He reported persistent shortness of breath with activity at this visit, so a CT chest was  ordered to rule out chronic PE, which was negative. Discussed monitoring chronic DVT vs anticoagulation. Patient chose anticoagulation, and he was started on rivaroxaban 20 mg daily x 3 months. He was recommended to get a repeat US after completing treatment, but this was not done. He discontinued the anticoagulation after 1 month due to symptoms of extreme fatigue.     Patient has no other personal history of pulmonary embolism or DVT. He has 6 sisters and 3 brothers with no VTE history. Parent's without history of VTE. He denies any subsequent problems with lower leg swelling or chest pain/shortness of breath in the interim.     He is pursuing bariatric surgery, probable gastric sleeve, with tentative plans for surgery in January/February. He has plans to meet with the surgeon next month.     No history of varicose veins. He does have compression stockings at home, medical grade, that he uses periodically for his gout flares.     He is not currently on any anticoagulation medications.     Past Medical History:  No past medical history on file.    Past Surgical History:  Past Surgical History:   Procedure Laterality Date     VASECTOMY N/A 2020       Medications:  Current Outpatient Medications   Medication Sig Dispense Refill     allopurinol (ZYLOPRIM) 300 MG tablet TAKE 1 TABLET BY MOUTH EVERY DAY 90 tablet 1     Cholecalciferol (VITAMIN D3) 75 MCG (3000 UT) TABS Take 2 tablets by mouth daily 120 tablet 0     colchicine (COLCYRS) 0.6 MG tablet Take 1 tablet (0.6 mg) by mouth daily 90 tablet 3     losartan (COZAAR) 100 MG tablet Take 1 tablet (100 mg) by mouth daily 90 tablet 3     predniSONE (DELTASONE) 20 MG tablet Take 3 tabs by mouth daily x 3 days, then 2 tabs daily x 3 days, then 1 tab daily x 3 days, then 1/2 tab daily x 3 days. 20 tablet 0     Semaglutide-Weight Management (WEGOVY) 1 MG/0.5ML pen Inject 1 mg Subcutaneous every 7 days 2 mL 3     Semaglutide-Weight Management (WEGOVY) 1.7 MG/0.75ML pen Inject  1.7 mg Subcutaneous every 7 days 3 mL 3        Allergies:  Allergies   Allergen Reactions     Cats Other (See Comments)     No Clinical Screening - See Comments Unknown     seasonal     Seasonal Allergies        ROS:  Remainder of a comprehensive 14 point ROS is negative unless noted above.    Social History:  Patient works as an . Desk/computer job.  History of cigar use, x 10 years, 1 cigar weekly. No significant alcohol use. Denies any illicit drug use.     Family History:  6 sisters, 3 brothers - no VTE hx  Parents - no VTE hx    Objectives:  BP (!) 152/84 (BP Location: Right arm, Patient Position: Sitting, Cuff Size: Adult Large)   Pulse 74   Temp 97.9  F (36.6  C) (Tympanic)   Wt (!) 166.5 kg (367 lb)   SpO2 97%   BMI 50.47 kg/m    Exam:   Constitutional: Awake, alert, cooperative, in NAD.   Eyes: EOMI, sclera clear, conjunctiva normal.   ENT: Normocephalic, without obvious abnormality, moist mucus membranes   Respiratory: Non-labored breathing.   Cardiovascular: no edema, warm well perfused   Skin: No concerning lesions or rash on exposed areas.   Musculoskeletal: Normal bulk.   Neurologic: Awake, alert & oriented x 3, clear speech, moves all 4 extremities   Psych: appropriate affect     Labs:  Last Comprehensive Metabolic Panel:  Sodium   Date Value Ref Range Status   02/27/2023 142 136 - 145 mmol/L Final   12/13/2013 143 133 - 144 mmol/L Final     Potassium   Date Value Ref Range Status   02/27/2023 3.6 3.4 - 5.3 mmol/L Final   12/13/2013 4.2 3.4 - 5.3 mmol/L Final     Chloride   Date Value Ref Range Status   02/27/2023 104 98 - 107 mmol/L Final   12/13/2013 105 94 - 109 mmol/L Final     Carbon Dioxide   Date Value Ref Range Status   12/13/2013 30 20 - 32 mmol/L Final     Carbon Dioxide (CO2)   Date Value Ref Range Status   02/27/2023 25 22 - 29 mmol/L Final     Anion Gap   Date Value Ref Range Status   02/27/2023 13 7 - 15 mmol/L Final   12/13/2013 9 6 - 17 mmol/L Final     Glucose   Date  Value Ref Range Status   02/27/2023 66 (L) 70 - 99 mg/dL Final   12/13/2013 90 60 - 99 mg/dL Final     Urea Nitrogen   Date Value Ref Range Status   02/27/2023 11.4 6.0 - 20.0 mg/dL Final   12/13/2013 12 5 - 24 mg/dL Final     Creatinine   Date Value Ref Range Status   02/27/2023 0.99 0.67 - 1.17 mg/dL Final   12/13/2013 1.20 0.66 - 1.25 mg/dL Final     GFR Estimate   Date Value Ref Range Status   02/27/2023 >90 >60 mL/min/1.73m2 Final     Comment:     eGFR calculated using 2021 CKD-EPI equation.   12/13/2013 72 >60 mL/min/1.7m2 Final     Calcium   Date Value Ref Range Status   02/27/2023 9.3 8.6 - 10.0 mg/dL Final   12/13/2013 9.1 8.5 - 10.4 mg/dL Final     Bilirubin Total   Date Value Ref Range Status   02/27/2023 0.3 <=1.2 mg/dL Final     Alkaline Phosphatase   Date Value Ref Range Status   02/27/2023 78 40 - 129 U/L Final     ALT   Date Value Ref Range Status   02/27/2023 19 10 - 50 U/L Final   09/17/2012 47 0 - 70 U/L Final     AST   Date Value Ref Range Status   02/27/2023 23 10 - 50 U/L Final     Imaging:  US LLE 9/13/21 - There is deep vein thrombosis which is acute nonocclusive appearing from mid calf to the ankle. The common femoral, profunda femoral, femoral and popliteal veins as well as the peroneal veins and proximal posterior tibial veins are patent and compressible. No superficial thrombophlebitis. No popliteal cyst.     US LLE 12/17/21 - Chronic DVT of one left posterior tibial vein from the proximal to mid calf.     CTA chest 1/12/22 - No pulmonary embolism.     Attestation Note:  Patient was seen and evaluated by this writer along with Lily KEVIN (physician assistant student). Agree with the assessment and plan above.     Darshan is a 38 year old male with a history of morbid obesity and unprovoked left distal leg DVT back in 2021, referred to our clinic for pre-bariatric surgery hematological consultation. Darshan's right distal leg DVT back in 2021 might or might not have been associated with his  "gout flare at the time. Otherwise, this left distal leg DVT was an unprovoked or at most a minimally provoked venous thromboembolic event. At the time, he did not appropriately treated for the DVT as the patient self discontinue anticoagulation after only about 10 days of therapy due to report of \"side effects\" in form of fatigue, chest pain and shortness of breath. Thus it was not surprising that his repeat venous ultrasound back in Dec 2021 showed chronic residual thrombus.     Today we discuss that based on current American Society of Hematology (MEI) guidelines, because of the unprovoked nature of his DVT in 2021, he should consider staying on indefinite anticoagulation therapy. However, the patient strongly against this recommendation and adamantly stated that he will not go on indefinite anticoagulation therapy. Thus, we educated him on signs and symptoms of DVT/PE and instructed him to be vigilant about these signs and symptoms and seek immediate medical attention.     In regard to his bariatric surgery, he is cleared from a hematological standpoint to proceed with planned bariatric surgery. Given his history of DVT, it is our recommendation that he should be on pharmacological DVT/PE prophylaxis for 7 days post operatively. This can be easily achieved by using enoxaparin at 40 mg SubQ Q 24 hours starting the day after his surgery assuming that he has no bleeding complications and continue enoxaparin for 7 days post operatively. I will defer to bariatric surgery team to prescribe this at the time of the patient discharge from the hospital.    At this time, we have no plans to see this patient back on a routine basis.   Thank you for letting us to participate in this patient's care.     50 minutes spent by me on the date of the encounter doing chart review, history and exam, documentation and further activities per the note.    Time IN: 11:31  Time OUT: 12:00      Philip Alamo PA-C, ELZAS  Physician " Assistant  [facility].

## 2023-10-30 ENCOUNTER — TELEPHONE (OUTPATIENT)
Dept: ENDOCRINOLOGY | Facility: CLINIC | Age: 38
End: 2023-10-30
Payer: COMMERCIAL

## 2023-10-30 NOTE — TELEPHONE ENCOUNTER
Prior Authorization Approval    Medication: WEGOVY 1.7 MG/0.75ML SC SOAJ  Authorization Effective Date: 9/30/2023  Authorization Expiration Date: 10/29/2024  Approved Dose/Quantity: 3ml/28 days   Reference #: [id number]   Insurance Company: [employer] - Phone [phone] Fax [phone]  Expected CoPay: $    CoPay Card Available:      Financial Assistance Needed: no  Which Pharmacy is filling the prescription:    Pharmacy Notified: no  Patient Notified: no

## 2023-10-30 NOTE — TELEPHONE ENCOUNTER
PA Initiation    Medication: WEGOVY 1.7 MG/0.75ML SC SOAJ  Insurance Company: [employer] - Phone [phone] Fax [phone]  Pharmacy Filling the Rx:    Filling Pharmacy Phone:    Filling Pharmacy Fax:    Start Date: 10/30/2023    Key: [id number]

## 2023-11-01 ENCOUNTER — CARE COORDINATION (OUTPATIENT)
Dept: ENDOCRINOLOGY | Facility: CLINIC | Age: 38
End: 2023-11-01
Payer: COMMERCIAL

## 2023-11-01 DIAGNOSIS — G47.30 SLEEP APNEA: ICD-10-CM

## 2023-11-01 DIAGNOSIS — E66.01 MORBID OBESITY (H): Primary | ICD-10-CM

## 2023-11-01 DIAGNOSIS — Z00.00 PREVENTATIVE HEALTH CARE: ICD-10-CM

## 2023-11-01 NOTE — PROGRESS NOTES
Tasklist updated and sent to patient via [employer].    Bariatric Task List  Fax:  Please fax all paperwork to: [phone] -     Status:  Is patient a candidate for bariatric surgery?:  patient is a candidate for bariatric surgery -     Cleared to schedule surgeon consult?:  cleared to schedule surgeon consult - Call [phone] to schedule. bks   Status:  surgery evaluation in process -     Surgeon: Karey  -     Tentative surgery month/year: To be determined. -        Insurance: Insurance:  [facility] -        Patient Info: Initial Weight:  386 -     Date of Initial Weight/Height:  12/20/2018 -  consult 1/2021 with weight 378, started weight loss with primary care    Goal Weight (lbs):  348 -     Required Weight Loss:  38 -     Surgery Type:  sleeve gastrectomy -        Dietician Visits: Structured weight loss required by insurance?:  structured weight loss required -     Dietician Visit 1:  Completed - 3/28/23 KB   Dietician Visit 2:  Completed - 4/25/23 KB   Dietician Visit 3:  Completed - 5/16/23 KB   Dietician Visit 4:  Completed - 6/13/23 KB   Dietician Visit 5:  Completed - 7/18/23 KB   Dietician Visit 6:  Completed - 8/29/23 bks   Dietician Visit additional:  Needed - monthly until surgery for weight loss and postop diet teaching. bks   Dietician Notes:  RD Visits: 9/18/23, 10/23/23, 11/20/23 appt. bks -        Psychological Evaluation: Psych eval:  Needed - List and letter sent to pt 12/16/21 -AS; Dr Romana Villarreal appt 11/17/23      Lab Work: Complete Blood Count:  Needed - Ordered 10/23/23. bks   Comprehensive Metabolic Panel:  Completed - Ordered 12/16/21 - AS; 2/27/23 bks   Vitamin D:  Completed - Ordered 12/16/21 - AS; 2/27/23 bks   PTH:  Needed - Ordered 10/23/23. bks   Hgb A1c:  Completed - Ordered 12/16/21 - AS; 2/27/23 bks    Lipids: Completed - 2/27/23 bks    TSH ([facility], [facility], [place]): Completed - 2/27/23 bks   Vitamin A: Needed - Ordered 10/23/23. bks      Consults/ Clearance Sleep Medicine:  Needed  "- 11/1/23 Sleep referral entered. [facility]   Hematology:  Completed - hx of blood clot- 9/2021; 10/23/23 Dr Alamo Would recommend LMWH, enoxaparin 40mg initated one day postop  if no bleeding complications for 7 days total, to use compression stocking for 7 days after surgery (remove stockings at night. s   Medical Weight Management:   -  Ongoing with ene Chan CNP      PCP: Establish care with PCP:  Completed -     Follow up with PCP:    -     PCP letter of support:  Needed - 11/1/23 Referral entered for Family Practice (they do primary care). bks  Call [phone] if you do not hear from them in 2 business days.  You could ask about [facility] at [address] [place] [address], ph: [phone]      Stopping Smoking/ Alcohol Use/Cannabis Use: Quit tobacco use (3 months smoke free)?:  Needed -    Quit date:    - cigars - let us know your quit date. [facility]      Patient Education:  Information Session:  Completed -     Attended New Consult Class?: Needed -     Given \"Making your decision\" handout?:  Yes -     Given \"A Roadmap to you Weight Loss Surgery\" handout?: Yes -     Given \"Epic Care Companion\" information?: Yes -     Attended support group?:  Needed -     Support plan in place?:  Needed -        Final Tasks to complete after surgery is set up:  Before surgery online preop class:  Needed -     Nurse visit for information:  Needed -     Weight Check: Needed -     History and Physical: Pre-Assessment Clinic (PAC) with anesthesia team -     Final labs per clinic: Needed -     Schedule postop appointments: Needed -     Other:   -   -        Notes: Please register for the Weight Loss Surgery Care Companion Pathway through the [employer] mobile eze or via web browser. You register by answering \"yes\" to the following question, \"Do you agree to take part in this free, online program?\"  Information from this Pathway can help you be more successful before surgery and for up to one year " after surgery.  This Pathway through [employer] can also answer questions you may have about your surgery.   The Pathway will start when you get your Tasklist after your initial consultation or when your surgery is scheduled.   -

## 2023-11-17 ENCOUNTER — CARE COORDINATION (OUTPATIENT)
Dept: SLEEP MEDICINE | Facility: CLINIC | Age: 38
End: 2023-11-17

## 2023-11-17 ENCOUNTER — VIRTUAL VISIT (OUTPATIENT)
Dept: ENDOCRINOLOGY | Facility: CLINIC | Age: 38
End: 2023-11-17
Payer: COMMERCIAL

## 2023-11-17 VITALS — HEIGHT: 71 IN | BODY MASS INDEX: 44.1 KG/M2 | WEIGHT: 315 LBS

## 2023-11-17 DIAGNOSIS — E66.813 CLASS 3 SEVERE OBESITY DUE TO EXCESS CALORIES WITH SERIOUS COMORBIDITY AND BODY MASS INDEX (BMI) OF 50.0 TO 59.9 IN ADULT (H): Primary | ICD-10-CM

## 2023-11-17 DIAGNOSIS — E66.01 CLASS 3 SEVERE OBESITY DUE TO EXCESS CALORIES WITH SERIOUS COMORBIDITY AND BODY MASS INDEX (BMI) OF 50.0 TO 59.9 IN ADULT (H): Primary | ICD-10-CM

## 2023-11-17 PROCEDURE — 99214 OFFICE O/P EST MOD 30 MIN: CPT | Mod: 95 | Performed by: NURSE PRACTITIONER

## 2023-11-17 RX ORDER — SEMAGLUTIDE 2.4 MG/.75ML
2.4 INJECTION, SOLUTION SUBCUTANEOUS
Qty: 3 ML | Refills: 3 | Status: SHIPPED | OUTPATIENT
Start: 2023-11-17 | End: 2024-02-27

## 2023-11-17 ASSESSMENT — PAIN SCALES - GENERAL: PAINLEVEL: NO PAIN (0)

## 2023-11-17 NOTE — PATIENT INSTRUCTIONS
"Thank you for allowing us the privilege of caring for you. We hope we provided you with the excellent service you deserve.   Please let us know if there is anything else we can do for you so that we can be sure you are completely satisfied with your care experience.    To ensure the quality of our services you may be receiving a patient satisfaction survey from an independent patient satisfaction monitoring company.    The greatest compliment you can give is a \"Likely to Recommend\"    Your visit was with Brianda Chan NP today.    Instructions per today's visit:     Paul Montez, it was great to visit with you today.  Here is a review of our visit.  If our clinic scheduler is not able to reach you please call [phone] to schedule your next appointments.    Leave poly cup in kitchen when you are going to eat   Keep practicing eating slower  Try to get back to the table  Continue wegovy 2.4mg   Great work with hydration   Follow up 2/2024       Information about Video Visits with [facility]: video visit information  _________________________________________________________________________________________________________________________________________________________  If you are asked by your clinic team to have your blood pressure checked:  [facility] do offer several locations for blood pressure checks. Please follow the below link to schedule an appointment. Scheduling an appointment at the pharmacy for a blood pressure check is now preferred.    Appointment Plus (appointment-plus.[employer])  _________________________________________________________________________________________________________________________________________________________  Important contact and scheduling information:  Please call our contact center at [phone] to schedule your next appointments.  To find a lab location near you, please call [phone].  For any nursing questions or concerns call Shey Valladarse LPN at " [phone] or Brandi Cazares RN at [phone]  Please call during clinic hours Monday through Friday 8:00a - 4:00p if you have questions or you can contact us via [employer] at anytime and we will reply during clinic hours.    Lab results will be communicated through My Chart or letter (if My Chart not used). Please call the clinic if you have not received communication after 1 week or if you have any questions.?  Clinic Fax: [phone]    _________________________________________________________________________________________________________________________________________________________  Meal Replacement Products:    Here is the link to our new e-store where you can purchase our meal replacement products    [facility] E-Store  [email or web address]    The one week starter kit is a great way to sample a variety of products and see what works for you.    If you want more information about the product go to: [employer]    If you are an employee or [facility] or [facility] please contact your care team for a 10% estore discount    Free Shipping for orders over $75     Benefits of meal replacements products:    Portion and calorie control  Improved nutrition  Structured eating  Simplified food choices  Avoid contact with trigger foods  _________________________________________________________________________________________________________________________________________________________  Interested in working with a health ?  Health coaches work with you to improve your overall health and wellbeing.  They look at the whole person, and may involve discussion of different areas of life, including, but not limited to the four pillars of health (sleep, exercise, nutrition, and stress management). Discuss with your care team if you would like to start working a health .  Health Coaching-3 Pack: Schedule by calling [phone]    $99 for three  health coaching visits    Visits may be done in person or via phone    Coaching is a partnership between the  and the client; Coaches do not prescribe or diagnose    Coaching helps inspire the client to reach his/her personal goals   _________________________________________________________________________________________________________________________________________________________  24 Week Healthy Lifestyle Plan:    Our mission in the 24-week Healthy Lifestyle Plan is to provide you with individualized care by giving you the tools, education and support you need to lose weight and maintain a healthy lifestyle. In your 24-week journey, you ll be supported by a dedicated weight loss team that includes registered dietitians, medical weight management providers, health coaches, and nurses -- all with special expertise in weight loss -- to help you every step of the way.     Monthly meetings with your registered dietician or medical weight management provider help to review your progress, update your care plan, and make any adjustments needed to ensure success. Between these visits, weekly and bi-weekly health  visits will help you focus on the four pillars of weight loss -- stress, sleep, nutrition, and exercise -- and how you can best adapt each to achieve sustainable weight loss results.    In addition, you will be given exclusive access to online wellbeing classes through [employer].  Your initial visit will be with a medical weight management provider who will help to understand your weight loss goals and ensure this program is the right fit for you. Please let our team know if you are interested in the 24 week plan by sending a message to your care team or calling [phone] to schedule.  _________________________________________________________________________________________________________________________________________________________  __________  [facility] Get Moving  Program  Our team of physical therapists is trained to help you understand and take control of your condition. They will perform a thorough evaluation to determine your ability for activity and develop a customized plan to fit your goals and physical ability.  Scheduling: Unsure if the Get Moving program is right for you? Discuss the program with your medical provider or diabetes educator. You can also call us at [phone] to ask questions or schedule an appointment.   [facility] Get Moving Program  ____________________________________________________________________________________________________________________________________________________________________________  [facility] Diabetes Prevention Program (DPP)  If you have prediabetes and Medicare please contact us via [employer] to learn more about the Diabetes Prevention Program (DPP)  Program Details:  [facility] offers the year-long Diabetes Prevention Program (DPP). The program helps you to make lifestyle changes that prevent or delay type 2 diabetes by supporting healthy eating, increased physical activity, stress reduction and use of coping skills.   On average, previous [facility] DPP cohorts have lost and maintained at least 5% of their starting weight throughout the program and averaged more than 150 minutes of physical activity per week.  Participants meet weekly for one-hour group sessions over sixteen weeks, every other week for the next 8 weeks, and monthly for the last six months.   A year-long maintenance program is also available for participants who complete the first year.   Location & Cost:   During the COVID-19 Public Health Emergency, the program is offered virtually. When in-person classes can resume, they will be held at [facility].  For people with Medicare, the program is covered in full. A self-pay option will also be available for those with non-Medicare insurance plans.    ______________________________________________________________________________________________________________________________________________________________________________________________________________________________    To work with a Behavioral Health Psychologist:    Call to schedule:    Alton Pierre - [phone]  Elisa Rose - [phone]  Ashlie Portillo - [phone]  Debi Gomes - [phone]   Aracelis Carlson PhD (cannot accept Medicare) [phone]        Thank DUSTIN jones [facility] Comprehensive Weight Management Team

## 2023-11-17 NOTE — PROGRESS NOTES
Pre bariatric surgery Note     Bigg Montez  MRN:  [id number]  :  1985  DANNIE:  2023    Dear Physician No Ref-Primary,    I had the pleasure of seeing your patient Bigg Montez. He is a 38 year old male who I am continuing to see for treatment of obesity related to:        2021    11:14 PM   --   I have the following health issues associated with obesity High Blood Pressure       Assessment & Plan   Problem List Items Addressed This Visit        Digestive    Class 3 severe obesity due to excess calories with serious comorbidity and body mass index (BMI) of 50.0 to 59.9 in adult (H) - Primary     Great progress with weight loss and behavior modifications. Also working on clearances. Feeling good about his progress. No adverse side effects with wegovy so we'll continue that for now.     Leave poly cup in kitchen when you are going to eat   Keep practicing eating slower  Try to get back to the table for meals   Continue wegovy 2.4mg   Great work with hydration   Follow up 2024          Relevant Medications    Semaglutide-Weight Management (WEGOVY) 2.4 MG/0.75ML pen          INTERVAL HISTORY:  NBS 2021 BMI 51.9 (378lb)   Last seen 2023 increase wegovy from 1mg.    Concerns for Dr. Eden- patient does not want to residents/ students operating on him - Would like to get surgery in 2024    Struggling with slowing down and  meals from liquids- actively practicing     anti-obesity medication history    Current:   wegovy 2.4mg started 10/30/2023- less side effects at 2.4mg (less bloating and gas). Less cravings and food noise. Able to be intentional about eating at home more     Recent diet changes:   Eating at home more   Increased hydration (habit now)   Struggling to separate meals from liquids   Craves rice     Recent exercise/activity changes: less joint pain    Would love to get back to being able to go dnwn more than 1 step at a time     Recent stressors: family  really busy   Works with therapist regularly     Recent sleep changes: CPAP readings are improving - more energy     Vitamins/Labs: repeat labs due before surgery     Tasks:  Psych- scheduled today   PCP- having hard time getting this scheduled   Hematology- anticoag plan with hx of DVT and PE - 10/23/2023- Philip Alamo PA-C  Sleep- was going to see if new PCP would clear given he already has CPAP and is helpful - scheduled 11/20/2023 with sleep medicine   RD   Scheduled with Dr. Eden     Quit smoking cigars beginning of this year (1/2023)- lost a community with this as this was social     CURRENT WEIGHT:   354 lbs 0 oz    Initial Weight (lbs): 378 lbs  Last Visits Weight: 166.5 kg (367 lb)  Cumulative weight loss (lbs): 24  Weight Loss Percentage: 6.35%        11/16/2023     8:57 AM   Changes and Difficulties   I have made the following changes to my diet since my last visit: Eliminating complex calorie meals and processed foods   With regards to my diet, I am still struggling with: Not struggling   I have made the following changes to my activity/exercise since my last visit: I have been walking everyday for at least 30 mins   With regards to my activity/exercise, I am still struggling with: Na         MEDICATIONS:   Current Outpatient Medications   Medication Sig Dispense Refill     Semaglutide-Weight Management (WEGOVY) 2.4 MG/0.75ML pen Inject 2.4 mg Subcutaneous every 7 days 3 mL 3     allopurinol (ZYLOPRIM) 300 MG tablet TAKE 1 TABLET BY MOUTH EVERY DAY 90 tablet 1     Cholecalciferol (VITAMIN D3) 75 MCG (3000 UT) TABS Take 2 tablets by mouth daily 120 tablet 0     colchicine (COLCYRS) 0.6 MG tablet Take 1 tablet (0.6 mg) by mouth daily 90 tablet 3     losartan (COZAAR) 100 MG tablet Take 1 tablet (100 mg) by mouth daily 90 tablet 3     predniSONE (DELTASONE) 20 MG tablet Take 3 tabs by mouth daily x 3 days, then 2 tabs daily x 3 days, then 1 tab daily x 3 days, then 1/2 tab daily x 3 days. 20 tablet 0            11/16/2023     8:57 AM   Weight Loss Medication History Reviewed With Patient   Which weight loss medications are you currently taking on a regular basis? Wegovy   Are you having any side effects from the weight loss medication that we have prescribed you? No       Office Visit on 02/27/2023   Component Date Value Ref Range Status     Cholesterol 02/27/2023 163  <200 mg/dL Final     Triglycerides 02/27/2023 94  <150 mg/dL Final     Direct Measure HDL 02/27/2023 44  >=40 mg/dL Final     LDL Cholesterol Calculated 02/27/2023 100  <=100 mg/dL Final     Non HDL Cholesterol 02/27/2023 119  <130 mg/dL Final     Sodium 02/27/2023 142  136 - 145 mmol/L Final     Potassium 02/27/2023 3.6  3.4 - 5.3 mmol/L Final     Chloride 02/27/2023 104  98 - 107 mmol/L Final     Carbon Dioxide (CO2) 02/27/2023 25  22 - 29 mmol/L Final     Anion Gap 02/27/2023 13  7 - 15 mmol/L Final     Urea Nitrogen 02/27/2023 11.4  6.0 - 20.0 mg/dL Final     Creatinine 02/27/2023 0.99  0.67 - 1.17 mg/dL Final     Calcium 02/27/2023 9.3  8.6 - 10.0 mg/dL Final     Glucose 02/27/2023 66 (L)  70 - 99 mg/dL Final     Alkaline Phosphatase 02/27/2023 78  40 - 129 U/L Final     AST 02/27/2023 23  10 - 50 U/L Final     ALT 02/27/2023 19  10 - 50 U/L Final     Protein Total 02/27/2023 7.2  6.4 - 8.3 g/dL Final     Albumin 02/27/2023 4.2  3.5 - 5.2 g/dL Final     Bilirubin Total 02/27/2023 0.3  <=1.2 mg/dL Final     GFR Estimate 02/27/2023 >90  >60 mL/min/1.73m2 Final    eGFR calculated using 2021 CKD-EPI equation.     Hemoglobin A1C 02/27/2023 5.7 (H)  0.0 - 5.6 % Final    Normal <5.7%   Prediabetes 5.7-6.4%    Diabetes 6.5% or higher     Note: Adopted from ADA consensus guidelines.     Vitamin D, Total (25-Hydroxy) 02/27/2023 12 (L)  20 - 75 ug/L Final     TSH 02/27/2023 1.95  0.30 - 4.20 uIU/mL Final     Uric Acid 02/27/2023 6.0  3.4 - 7.0 mg/dL Final           6/1/2012     9:00 AM   CHEL Score (Last Two)   CHEL Raw Score 33   Activation Score 41.7  "  CHEL Level 1         PHYSICAL EXAM:  Objective    Ht 1.816 m (5' 11.5\")   Wt (!) 160.6 kg (354 lb)   BMI 48.69 kg/m               GENERAL: Healthy, alert and no distress  EYES: Eyes grossly normal to inspection.  No discharge or erythema, or obvious scleral/conjunctival abnormalities.  RESP: No audible wheeze, cough, or visible cyanosis.  No visible retractions or increased work of breathing.    SKIN: Visible skin clear. No significant rash, abnormal pigmentation or lesions.  NEURO: Cranial nerves grossly intact.  Mentation and speech appropriate for age.  PSYCH: Mentation appears normal, affect normal/bright, judgement and insight intact, normal speech and appearance well-groomed.        Sincerely,    Brianda Chan NP      30 minutes spent by me on the date of the encounter doing chart review, history and exam, documentation and further activities per the note  Virtual Visit Details    Type of service:  Video Visit   Video Start Time: 0800  Video End Time:0825    Originating Location (pt. Location): Home    Distant Location (provider location):  Off-site  Platform used for Video Visit: Harshil"

## 2023-11-17 NOTE — PROGRESS NOTES
Left message to see if he has done a sleep study before so we can request it for 11/20/23 appointment.

## 2023-11-17 NOTE — NURSING NOTE
Is the patient currently in the state of [place]? YES    Visit mode:VIDEO    If the visit is dropped, the patient can be reconnected by: VIDEO VISIT: Text to cell phone:   Telephone Information:   Mobile [phone]       Will anyone else be joining the visit? NO  (If patient encounters technical issues they should call [phone]    How would you like to obtain your AVS? MyChart    Are changes needed to the allergy or medication list? Pt stated no changes to allergies and Pt stated no med changes    Reason for visit: Follow Up    Fabiola MEJIA

## 2023-11-17 NOTE — ASSESSMENT & PLAN NOTE
Great progress with weight loss and behavior modifications. Also working on clearances. Feeling good about his progress. No adverse side effects with wegovy so we'll continue that for now.     Leave poly cup in kitchen when you are going to eat   Keep practicing eating slower  Try to get back to the table for meals   Continue wegovy 2.4mg   Great work with hydration   Follow up 2/2024

## 2023-11-17 NOTE — LETTER
2023       RE: Bigg Montez  [address]  [place] [address]     Dear Colleague,    Thank you for referring your patient, Bigg Montez, to the [facility] [place] at [facility]. Please see a copy of my visit note below.      Pre bariatric surgery Note     Bigg Montez  MRN:  [id number]  :  1985  DANNIE:  2023    Dear Physician No Ref-Primary,    I had the pleasure of seeing your patient Bigg Montez. He is a 38 year old male who I am continuing to see for treatment of obesity related to:        2021    11:14 PM   --   I have the following health issues associated with obesity High Blood Pressure       Assessment & Plan   Problem List Items Addressed This Visit          Digestive    Class 3 severe obesity due to excess calories with serious comorbidity and body mass index (BMI) of 50.0 to 59.9 in adult (H) - Primary     Great progress with weight loss and behavior modifications. Also working on clearances. Feeling good about his progress. No adverse side effects with wegovy so we'll continue that for now.     Leave poly cup in kitchen when you are going to eat   Keep practicing eating slower  Try to get back to the table for meals   Continue wegovy 2.4mg   Great work with hydration   Follow up 2024          Relevant Medications    Semaglutide-Weight Management (WEGOVY) 2.4 MG/0.75ML pen          INTERVAL HISTORY:  NBS 2021 BMI 51.9 (378lb)   Last seen 2023 increase wegovy from 1mg.    Concerns for Dr. Eden- patient does not want to residents/ students operating on him - Would like to get surgery in 2024    Struggling with slowing down and  meals from liquids- actively practicing     anti-obesity medication history    Current:   wegovy 2.4mg started 10/30/2023- less side effects at 2.4mg (less bloating and gas). Less cravings and food noise. Able to be  intentional about eating at home more     Recent diet changes:   Eating at home more   Increased hydration (habit now)   Struggling to separate meals from liquids   Craves rice     Recent exercise/activity changes: less joint pain    Would love to get back to being able to go dnwn more than 1 step at a time     Recent stressors: family really busy   Works with therapist regularly     Recent sleep changes: CPAP readings are improving - more energy     Vitamins/Labs: repeat labs due before surgery     Tasks:  Psych- scheduled today   PCP- having hard time getting this scheduled   Hematology- anticoag plan with hx of DVT and PE - 10/23/2023- Philip Alamo PA-C  Sleep- was going to see if new PCP would clear given he already has CPAP and is helpful - scheduled 11/20/2023 with sleep medicine   RD   Scheduled with Dr. Eden     Quit smoking cigars beginning of this year (1/2023)- lost a community with this as this was social     CURRENT WEIGHT:   354 lbs 0 oz    Initial Weight (lbs): 378 lbs  Last Visits Weight: 166.5 kg (367 lb)  Cumulative weight loss (lbs): 24  Weight Loss Percentage: 6.35%        11/16/2023     8:57 AM   Changes and Difficulties   I have made the following changes to my diet since my last visit: Eliminating complex calorie meals and processed foods   With regards to my diet, I am still struggling with: Not struggling   I have made the following changes to my activity/exercise since my last visit: I have been walking everyday for at least 30 mins   With regards to my activity/exercise, I am still struggling with: Na         MEDICATIONS:   Current Outpatient Medications   Medication Sig Dispense Refill    Semaglutide-Weight Management (WEGOVY) 2.4 MG/0.75ML pen Inject 2.4 mg Subcutaneous every 7 days 3 mL 3    allopurinol (ZYLOPRIM) 300 MG tablet TAKE 1 TABLET BY MOUTH EVERY DAY 90 tablet 1    Cholecalciferol (VITAMIN D3) 75 MCG (3000 UT) TABS Take 2 tablets by mouth daily 120 tablet 0    colchicine  (COLCYRS) 0.6 MG tablet Take 1 tablet (0.6 mg) by mouth daily 90 tablet 3    losartan (COZAAR) 100 MG tablet Take 1 tablet (100 mg) by mouth daily 90 tablet 3    predniSONE (DELTASONE) 20 MG tablet Take 3 tabs by mouth daily x 3 days, then 2 tabs daily x 3 days, then 1 tab daily x 3 days, then 1/2 tab daily x 3 days. 20 tablet 0           11/16/2023     8:57 AM   Weight Loss Medication History Reviewed With Patient   Which weight loss medications are you currently taking on a regular basis? Wegovy   Are you having any side effects from the weight loss medication that we have prescribed you? No       Office Visit on 02/27/2023   Component Date Value Ref Range Status    Cholesterol 02/27/2023 163  <200 mg/dL Final    Triglycerides 02/27/2023 94  <150 mg/dL Final    Direct Measure HDL 02/27/2023 44  >=40 mg/dL Final    LDL Cholesterol Calculated 02/27/2023 100  <=100 mg/dL Final    Non HDL Cholesterol 02/27/2023 119  <130 mg/dL Final    Sodium 02/27/2023 142  136 - 145 mmol/L Final    Potassium 02/27/2023 3.6  3.4 - 5.3 mmol/L Final    Chloride 02/27/2023 104  98 - 107 mmol/L Final    Carbon Dioxide (CO2) 02/27/2023 25  22 - 29 mmol/L Final    Anion Gap 02/27/2023 13  7 - 15 mmol/L Final    Urea Nitrogen 02/27/2023 11.4  6.0 - 20.0 mg/dL Final    Creatinine 02/27/2023 0.99  0.67 - 1.17 mg/dL Final    Calcium 02/27/2023 9.3  8.6 - 10.0 mg/dL Final    Glucose 02/27/2023 66 (L)  70 - 99 mg/dL Final    Alkaline Phosphatase 02/27/2023 78  40 - 129 U/L Final    AST 02/27/2023 23  10 - 50 U/L Final    ALT 02/27/2023 19  10 - 50 U/L Final    Protein Total 02/27/2023 7.2  6.4 - 8.3 g/dL Final    Albumin 02/27/2023 4.2  3.5 - 5.2 g/dL Final    Bilirubin Total 02/27/2023 0.3  <=1.2 mg/dL Final    GFR Estimate 02/27/2023 >90  >60 mL/min/1.73m2 Final    eGFR calculated using 2021 CKD-EPI equation.    Hemoglobin A1C 02/27/2023 5.7 (H)  0.0 - 5.6 % Final    Normal <5.7%   Prediabetes 5.7-6.4%    Diabetes 6.5% or higher     Note:  "Adopted from ADA consensus guidelines.    Vitamin D, Total (25-Hydroxy) 02/27/2023 12 (L)  20 - 75 ug/L Final    TSH 02/27/2023 1.95  0.30 - 4.20 uIU/mL Final    Uric Acid 02/27/2023 6.0  3.4 - 7.0 mg/dL Final           6/1/2012     9:00 AM   CHEL Score (Last Two)   CHEL Raw Score 33   Activation Score 41.7   CHEL Level 1         PHYSICAL EXAM:  Objective    Ht 1.816 m (5' 11.5\")   Wt (!) 160.6 kg (354 lb)   BMI 48.69 kg/m               GENERAL: Healthy, alert and no distress  EYES: Eyes grossly normal to inspection.  No discharge or erythema, or obvious scleral/conjunctival abnormalities.  RESP: No audible wheeze, cough, or visible cyanosis.  No visible retractions or increased work of breathing.    SKIN: Visible skin clear. No significant rash, abnormal pigmentation or lesions.  NEURO: Cranial nerves grossly intact.  Mentation and speech appropriate for age.  PSYCH: Mentation appears normal, affect normal/bright, judgement and insight intact, normal speech and appearance well-groomed.        Sincerely,    Brianda Chan NP      30 minutes spent by me on the date of the encounter doing chart review, history and exam, documentation and further activities per the note  Virtual Visit Details    Type of service:  Video Visit   Video Start Time: 0800  Video End Time:0825    Originating Location (pt. Location): Home    Distant Location (provider location):  Off-site  Platform used for Video Visit: Harshil    "

## 2023-11-20 ENCOUNTER — OFFICE VISIT (OUTPATIENT)
Dept: SLEEP MEDICINE | Facility: CLINIC | Age: 38
End: 2023-11-20
Payer: COMMERCIAL

## 2023-11-20 ENCOUNTER — VIRTUAL VISIT (OUTPATIENT)
Dept: ENDOCRINOLOGY | Facility: CLINIC | Age: 38
End: 2023-11-20
Payer: COMMERCIAL

## 2023-11-20 VITALS
BODY MASS INDEX: 44.1 KG/M2 | HEART RATE: 79 BPM | WEIGHT: 315 LBS | HEIGHT: 71 IN | OXYGEN SATURATION: 99 % | DIASTOLIC BLOOD PRESSURE: 87 MMHG | SYSTOLIC BLOOD PRESSURE: 138 MMHG

## 2023-11-20 VITALS — HEIGHT: 71 IN | BODY MASS INDEX: 44.1 KG/M2 | WEIGHT: 315 LBS

## 2023-11-20 DIAGNOSIS — E66.01 MORBID OBESITY (H): ICD-10-CM

## 2023-11-20 DIAGNOSIS — E66.01 CLASS 3 SEVERE OBESITY DUE TO EXCESS CALORIES WITH SERIOUS COMORBIDITY AND BODY MASS INDEX (BMI) OF 50.0 TO 59.9 IN ADULT (H): ICD-10-CM

## 2023-11-20 DIAGNOSIS — Z71.3 NUTRITIONAL COUNSELING: Primary | ICD-10-CM

## 2023-11-20 DIAGNOSIS — E66.813 CLASS 3 SEVERE OBESITY DUE TO EXCESS CALORIES WITH SERIOUS COMORBIDITY AND BODY MASS INDEX (BMI) OF 50.0 TO 59.9 IN ADULT (H): ICD-10-CM

## 2023-11-20 DIAGNOSIS — G47.33 OBSTRUCTIVE SLEEP APNEA: Primary | ICD-10-CM

## 2023-11-20 PROCEDURE — 99207 PR NO CHARGE LOS: CPT | Mod: 95 | Performed by: DIETITIAN, REGISTERED

## 2023-11-20 PROCEDURE — 99203 OFFICE O/P NEW LOW 30 MIN: CPT | Performed by: PSYCHIATRY & NEUROLOGY

## 2023-11-20 PROCEDURE — 97803 MED NUTRITION INDIV SUBSEQ: CPT | Mod: 95 | Performed by: DIETITIAN, REGISTERED

## 2023-11-20 ASSESSMENT — SLEEP AND FATIGUE QUESTIONNAIRES
HOW LIKELY ARE YOU TO NOD OFF OR FALL ASLEEP WHILE SITTING AND TALKING TO SOMEONE: WOULD NEVER DOZE
HOW LIKELY ARE YOU TO NOD OFF OR FALL ASLEEP WHILE WATCHING TV: SLIGHT CHANCE OF DOZING
HOW LIKELY ARE YOU TO NOD OFF OR FALL ASLEEP WHILE LYING DOWN TO REST IN THE AFTERNOON WHEN CIRCUMSTANCES PERMIT: WOULD NEVER DOZE
HOW LIKELY ARE YOU TO NOD OFF OR FALL ASLEEP WHEN YOU ARE A PASSENGER IN A CAR FOR AN HOUR WITHOUT A BREAK: WOULD NEVER DOZE
HOW LIKELY ARE YOU TO NOD OFF OR FALL ASLEEP WHILE SITTING QUIETLY AFTER LUNCH WITHOUT ALCOHOL: WOULD NEVER DOZE
HOW LIKELY ARE YOU TO NOD OFF OR FALL ASLEEP IN A CAR, WHILE STOPPED FOR A FEW MINUTES IN TRAFFIC: WOULD NEVER DOZE
HOW LIKELY ARE YOU TO NOD OFF OR FALL ASLEEP WHILE SITTING INACTIVE IN A PUBLIC PLACE: WOULD NEVER DOZE
HOW LIKELY ARE YOU TO NOD OFF OR FALL ASLEEP WHILE SITTING AND READING: WOULD NEVER DOZE

## 2023-11-20 ASSESSMENT — PAIN SCALES - GENERAL: PAINLEVEL: NO PAIN (0)

## 2023-11-20 NOTE — NURSING NOTE
Is the patient currently in the state of [place]? YES    Visit mode:VIDEO    If the visit is dropped, the patient can be reconnected by: VIDEO VISIT: Text to cell phone:   Telephone Information:   Mobile [phone]       Will anyone else be joining the visit? NO  (If patient encounters technical issues they should call [phone]    How would you like to obtain your AVS? MyChart    Are changes needed to the allergy or medication list? No    Reason for visit: RECHECK    Primo MEJIA

## 2023-11-20 NOTE — NURSING NOTE
"Chief Complaint   Patient presents with    Sleep Problem     Establish care       Initial /87   Pulse 79   Ht 1.815 m (5' 11.46\")   Wt (!) 162 kg (357 lb 3.2 oz)   SpO2 99%   BMI 49.18 kg/m   Estimated body mass index is 49.18 kg/m  as calculated from the following:    Height as of this encounter: 1.815 m (5' 11.46\").    Weight as of this encounter: 162 kg (357 lb 3.2 oz).    Medication Reconciliation: complete  ESS 1  Neck circumference:  50 centimeters.  Sherrill Cueto MA       "

## 2023-11-20 NOTE — PROGRESS NOTES
"Video-Visit Details    Type of service:  Video Visit    Video Start Time: 9:30 AM    Video End Time: 9:49 AM     Originating Location (pt. Location): Home    Distant Location (provider location):  Offsite (providers home) [facility] [place]     Platform used for Video Visit: Harshil        Bariatric Nutrition Consultation Note    Reason For Visit: Nutrition reassessment    Bigg Montez is a 38 year old presenting today for return bariatric nutrition consult.  Pt is interested in laparoscopic sleeve gastrectomy with TBD.  Patient is accompanied by self. This is patients 9th nutrition visit prior to surgery.     Pt referred by Brianda Chan NP on January 25, 2021.  Patient with Co-morbidities of obesity including:  Type II DM no  Renal Failure no  Sleep apnea yes  Hypertension yes   Dyslipidemia no  Joint pain no  Back pain no  GERD no   Prediabetes yes    H/o gout. Pt reports having a blood clot in mid-sept. 2021 1/21/2021    11:14 PM   Support System Reviewed With Patient   Who do you have in your support network that can be available to help you for the first 2 weeks after surgery? My wife   Who can you count on for support throughout your weight loss surgery journey? Kenny ceja       ANTHROPOMETRICS:  Initial weight (12/2018 with PCP): 386 lbs    Estimated body mass index is 49.51 kg/m  as calculated from the following:    Height as of this encounter: 1.803 m (5' 11\").    Weight as of this encounter: 161 kg (355 lb). (-5 lbs over last month, -31 lbs from initial)     Required weight loss goal pre-op: 38 lbs from initial consult weight (goal weight 348 lbs or less before surgery).         1/21/2021    11:14 PM   --   I have tried the following methods to lose weight Watching portions or calories    Exercise    Weight Watchers    Slimfast    Physician directed program           1/21/2021    11:14 PM   Weight Loss Questions Reviewed With Patient   How long have you " been overweight? Since puberty       SUPPLEMENT INFORMATION:  Milk thistle supplement pt reports for gout  Men's One-a-day MVI    Nutrition Related Labs:  Vitamin D 12 (L) on 2/27/23. Started Vitamin D 6000 units daily in June. He plans to have lab rechecked soon. Also needs vitamin A, PTH and CBC rechecked.     Medications for Weight Loss:  Wegovy    NUTRITION HISTORY:  No known food allergies/intolerances  H/o gout    He continues to work on reducing starches (rice, noodles). Increasing fruits, working on increase vegetables. No longer going back for seconds at dinner. Intermittently replacing breakfast with protein shake.     7/18/23 - Has started going to the gym, and working on increasing his water intake.     8/29/23 - Cut out energy drink intake. Went up on Wegovy to 1 mg.     9/18/23 - Continues Wegovy injections, losing consistently each month. Reduced starch portions. Reports eating ~1.5 meals daily. May have fruit for a snack or [employer] granola bar.     10/23/23 - Only tolerates [employer] protein shakes.  As tried many others. Gets stomach ache from others.     Today - Feeling more bloated, constipated with 2.4 mg dose of Wegovy. Plans to start Miralax. Feels good energy. Most days getting in 3 meals daily, less often may only get 1-2 meals daily. Has not eaten rice in last 3-4 weeks. Focusing on high protein meals.     Diet Recall (yesterday):  Breakfast: [employer] Protein Shakes   Lunch:   Dinner: half chicken quesadilla   Beverages: water (64 oz), sugar-free sports drinks.     Progress Towards Previous Goals:  1.Goal weight for surgery: 348 lbs or less - Improving.   2. Ensure you are consuming lean protein at each meal period - Example Greek yogurt and granola at lunch - Improved   - Consider setting alarms 3 times per day to remind you to eat some protein.   3. Gym 3 times per week. - Improved. Getting in 15-30 mins daily.   4. 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         1/21/2021    11:14 PM   Dining Out History Reviewed With Patient   How often do you dine out? Nearly every day.   Where do you dine out? (select all that apply) fast food chains    take out   What types of food do you order when you dine out? Protien rice dishes           1/21/2021    11:14 PM   Physical Activity Reviewed With Patient   How often do you exercise? 1 to 2 times per week   What is the duration of your exercise (in minutes)? 60+ Minutes   What types of exercise do you do? gym membership    weightlifting   What keeps you from being more active? Lack of Time       NUTRITION DIAGNOSIS:  Obesity r/t long history of self-monitoring deficit and excessive energy intake aeb BMI >30 kg/m2. - improving    INTERVENTION:  Intervention Provided/Education Provided:  - Reviewed weight loss goal for surgery and strategy for pre-op weight loss.   - Reviewed bariatric diet guidelines   - Encouraged increased physical activity.   - Praised pt on the progress he has made this month  - Discussed potential barriers to maintaining positive change and how to overcome.  - Remind pt he is due for repeat vitamin D lab and other baseline bariatric labs. Reviewed other task list items left.   - Provided pt with list of diet handouts and nutrition goals below.         1/21/2021    11:14 PM   Questions Reviewed With Patient   How ready are you to make changes regarding your weight? Number 1 = Not ready at all to make changes up to 10 = very ready. 10   How confident are you that you can change? 1 = Not confident that you will be successful making changes up to 10 = very confident. 10       Expected Engagement: good    GOALS:  1.Goal weight for surgery: 348 lbs or less  2. Ensure you are consuming lean protein at each meal period - Example Greek yogurt and granola at lunch   - Consider setting alarms 3 times per day to remind you to eat some protein.   3.  Increase physical activity as able. At least 30 mins walk daily.   4. Have labs done (vitamin D, vitamin A, PTH and CBC)    Post-op Diet Handouts:  Diet Guidelines after Weight-loss Surgery  [email or web address]     Your Stage 1 Diet: Clear Liquids  [email or web address]     Your Stage 2 Diet: Low-fat Full Liquids  [email or web address]     Your Stage 3 Diet: Pureed Foods  [email or web address]     Pureed Pleasures  [email or web address]    Your Stage 4 Diet: Soft Foods  [email or web address]    Your Stage 5 Diet: Regular Foods  [email or web address]    Supplements after Sleeve Gastrectomy, Gastric Bypass or Single Anastomosis Duodenal Switch  [email or web address]    Keeping Track of Fluids  [email or web address]      Time spent with patient: 19 mins    Angela Granger, BRO, LD

## 2023-11-20 NOTE — PATIENT INSTRUCTIONS
Paul Sexton,     Follow-up with RD on 12/29    Thank you,    Angela Granger, RD, LD  If you would like to schedule or reschedule an appointment with the RD, please call [phone]    Nutrition Goals  1.Goal weight for surgery: 348 lbs or less  2. Ensure you are consuming lean protein at each meal period - Example Greek yogurt and granola at lunch   - Consider setting alarms 3 times per day to remind you to eat some protein.   3. Increase physical activity as able. At least 30 mins walk daily.   4. Have labs done (vitamin D, vitamin A, PTH and CBC)    Post-op Diet Handouts:  Diet Guidelines after Weight-loss Surgery  [email or web address]     Your Stage 1 Diet: Clear Liquids  [email or web address]     Your Stage 2 Diet: Low-fat Full Liquids  [email or web address]     Your Stage 3 Diet: Pureed Foods  [email or web address]     Pureed Pleasures  [email or web address]    Your Stage 4 Diet: Soft Foods  [email or web address]    Your Stage 5 Diet: Regular Foods  [email or web address]    Supplements after Sleeve Gastrectomy, Gastric Bypass or Single Anastomosis Duodenal Switch  [email or web address]    Keeping Track of Fluids  [email or web address]            COMPREHENSIVE WEIGHT MANAGEMENT PROGRAM  VIRTUAL SUPPORT GROUPS    For Support Group Information:      We offer support groups for patients who are working on weight loss and considering, preparing for or have had weight loss surgery.   There is no cost for this opportunity.  You are invited to attend the?Virtual Support Groups?provided by any of the following locations:    [facility] via Lala Teams with Cynthia Amor RN  2.   [place] via Lala Teams with Terry Nix, PhD, LP  3.   [place] via Lala Teams with Paola Franks RN  4.   [facility] via Lala Teams with Paola Kwok, [facility]    The following Support Group information can also be found on our  "website:  [email or web address]    [email or web address]      [facility] Weight Loss Surgery Support Group    [facility] Weight Loss Surgery Support Group  The support group is a patient-lead forum that meets monthly to share experiences, encouragement and education. It is open to those who have had weight loss surgery, are scheduled for surgery, or are considering surgery.   WHEN: This group meets on the 3rd Wednesday of each month from 5:00PM - 6:00PM virtually using Microsoft Teams.   FACILITATOR: Led by Cynthia Mccall RD, HELEN, RN, the program's Clinical Coordinator.   TO REGISTER: Please contact the clinic via [employer] or call the nurse line directly at [phone] to inform our staff that you would like an invite sent to you and to let us know the email you would like the invite sent to. Prior to the meeting, a link with directions on how to join the meeting will be sent to you.    2023 Meetings   April 19: Guest Speaker, Gonzalo Sewell RD, LD, \"Maintaining Weight Loss after Bariatric Surgery\".  May 17: \"Let's Talk\" a time for the group to share.  June 21: \"Let's Talk\" a time for the group to share.  July 19  August 16  September 20  October 18  November 15  December 20    [facility] Support Groups    Connections Bariatric Care Support Group?  This is open to all pre- and post- operative bariatric surgery patients as well as their support system.   WHEN: This group meets the 2nd Tuesday of each month from 6:30 PM - 8:00 PM virtually using Microsoft Teams.   FACILITATOR: Led by Terry Nix, Ph.D who is a Licensed Psychologist with the [facility] Comprehensive Weight Management Program.   TO REGISTER: Please send an email to Terry Nix, Ph.D., LP at?[email or web address]?if you would like an invitation to " "the group and to learn about using Microsoft Teams.    2023 Meetings  April 11: Guest speaker, Leigh Ann Handy MD, Bariatrician, [facility] Comprehensive Weight Management Program, \"Injectable Weight Loss Medications\".  May 9  Melly 13  July 11  August 8  September 12  October 10  November 14  December 12    Connections Post-Operative Bariatric Surgery Support Group  This is a support group for [facility] bariatric patients (and those external to [facility]) who have had bariatric surgery and are at least 3 months post-surgery.  WHEN: This support group meets the 4th Wednesday of the month from 11:00 AM - 12:00 PM virtually using Microsoft Teams.   FACILITATOR: Led by Certified Bariatric Nurse, Paola Franks RN.   TO REGISTER: Please send an email to Paola at [email or web address] if you would like an invitation to the group and to learn about using Microsoft Teams.    2023 Meetings  April 26  May 24  Melly 28  July 26  August 23  September 27  October 25  November 22  December 27      [facility]   Healthy Lifestyle Group    Healthy Lifestyle Group?  This is a 60 minute virtual coaching group for those who want to lead a healthier lifestyle. Come together to set goals and overcome barriers in a supportive group environment. We will address the four pillars of health--nutrition, exercise, sleep and emotional well-being.  This group is highly recommended for those who are participating in the 24 week Healthy Lifestyle Plan and our Health Coaching sessions.    WHEN: This group meets the first Friday of the month, 12:30 PM - 1:30 PM online, via a zoom meeting.      FACILITATOR: Led by National Board Certified Health and , Paola Kwok, [facility].     TO REGISTER: Please call the Call Center at [phone] to register. You will get an appointment to attend in e Health Access. Fifteen minutes prior to the meeting, complete the e-check in and you will get the " "link to join the meeting.  There is no charge to attend this group and space is limited.       2023 and 2024 Meeting Topics and Dates:  November 3: Introduction to Mindfulness (Learn simple and effective mindfulness practices and how it can benefit you)  December 8: Let's Talk (guided discussion on our wins and challenges)  January 5: New Years Vision: Manifest your Best 2024! (Guided imagery,  journaling and discussion)  February 2: Let's Talk  March 1: 10 Percent Happier by Brian Chakraborty (Book Bites; a guided discussion on the nuggets of wisdom from favorite wellness books; no need to read the book but highly encouraged)  April 5: Let's Talk  May 3: \"Essentialism; The Disciplined Pursuit of Less by Sheldon Flores (book bites discussion)  June 7: Let's Talk  July 5: NO MEETING, off for the 4th of July Holiday  August 2: The Blue Zones, Secrets for Living a Longer Life by Brian Mendiola (book bites discussion)                         "

## 2023-11-20 NOTE — LETTER
"11/20/2023       RE: Bigg Montez  [address]  [place] [address]     Dear Colleague,    Thank you for referring your patient, Bigg Montez, to the [facility] [place] at [facility]. Please see a copy of my visit note below.    Video-Visit Details    Type of service:  Video Visit    Video Start Time: 9:30 AM    Video End Time: 9:49 AM     Originating Location (pt. Location): Home    Distant Location (provider location):  Offsite (providers home) [facility] [place]     Platform used for Video Visit: Handango        Bariatric Nutrition Consultation Note    Reason For Visit: Nutrition reassessment    Bigg Montez is a 38 year old presenting today for return bariatric nutrition consult.  Pt is interested in laparoscopic sleeve gastrectomy with TBD.  Patient is accompanied by self. This is patients 9th nutrition visit prior to surgery.     Pt referred by Brianda Chan NP on January 25, 2021.  Patient with Co-morbidities of obesity including:  Type II DM no  Renal Failure no  Sleep apnea yes  Hypertension yes   Dyslipidemia no  Joint pain no  Back pain no  GERD no   Prediabetes yes    H/o gout. Pt reports having a blood clot in mid-sept. 2021 1/21/2021    11:14 PM   Support System Reviewed With Patient   Who do you have in your support network that can be available to help you for the first 2 weeks after surgery? My wife   Who can you count on for support throughout your weight loss surgery journey? Kenny ceja       ANTHROPOMETRICS:  Initial weight (12/2018 with PCP): 386 lbs    Estimated body mass index is 49.51 kg/m  as calculated from the following:    Height as of this encounter: 1.803 m (5' 11\").    Weight as of this encounter: 161 kg (355 lb). (-5 lbs over last month, -31 lbs from initial)     Required weight loss goal pre-op: 38 lbs from initial consult weight (goal " weight 348 lbs or less before surgery).         1/21/2021    11:14 PM   --   I have tried the following methods to lose weight Watching portions or calories    Exercise    Weight Watchers    [facility]    Physician directed program           1/21/2021    11:14 PM   Weight Loss Questions Reviewed With Patient   How long have you been overweight? Since puberty       SUPPLEMENT INFORMATION:  Milk thistle supplement pt reports for gout  Men's One-a-day MVI    Nutrition Related Labs:  Vitamin D 12 (L) on 2/27/23. Started Vitamin D 6000 units daily in June. He plans to have lab rechecked soon. Also needs vitamin A, PTH and CBC rechecked.     Medications for Weight Loss:  Wegovy    NUTRITION HISTORY:  No known food allergies/intolerances  H/o gout    He continues to work on reducing starches (rice, noodles). Increasing fruits, working on increase vegetables. No longer going back for seconds at dinner. Intermittently replacing breakfast with protein shake.     7/18/23 - Has started going to the gym, and working on increasing his water intake.     8/29/23 - Cut out energy drink intake. Went up on Wegovy to 1 mg.     9/18/23 - Continues Wegovy injections, losing consistently each month. Reduced starch portions. Reports eating ~1.5 meals daily. May have fruit for a snack or [facility] gran[employer] bar.     10/23/23 - Only tolerates [employer] protein shakes.  As tried many others. Gets stomach ache from others.     Today - Feeling more bloated, constipated with 2.4 mg dose of Wegovy. Plans to start Miralax. Feels good energy. Most days getting in 3 meals daily, less often may only get 1-2 meals daily. Has not eaten rice in last 3-4 weeks. Focusing on high protein meals.     Diet Recall (yesterday):  Breakfast: [employer] Protein Shakes   Lunch:   Dinner: half chicken quesadilla   Beverages: water (64 oz), sugar-free sports drinks.     Progress Towards Previous Goals:  1.Goal weight for surgery: 348 lbs or less - Improving.   2. Ensure  you are consuming lean protein at each meal period - Example Greek yogurt and granola at lunch - Improved   - Consider setting alarms 3 times per day to remind you to eat some protein.   3. Gym 3 times per week. - Improved. Getting in 15-30 mins daily.   4. 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         1/21/2021    11:14 PM   Dining Out History Reviewed With Patient   How often do you dine out? Nearly every day.   Where do you dine out? (select all that apply) fast food chains    take out   What types of food do you order when you dine out? Protien rice dishes           1/21/2021    11:14 PM   Physical Activity Reviewed With Patient   How often do you exercise? 1 to 2 times per week   What is the duration of your exercise (in minutes)? 60+ Minutes   What types of exercise do you do? gym membership    weightlifting   What keeps you from being more active? Lack of Time       NUTRITION DIAGNOSIS:  Obesity r/t long history of self-monitoring deficit and excessive energy intake aeb BMI >30 kg/m2. - improving    INTERVENTION:  Intervention Provided/Education Provided:  - Reviewed weight loss goal for surgery and strategy for pre-op weight loss.   - Reviewed bariatric diet guidelines   - Encouraged increased physical activity.   - Praised pt on the progress he has made this month  - Discussed potential barriers to maintaining positive change and how to overcome.  - Remind pt he is due for repeat vitamin D lab and other baseline bariatric labs. Reviewed other task list items left.   - Provided pt with list of diet handouts and nutrition goals below.         1/21/2021    11:14 PM   Questions Reviewed With Patient   How ready are you to make changes regarding your weight? Number 1 = Not ready at all to make changes up to 10 = very ready. 10   How confident are you that you can change? 1 = Not confident that you will be successful  making changes up to 10 = very confident. 10       Expected Engagement: good    GOALS:  1.Goal weight for surgery: 348 lbs or less  2. Ensure you are consuming lean protein at each meal period - Example Greek yogurt and granola at lunch   - Consider setting alarms 3 times per day to remind you to eat some protein.   3. Increase physical activity as able. At least 30 mins walk daily.   4. Have labs done (vitamin D, vitamin A, PTH and CBC)    Post-op Diet Handouts:  Diet Guidelines after Weight-loss Surgery  [email or web address]     Your Stage 1 Diet: Clear Liquids  [email or web address]     Your Stage 2 Diet: Low-fat Full Liquids  [email or web address]     Your Stage 3 Diet: Pureed Foods  [email or web address]     Pureed Pleasures  [email or web address]    Your Stage 4 Diet: Soft Foods  [email or web address]    Your Stage 5 Diet: Regular Foods  [email or web address]    Supplements after Sleeve Gastrectomy, Gastric Bypass or Single Anastomosis Duodenal Switch  [email or web address]    Keeping Track of Fluids  [email or web address]      Time spent with patient: 19 mins    Angela Granger RD, LD

## 2023-11-20 NOTE — PROGRESS NOTES
Outpatient Sleep Medicine Consultation:      Name: Bigg Montez MRN# [id number]   Age: 38 year old YOB: 1985     Date of Consultation: November 20, 2023  Consultation is requested by: Brianda Chan, JAYJAY  [address] [place] [address] Brianda Chan  Primary care provider: No Ref-Primary, Physician       Reason for Sleep Consult:     Bigg Montez is sent by Brianda Chan for a sleep consultation regarding TRACEE.    Patient s Reason for visit  Shun Tc main reason for visit: Sleep apnea  Patient states problem(s) started: 2013  Bigg Montez's goals for this visit: Approve for bariatric surgery           Assessment and Plan:     Patient is a 39 yo M w/ PMHx of HTN, TRACEE on CPAP who presents to clinic for evaluation of TRACEE. Previously diagnosed with likely severe TRACEE in 2013, currently on PAP therapy. He is due for new machine/supplies but unable to obtain copy of sleep study. Recommend home sleep study to evaluate TRACEE.  Would prefer an in lab PSG with TCM however need to qualify as soon as possible to help patient get new CPAP machine.        Summary Sleep Diagnoses:  1. Obstructive sleep apnea    Comorbid Diagnoses:  1. Morbid obesity      Summary Recommendations:  -Home sleep study ordered to re-characterize TRACEE and to obtain new machine/supplies  -If able, recommend holding CPAP 1-2 nights prior to night of sleep study (ideally 3 nights)    Orders Placed This Encounter   Procedures     HST - Home Sleep Apnea Test  - WatchPat NonReturnable         Summary Counseling:    Sleep Testing Reviewed  Obstructive Sleep Apnea Reviewed  Complications of Untreated Sleep Apnea Reviewed  Advised to never drive if tired or sleepy      Medical Decision-making:   Educational materials provided in instructions    CC: Brianda Chan       RTC for sleep study results with KATE    Patient seen and discussed with attending Dr Gunner Mak MD  Sleep Medicine Fellow    Outpatient Sleep Medicine  Staff  I have seen and evaluated the patient and discussed with the sleep fellow on 11-20-23.  I supervised the fellow during the visit and agree with the documentation.      In addition to face to face time I have also reviewed the patients chart, coordinated care, assisted in placing orders and documentation.  Total time (Face-to-Face, care coordination, orders, documentation) spent on 11-20-23 was 30 minutes.     Claudio Martino MD           History of Present Illness:         Patient is a 39 yo M w/ PMHx of HTN, TRACEE on CPAP who presents to clinic for evaluation of TRACEE.    He was previously diagnosed with TRACEE in 2013 at [facility]. He was told he stopped breathing ~60 times/minute. Weight at time of the study was about 370 lbs. He has been on PAP therapy since. He has not seen a sleep specialist since 2013 and has been self adjusting his CPAP. His CPAP broke last year so he is currently using a friend's.    On CPAP, he denies snoring, gasping or choking for air, witnessed apneas. Generally wakes up feeling well rested but sometimes does not sleep well due to his children or need to use the restroom.    Past Sleep Evaluations: 2013      Download data (8/22/23-11/19/23):  Usage 89/90 days  Use > 4hrs: 79%  Avg use: 5hrs 28 min  Pressure: CPAP 13 cmH20  Residual AHI 1.6  Large leak present      SLEEP-WAKE SCHEDULE:     Work/School Days: Patient goes to school/work: No   Usually gets into bed at 10pm  Takes patient about 30 mins to fall asleep  Has trouble falling asleep Cpap not often nights per week  Wakes up in the middle of the night high blood medicine make me have to urinate times.  Wakes up due to Other  He has trouble falling back asleep 1 times a week.   It usually takes 30 min to get back to sleep  Patient is usually up at 6am  Uses alarm: No    Weekends/Non-work Days/All Other Days:  Usually gets into bed at 10pm   Takes patient about 30 to fall asleep  Patient is usually up at 6qm  Uses alarm:  No    Sleep Need  Patient gets  6.5 sleep on average   Patient thinks he needs about 6.5 sleep    Bigg Montez prefers to sleep in this position(s): Head Elevated   Patient states they do the following activities in bed: Use phone, computer, or tablet    Naps  Patient takes a purposeful nap 0 times a week  He feels better after a nap: Yes if he takes a rare nap  He dozes off unintentionally 1-2 days per week -   Patient has had a driving accident or near-miss due to sleepiness/drowsiness: No      SLEEP DISRUPTIONS:    Breathing/Snoring  Patient snores:Yes  Other people complain about his snoring: Yes  Patient has been told he stops breathing in his sleep:Yes  He has issues with the following: Getting up to urinate more than once    Movement:  Patient gets pain, discomfort, with an urge to move:  No  It happens when he is resting:  No  It happens more at night:  No  Patient has been told he kicks his legs at night:  Yes     Behaviors in Sleep:  Bigg Montez has experienced the following behaviors while sleeping:    He has experienced sudden muscle weakness during the day: No    Sleep walking as child, none as an adult  No dream enactment   No sleep paralysis  No sleep related hallucinations     Is there anything else you would like your sleep provider to know:   no         CAFFEINE AND OTHER SUBSTANCES:    Patient consumes caffeinated beverages per day:  0  Last caffeine use is usually: 0  List of any prescribed or over the counter stimulants that patient takes: 0  List of any prescribed or over the counter sleep medication patient takes: None  List of previous sleep medications that patient has tried: None  Patient drinks alcohol to help them sleep: No  Patient drinks alcohol near bedtime: No    Family History:  Patient has a family member been diagnosed with a sleep disorder: Yes            SCALES:    EPWORTH SLEEPINESS SCALE         11/20/2023     7:40 AM    [place] Sleepiness Scale ( M.W. Johns   [id number]<br>ESS - [place]/English - Final version - 21 Nov 07 - [facility] [employer].)   Sitting and reading Would never doze   Watching TV Slight chance of dozing   Sitting, inactive in a public place (e.g. a theatre or a meeting) Would never doze   As a passenger in a car for an hour without a break Would never doze   Lying down to rest in the afternoon when circumstances permit Would never doze   Sitting and talking to someone Would never doze   Sitting quietly after a lunch without alcohol Would never doze   In a car, while stopped for a few minutes in traffic Would never doze   Shaniko Score (MC) 1   Shaniko Score (Sleep) 1         INSOMNIA SEVERITY INDEX (JULIA)          11/20/2023     7:32 AM   Insomnia Severity Index (JULIA)   Difficulty falling asleep 1   Difficulty staying asleep 0   Problems waking up too early 0   How SATISFIED/DISSATISFIED are you with your CURRENT sleep pattern? 0   How NOTICEABLE to others do you think your sleep problem is in terms of impairing the quality of your life? 1   How WORRIED/DISTRESSED are you about your current sleep problem? 1   To what extent do you consider your sleep problem to INTERFERE with your daily functioning (e.g. daytime fatigue, mood, ability to function at work/daily chores, concentration, memory, mood, etc.) CURRENTLY? 1   JULIA Total Score 4       Guidelines for Scoring/Interpretation:  Total score categories:  0-7 = No clinically significant insomnia   8-14 = Subthreshold insomnia   15-21 = Clinical insomnia (moderate severity)  22-28 = Clinical insomnia (severe)  Used via courtesy of www.[facility] Hospitale[facility].va.gov with permission from Ced Hooks PhD., [facility]      STOP BANG         11/20/2023    12:00 PM   STOP BANG Questionnaire (  2008, the American Society of Anesthesiologists, Inc. [employer]   Neck Cir (cm) Clinic: 50 cm   B/P Clinic: 138/87   BMI Clinic: 49.18         GAD7         No data to display           "        CAGE-AID         No data to display                CAGE-AID reprinted with permission from the [place] Medical Journal, MARISSA Comer. and DANILO Cotton, \"Conjoint screening questionnaires for alcohol and drug abuse\" [place] Medical Journal 94: 135-140, 1995.      PATIENT HEALTH QUESTIONNAIRE-9 (PHQ - 9)        5/27/2014     9:40 AM   PHQ-9 ([employer])   No Interest In Doing Things 2   Feeling Depressed 1   Trouble Sleeping 2   Tired / No Energy 0   No appetite or Over-Eating 1   Feeling Bad about Self 1   Trouble Concentrating 2   Moving Slow or Restless 1   Suicidal Thoughts 0   Total Score 10       Developed by Amparo Alvarez, Debi Cueto, Vinny Starkey and colleagues, with an educational savannah from [employer]. No permission required to reproduce, translate, display or distribute.        Allergies:    Allergies   Allergen Reactions     Cats Other (See Comments)     No Clinical Screening - See Comments Unknown     seasonal     Seasonal Allergies        Medications:    Current Outpatient Medications   Medication Sig Dispense Refill     allopurinol (ZYLOPRIM) 300 MG tablet TAKE 1 TABLET BY MOUTH EVERY DAY 90 tablet 1     Cholecalciferol (VITAMIN D3) 75 MCG (3000 UT) TABS Take 2 tablets by mouth daily 120 tablet 0     colchicine (COLCYRS) 0.6 MG tablet Take 1 tablet (0.6 mg) by mouth daily 90 tablet 3     losartan (COZAAR) 100 MG tablet Take 1 tablet (100 mg) by mouth daily 90 tablet 3     predniSONE (DELTASONE) 20 MG tablet Take 3 tabs by mouth daily x 3 days, then 2 tabs daily x 3 days, then 1 tab daily x 3 days, then 1/2 tab daily x 3 days. 20 tablet 0     Semaglutide-Weight Management (WEGOVY) 2.4 MG/0.75ML pen Inject 2.4 mg Subcutaneous every 7 days 3 mL 3       Problem List:  Patient Active Problem List    Diagnosis Date Noted     Prediabetes 03/16/2022     Priority: Medium     Vitamin D deficiency 03/16/2022     Priority: Medium     Class 3 severe obesity due to excess calories with serious " comorbidity and body mass index (BMI) of 50.0 to 59.9 in adult (H) 2021     Priority: Medium     TRACEE (obstructive sleep apnea) 2021     Priority: Medium     Wears CPAP, self adjusted. No currently followed by sleep team.        Essential hypertension 2018     Priority: Medium     Gout 2013     Priority: Medium     Imo Update utility       CARDIOVASCULAR SCREENING; LDL GOAL LESS THAN 160 2012     Priority: Medium        Past Medical/Surgical History:  Past Medical History:   Diagnosis Date     Gout      Past Surgical History:   Procedure Laterality Date     VASECTOMY N/A        Social History:  Social History     Socioeconomic History     Marital status:      Spouse name: Not on file     Number of children: 2     Years of education: Not on file     Highest education level: Not on file   Occupational History     Employer: [employer]   Tobacco Use     Smoking status: Former     Types: Cigars     Quit date: 2021     Years since quittin.7     Smokeless tobacco: Never   Vaping Use     Vaping Use: Never used   Substance and Sexual Activity     Alcohol use: Yes     Comment: 1 drink weekly     Drug use: No     Sexual activity: Yes     Partners: Female   Other Topics Concern     Parent/sibling w/ CABG, MI or angioplasty before 65F 55M? No   Social History Narrative     Not on file     Social Determinants of Health     Financial Resource Strain: Not on file   Food Insecurity: Not on file   Transportation Needs: Not on file   Physical Activity: Not on file   Stress: Not on file   Social Connections: Not on file   Interpersonal Safety: Not on file   Housing Stability: Not on file       Family History:  No family history on file.    Review of Systems:  A complete review of systems reviewed by me is negative with the exeption of what has been mentioned in the history of present illness.  In the last TWO WEEKS have you experienced any of the following symptoms?  Fevers: No  Night  "Sweats: No  Weight Gain: No  Pain at Night: No  Double Vision: No  Changes in Vision: No  Difficulty Breathing through Nose: No  Sore Throat in Morning: No  Dry Mouth in the Morning: Yes  Shortness of Breath Lying Flat: No  Shortness of Breath With Activity: No  Awakening with Shortness of Breath: No  Increased Cough: No  Heart Racing at Night: No  Swelling in Feet or Legs: No  Diarrhea at Night: No  Heartburn at Night: No  Urinating More than Once at Night: No  Losing Control of Urine at Night: No  Joint Pains at Night: No  Headaches in Morning: No  Weakness in Arms or Legs: No  Depressed Mood: No  Anxiety: No     Physical Examination:  Vitals: /87   Pulse 79   Ht 1.815 m (5' 11.46\")   Wt (!) 162 kg (357 lb 3.2 oz)   SpO2 99%   BMI 49.18 kg/m    BMI= Body mass index is 49.18 kg/m .    Neck Cir (cm): 50 cm      GENERAL APPEARANCE: healthy, alert, and no distress  RESP: lungs clear to auscultation - no rales, rhonchi or wheezes  CV: regular rates and rhythm, normal S1 S2, no S3 or S4, and no murmur, click or rub  NEURO: Normal strength and tone, mentation intact, and speech normal  PSYCH: mentation appears normal and affect normal/bright  Mallampati Class: III.  Tonsillar Stage: 3  extending beyond pillars.         Data: All pertinent previous laboratory data reviewed     Recent Labs   Lab Test 02/27/23  1426      POTASSIUM 3.6   CHLORIDE 104   CO2 25   ANIONGAP 13   GLC 66*   BUN 11.4   CR 0.99   NICOLE 9.3       No results for input(s): \"WBC\", \"RBC\", \"HGB\", \"HCT\", \"MCV\", \"MCH\", \"MCHC\", \"RDW\", \"PLT\" in the last 43526 hours.    Recent Labs   Lab Test 02/27/23  1426   PROTTOTAL 7.2   ALBUMIN 4.2   BILITOTAL 0.3   ALKPHOS 78   AST 23   ALT 19       TSH (uIU/mL)   Date Value   02/27/2023 1.95       No results found for: \"UAMP\", \"UBARB\", \"BENZODIAZEUR\", \"UCANN\", \"UCOC\", \"OPIT\", \"UPCP\"    No results found for: \"IRONSAT\", \"RI34845\", \"PEDRO\"    No results found for: \"PH\", \"PHARTERIAL\", \"PO2\", \"QV1SMZMBDIT\", " "\"SAT\", \"PCO2\", \"HCO3\", \"BASEEXCESS\", \"ROBERTH\", \"BEB\"    @LABRCNTIPR(phv:4,pco2v:4,po2v:4,hco3v:4,bayron:4,o2per:4)@    Echocardiology: No results found for this or any previous visit (from the past 4320 hour(s)).    Chest x-ray: No results found for this or any previous visit from the past 365 days.      Chest CT: No results found for this or any previous visit from the past 365 days.      PFT: Most Recent [place] Pulmonary Function Testing    No results found for: \"20001\"  No results found for: \"20002\"  No results found for: \"20003\"  No results found for: \"20015\"  No results found for: \"20016\"  No results found for: \"20027\"  No results found for: \"20028\"  No results found for: \"20029\"  No results found for: \"20079\"  No results found for: \"20080\"  No results found for: \"20081\"  No results found for: \"20335\"  No results found for: \"20105\"  No results found for: \"20053\"  No results found for: \"20054\"  No results found for: \"20055\"      Paola Mak MD 11/20/2023           "

## 2023-11-29 ENCOUNTER — OFFICE VISIT (OUTPATIENT)
Dept: ENDOCRINOLOGY | Facility: CLINIC | Age: 38
End: 2023-11-29
Payer: COMMERCIAL

## 2023-11-29 VITALS
WEIGHT: 315 LBS | OXYGEN SATURATION: 100 % | BODY MASS INDEX: 42.66 KG/M2 | SYSTOLIC BLOOD PRESSURE: 143 MMHG | HEIGHT: 72 IN | HEART RATE: 90 BPM | DIASTOLIC BLOOD PRESSURE: 95 MMHG

## 2023-11-29 DIAGNOSIS — E66.01 OBESITY, CLASS III, BMI 40-49.9 (MORBID OBESITY) (H): Primary | ICD-10-CM

## 2023-11-29 PROCEDURE — 99214 OFFICE O/P EST MOD 30 MIN: CPT | Performed by: SURGERY

## 2023-11-29 ASSESSMENT — PAIN SCALES - GENERAL: PAINLEVEL: NO PAIN (0)

## 2023-11-29 NOTE — NURSING NOTE
"(   Chief Complaint   Patient presents with    New Patient     Meet and greet    )    ( Weight: (!) 161.3 kg (355 lb 11.2 oz) )  ( Height: 181.6 cm (5' 11.5\") )  ( BMI (Calculated): 48.92 )  (   )  (   )  (   )  (   )  (   )  (   )    ( BP: (!) 143/95 )  (   )  (   )  (   )  ( Pulse: 90 )  (   )  ( SpO2: 100 % )    (   Patient Active Problem List   Diagnosis    CARDIOVASCULAR SCREENING; LDL GOAL LESS THAN 160    Gout    Class 3 severe obesity due to excess calories with serious comorbidity and body mass index (BMI) of 50.0 to 59.9 in adult (H)    Essential hypertension    TRACEE (obstructive sleep apnea)    Prediabetes    Vitamin D deficiency    )  (   Current Outpatient Medications   Medication Sig Dispense Refill    allopurinol (ZYLOPRIM) 300 MG tablet TAKE 1 TABLET BY MOUTH EVERY DAY 90 tablet 1    Cholecalciferol (VITAMIN D3) 75 MCG (3000 UT) TABS Take 2 tablets by mouth daily 120 tablet 0    colchicine (COLCYRS) 0.6 MG tablet Take 1 tablet (0.6 mg) by mouth daily 90 tablet 3    losartan (COZAAR) 100 MG tablet Take 1 tablet (100 mg) by mouth daily 90 tablet 3    predniSONE (DELTASONE) 20 MG tablet Take 3 tabs by mouth daily x 3 days, then 2 tabs daily x 3 days, then 1 tab daily x 3 days, then 1/2 tab daily x 3 days. 20 tablet 0    Semaglutide-Weight Management (WEGOVY) 2.4 MG/0.75ML pen Inject 2.4 mg Subcutaneous every 7 days 3 mL 3    )  ( Diabetes Eval:    )    ( Pain Eval:  No Pain (0) )    ( Wound Eval:       )    (   History   Smoking Status    Former    Types: Cigars   Smokeless Tobacco    Never    )    ( Signed By:  Branden Hernandez, EMT; November 29, 2023; 8:28 AM )    "

## 2023-11-29 NOTE — Clinical Note
I would like the patient to stop taking prednisone for 6 weeks prior to surgery.  Also can we have the [place] pharmacist reach out to him regarding switching to a liquid prednisone equivalent certainly let me know if you have a few minutes to connect today

## 2023-11-29 NOTE — LETTER
"11/29/2023       RE: Bigg Montez  [address]  [place] [address]     Dear Colleague,    Thank you for referring your patient, Bigg Montez, to the [facility] [place] at [facility]. Please see a copy of my visit note below.    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  The patient has sleep apnea, prediabetes and hypertension.  The patient also has a history of.  The most she is overweight is approximately 408 pounds he weighs 355 pounds today.  He is currently on Wegovy and doing well.     Past Medical History:   Diagnosis Date    Gout      Past Surgical History:   Procedure Laterality Date    VASECTOMY N/A 2020     Current Outpatient Medications   Medication    allopurinol (ZYLOPRIM) 300 MG tablet    Cholecalciferol (VITAMIN D3) 75 MCG (3000 UT) TABS    colchicine (COLCYRS) 0.6 MG tablet    losartan (COZAAR) 100 MG tablet    predniSONE (DELTASONE) 20 MG tablet    Semaglutide-Weight Management (WEGOVY) 2.4 MG/0.75ML pen     No current facility-administered medications for this visit.        Allergies   Allergen Reactions    Cats Other (See Comments)    No Clinical Screening - See Comments Unknown     seasonal    Seasonal Allergies      BP (!) 143/95 (BP Location: Left arm, Patient Position: Sitting, Cuff Size: Adult Large)   Pulse 90   Ht 1.816 m (5' 11.5\")   Wt (!) 161.3 kg (355 lb 11.2 oz)   SpO2 100%   BMI 48.92 kg/m         On physical examination he has primarily central adipose tissue deposition.      Patient highly motivated for laparoscopic sleeve gastrectomy he still needs to lose additional weight.  He is awaiting a sleep study.  We will have our team reach out to him in terms of scheduling a date.  We talked about the procedure its risk potential complications and alternatives including the risk of leak, pulmonary embolus, " wound infection and gastroesophageal reflux disease amongst others.  He understands the alternative procedures including the Kevin-en-Y gastric bypass and has selected the sleeve.  He also understands that he may need to fix a hiatal hernia if we find 1 at the time of surgery.      Teddy Eden MD

## 2023-12-18 ENCOUNTER — TELEPHONE (OUTPATIENT)
Dept: SLEEP MEDICINE | Facility: CLINIC | Age: 38
End: 2023-12-18
Payer: COMMERCIAL

## 2023-12-18 DIAGNOSIS — G47.33 OSA (OBSTRUCTIVE SLEEP APNEA): Primary | ICD-10-CM

## 2023-12-18 NOTE — TELEPHONE ENCOUNTER
MRN [id number]    YUEKiran Montez    DOS 1/15/2024    HST         Good morning,         The above patient s visit is not a covered benefit. PSG is a covered benefit. Please let us know what you would like to do.         Thanks.         Chelsea Gresham  Authorization Specialist [facility] Prior Authorization    [address] [place] [address]  [email or web address]   Office: [phone]  Fax [phone]    Employed by [facility] [employer]

## 2023-12-22 NOTE — TELEPHONE ENCOUNTER
HST denied    Order placed for in lab diagnostic PSG to requalify for CPAP.  Considering BMI we will order TCM.

## 2023-12-27 ENCOUNTER — OFFICE VISIT (OUTPATIENT)
Dept: FAMILY MEDICINE | Facility: CLINIC | Age: 38
End: 2023-12-27
Payer: COMMERCIAL

## 2023-12-27 VITALS
HEART RATE: 76 BPM | HEIGHT: 71 IN | SYSTOLIC BLOOD PRESSURE: 132 MMHG | TEMPERATURE: 97.2 F | RESPIRATION RATE: 16 BRPM | WEIGHT: 315 LBS | OXYGEN SATURATION: 94 % | BODY MASS INDEX: 44.1 KG/M2 | DIASTOLIC BLOOD PRESSURE: 84 MMHG

## 2023-12-27 DIAGNOSIS — Z76.89 ESTABLISHING CARE WITH NEW DOCTOR, ENCOUNTER FOR: Primary | ICD-10-CM

## 2023-12-27 DIAGNOSIS — M1A.09X0 CHRONIC GOUT OF MULTIPLE SITES, UNSPECIFIED CAUSE: ICD-10-CM

## 2023-12-27 DIAGNOSIS — E66.01 CLASS 3 SEVERE OBESITY DUE TO EXCESS CALORIES WITH SERIOUS COMORBIDITY AND BODY MASS INDEX (BMI) OF 50.0 TO 59.9 IN ADULT (H): ICD-10-CM

## 2023-12-27 DIAGNOSIS — I82.442 DEEP VEIN THROMBOSIS (DVT) OF TIBIAL VEIN OF LEFT LOWER EXTREMITY, UNSPECIFIED CHRONICITY (H): ICD-10-CM

## 2023-12-27 DIAGNOSIS — I10 ESSENTIAL HYPERTENSION: ICD-10-CM

## 2023-12-27 DIAGNOSIS — Z11.4 SCREENING FOR HIV (HUMAN IMMUNODEFICIENCY VIRUS): ICD-10-CM

## 2023-12-27 DIAGNOSIS — Z11.59 NEED FOR HEPATITIS C SCREENING TEST: ICD-10-CM

## 2023-12-27 DIAGNOSIS — R73.03 PRE-DIABETES: ICD-10-CM

## 2023-12-27 DIAGNOSIS — E66.813 CLASS 3 SEVERE OBESITY DUE TO EXCESS CALORIES WITH SERIOUS COMORBIDITY AND BODY MASS INDEX (BMI) OF 50.0 TO 59.9 IN ADULT (H): ICD-10-CM

## 2023-12-27 LAB
ALBUMIN SERPL BCG-MCNC: 4 G/DL (ref 3.5–5.2)
ALP SERPL-CCNC: 72 U/L (ref 40–150)
ALT SERPL W P-5'-P-CCNC: 18 U/L (ref 0–70)
ANION GAP SERPL CALCULATED.3IONS-SCNC: 9 MMOL/L (ref 7–15)
AST SERPL W P-5'-P-CCNC: 22 U/L (ref 0–45)
BILIRUB SERPL-MCNC: 0.2 MG/DL
BUN SERPL-MCNC: 15.1 MG/DL (ref 6–20)
CALCIUM SERPL-MCNC: 9 MG/DL (ref 8.6–10)
CHLORIDE SERPL-SCNC: 109 MMOL/L (ref 98–107)
CREAT SERPL-MCNC: 1.14 MG/DL (ref 0.67–1.17)
DEPRECATED HCO3 PLAS-SCNC: 27 MMOL/L (ref 22–29)
EGFRCR SERPLBLD CKD-EPI 2021: 84 ML/MIN/1.73M2
GLUCOSE SERPL-MCNC: 80 MG/DL (ref 70–99)
HBA1C MFR BLD: 5.2 % (ref 0–5.6)
HCV AB SERPL QL IA: NONREACTIVE
HIV 1+2 AB+HIV1 P24 AG SERPL QL IA: NONREACTIVE
POTASSIUM SERPL-SCNC: 4.5 MMOL/L (ref 3.4–5.3)
PROT SERPL-MCNC: 6.8 G/DL (ref 6.4–8.3)
SODIUM SERPL-SCNC: 145 MMOL/L (ref 135–145)
URATE SERPL-MCNC: 8.8 MG/DL (ref 3.4–7)

## 2023-12-27 PROCEDURE — 84550 ASSAY OF BLOOD/URIC ACID: CPT | Performed by: FAMILY MEDICINE

## 2023-12-27 PROCEDURE — 99215 OFFICE O/P EST HI 40 MIN: CPT | Performed by: FAMILY MEDICINE

## 2023-12-27 PROCEDURE — 83036 HEMOGLOBIN GLYCOSYLATED A1C: CPT | Performed by: FAMILY MEDICINE

## 2023-12-27 PROCEDURE — 86803 HEPATITIS C AB TEST: CPT | Performed by: FAMILY MEDICINE

## 2023-12-27 PROCEDURE — 87389 HIV-1 AG W/HIV-1&-2 AB AG IA: CPT | Performed by: FAMILY MEDICINE

## 2023-12-27 PROCEDURE — 80053 COMPREHEN METABOLIC PANEL: CPT | Performed by: FAMILY MEDICINE

## 2023-12-27 PROCEDURE — 36415 COLL VENOUS BLD VENIPUNCTURE: CPT | Performed by: FAMILY MEDICINE

## 2023-12-27 RX ORDER — COLCHICINE 0.6 MG/1
0.6 TABLET ORAL DAILY
Qty: 90 TABLET | Refills: 3 | Status: SHIPPED | OUTPATIENT
Start: 2023-12-27 | End: 2024-07-18

## 2023-12-27 RX ORDER — PREDNISONE 20 MG/1
TABLET ORAL
Qty: 20 TABLET | Refills: 0 | Status: SHIPPED | OUTPATIENT
Start: 2023-12-27 | End: 2024-06-06

## 2023-12-27 RX ORDER — AMLODIPINE BESYLATE 2.5 MG/1
2.5 TABLET ORAL DAILY
Qty: 30 TABLET | Refills: 1 | Status: SHIPPED | OUTPATIENT
Start: 2023-12-27 | End: 2024-03-11

## 2023-12-27 NOTE — PROGRESS NOTES
Assessment & Plan     Establishing care with new doctor, encounter for      Chronic gout of multiple sites, unspecified cause    - Uric acid; Future  - colchicine (COLCRYS) 0.6 MG tablet; Take 1 tablet (0.6 mg) by mouth daily  - predniSONE (DELTASONE) 20 MG tablet; Take 3 tabs by mouth daily x 3 days, then 2 tabs daily x 3 days, then 1 tab daily x 3 days, then 1/2 tab daily x 3 days.  - Uric acid    Essential hypertension    - Comprehensive metabolic panel (BMP + Alb, Alk Phos, ALT, AST, Total. Bili, TP); Future  - amLODIPine (NORVASC) 2.5 MG tablet; Take 1 tablet (2.5 mg) by mouth daily  - Comprehensive metabolic panel (BMP + Alb, Alk Phos, ALT, AST, Total. Bili, TP)    Class 3 severe obesity due to excess calories with serious comorbidity and body mass index (BMI) of 50.0 to 59.9 in adult (H)      Screening for HIV (human immunodeficiency virus)    - HIV Antigen Antibody Combo; Future  - HIV Antigen Antibody Combo    Need for hepatitis C screening test    - Hepatitis C Screen Reflex to HCV RNA Quant and Genotype; Future  - Hepatitis C Screen Reflex to HCV RNA Quant and Genotype    Deep vein thrombosis (DVT) of tibial vein of left lower extremity, unspecified chronicity (H)      Pre-diabetes    - Hemoglobin A1c; Future  - Hemoglobin A1c    New patient, establishing care with a new clinic, history of hypertension, not adequately controlled on losartan 100 mg daily, I added a low-dose Norvasc 2.5 mg for better control could possibly discontinue after his gastric sleeve surgery if his blood pressure improved.  Obesity, scheduled for gastric sleeve surgery, currently doing Wegovy.  Chronic gout, discussed minimizing steroid use, discussed healthy lifestyle changes, colchicine seems to help.Allopurinol causes flareup, not currently taking he prefers to work on lifestyle to lower his uric acid level.  Hopefully gastric sleeve weight loss will help.    Review of external notes as documented elsewhere in note  40 minutes  "spent by me on the date of the encounter doing chart review, review of outside records, review of test results, interpretation of tests, patient visit, and documentation        BMI:   Estimated body mass index is 49.65 kg/m  as calculated from the following:    Height as of this encounter: 1.803 m (5' 11\").    Weight as of this encounter: 161.5 kg (356 lb).   Weight management plan: Discussed healthy diet and exercise guidelines seeing Dia, will have G. Sleeve Surgery    Work on weight loss  Regular exercise    Rafael Bonner MD  [facility]    Jass Sexton is a 38 year old, presenting for the following health issues:  Establish Care      12/27/2023    11:04 AM   Additional Questions   Roomed by hser   Accompanied by self       History of Present Illness       Hypertension: He presents for follow up of hypertension.  He does not check blood pressure  regularly outside of the clinic. Outpatient blood pressures have not been over 140/90. He follows a low salt diet.     He eats 2-3 servings of fruits and vegetables daily.He consumes 1 sweetened beverage(s) daily.He exercises with enough effort to increase his heart rate 10 to 19 minutes per day.  He exercises with enough effort to increase his heart rate 3 or less days per week.   He is taking medications regularly.       He is here to establish care with a new clinic and new doctor, previously seen at [facility], medical history included hypertension, currently controlled with losartan 100 mg daily, but blood pressure has been running high for the past few months, no dizziness, no chest pain or shortness of breath.  He has morbid obesity, highest weight was 408 pounds, with a weight loss clinic and endocrinology consult, was able to bring his weight down to 356 pounds, currently using Wegovy.  He is planning to have a gastric sleeve surgery within a month.  He has chronic gout, is taking colchicine and steroid as needed " "for flareup, allopurinol causes gout flareup.  He has a history of DVT involving the left calf about 2 years ago was treated with anticoagulant and follow-up with hematologist, no recurrence since then, unclear of the etiology.      Review of Systems   Constitutional, HEENT, cardiovascular, pulmonary, GI, , musculoskeletal, neuro, skin, endocrine and psych systems are negative, except as otherwise noted.      Objective    /84 (BP Location: Right arm, Patient Position: Sitting, Cuff Size: Adult Large)   Pulse 76   Temp 97.2  F (36.2  C) (Temporal)   Resp 16   Ht 1.803 m (5' 11\")   Wt (!) 161.5 kg (356 lb)   SpO2 94%   BMI 49.65 kg/m    Body mass index is 49.65 kg/m .  Physical Exam   GENERAL: healthy, alert and no distress  NECK: no adenopathy, no asymmetry, masses, or scars and thyroid normal to palpation  RESP: lungs clear to auscultation - no rales, rhonchi or wheezes  CV: regular rate and rhythm, normal S1 S2, no S3 or S4, no murmur, click or rub, no peripheral edema and peripheral pulses strong  ABDOMEN: soft, nontender, no hepatosplenomegaly, no masses and bowel sounds normal  MS: no gross musculoskeletal defects noted, no edema    Results for orders placed or performed in visit on 12/27/23   HIV Antigen Antibody Combo     Status: Normal   Result Value Ref Range    HIV Antigen Antibody Combo Nonreactive Nonreactive   Hepatitis C Screen Reflex to HCV RNA Quant and Genotype     Status: Normal   Result Value Ref Range    Hepatitis C Antibody Nonreactive Nonreactive   Uric acid     Status: Abnormal   Result Value Ref Range    Uric Acid 8.8 (H) 3.4 - 7.0 mg/dL   Comprehensive metabolic panel (BMP + Alb, Alk Phos, ALT, AST, Total. Bili, TP)     Status: Abnormal   Result Value Ref Range    Sodium 145 135 - 145 mmol/L    Potassium 4.5 3.4 - 5.3 mmol/L    Carbon Dioxide (CO2) 27 22 - 29 mmol/L    Anion Gap 9 7 - 15 mmol/L    Urea Nitrogen 15.1 6.0 - 20.0 mg/dL    Creatinine 1.14 0.67 - 1.17 mg/dL    GFR " Estimate 84 >60 mL/min/1.73m2    Calcium 9.0 8.6 - 10.0 mg/dL    Chloride 109 (H) 98 - 107 mmol/L    Glucose 80 70 - 99 mg/dL    Alkaline Phosphatase 72 40 - 150 U/L    AST 22 0 - 45 U/L    ALT 18 0 - 70 U/L    Protein Total 6.8 6.4 - 8.3 g/dL    Albumin 4.0 3.5 - 5.2 g/dL    Bilirubin Total 0.2 <=1.2 mg/dL   Hemoglobin A1c     Status: Normal   Result Value Ref Range    Hemoglobin A1C 5.2 0.0 - 5.6 %       This note was completed in part using a voice recognition software, any grammatical or context distortion are unintentional and inherent to the software.          Prior to immunization administration, verified patients identity using patient s name and date of birth. Please see Immunization Activity for additional information.     Screening Questionnaire for Adult Immunization    Are you sick today?   No   Do you have allergies to medications, food, a vaccine component or latex?   No   Have you ever had a serious reaction after receiving a vaccination?   No   Do you have a long-term health problem with heart, lung, kidney, or metabolic disease (e.g., diabetes), asthma, a blood disorder, no spleen, complement component deficiency, a cochlear implant, or a spinal fluid leak?  Are you on long-term aspirin therapy?   No   Do you have cancer, leukemia, HIV/AIDS, or any other immune system problem?   No   Do you have a parent, brother, or sister with an immune system problem?   No   In the past 3 months, have you taken medications that affect  your immune system, such as prednisone, other steroids, or anticancer drugs; drugs for the treatment of rheumatoid arthritis, Crohn s disease, or psoriasis; or have you had radiation treatments?   No   Have you had a seizure, or a brain or other nervous system problem?   No   During the past year, have you received a transfusion of blood or blood    products, or been given immune (gamma) globulin or antiviral drug?   No   For women: Are you pregnant or is there a chance you could  become       pregnant during the next month?   No   Have you received any vaccinations in the past 4 weeks?   No     Immunization questionnaire answers were all negative.      Patient instructed to remain in clinic for 15 minutes afterwards, and to report any adverse reactions.     Screening performed by Opal Singh MA on 12/27/2023 at 11:06 AM.

## 2023-12-28 ENCOUNTER — CARE COORDINATION (OUTPATIENT)
Dept: ENDOCRINOLOGY | Facility: CLINIC | Age: 38
End: 2023-12-28

## 2023-12-28 NOTE — PROGRESS NOTES
Tasklist updated and sent to patient via [employer].    Bariatric Task List  Fax:  Please fax all paperwork to: [phone] -     Status:  Is patient a candidate for bariatric surgery?:  patient is a candidate for bariatric surgery -     Cleared to schedule surgeon consult?:  cleared to schedule surgeon consult - 11/29/23 appt. bks   Status:  surgery evaluation in process -     Surgeon: Karey  -     Tentative surgery month/year: To be determined. -        Insurance: Insurance:  [facility] -      Contact insurance to discuss coverage: Needed -          Patient Info: Initial Weight:  386 -     Date of Initial Weight/Height:  12/20/2018 -  consult 1/2021 with weight 378, started weight loss with primary care    Goal Weight (lbs):  348 -     Required Weight Loss:  38 -     Surgery Type:  sleeve gastrectomy -        Dietician Visits: Structured weight loss required by insurance?:  structured weight loss required -     Dietician Visit 1:  Completed - 3/28/23 KB   Dietician Visit 2:  Completed - 4/25/23 KB   Dietician Visit 3:  Completed - 5/16/23 KB   Dietician Visit 4:  Completed - 6/13/23 KB   Dietician Visit 5:  Completed - 7/18/23 KB   Dietician Visit 6:  Completed - 8/29/23 bks   Dietician Visit additional:  Needed - monthly until surgery for weight loss and postop diet teaching. bks   Clearance from dietician to see surgeon?:    -     Dietician Notes:  RD Visits: 9/18/23, 10/23/23, 11/20/23, 12/29/23 appt. bks -        Psychological Evaluation: Psych eval:  Needed - List and letter sent to pt 12/16/21 -AS; Dr Romana Villarreal appt 11/17/23, call [phone] to finalize evaluation and have it faxed to [phone]. bks      Lab Work: Complete Blood Count:  Needed - Ordered 10/23/23. bks   Comprehensive Metabolic Panel:  Completed - Ordered 12/16/21 - AS; 2/27/23 bks   Vitamin D:  Needed - Ordered 12/16/21 - AS; 2/27/23=12, need recheck bks   PTH:  Needed - Ordered 10/23/23. bks   Hgb A1c:  Completed - Ordered 12/16/21 -  "AS; 2/27/23 bks    Lipids: Completed - 2/27/23 bks    TSH ([facility], [facility], [place] MA): Completed - 2/27/23 bks   Vitamin A: Needed - Ordered 10/23/23. bks      Consults/ Clearance Sleep Medicine:  Needed - 11/1/23 Sleep referral entered. 2/5/24 Eliseo Bloom appt. bks   Hematology:  Completed - hx of blood clot- 9/2021; 10/23/23 Dr Alamo Would recommend LMWH, enoxaparin 40mg initated one day postop  if no bleeding complications for 7 days total, to use compression stocking for 7 days after surgery (remove stockings at night. bks   Medical Weight Management:   -        Testing: Sleep Study:   -  Needed   Your insurance does not cover the in-home sleep study. Please call sleep center for a in-clinic sleep study.   PCP: Establish care with PCP:  Completed -     Follow up with PCP:    -     PCP letter of support:  Needed - 11/1/23 Referral entered. 12/27/23 Rafael Bonner appt. bks      Stopping Smoking/ Alcohol Use/Cannabis Use: Quit tobacco use (3 months smoke free)?:  Needed -    Quit date:    - cigars - let us know your quit date. bks      Patient Education:  Information Session:  Completed -     Attended New Consult Class?: Needed -     Given \"Making your decision\" handout?:  Yes -     Given \"A Roadmap to you Weight Loss Surgery\" handout?: Yes -     Given \"Epic Care Companion\" information?: Yes -     Attended support group?:  Needed -     Support plan in place?:  Completed -        Final Tasks:  Before surgery online preop class:  Needed -     Nurse visit for information:  Needed -     Weight Check: Needed -     History and Physical: Pre-Assessment Clinic (PAC) -   Needed -     Final labs per clinic: Needed -     Schedule postop appointments: Needed -     Other:   -   -        Notes: Please register for the Weight Loss Surgery Care Companion Pathway through the [employer] mobile eze or via web browser. You register by answering \"yes\" to the following question, \"Do you agree to take part in this free, online program?\"  " Information from this Pathway can help you be more successful before surgery and for up to one year after surgery.  This Pathway through [facility] can also answer questions you may have about your surgery.   The Pathway will start when you get your Tasklist after your initial consultation or when your surgery is scheduled.  This is activated. bks   -    11/30/23 To be off allopurinol and prednisone for several weeks before and after surgery per Dr Eden.

## 2023-12-28 NOTE — PROGRESS NOTES
"Video-Visit Details    Type of service:  Video Visit    Video Start Time: 10:00 AM   Video End Time: 10:21 AM     Originating Location (pt. Location): Home    Distant Location (provider location):  Offsite (providers home) [facility] [place]     Platform used for Video Visit: Harshil        Bariatric Nutrition Consultation Note    Reason For Visit: Nutrition reassessment    Bigg Montez is a 38 year old presenting today for return bariatric nutrition consult.  Pt is interested in laparoscopic sleeve gastrectomy with TBD.  Patient is accompanied by self. This is patients 10th nutrition visit prior to surgery.     Coordination of care:     Pt referred by Brianda Chan NP on January 25, 2021.  Patient with Co-morbidities of obesity including:  Type II DM no  Renal Failure no  Sleep apnea yes  Hypertension yes   Dyslipidemia no  Joint pain no  Back pain no  GERD no   Prediabetes yes    H/o gout. Pt reports having a blood clot in mid-sept. 2021 1/21/2021    11:14 PM   Support System Reviewed With Patient   Who do you have in your support network that can be available to help you for the first 2 weeks after surgery? My wife   Who can you count on for support throughout your weight loss surgery journey? Kenny ceja       ANTHROPOMETRICS:  Initial weight (12/2018 with PCP): 386 lbs    Estimated body mass index is 49 kg/m  as calculated from the following:    Height as of this encounter: 1.803 m (5' 10.98\").    Weight as of this encounter: 159.3 kg (351 lb 3.2 oz). (-4 lbs over last month, -35 lbs from initial)     Required weight loss goal pre-op: 38 lbs from initial consult weight (goal weight 348 lbs or less before surgery).         1/21/2021    11:14 PM   --   I have tried the following methods to lose weight Watching portions or calories    Exercise    Weight Watchers    Slimfast    Physician directed program           1/21/2021    11:14 PM   Weight Loss Questions Reviewed " With Patient   How long have you been overweight? Since puberty       SUPPLEMENT INFORMATION:  Milk thistle supplement pt reports for gout  Men's One-a-day MVI    Nutrition Related Labs:  2/27/23 - Vitamin D 12 (L). Started Vitamin D 6000 units daily in June. He plans to have lab rechecked soon. Also needs vitamin A, PTH and CBC rechecked - added onto specimen collected 2 days ago.      12/27/23 - HgbA1c: 5.2 (WNL- improved from 5.7 at previous check)    Medications for Weight Loss:  Wegovy    NUTRITION HISTORY:  No known food allergies/intolerances  H/o gout    He continues to work on reducing starches (rice, noodles). Increasing fruits, working on increase vegetables. No longer going back for seconds at dinner. Intermittently replacing breakfast with protein shake.     7/18/23 - Has started going to the gym, and working on increasing his water intake.     8/29/23 - Cut out energy drink intake. Went up on Wegovy to 1 mg.     9/18/23 - Continues Wegovy injections, losing consistently each month. Reduced starch portions. Reports eating ~1.5 meals daily. May have fruit for a snack or Elementum granola bar.     10/23/23 - Only tolerates FairLife protein shakes.  As tried many others. Gets stomach ache from others.     11/20/23 - Feeling more bloated, constipated with 2.4 mg dose of Wegovy. Plans to start Miralax. Feels good energy. Most days getting in 3 meals daily, less often may only get 1-2 meals daily. Has not eaten rice in last 3-4 weeks. Focusing on high protein meals.     Today - Consuming 3 meals daily , but feels like food is staying in his stomach. Using a salad sized plate for meals. Continues to work on eating slowly, but needs to work more on  out beverage intake.     Diet Recall (yesterday):  Breakfast: Fairlife Protein Shakes   Lunch: deli meat (ham/turkey)   Dinner: chicken stew and occ rice; red beans and rice and sausage   Beverages: water (100 oz), sugar-free sports drinks.      Progress Towards Previous Goals:  1.Goal weight for surgery: 348 lbs or less - Improving, 3 lbs from goal  2. Ensure you are consuming lean protein at each meal period - Example Greek yogurt and granola at lunch - Met, continues    - Consider setting alarms 3 times per day to remind you to eat some protein.   3. Increase physical activity as able. At least 30 mins walk daily. - No significant change lately  4. 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         1/21/2021    11:14 PM   Dining Out History Reviewed With Patient   How often do you dine out? Nearly every day.   Where do you dine out? (select all that apply) fast food chains    take out   What types of food do you order when you dine out? Protien rice dishes           1/21/2021    11:14 PM   Physical Activity Reviewed With Patient   How often do you exercise? 1 to 2 times per week   What is the duration of your exercise (in minutes)? 60+ Minutes   What types of exercise do you do? gym membership    weightlifting   What keeps you from being more active? Lack of Time       NUTRITION DIAGNOSIS:  Obesity r/t long history of self-monitoring deficit and excessive energy intake aeb BMI >30 kg/m2. - improving    INTERVENTION:  Intervention Provided/Education Provided:  - Reviewed weight loss goal for surgery and strategy for pre-op weight loss.   - Reviewed bariatric diet guidelines   - Encouraged increased physical activity.   - Praised pt on the progress he has made this month  - Discussed potential barriers to maintaining positive change and how to overcome.  -  Reviewed other task list items left.   - Provided pt with list of diet handouts and nutrition goals below.         1/21/2021    11:14 PM   Questions Reviewed With Patient   How ready are you to make changes regarding your weight? Number 1 = Not ready at all to make changes up to 10 = very ready. 10   How  confident are you that you can change? 1 = Not confident that you will be successful making changes up to 10 = very confident. 10       Expected Engagement: good    Goals before surgery:  1.Goal weight for surgery: 348 lbs or less  2. Reduce portions. Use a saucer/dessert sized plate.  3. Increase physical activity as able. At least 30 mins walk daily.   4. Have PTH labs done   5. Avoid drinking with meals and for 30 mins before and after.     Goals after surgery:   1. Follow the bariatric post-op diet advancement schedule (see below)  2. Sip on 48-64 oz (or greater) fluids daily, recording intake to help stay on-track.  - Drink at least 1-2 oz of fluid every 15-30 min.  3. Stop vitamins/minerals 1 week before surgery. We will discuss starting a chewable multivitamin at the one week post-op visit.   4. Work towards consuming 60 gm protein daily.     Post-op Diet Advancement Schedule:  Clear Liquid Diet (stage 1): starts the day before surgery  Low-Fat Full Liquid Diet (stage 2): starts one week post-op  Pureed Diet (stage 3): starts 2 weeks post-op  Soft Diet (stage 4): starts 4 weeks post-op  Regular Diet (stage 5): starts 8 weeks post-op     Post-op Diet Handouts:  Diet Guidelines after Weight-loss Surgery  [email or web address]     Your Stage 1 Diet: Clear Liquids  [email or web address]     Your Stage 2 Diet: Low-fat Full Liquids  [email or web address]     Your Stage 3 Diet: Pureed Foods  [email or web address]     Pureed Recipes  [email or web address]    Your Stage 4 Diet: Soft Foods  [email or web address]    Your Stage 5 Diet: Regular Foods  [email or web address]    Supplements after Sleeve Gastrectomy, Gastric Bypass or Single Anastomosis Duodenal Switch  [email or web address]    Keeping Track of Fluids  [email or web address]      Protein Supplements for After Surgery:   Unflavored protein powder: Genepro, Isopure (25-30 gm protein per  1 scoop)  You may also use flavored protein powder if you prefer.   Protein shots: https://store.WhereNet.com/collections/protein-shots  Pre-made protein shakes (no more than 210 Calories, at least 20 grams of protein, and less than 10 grams of sugar):   Premier Protein (160 Calories, 30 g protein)  Boost/Ensure Max (160 calories, 30 gm protein)   [employer] Core Power (170 calories, 26 gm protein)  Aldi's Elevation Protein Shake (160 calories, 30 gm protein)   Equate Protein Shake (160 calories, 30 gm protein)  Slim Fast Advanced Nutrition (180 Calories, 20 g protein)  Muscle Milk, lactose-free, 17 oz bottle (210 Calories, 30 g protein)  Glucerna Protein Smart (150 grover, 30 gm protein)  Clear Protein Drinks:  BiPro  Premier Protein clear  [id number]  M Health Protein 15 Concentrate  Bone broth  Beneprotein protein powder mixed with 4-6 oz of fluid  [id number]        Time spent with patient: 21 mins    Angela Granger, BRO, LD

## 2023-12-29 ENCOUNTER — TELEPHONE (OUTPATIENT)
Dept: ENDOCRINOLOGY | Facility: CLINIC | Age: 38
End: 2023-12-29

## 2023-12-29 ENCOUNTER — VIRTUAL VISIT (OUTPATIENT)
Dept: ENDOCRINOLOGY | Facility: CLINIC | Age: 38
End: 2023-12-29
Payer: COMMERCIAL

## 2023-12-29 VITALS — BODY MASS INDEX: 44.1 KG/M2 | HEIGHT: 71 IN | WEIGHT: 315 LBS

## 2023-12-29 DIAGNOSIS — Z98.84 BARIATRIC SURGERY STATUS: ICD-10-CM

## 2023-12-29 DIAGNOSIS — E66.01 CLASS 3 SEVERE OBESITY DUE TO EXCESS CALORIES WITH SERIOUS COMORBIDITY AND BODY MASS INDEX (BMI) OF 50.0 TO 59.9 IN ADULT (H): ICD-10-CM

## 2023-12-29 DIAGNOSIS — E66.813 CLASS 3 SEVERE OBESITY DUE TO EXCESS CALORIES WITH SERIOUS COMORBIDITY AND BODY MASS INDEX (BMI) OF 50.0 TO 59.9 IN ADULT (H): ICD-10-CM

## 2023-12-29 DIAGNOSIS — Z71.3 NUTRITIONAL COUNSELING: Primary | ICD-10-CM

## 2023-12-29 PROCEDURE — 97803 MED NUTRITION INDIV SUBSEQ: CPT | Mod: 95 | Performed by: DIETITIAN, REGISTERED

## 2023-12-29 PROCEDURE — 99207 PR NO CHARGE LOS: CPT | Mod: 95 | Performed by: DIETITIAN, REGISTERED

## 2023-12-29 ASSESSMENT — PAIN SCALES - GENERAL: PAINLEVEL: NO PAIN (0)

## 2023-12-29 NOTE — NURSING NOTE
Is the patient currently in the state of [place]? YES    Visit mode:VIDEO    If the visit is dropped, the patient can be reconnected by: VIDEO VISIT: Text to cell phone:   Telephone Information:   Mobile [phone]       Will anyone else be joining the visit? NO  (If patient encounters technical issues they should call [phone]    How would you like to obtain your AVS? MyChart    Are changes needed to the allergy or medication list? N/A    Reason for visit: RECHECK (Pre- surg Nutrition)    Tammi MEJIA

## 2023-12-29 NOTE — PATIENT INSTRUCTIONS
Paul Sexton,    Follow-up with RD on 1/22    Thank you,    Angela Granger, RD, LD  If you would like to schedule or reschedule an appointment with the RD, please call [phone]    Nutrition Goals  Goals before surgery:  1.Goal weight for surgery: 348 lbs or less  2. Reduce portions. Use a saucer/dessert sized plate.  3. Increase physical activity as able. At least 30 mins walk daily.   4. Have PTH labs done   5. Avoid drinking with meals and for 30 mins before and after.     Goals after surgery:   1. Follow the bariatric post-op diet advancement schedule (see below)  2. Sip on 48-64 oz (or greater) fluids daily, recording intake to help stay on-track.  - Drink at least 1-2 oz of fluid every 15-30 min.  3. Stop vitamins/minerals 1 week before surgery. We will discuss starting a chewable multivitamin at the one week post-op visit.   4. Work towards consuming 60 gm protein daily.     Post-op Diet Advancement Schedule:  Clear Liquid Diet (stage 1): starts the day before surgery  Low-Fat Full Liquid Diet (stage 2): starts one week post-op  Pureed Diet (stage 3): starts 2 weeks post-op  Soft Diet (stage 4): starts 4 weeks post-op  Regular Diet (stage 5): starts 8 weeks post-op     Post-op Diet Handouts:  Diet Guidelines after Weight-loss Surgery  [email or web address]     Your Stage 1 Diet: Clear Liquids  [email or web address]     Your Stage 2 Diet: Low-fat Full Liquids  [email or web address]     Your Stage 3 Diet: Pureed Foods  [email or web address]     Pureed Recipes  [email or web address]    Your Stage 4 Diet: Soft Foods  [email or web address]    Your Stage 5 Diet: Regular Foods  [email or web address]    Supplements after Sleeve Gastrectomy, Gastric Bypass or Single Anastomosis Duodenal Switch  [email or web address]    Keeping Track of Fluids  [email or web address]      Protein Supplements for After Surgery:   Unflavored protein powder: Genepro,  Isopure (25-30 gm protein per 1 scoop)  You may also use flavored protein powder if you prefer.   Protein shots: https://store.bariatricFastgen.com/collections/protein-shots  Pre-made protein shakes (no more than 210 Calories, at least 20 grams of protein, and less than 10 grams of sugar):   Premier Protein (160 Calories, 30 g protein)  Boost/Ensure Max (160 calories, 30 gm protein)   Fairlife Core Power (170 calories, 26 gm protein)  Aldi's Elevation Protein Shake (160 calories, 30 gm protein)   Equate Protein Shake (160 calories, 30 gm protein)  Slim Fast Advanced Nutrition (180 Calories, 20 g protein)  Muscle Milk, lactose-free, 17 oz bottle (210 Calories, 30 g protein)  Glucerna Protein Smart (150 grover, 30 gm protein)  Clear Protein Drinks:  BiPro  Premier Protein clear  [id number]   [employer] Protein 15 Concentrate  Bone broth  Beneprotein protein powder mixed with 4-6 oz of fluid  [id number]      COMPREHENSIVE WEIGHT MANAGEMENT PROGRAM  VIRTUAL SUPPORT GROUPS    At [facility], our Comprehensive Weight Management program offers on-line support groups for patients who are working on weight loss and considering, preparing for, or have had weight loss surgery.     There is no cost for this opportunity.  You are invited to attend the?Virtual Support Groups?provided by any of the following locations:    [facility] via [employer] (Group) Teams with Cynthia Amor RN  2.   [place] via [employer] (Group) Teams with Terry Nix, PhD, LP  3.   [place] via [employer] with Paola Franks RN  4.   [facility] via a Zoom Meeting with CARLOS Velasquez    The following Support Group information can also be found on our website:  [email or web address]      [facility] Weight Loss Surgery Support Group  The support group is a patient-lead forum that meets monthly to share experiences, encouragement and education. It is open to those  "who have had weight loss surgery, are scheduled for surgery, or are considering surgery.   WHEN: This group meets on the 3rd Wednesday of each month from 5:00PM - 6:00PM virtually using Microsoft Teams.   FACILITATOR: Led by Cynthia Mccall RD, LD, RN, the program's Clinical Coordinator.   TO REGISTER: Please contact the clinic via [employer] or call the nurse line directly at [phone] to inform our staff that you would like an invite sent to you and to let us know the email you would like the invite sent to. Prior to the meeting, a link with directions on how to join the meeting will be sent to you.    2023 and 2024 Meetings   December 20  January 17  February 21  March 20  April 17  May 15  Melly 19      [facility] - [facility] Bariatric Care Support Group?  This is open to all pre- and post- operative bariatric surgery patients as well as their support system.   WHEN: This group meets the 3rd Tuesday of each month from 6:30 PM - 8:00 PM virtually using Microsoft Teams.   FACILITATOR: Led by Terry Nix, Ph.D who is a Licensed Psychologist with the [facility] Comprehensive Weight Management Program.   TO REGISTER: Please send an email to Terry Nix, Ph.D., LP at?[email or web address]?if you would like an invitation to the group. Prior to the meeting, a link with directions on how to join the meeting will be sent to you.    2023 and 2024 Meetings  December 19 January 16: \"Medication Management and Bariatric Surgery\", Irina Salazar, PharmD, Pharmacy Resident at [facility]  February 20: \"A Bariatric Surgery Patient's Perspective\", JEZ Lord, [facility], Behavioral Health Clinician at [facility]  March 19  April 16  May 21  Melly 18: \"Nutritional Labeling\", Dietitian speaker to be announced.  November 19: \"Holiday Eating\", Dietitian speaker to be announced.    [facility] - " "[facility] Post-Operative Bariatric Surgery Support Group  This is a support group for [facility] bariatric patients (and those external to [facility]) who have had bariatric surgery and are at least 3 months post-surgery.  WHEN: This support group meets the 4th Wednesday of the month from 11:00 AM - 12:00 PM virtually using Microsoft Teams.   FACILITATOR: Led by Certified Bariatric Nurse, Paola Franks RN.   TO REGISTER: Please send an email to Paola at [email or web address] if you would like an invitation to the group.  Prior to the meeting, a link with directions on how to join the meeting will be sent to you.    2023 and 2024 Meetings  December 27  January 24  February 28  March 27  April 24  May 22  Melly 26    [facility] Healthy Lifestyle Group?  This is a 60 minute virtual coaching group for those who want to lead a healthier lifestyle. Come together to set goals and overcome barriers in a supportive group environment. We will address the four pillars of health: nutrition, exercise, sleep and emotional well-being.  This group is highly recommended for those who are participating in the 24 week Healthy Lifestyle Plan and our Health Coaching sessions.  WHEN: This group meets the 1st Friday of the month, 12:30 PM - 1:30 PM online, via a zoom meeting.    FACILITATOR: Led by National Board Certified Health and , Paola Kwok Pending sale to [facility].   TO REGISTER: Please call the Call Center at [phone] to register.  You will get an appointment to attend in [facility]. Fifteen minutes prior to the meeting, complete the e-check in and you will get the link to join the meeting.    There is no charge to attend this group and space is limited.     2023 and 2024 Meetings  December 1: \"Let's Talk\" (guided discussion on our wins and challenges)  January 5: \"New Years Vision: Manifest your Best 2024!\" (guided imagery,  journaling and " "discussion)  February 2: \"Let's Talk\"  March 1: \"10 Percent Happier\" by Brian Chakraborty (Book Bites - a guided discussion on the nuggets of wisdom from favorite wellness books, no need to read the book but highly encouraged)  April 5: \"Let's Talk\"  May 3: \"Essentialism: The Disciplined Pursuit of Less\" by Sheldon Louis (Book Bites discussion)  June 7: \"Let's Talk\"  July 5: NO MEETING, off for the 4th of July Holiday  August 2: \"The Blue Zones, Secrets for Living a Longer Life\" by Brian Mendiola (Book Bites discussion)                    "

## 2023-12-29 NOTE — LETTER
"12/29/2023       RE: Bigg Montez  [address]  [place] [address]     Dear Colleague,    Thank you for referring your patient, Bigg Montez, to the [facility] [place] at [facility]. Please see a copy of my visit note below.    Video-Visit Details    Type of service:  Video Visit    Video Start Time: 10:00 AM   Video End Time: 10:21 AM     Originating Location (pt. Location): Home    Distant Location (provider location):  Offsite (providers home) [facility] [place]     Platform used for Video Visit: [employer]        Bariatric Nutrition Consultation Note    Reason For Visit: Nutrition reassessment    Bigg Montez is a 38 year old presenting today for return bariatric nutrition consult.  Pt is interested in laparoscopic sleeve gastrectomy with TBD.  Patient is accompanied by self. This is patients 10th nutrition visit prior to surgery.     Coordination of care:     Pt referred by Brianda Chan NP on January 25, 2021.  Patient with Co-morbidities of obesity including:  Type II DM no  Renal Failure no  Sleep apnea yes  Hypertension yes   Dyslipidemia no  Joint pain no  Back pain no  GERD no   Prediabetes yes    H/o gout. Pt reports having a blood clot in mid-sept. 2021 1/21/2021    11:14 PM   Support System Reviewed With Patient   Who do you have in your support network that can be available to help you for the first 2 weeks after surgery? My wife   Who can you count on for support throughout your weight loss surgery journey? Kenny ceja       ANTHROPOMETRICS:  Initial weight (12/2018 with PCP): 386 lbs    Estimated body mass index is 49 kg/m  as calculated from the following:    Height as of this encounter: 1.803 m (5' 10.98\").    Weight as of this encounter: 159.3 kg (351 lb 3.2 oz). (-4 lbs over last month, -35 lbs from initial)     Required weight loss goal pre-op: 38 lbs " from initial consult weight (goal weight 348 lbs or less before surgery).         1/21/2021    11:14 PM   --   I have tried the following methods to lose weight Watching portions or calories    Exercise    Weight Watchers    [facility]    Physician directed program           1/21/2021    11:14 PM   Weight Loss Questions Reviewed With Patient   How long have you been overweight? Since puberty       SUPPLEMENT INFORMATION:  Milk thistle supplement pt reports for gout  Men's One-a-day MVI    Nutrition Related Labs:  2/27/23 - Vitamin D 12 (L). Started Vitamin D 6000 units daily in June. He plans to have lab rechecked soon. Also needs vitamin A, PTH and CBC rechecked - added onto specimen collected 2 days ago.      12/27/23 - HgbA1c: 5.2 (WNL- improved from 5.7 at previous check)    Medications for Weight Loss:  Wegovy    NUTRITION HISTORY:  No known food allergies/intolerances  H/o gout    He continues to work on reducing starches (rice, noodles). Increasing fruits, working on increase vegetables. No longer going back for seconds at dinner. Intermittently replacing breakfast with protein shake.     7/18/23 - Has started going to the gym, and working on increasing his water intake.     8/29/23 - Cut out energy drink intake. Went up on Wegovy to 1 mg.     9/18/23 - Continues Wegovy injections, losing consistently each month. Reduced starch portions. Reports eating ~1.5 meals daily. May have fruit for a snack or [employer] granola bar.     10/23/23 - Only tolerates FairLife protein shakes.  As tried many others. Gets stomach ache from others.     11/20/23 - Feeling more bloated, constipated with 2.4 mg dose of Wegovy. Plans to start Miralax. Feels good energy. Most days getting in 3 meals daily, less often may only get 1-2 meals daily. Has not eaten rice in last 3-4 weeks. Focusing on high protein meals.     Today - Consuming 3 meals daily , but feels like food is staying in his stomach. Using a salad sized plate for  meals. Continues to work on eating slowly, but needs to work more on  out beverage intake.     Diet Recall (yesterday):  Breakfast: Fairlife Protein Shakes   Lunch: deli meat (ham/turkey)   Dinner: chicken stew and occ rice; red beans and rice and sausage   Beverages: water (100 oz), sugar-free sports drinks.     Progress Towards Previous Goals:  1.Goal weight for surgery: 348 lbs or less - Improving, 3 lbs from goal  2. Ensure you are consuming lean protein at each meal period - Example Greek yogurt and granola at lunch - Met, continues    - Consider setting alarms 3 times per day to remind you to eat some protein.   3. Increase physical activity as able. At least 30 mins walk daily. - No significant change lately  4. 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         1/21/2021    11:14 PM   Dining Out History Reviewed With Patient   How often do you dine out? Nearly every day.   Where do you dine out? (select all that apply) fast food chains    take out   What types of food do you order when you dine out? Protien rice dishes           1/21/2021    11:14 PM   Physical Activity Reviewed With Patient   How often do you exercise? 1 to 2 times per week   What is the duration of your exercise (in minutes)? 60+ Minutes   What types of exercise do you do? gym membership    weightlifting   What keeps you from being more active? Lack of Time       NUTRITION DIAGNOSIS:  Obesity r/t long history of self-monitoring deficit and excessive energy intake aeb BMI >30 kg/m2. - improving    INTERVENTION:  Intervention Provided/Education Provided:  - Reviewed weight loss goal for surgery and strategy for pre-op weight loss.   - Reviewed bariatric diet guidelines   - Encouraged increased physical activity.   - Praised pt on the progress he has made this month  - Discussed potential barriers to maintaining positive change and how to  overcome.  -  Reviewed other task list items left.   - Provided pt with list of diet handouts and nutrition goals below.         1/21/2021    11:14 PM   Questions Reviewed With Patient   How ready are you to make changes regarding your weight? Number 1 = Not ready at all to make changes up to 10 = very ready. 10   How confident are you that you can change? 1 = Not confident that you will be successful making changes up to 10 = very confident. 10       Expected Engagement: good    Goals before surgery:  1.Goal weight for surgery: 348 lbs or less  2. Reduce portions. Use a saucer/dessert sized plate.  3. Increase physical activity as able. At least 30 mins walk daily.   4. Have PTH labs done   5. Avoid drinking with meals and for 30 mins before and after.     Goals after surgery:   1. Follow the bariatric post-op diet advancement schedule (see below)  2. Sip on 48-64 oz (or greater) fluids daily, recording intake to help stay on-track.  - Drink at least 1-2 oz of fluid every 15-30 min.  3. Stop vitamins/minerals 1 week before surgery. We will discuss starting a chewable multivitamin at the one week post-op visit.   4. Work towards consuming 60 gm protein daily.     Post-op Diet Advancement Schedule:  Clear Liquid Diet (stage 1): starts the day before surgery  Low-Fat Full Liquid Diet (stage 2): starts one week post-op  Pureed Diet (stage 3): starts 2 weeks post-op  Soft Diet (stage 4): starts 4 weeks post-op  Regular Diet (stage 5): starts 8 weeks post-op     Post-op Diet Handouts:  Diet Guidelines after Weight-loss Surgery  [email or web address]     Your Stage 1 Diet: Clear Liquids  [email or web address]     Your Stage 2 Diet: Low-fat Full Liquids  [email or web address]     Your Stage 3 Diet: Pureed Foods  [email or web address]     Pureed Recipes  [email or web address]    Your Stage 4 Diet: Soft Foods  [email or web address]    Your Stage 5 Diet: Regular  Foods  [email or web address]    Supplements after Sleeve Gastrectomy, Gastric Bypass or Single Anastomosis Duodenal Switch  [email or web address]    Keeping Track of Fluids  [email or web address]      Protein Supplements for After Surgery:   Unflavored protein powder: Genepro, Isopure (25-30 gm protein per 1 scoop)  You may also use flavored protein powder if you prefer.   Protein shots: [email or web address]://store.[email or web address]/[email or web address]/[email or web address]  Pre-made protein shakes (no more than 210 Calories, at least 20 grams of protein, and less than 10 grams of sugar):   Premier Protein (160 Calories, 30 g protein)  Boost/Ensure Max (160 calories, 30 gm protein)   Gray Routes [employer] Core Power (170 calories, 26 gm protein)  Aldi's Elevation Protein Shake (160 calories, 30 gm protein)   Equate Protein Shake (160 calories, 30 gm protein)  Slim Fast Advanced Nutrition (180 Calories, 20 g protein)  Muscle Milk, lactose-free, 17 oz bottle (210 Calories, 30 g protein)  Glucerna Protein Smart (150 grover, 30 gm protein)  Clear Protein Drinks:  BiPro  Premier Protein clear  [id number]  M Health Protein 15 Concentrate  Bone broth  Beneprotein protein powder mixed with 4-6 oz of fluid  [id number]        Time spent with patient: 21 mins    Angela Granger RD, LD

## 2023-12-29 NOTE — TELEPHONE ENCOUNTER
----- Message from Zahida De La Vega RN sent at 11/30/2023 11:05 AM CST -----  Regarding: Prednisone  FYI- Potential sleeve... see below regarding his prednisone and Dr Munguia recommendation that prednisone be stopped several weeks before surgery and ordered in liquid form post op  ----- Message -----  From: Teddy Eden MD  Sent: 11/30/2023  10:48 AM CST  To: Lauren Turner Bloch, [facility]; Zahida De La Vega RN  Subject: RE: Gout medication                              Thanks. I would want him off this before surgery for several weeks  ----- Message -----  From: Bloch, Lauren Turner, [facility]  Sent: 11/30/2023   7:59 AM CST  To: Teddy Eden MD; Zahida De La Vega RN  Subject: RE: Gout medication                              He shouldn't need liquid prednisone after as it is a small pill, unless there were other thoughts on why it needed to be liquid.   ----- Message -----  From: Teddy Eden MD  Sent: 11/29/2023   8:02 PM CST  To: Lauren Turner Bloch, [facility]; Zahida De La Vega RN  Subject: RE: Gout medication                              After  ----- Message -----  From: Bloch, Lauren Turner, [facility]  Sent: 11/29/2023  11:27 AM CST  To: Teddy Eden MD; Zahida De La Vega RN  Subject: RE: Gout medication                              Do you mean now or after his Sleeve Gastrectomy?     Allopurinol isn't readily available as liquid. Can be cut in half or crushed. That is what I typically recommend for the first 6-12 weeks post op.   ----- Message -----  From: Zahida De La Vega RN  Sent: 11/29/2023   9:39 AM CST  To: Teddy Eden MD; #  Subject: Gout medication                                  Mera Eden was wondering if you could reach out to Mr Montez to change his gout medication to a liquid?    Thanks    Zahida De La Vega RN, BSN, CCRN  [facility]  [phone]

## 2024-01-02 ENCOUNTER — TELEPHONE (OUTPATIENT)
Dept: SLEEP MEDICINE | Facility: CLINIC | Age: 39
End: 2024-01-02
Payer: COMMERCIAL

## 2024-01-02 NOTE — TELEPHONE ENCOUNTER
PT LVM ABOUT SLEEP STUDY BEING CANCELLED IN FEB BUT I TOLD HIM IN VM THAT THE VISIT IN FEB WAS CONSULT WHICH HE ALREADY HAD IN NOVEMBER AND HIS PSG IS SCHEDULED IN MAY, PUT HIS APPT ON WAITLIST.    Rich Barcenas, Visit facilitator.

## 2024-01-03 ENCOUNTER — MYC MEDICAL ADVICE (OUTPATIENT)
Dept: ENDOCRINOLOGY | Facility: CLINIC | Age: 39
End: 2024-01-03
Payer: COMMERCIAL

## 2024-01-03 ENCOUNTER — CARE COORDINATION (OUTPATIENT)
Dept: ENDOCRINOLOGY | Facility: CLINIC | Age: 39
End: 2024-01-03
Payer: COMMERCIAL

## 2024-01-03 DIAGNOSIS — E66.01 MORBID OBESITY (H): Primary | ICD-10-CM

## 2024-01-03 NOTE — PROGRESS NOTES
Patient frustrated with sleep study being scheduled in May to obtain sleep medicine clearance to proceed with bariatric surgery.  He states he is routinely using CPAP to treat his sleep apnea.  He is hoping to have bariatric surgery sooner.   He is also working on getting labs and other clearances done. Met with Dr Bonner on 12/27/23 for PCP support. Meet with Dr Villarreal 11/29/23 for psychological evaluation.  To get labs done later this week.

## 2024-01-04 ENCOUNTER — LAB (OUTPATIENT)
Dept: LAB | Facility: CLINIC | Age: 39
End: 2024-01-04
Payer: COMMERCIAL

## 2024-01-04 DIAGNOSIS — E66.01 MORBID OBESITY (H): ICD-10-CM

## 2024-01-04 DIAGNOSIS — Z98.84 BARIATRIC SURGERY STATUS: ICD-10-CM

## 2024-01-04 LAB
CHOLEST SERPL-MCNC: 131 MG/DL
ERYTHROCYTE [DISTWIDTH] IN BLOOD BY AUTOMATED COUNT: 13.8 % (ref 10–15)
FASTING STATUS PATIENT QL REPORTED: YES
HCT VFR BLD AUTO: 45.9 % (ref 40–53)
HDLC SERPL-MCNC: 35 MG/DL
HGB BLD-MCNC: 14.6 G/DL (ref 13.3–17.7)
LDLC SERPL CALC-MCNC: 80 MG/DL
MCH RBC QN AUTO: 26.7 PG (ref 26.5–33)
MCHC RBC AUTO-ENTMCNC: 31.8 G/DL (ref 31.5–36.5)
MCV RBC AUTO: 84 FL (ref 78–100)
NONHDLC SERPL-MCNC: 96 MG/DL
PLATELET # BLD AUTO: 399 10E3/UL (ref 150–450)
PTH-INTACT SERPL-MCNC: 7 PG/ML (ref 15–65)
RBC # BLD AUTO: 5.46 10E6/UL (ref 4.4–5.9)
TRIGL SERPL-MCNC: 82 MG/DL
TSH SERPL DL<=0.005 MIU/L-ACNC: 2.63 UIU/ML (ref 0.3–4.2)
VIT B12 SERPL-MCNC: 509 PG/ML (ref 232–1245)
VIT D+METAB SERPL-MCNC: 24 NG/ML (ref 20–50)
WBC # BLD AUTO: 10.2 10E3/UL (ref 4–11)

## 2024-01-04 PROCEDURE — 80061 LIPID PANEL: CPT

## 2024-01-04 PROCEDURE — 83970 ASSAY OF PARATHORMONE: CPT

## 2024-01-04 PROCEDURE — 82306 VITAMIN D 25 HYDROXY: CPT

## 2024-01-04 PROCEDURE — 99000 SPECIMEN HANDLING OFFICE-LAB: CPT

## 2024-01-04 PROCEDURE — 85027 COMPLETE CBC AUTOMATED: CPT

## 2024-01-04 PROCEDURE — 84590 ASSAY OF VITAMIN A: CPT | Mod: 90

## 2024-01-04 PROCEDURE — 84443 ASSAY THYROID STIM HORMONE: CPT

## 2024-01-04 PROCEDURE — 36415 COLL VENOUS BLD VENIPUNCTURE: CPT

## 2024-01-04 PROCEDURE — 82607 VITAMIN B-12: CPT

## 2024-01-07 LAB
ANNOTATION COMMENT IMP: NORMAL
RETINYL PALMITATE SERPL-MCNC: 0.02 MG/L
VIT A SERPL-MCNC: 0.52 MG/L

## 2024-01-10 ENCOUNTER — NURSE TRIAGE (OUTPATIENT)
Dept: FAMILY MEDICINE | Facility: CLINIC | Age: 39
End: 2024-01-10

## 2024-01-10 ENCOUNTER — NURSE TRIAGE (OUTPATIENT)
Dept: NURSING | Facility: CLINIC | Age: 39
End: 2024-01-10

## 2024-01-10 ENCOUNTER — VIRTUAL VISIT (OUTPATIENT)
Dept: URGENT CARE | Facility: CLINIC | Age: 39
End: 2024-01-10
Payer: COMMERCIAL

## 2024-01-10 DIAGNOSIS — U07.1 INFECTION DUE TO 2019 NOVEL CORONAVIRUS: ICD-10-CM

## 2024-01-10 DIAGNOSIS — U07.1 INFECTION DUE TO 2019 NOVEL CORONAVIRUS: Primary | ICD-10-CM

## 2024-01-10 PROCEDURE — 99213 OFFICE O/P EST LOW 20 MIN: CPT | Mod: 95

## 2024-01-10 RX ORDER — BENZONATATE 100 MG/1
100 CAPSULE ORAL 3 TIMES DAILY PRN
Qty: 30 CAPSULE | Refills: 0 | Status: SHIPPED | OUTPATIENT
Start: 2024-01-10 | End: 2024-02-16

## 2024-01-10 NOTE — TELEPHONE ENCOUNTER
RN COVID TREATMENT VISIT  01/10/24      The patient has been triaged and does not require a higher level of care.    Bigg Montez  38 year old  Current weight? 351lb    Has the patient been seen by a primary care provider at an [facility] or [facility] within the past two years? Yes.   Have you been in close proximity to/do you have a known exposure to a person with a confirmed case of influenza? No.     General treatment eligibility:  Date of positive COVID test (PCR or at home)?  1/10/2024    Are you or have you been hospitalized for this COVID-19 infection? No.   Have you received monoclonal antibodies or antiviral treatment for COVID-19 since this positive test? No.   Do you have any of the following conditions that place you at risk of being very sick from COVID-19?   - Overweight or Obesity (BMI >85th percentile or BMI 25 or higher)  Yes, patient has at least one high risk condition as noted above.     Current COVID symptoms:   - fever or chills  - cough  - sore throat  Yes. Patient has at least one symptom as selected.     How many days since symptoms started? 5 days or less. Established patient, 12 years or older weighing at least 88.2 lbs, who has symptoms that started in the past 5 days, has not been hospitalized nor received treatment already, and is at risk for being very sick from COVID-19.     Treatment eligibility by RN:  Are you currently pregnant or nursing? No  Do you have a clinically significant hypersensitivity to nirmatrelvir or ritonavir, or toxic epidermal necrolysis (TEN) or Sen-Christopher Syndrome? No  Do you have a history of hepatitis, any hepatic impairment on the Problem List (such as Child-Munoz Class C, cirrhosis, fatty liver disease, alcoholic liver disease), or was the last liver lab (hepatic panel, ALT, AST, ALK Phos, bilirubin) elevated in the past 6 months? No  Do you have any history of severe renal impairment (eGFR < 30mL/min)? No    Is  patient eligible to continue? Yes, patient meets all eligibility requirements for the RN COVID treatment (as denoted by all no responses above).     Current Outpatient Medications   Medication Sig Dispense Refill    amLODIPine (NORVASC) 2.5 MG tablet Take 1 tablet (2.5 mg) by mouth daily 30 tablet 1    Cholecalciferol (VITAMIN D3) 75 MCG (3000 UT) TABS Take 2 tablets by mouth daily 120 tablet 0    colchicine (COLCRYS) 0.6 MG tablet Take 1 tablet (0.6 mg) by mouth daily 90 tablet 3    losartan (COZAAR) 100 MG tablet Take 1 tablet (100 mg) by mouth daily 90 tablet 3    predniSONE (DELTASONE) 20 MG tablet Take 3 tabs by mouth daily x 3 days, then 2 tabs daily x 3 days, then 1 tab daily x 3 days, then 1/2 tab daily x 3 days. 20 tablet 0    Semaglutide-Weight Management (WEGOVY) 2.4 MG/0.75ML pen Inject 2.4 mg Subcutaneous every 7 days 3 mL 3       Medications from List 1 of the standing order (on medications that exclude the use of Paxlovid) that patient is taking: NONE. Is patient taking St. Anthony's Wort? No  Is patient taking St. Anthony's Wort or any meds from List 1? No.   Medications from List 2 of the standing order (on meds that provider needs to adjust) that patient is taking: amlodipine (Norvasc), explained a provider visit is necessary to discuss medication adjustments while taking Paxlovid. Is patient on any of the meds from List 2? Yes. Patient will be scheduled or transferred to a  at the end of this call.   Srinivas Cherry RN          Reason for Disposition   COVID-19 diagnosed by positive lab test (e.g., PCR, rapid self-test kit) and mild symptoms (e.g., cough, fever, others) and no complications or SOB    Additional Information   Negative: SEVERE difficulty breathing (e.g., struggling for each breath, speaks in single words)   Negative: Difficult to awaken or acting confused (e.g., disoriented, slurred speech)   Negative: Bluish (or gray) lips or face now   Negative: Shock suspected (e.g.,  cold/pale/clammy skin, too weak to stand, low BP, rapid pulse)   Negative: Sounds like a life-threatening emergency to the triager   Negative: Diagnosed or suspected COVID-19 and symptoms lasting 3 or more weeks   Negative: COVID-19 exposure and no symptoms   Negative: COVID-19 vaccine reaction suspected (e.g., fever, headache, muscle aches) occurring 1 to 3 days after getting vaccine   Negative: COVID-19 vaccine, questions about   Negative: Lives with someone known to have influenza (flu test positive) and flu-like symptoms (e.g., cough, runny nose, sore throat, SOB; with or without fever)   Negative: Possible COVID-19 symptoms and triager concerned about severity of symptoms or other causes   Negative: COVID-19 and breastfeeding, questions about   Negative: SEVERE or constant chest pain or pressure  (Exception: Mild central chest pain, present only when coughing.)   Negative: MODERATE difficulty breathing (e.g., speaks in phrases, SOB even at rest, pulse 100-120)   Negative: Headache and stiff neck (can't touch chin to chest)   Negative: Oxygen level (e.g., pulse oximetry) 90% or lower   Negative: Chest pain or pressure  (Exception: MILD central chest pain, present only when coughing.)   Negative: Drinking very little and dehydration suspected (e.g., no urine > 12 hours, very dry mouth, very lightheaded)   Negative: Patient sounds very sick or weak to the triager   Negative: MILD difficulty breathing (e.g., minimal/no SOB at rest, SOB with walking, pulse <100)   Negative: Fever > 103 F (39.4 C)   Negative: Fever > 101 F (38.3 C) and over 60 years of age   Negative: Fever > 100.0 F (37.8 C) and bedridden (e.g., CVA, chronic illness, recovering from surgery)   Negative: HIGH RISK patient (e.g., weak immune system, age > 64 years, obesity with BMI of 30 or higher, pregnant, chronic lung disease or other chronic medical condition) and COVID symptoms (e.g., cough, fever)  (Exceptions: Already seen by doctor or NP/PA  and no new or worsening symptoms.)   Negative: HIGH RISK patient and influenza is widespread in the community and ONE OR MORE respiratory symptoms: cough, sore throat, runny or stuffy nose   Negative: HIGH RISK patient and influenza exposure within the last 7 days and ONE OR MORE respiratory symptoms: cough, sore throat, runny or stuffy nose   Negative: Oxygen level (e.g., pulse oximetry) 91 to 94%   Negative: COVID-19 infection suspected by caller or triager and mild symptoms (cough, fever, or others) and negative COVID-19 rapid test   Negative: Fever present > 3 days (72 hours)   Negative: Fever returns after gone for over 24 hours and symptoms worse or not improved   Negative: Continuous (nonstop) coughing interferes with work or school and no improvement using cough treatment per Care Advice   Negative: Cough present > 3 weeks    Protocols used: Coronavirus (COVID-19) Diagnosed or [id number]

## 2024-01-10 NOTE — PATIENT INSTRUCTIONS
Take Paxlovid as directed. Do not start amlodopine while on Paxlovid. Do not start colchcine or prednisone while on paxlovid and wait for 3 days after done with paxlovid to take colchicine or prednisone. Increase fluids. Walk about every couple of hours. Rest as needed. Over the counter medications Mucinex (guiafenisen is generic) to thin secretions and if cough can also take Delsym (dextromephorphan). Acetaminophen (if no liver problems) 500 mg tabs, take two tabs three times day as needed.   If difficulty breathing, shortness of breath, chest pain, or confusion develops go to the ER.   Instructions for Patients      What are the symptoms of COVID-19?  Symptoms can include fever, cough, shortness of breath, chills, headache, muscle pain sore throat, fatigue, runny or stuffy nose, and loss of taste and smell. Other less common symptoms include nausea, vomiting, or diarrhea (watery stools).    Know when to call 911. Emergency warning signs include:  Trouble breathing or shortness of breath  Pain or pressure in the chest that doesn't go away  Feeling confused like you haven't felt before, or not being able to wake up  Bluish-colored lips or face    How can I take care of myself?  Get lots of rest. Drink extra fluids (unless a doctor has told you not to).  Take Tylenol (acetaminophen) for fever or pain. If you have liver or kidney problems, ask your family doctor if it's okay to take Tylenol   Adults can take either:   650 mg (two 325 mg pills or tablets) every 4 to 6 hours, or...   1,000 mg (two 500 mg pills) every 8 hours as needed.  Note: Don't take more than 3,000 mg in one day. Acetaminophen is found in many medicines (both prescribed and over the counter). Read all labels to be sure you don't take too much.  For children, check the Tylenol bottle for the right dose. The dose is based on the child's age or weight.  Take over the counter medicines for your symptoms as needed. Talk to your pharmacist.  If you have  other health problems (like cancer, heart failure, an organ transplant, or severe kidney disease): Call your specialty clinic if you don't feel better in the next 2 days.    Where can I get more information?   [employer] COVID-19 Resource Hub: [email or web address]/covid19/   CDC Quarantine & Isolation: https://www.cdc.gov/coronavirus/2019-ncov/your-health/quarantine-isolation.html   CDC - What to Do If You're Sick: https://www.cdc.gov/coronavirus/2019-ncov/if-you-are-sick/index.html  Learn about the ACTIV-6 Clinical Trial: activ6.[facility].edu or call ([phone])-[phone]

## 2024-01-10 NOTE — CONFIDENTIAL NOTE
This patient is done, transferrend to triage scheduling due to meds pt is on. Srinivas Cherry RN.

## 2024-01-10 NOTE — TELEPHONE ENCOUNTER
Reason for Disposition   COVID-19 diagnosed by positive lab test (e.g., PCR, rapid self-test kit) and mild symptoms (e.g., cough, fever, others) and no complications or SOB    Additional Information   Negative: SEVERE difficulty breathing (e.g., struggling for each breath, speaks in single words)   Negative: Difficult to awaken or acting confused (e.g., disoriented, slurred speech)   Negative: Bluish (or gray) lips or face now   Negative: Shock suspected (e.g., cold/pale/clammy skin, too weak to stand, low BP, rapid pulse)   Negative: Sounds like a life-threatening emergency to the triager   Negative: SEVERE or constant chest pain or pressure  (Exception: Mild central chest pain, present only when coughing.)   Negative: MODERATE difficulty breathing (e.g., speaks in phrases, SOB even at rest, pulse 100-120)   Negative: Headache and stiff neck (can't touch chin to chest)   Negative: Oxygen level (e.g., pulse oximetry) 90% or lower   Negative: Chest pain or pressure  (Exception: MILD central chest pain, present only when coughing.)   Negative: Drinking very little and dehydration suspected (e.g., no urine > 12 hours, very dry mouth, very lightheaded)   Negative: Patient sounds very sick or weak to the triager   Negative: MILD difficulty breathing (e.g., minimal/no SOB at rest, SOB with walking, pulse <100)   Negative: Fever > 103 F (39.4 C)   Negative: Fever > 101 F (38.3 C) and over 60 years of age   Negative: Fever > 100.0 F (37.8 C) and bedridden (e.g., CVA, chronic illness, recovering from surgery)   Negative: HIGH RISK patient (e.g., weak immune system, age > 64 years, obesity with BMI of 30 or higher, pregnant, chronic lung disease or other chronic medical condition) and COVID symptoms (e.g., cough, fever)  (Exceptions: Already seen by doctor or NP/PA and no new or worsening symptoms.)   Negative: HIGH RISK patient and influenza is widespread in the community and ONE OR MORE respiratory symptoms: cough, sore  throat, runny or stuffy nose   Negative: HIGH RISK patient and influenza exposure within the last 7 days and ONE OR MORE respiratory symptoms: cough, sore throat, runny or stuffy nose   Negative: Oxygen level (e.g., pulse oximetry) 91 to 94%   Negative: COVID-19 infection suspected by caller or triager and mild symptoms (cough, fever, or others) and negative COVID-19 rapid test   Negative: Fever present > 3 days (72 hours)   Negative: Fever returns after gone for over 24 hours and symptoms worse or not improved   Negative: Continuous (nonstop) coughing interferes with work or school and no improvement using cough treatment per Care Advice   Negative: Cough present > 3 weeks    Protocols used: Coronavirus (COVID-19) Diagnosed or [id number]-FR

## 2024-01-11 NOTE — TELEPHONE ENCOUNTER
Patient just had virtual visit for COVID and was prescribed paxlovid and tessalon. Patient called the [facility] in [place] off of [address] and was told that they do not have the prescriptions. Patient is requesting the prescriptions be called in again. FNA confirmed with pharmacist that the medications are ready for .     Reason for Disposition   General information question, no triage required and triager able to answer question    Protocols used: Information Only Call - No Triage-A-

## 2024-01-11 NOTE — PROGRESS NOTES
Assessment:    Infection due to 2019 novel coronavirus    - nirmatrelvir and ritonavir (PAXLOVID) 300 mg/100 mg therapy pack  Dispense: 30 tablet; Refill: 0  - benzonatate (TESSALON) 100 MG capsule  Dispense: 30 capsule; Refill: 0       COVID-19 positive patient.  Encounter for consideration of medication intervention.    Patient does qualify for a prescription.   Full discussion with patient including medication options, risks and benefits.   Potential drug interactions reviewed with patient.      Plan:  Treatment planned   Paxlovid, Rx sent to patient's pharmacy        Patient Instructions   Take Paxlovid as directed. Do not start amlodopine while on Paxlovid. Do not start colchcine or prednisone while on paxlovid and wait for 3 days after done with paxlovid to take colchicine or prednisone. Increase fluids. Walk about every couple of hours. Rest as needed. Over the counter medications Mucinex (guiafenisen is generic) to thin secretions and if cough can also take Delsym (dextromephorphan). Acetaminophen (if no liver problems) 500 mg tabs, take two tabs three times day as needed.   If difficulty breathing, shortness of breath, chest pain, or confusion develops go to the ER.   Instructions for Patients      What are the symptoms of COVID-19?  Symptoms can include fever, cough, shortness of breath, chills, headache, muscle pain sore throat, fatigue, runny or stuffy nose, and loss of taste and smell. Other less common symptoms include nausea, vomiting, or diarrhea (watery stools).    Know when to call 911. Emergency warning signs include:  Trouble breathing or shortness of breath  Pain or pressure in the chest that doesn't go away  Feeling confused like you haven't felt before, or not being able to wake up  Bluish-colored lips or face    How can I take care of myself?  Get lots of rest. Drink extra fluids (unless a doctor has told you not to).  Take Tylenol (acetaminophen) for fever or pain. If you have liver or  kidney problems, ask your family doctor if it's okay to take Tylenol   Adults can take either:   650 mg (two 325 mg pills or tablets) every 4 to 6 hours, or...   1,000 mg (two 500 mg pills) every 8 hours as needed.  Note: Don't take more than 3,000 mg in one day. Acetaminophen is found in many medicines (both prescribed and over the counter). Read all labels to be sure you don't take too much.  For children, check the Tylenol bottle for the right dose. The dose is based on the child's age or weight.  Take over the counter medicines for your symptoms as needed. Talk to your pharmacist.  If you have other health problems (like cancer, heart failure, an organ transplant, or severe kidney disease): Call your specialty clinic if you don't feel better in the next 2 days.    Where can I get more information?  [facility] COVID-19 Resource Hub: [email or web address]   CDC Quarantine & Isolation: https://www.cdc.gov/coronavirus/2019-ncov/your-health/quarantine-isolation.html   CDC - What to Do If You're Sick: https://www.cdc.gov/coronavirus/2019-ncov/if-you-are-sick/index.html  Learn about the ACTIV-6 Clinical Trial: [email or web address].[facility] or call (535)-311-4390        Jass Pride  is a 38 year old  who has a confirmed new positive COVID-19 diagnosis.      He has been identified as high risk for complications of this infection, and is being evaluated via a billable.  Patient reports that he began feeling sick yesterday when he was at a water park, he felt some difficulty breathing. He had a few episodes of shortness of breath, these went away. Currently patient has a violent hacking productive cough of yellow phlegm, sinus pressure, head congestion, and sore throat. He has had the chills & sweats, has not taken his temperature. No chest pain, difficulty breathing, shortness of breath or confusion currently.     Video visit:     Video-Visit Details    Type of service:  Video Visit    Video Visit Start  "Time:5:57 PM    Video End Time:6:06PM    Originating Location (pt. Location): Home    Distant Location (provider location):  [facility]     Platform used for Video Visit: [employer]      Objective    Vitals:  No vitals were obtained today due to virtual visit.  Estimated body mass index is 49 kg/m  as calculated from the following:    Height as of 12/29/23: 1.803 m (5' 10.98\").    Weight as of 12/29/23: 159.3 kg (351 lb 3.2 oz).   GENERAL: Healthy, alert and no distress  EYES: Eyes grossly normal to inspection.  No discharge or erythema, or obvious scleral/conjunctival abnormalities.  RESP: No audible wheeze, cough, or visible cyanosis.  No visible retractions or increased work of breathing.  Occasional sneeze.  SKIN: Visible skin clear. No significant rash, abnormal pigmentation or lesions.  NEURO: Cranial nerves grossly intact.  Mentation and speech appropriate for age.  PSYCH: Mentation appears normal, affect normal/bright, judgement and insight intact, normal speech and appearance well-groomed.  GFR Estimate   Date Value Ref Range Status   12/27/2023 84 >60 mL/min/1.73m2 Final   12/13/2013 72 >60 mL/min/1.7m2 Final                Diane Rodriguez, JAMESP-BC, CNP     "

## 2024-01-12 ENCOUNTER — TRANSFERRED RECORDS (OUTPATIENT)
Dept: HEALTH INFORMATION MANAGEMENT | Facility: CLINIC | Age: 39
End: 2024-01-12
Payer: COMMERCIAL

## 2024-01-16 ENCOUNTER — THERAPY VISIT (OUTPATIENT)
Dept: SLEEP MEDICINE | Facility: CLINIC | Age: 39
End: 2024-01-16
Payer: COMMERCIAL

## 2024-01-16 DIAGNOSIS — E66.813 CLASS 3 SEVERE OBESITY DUE TO EXCESS CALORIES WITH SERIOUS COMORBIDITY AND BODY MASS INDEX (BMI) OF 50.0 TO 59.9 IN ADULT (H): Primary | ICD-10-CM

## 2024-01-16 DIAGNOSIS — E66.01 CLASS 3 SEVERE OBESITY DUE TO EXCESS CALORIES WITH SERIOUS COMORBIDITY AND BODY MASS INDEX (BMI) OF 50.0 TO 59.9 IN ADULT (H): Primary | ICD-10-CM

## 2024-01-16 DIAGNOSIS — G47.33 OSA (OBSTRUCTIVE SLEEP APNEA): Primary | ICD-10-CM

## 2024-01-16 PROCEDURE — 95811 POLYSOM 6/>YRS CPAP 4/> PARM: CPT | Performed by: PSYCHIATRY & NEUROLOGY

## 2024-01-16 ASSESSMENT — SLEEP AND FATIGUE QUESTIONNAIRES
HOW LIKELY ARE YOU TO NOD OFF OR FALL ASLEEP WHILE SITTING QUIETLY AFTER LUNCH WITHOUT ALCOHOL: MODERATE CHANCE OF DOZING
HOW LIKELY ARE YOU TO NOD OFF OR FALL ASLEEP IN A CAR, WHILE STOPPED FOR A FEW MINUTES IN TRAFFIC: SLIGHT CHANCE OF DOZING
HOW LIKELY ARE YOU TO NOD OFF OR FALL ASLEEP WHILE LYING DOWN TO REST IN THE AFTERNOON WHEN CIRCUMSTANCES PERMIT: HIGH CHANCE OF DOZING
HOW LIKELY ARE YOU TO NOD OFF OR FALL ASLEEP WHILE WATCHING TV: SLIGHT CHANCE OF DOZING
HOW LIKELY ARE YOU TO NOD OFF OR FALL ASLEEP WHEN YOU ARE A PASSENGER IN A CAR FOR AN HOUR WITHOUT A BREAK: HIGH CHANCE OF DOZING
HOW LIKELY ARE YOU TO NOD OFF OR FALL ASLEEP WHILE SITTING INACTIVE IN A PUBLIC PLACE: SLIGHT CHANCE OF DOZING
HOW LIKELY ARE YOU TO NOD OFF OR FALL ASLEEP WHILE SITTING AND READING: HIGH CHANCE OF DOZING
HOW LIKELY ARE YOU TO NOD OFF OR FALL ASLEEP WHILE SITTING AND TALKING TO SOMEONE: WOULD NEVER DOZE

## 2024-01-17 NOTE — PROGRESS NOTES
Completed a split night PSG per provider order.    Preliminary .  A final therapeutic PAP pressure was achieved.    Supine REM was seen on therapeutic pressure.    Patient reports feeling refreshed in AM.

## 2024-01-18 NOTE — PROGRESS NOTES
Because of the presence of significant sleep disordered breathing and wait time to see follow up provider I gave the patient a courtesy call to give the results of their sleep study.      As expected the patient has severe TRCAEE with hypoxemia but this resolves nicely with CPAP.  Order placed for replacement machine.     This documentation of his sleep disordered breathing should be helpful for his surgical team.  Recommend autoCPAP before and after surgery.  Recommend continued follow up with sleep center.

## 2024-01-18 NOTE — PROCEDURES
" SLEEP STUDY INTERPRETATION  SPLIT NIGHT STUDY      Patient: LEATHA KERN  YOB: 1985  Study Date: 1/16/2024  MRN: [id number]  Referring Provider: -  Ordering Provider: Claudio Martino MD    Indications for Polysomnography: The patient is a 38 year old Male who is 5' 11\" and weighs 357.0 lbs. His BMI is 50.0, Dodson sleepiness scale 1 and neck circumference is 50 cm. Relevant medical history includes morbid obesity, TRACEE. A diagnostic polysomnogram was performed to evaluate for sleep apnea/hypoventilation/hypoxemia. After 139.0 minutes of sleep time the patient exhibited sufficient respiratory events qualifying him for a CPAP trial which was then initiated.    Polysomnogram Data: A full night polysomnogram recorded the standard physiologic parameters including EEG, EOG, EMG, ECG, nasal and oral airflow. Respiratory parameters of chest and abdominal movements were recorded with respiratory inductance plethysmography. Oxygen saturation was recorded by pulse oximetry.  Hypopnea scoring rule used: 1B 4%    Diagnostic PSG  Sleep Architecture: Sleep fragmentation  The total recording time of the polysomnogram was 205.4 minutes. The total sleep time was 139.0 minutes. Sleep latency was 51.4 minutes. REM latency was 105.0 minutes. Arousal index was 70.8 arousals per hour. Sleep efficiency was 67.7%. Wake after sleep onset was 15.0 minutes. The patient spent 11.2% of total sleep time in Stage N1, 58.6% in Stage N2, 17.6% in Stage N3, and 12.6% in REM. Time in REM supine was 17.5 minutes.    Respiration: Severe TRACEE with hypoxemia    Events ? The polysomnogram revealed a presence of 63 obstructive, - central, and 1 mixed apneas resulting in an apnea index of 27.6 events per hour. There were 180 obstructive hypopneas and - central hypopneas resulting in an obstructive hypopnea index of 77.7 and central hypopnea index of - events per hour. The combined apnea/hypopnea index was 105.3 events per hour (central " apnea/hypopnea index was - events per hour).  The REM AHI was 102.9 events per hour. The supine AHI was 115.5 events per hour. The RERA index was 3.9 events per hour. The RDI was 109.2 events per hour.    Snoring - was reported as moderate.    Respiratory rate and pattern - was notable for normal respiratory rate and pattern.    Sustained Sleep Associated Hypoventilation - Transcutaneous carbon dioxide monitoring was used however significant hypoventilation was not present with a maximum change from 40.9 to 48.7 mmHg and 0 minutes at or greater than 55 mmHg.    Sleep Associated Hypoxemia - (Greater than 5 minutes O2 sat at or below 88%) was present. Baseline oxygen saturation was 93.2%. Lowest oxygen saturation was 75.0%. Time spent less than or equal to 88% was 21.7 minutes. Time spent less than or equal to 89% was 32.2 minutes.     Treatment PSG  Sleep Architecture: Decreased sleep fragmentation  At 01:20:47 AM the patient was placed on PAP treatment and was titrated at pressures ranging from CPAP 5 cmH2O up to CPAP 7 cmH2O. The total recording time of the treatment portion of the study was 241.3 minutes. The total sleep time was 140.5 minutes. During the treatment portion of the study the sleep latency was 7.5 minutes. REM latency was 54.5 minutes. Arousal index was 26.0 arousals per hour. Sleep efficiency was 58.2%. Wake after sleep onset was 93.0 minutes. The patient spent 5.7% of total sleep time in Stage N1, 58.4% in Stage N2, 13.2% in Stage N3, and 22.8% in REM. Time in REM supine was 32.0 minutes.     Respiration: Sleep disordered breathing noted on the baseline portion of the study was nearly fully resolved with CPAP therapy.  Of note the patient did not have REM supine during this portion of the study.      The final pressure was CPAP 7 cmH2O with an AHI of 0.5 events per hour. Time in REM supine on final pressure was 32.0 minutes.     Movement Activity:     Periodic Limb Movements  o During the  diagnostic portion of the study, there were 0 PLMs recorded.  o During the treatment portion of the study, there were 8 PLMs recorded. The PLM index was 3.4 movements per hour. The PLM Arousal Index was 3.0 per hour.    REM EMG Activity - Excessive muscle activity was not present.    Nocturnal Behavior - Abnormal sleep related behaviors were not noted.    Bruxism - None apparent.    Cardiac Summary: Sinus, intermittent tachycardia  During the diagnostic portion of the study, the average pulse rate was 76.3 bpm. The minimum pulse rate was 60.0 bpm while the maximum pulse rate was 103.0 bpm.    During the treatment portion of the study, the average pulse rate was 77.5 bpm. The minimum pulse rate was 61.0 bpm while the maximum pulse rate was 105.0 bpm.     Assessment:     Severe TRACEE with hypoxemia     Sleep disordered breathing noted on the baseline portion of the study was nearly fully resolved with CPAP therapy.  Of note the patient did not have REM supine during this portion of the study.      Recommendations:    Treatment of TRACEE with Auto?titrating PAP therapy. Recommend clinical follow up with sleep management team, including review of compliance measures.    Advice regarding the risks of drowsy driving.    Suggest optimizing sleep schedule and avoiding sleep deprivation.    Weight management     Diagnostic Codes: G47.33, G47.36       Claudio Martion MD 1-

## 2024-01-19 LAB — SLPCOMP: NORMAL

## 2024-01-19 NOTE — PROGRESS NOTES
"Video-Visit Details    Type of service:  Video Visit    Video Start Time: 8:32 AM   Video End Time: 8:53 AM    Originating Location (pt. Location): Home    Distant Location (provider location):  Offsite (providers home) [facility] [place]     Platform used for Video Visit: Harshil        Bariatric Nutrition Consultation Note    Reason For Visit: Nutrition reassessment    Bigg Montez is a 38 year old presenting today for return bariatric nutrition consult and nutrition education regarding clear and low-fat full liquid diet for post-bariatric surgery.  Pt is interested in laparoscopic sleeve gastrectomy with TBD.  Patient is accompanied by self. This is patients 11th nutrition visit prior to surgery.       Pt referred by Brianda Chan NP on January 25, 2021.  Patient with Co-morbidities of obesity including:  Type II DM no  Renal Failure no  Sleep apnea yes  Hypertension yes   Dyslipidemia no  Joint pain no  Back pain no  GERD no   Prediabetes yes    H/o gout. Pt reports having a blood clot in mid-sept. 2021 1/21/2021    11:14 PM   Support System Reviewed With Patient   Who do you have in your support network that can be available to help you for the first 2 weeks after surgery? My wife   Who can you count on for support throughout your weight loss surgery journey? Kenny ceja       ANTHROPOMETRICS:  Initial weight (12/2018 with PCP): 386 lbs    Estimated body mass index is 47.03 kg/m  as calculated from the following:    Height as of this encounter: 1.816 m (5' 11.5\").    Weight as of this encounter: 155.1 kg (342 lb). (-9 lbs over last month, -44 lbs from initial)     Required weight loss goal pre-op: 38 lbs from initial consult weight (goal weight 348 lbs or less before surgery).         1/21/2021    11:14 PM   --   I have tried the following methods to lose weight Watching portions or calories    Exercise    Weight Watchers    Slimfast    Physician directed program "           1/21/2021    11:14 PM   Weight Loss Questions Reviewed With Patient   How long have you been overweight? Since puberty       SUPPLEMENT INFORMATION:  Milk thistle supplement pt reports for gout  [employer]ans Men's MVI    Nutrition Related Labs:  2/27/23 - Vitamin D: 12 (L). 1/4/24 - Vitamin D: 24 (improved, however optimal above 30)    12/27/23 - HgbA1c: 5.2 (WNL- improved from 5.7 at previous check)    Medications for Weight Loss:  Wegovy    NUTRITION HISTORY:  No known food allergies/intolerances  H/o gout    He continues to work on reducing starches (rice, noodles). Increasing fruits, working on increase vegetables. No longer going back for seconds at dinner. Intermittently replacing breakfast with protein shake.     7/18/23 - Has started going to the gym, and working on increasing his water intake.     8/29/23 - Cut out energy drink intake. Went up on Wegovy to 1 mg.     9/18/23 - Continues Wegovy injections, losing consistently each month. Reduced starch portions. Reports eating ~1.5 meals daily. May have fruit for a snack or [employer] granLife Recovery Systems bar.     10/23/23 - Only tolerates FairLife protein shakes.  As tried many others. Gets stomach ache from others.     11/20/23 - Feeling more bloated, constipated with 2.4 mg dose of Wegovy. Plans to start Miralax. Feels good energy. Most days getting in 3 meals daily, less often may only get 1-2 meals daily. Has not eaten rice in last 3-4 weeks. Focusing on high protein meals.     12/29/23 - Consuming 3 meals daily , but feels like food is staying in his stomach. Using a salad sized plate for meals. Continues to work on eating slowly, but needs to work more on  out beverage intake.     Today - Reports feeling like he is weaker with weight loss and Wegovy. Most days does meet 60 gm protein. Working on increased physical activity as able.     Diet Recall:  Breakfast: Fairlife Protein Shakes   Lunch: deli meat (ham/turkey)   Dinner: chicken stew and occ  rice; red beans and rice and sausage   Beverages: water (100 oz), sugar-free sports drinks.     Progress Towards Previous Goals:  1.Goal weight for surgery: 348 lbs or less - Met  2. Reduce portions. Use a saucer/dessert sized plate. - Met, continues   3. Increase physical activity as able. At least 30 mins walk daily. - Ongoing   4. Have PTH labs done - Met  5. Avoid drinking with meals and for 30 mins before and after. 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         1/21/2021    11:14 PM   Dining Out History Reviewed With Patient   How often do you dine out? Nearly every day.   Where do you dine out? (select all that apply) fast food chains    take out   What types of food do you order when you dine out? Protien rice dishes           1/21/2021    11:14 PM   Physical Activity Reviewed With Patient   How often do you exercise? 1 to 2 times per week   What is the duration of your exercise (in minutes)? 60+ Minutes   What types of exercise do you do? gym membership    weightlifting   What keeps you from being more active? Lack of Time       NUTRITION DIAGNOSIS:  Obesity r/t long history of self-monitoring deficit and excessive energy intake aeb BMI >30 kg/m2. - improving    Food- and Nutrition-related knowledge deficit r/t lack of prior exposure to information AEB pt scheduled for upcoming bariatric surgery and pt interest in diet education/review    INTERVENTION:  Intervention Provided/Education Provided/Reviewed previous goals and encouraged patient to continue goals prior to surgery.     Provided instruction on bariatric clear and low-fat full liquid diets.    Provided the following handouts: Diet Guidelines for Bariatric Surgery, Your Stage 1-5 Diet, Keeping Track of Your Fluids, list of recommended vitamin/mineral supplementation after sleeve gastrectomy surgery and RD contact information.           1/21/2021    11:14 PM   Questions Reviewed With Patient   How ready are you  to make changes regarding your weight? Number 1 = Not ready at all to make changes up to 10 = very ready. 10   How confident are you that you can change? 1 = Not confident that you will be successful making changes up to 10 = very confident. 10       Expected Engagement: good    Goals before surgery:  1.Goal weight for surgery: 348 lbs or less  2. Reduce portions. Use a saucer/dessert sized plate.  3. Increase physical activity as able. At least 30 mins walk daily.   4. Avoid drinking with meals and for 30 mins before and after.     Goals after surgery:   1. Follow the bariatric post-op diet advancement schedule (see below)  2. Sip on 48-64 oz (or greater) fluids daily, recording intake to help stay on-track.  - Drink at least 1-2 oz of fluid every 15-30 min.  3. Stop vitamins/minerals 1 week before surgery. We will discuss starting a chewable multivitamin at the one week post-op visit.   4. Work towards consuming 60 gm protein daily.     Post-op Diet Advancement Schedule:  Clear Liquid Diet (stage 1): starts the day before surgery  Low-Fat Full Liquid Diet (stage 2): starts one week post-op  Pureed Diet (stage 3): starts 2 weeks post-op  Soft Diet (stage 4): starts 4 weeks post-op  Regular Diet (stage 5): starts 8 weeks post-op     Post-op Diet Handouts:  Diet Guidelines after Weight-loss Surgery  [email or web address]     Your Stage 1 Diet: Clear Liquids  [email or web address]     Your Stage 2 Diet: Low-fat Full Liquids  [email or web address]     Your Stage 3 Diet: Pureed Foods  [email or web address]     Pureed Recipes  [email or web address]    Your Stage 4 Diet: Soft Foods  [email or web address]    Your Stage 5 Diet: Regular Foods  [email or web address]    Supplements after Sleeve Gastrectomy, Gastric Bypass or Single Anastomosis Duodenal Switch  [email or web address]    Keeping Track of Fluids  [email or web address]      Protein  Supplements for After Surgery:   Unflavored protein powder: Genepro, Isopure (25-30 gm protein per 1 scoop)  You may also use flavored protein powder if you prefer.   Protein shots: https://store.BringMeTheNews.Communication Intelligence/collections/protein-shots  Pre-made protein shakes (no more than 210 Calories, at least 20 grams of protein, and less than 10 grams of sugar):   Premier Protein (160 Calories, 30 g protein)  Boost/Ensure Max (160 calories, 30 gm protein)   Fairlife Core Power (170 calories, 26 gm protein)  Aldi's Elevation Protein Shake (160 calories, 30 gm protein)   Equate Protein Shake (160 calories, 30 gm protein)  Slim Fast Advanced Nutrition (180 Calories, 20 g protein)  Muscle Milk, lactose-free, 17 oz bottle (210 Calories, 30 g protein)  Glucerna Protein Smart (150 grover, 30 gm protein)  Clear Protein Drinks:  BiPro  Premier Protein clear  [id number]  M Health Protein 15 Concentrate  Bone broth  Beneprotein protein powder mixed with 4-6 oz of fluid  [id number]    Chewable Multivitamin Options for After Surgery:  Bariatric Advantage Advanced Multi EA chewable: https://www.bariatricYext.Communication Intelligence/item/chewable-advanced-multi-ea  Take 2 chews daily. Additional calcium citrate supplement needed.  - Validation code for Discount on Bariatric Advantage vitamin/mineral supplements: FSWLSBA    Celebrate Multi Complete 45: https://Yummy Garden Kids Eatery.Communication Intelligence/products/hbvpg-umrfkspm-64?sszucww=[email or web address]  Take 2 chews daily. Additional calcium citrate supplement needed.    Dario's Complete, or generic (with 10 mg iron per chew)   (https://www.Ocutronics.Communication Intelligence/p/kids-39-complete-multivitamin-chewable-tablets-orange-grape-38-cherry-150ct-up-38-up-8482/-/[id number]#lnk=sametab)   Take 2 chews per day. Additional B12 and calcium citrate supplements needed. Potential need for additional iron supplement (if needing more than 20 mg iron per day)      Time spent with patient: 21 mins    Angela Granger RD, LD

## 2024-01-22 ENCOUNTER — VIRTUAL VISIT (OUTPATIENT)
Dept: ENDOCRINOLOGY | Facility: CLINIC | Age: 39
End: 2024-01-22
Payer: COMMERCIAL

## 2024-01-22 VITALS — WEIGHT: 315 LBS | HEIGHT: 72 IN | BODY MASS INDEX: 42.66 KG/M2

## 2024-01-22 DIAGNOSIS — Z71.3 NUTRITIONAL COUNSELING: ICD-10-CM

## 2024-01-22 DIAGNOSIS — E66.813 CLASS 3 SEVERE OBESITY DUE TO EXCESS CALORIES WITH SERIOUS COMORBIDITY AND BODY MASS INDEX (BMI) OF 50.0 TO 59.9 IN ADULT (H): Primary | ICD-10-CM

## 2024-01-22 DIAGNOSIS — E66.01 CLASS 3 SEVERE OBESITY DUE TO EXCESS CALORIES WITH SERIOUS COMORBIDITY AND BODY MASS INDEX (BMI) OF 50.0 TO 59.9 IN ADULT (H): Primary | ICD-10-CM

## 2024-01-22 PROCEDURE — 99207 PR NO CHARGE LOS: CPT | Mod: 95 | Performed by: DIETITIAN, REGISTERED

## 2024-01-22 PROCEDURE — 97803 MED NUTRITION INDIV SUBSEQ: CPT | Mod: 95 | Performed by: DIETITIAN, REGISTERED

## 2024-01-22 ASSESSMENT — PAIN SCALES - GENERAL: PAINLEVEL: NO PAIN (0)

## 2024-01-22 NOTE — PATIENT INSTRUCTIONS
Paul Sexton,    Follow-up with RD on 2/23    Thank you,    Angela Granger, RD, LD  If you would like to schedule or reschedule an appointment with the RD, please call [phone]    Nutrition Goals  Goals before surgery:  1.Goal weight for surgery: 348 lbs or less  2. Reduce portions. Use a saucer/dessert sized plate.  3. Increase physical activity as able. At least 30 mins walk daily.   4. Avoid drinking with meals and for 30 mins before and after.     Goals after surgery:   1. Follow the bariatric post-op diet advancement schedule (see below)  2. Sip on 48-64 oz (or greater) fluids daily, recording intake to help stay on-track.  - Drink at least 1-2 oz of fluid every 15-30 min.  3. Stop vitamins/minerals 1 week before surgery. We will discuss starting a chewable multivitamin at the one week post-op visit.   4. Work towards consuming 60 gm protein daily.     Post-op Diet Advancement Schedule:  Clear Liquid Diet (stage 1): starts the day before surgery  Low-Fat Full Liquid Diet (stage 2): starts one week post-op  Pureed Diet (stage 3): starts 2 weeks post-op  Soft Diet (stage 4): starts 4 weeks post-op  Regular Diet (stage 5): starts 8 weeks post-op     Post-op Diet Handouts:  Diet Guidelines after Weight-loss Surgery  [email or web address]     Your Stage 1 Diet: Clear Liquids  [email or web address]     Your Stage 2 Diet: Low-fat Full Liquids  [email or web address]     Your Stage 3 Diet: Pureed Foods  [email or web address]     Pureed Recipes  [email or web address]    Your Stage 4 Diet: Soft Foods  [email or web address]    Your Stage 5 Diet: Regular Foods  [email or web address]    Supplements after Sleeve Gastrectomy, Gastric Bypass or Single Anastomosis Duodenal Switch  [email or web address]    Keeping Track of Fluids  [email or web address]      Protein Supplements for After Surgery:   Unflavored protein powder: Genepro, Isopure (25-30 gm  protein per 1 scoop)  You may also use flavored protein powder if you prefer.   Protein shots: https://store.bariatricpal.com/collections/protein-shots  Pre-made protein shakes (no more than 210 Calories, at least 20 grams of protein, and less than 10 grams of sugar):   Premier Protein (160 Calories, 30 g protein)  Boost/Ensure Max (160 calories, 30 gm protein)   Fairlife Core Power (170 calories, 26 gm protein)  Aldi's Elevation Protein Shake (160 calories, 30 gm protein)   Equate Protein Shake (160 calories, 30 gm protein)  Slim Fast Advanced Nutrition (180 Calories, 20 g protein)  Muscle Milk, lactose-free, 17 oz bottle (210 Calories, 30 g protein)  Glucerna Protein Smart (150 grover, 30 gm protein)  Clear Protein Drinks:  BiPro  Premier Protein clear  [id number]  [employer] Protein 15 Concentrate  Bone broth  Beneprotein protein powder mixed with 4-6 oz of fluid  [id number]    Chewable Multivitamin Options for After Surgery:  Bariatric Advantage Advanced Multi EA chewable: https://www.bariatricStrategic Product Innovations.HYLA Mobile/item/chewable-advanced-multi-ea  Take 2 chews daily. Additional calcium citrate supplement needed.  - Validation code for Discount on Bariatric Advantage vitamin/mineral supplements: FSWLSBA    Celebrate Multi Complete 45: https://IntheGlo/products/obipa-ogjeeknf-58?[email or web address]  Take 2 chews daily. Additional calcium citrate supplement needed.    Dario's Complete, or generic (with 10 mg iron per chew)   (https://www.Ininal.HYLA Mobile/p/kids-39-complete-multivitamin-chewable-tablets-orange-grape-38-cherry-150ct-up-38-up-8482/-/[email or web address]#lnk=sametab)   Take 2 chews per day. Additional B12 and calcium citrate supplements needed. Potential need for additional iron supplement (if needing more than 20 mg iron per day)      COMPREHENSIVE WEIGHT MANAGEMENT PROGRAM  VIRTUAL SUPPORT GROUPS    At [facility], our Comprehensive Weight Management program offers on-line support groups for patients  who are working on weight loss and considering, preparing for, or have had weight loss surgery.     There is no cost for this opportunity.  You are invited to attend the?Virtual Support Groups?provided by any of the following locations:    [facility] via Microsoft Teams with Cynthia Amor RN  2.   [place] via [employer] with Terry Nix, PhD, LP  3.   [place] via [employer] with Paola Franks RN  4.   [facility] via a Zoom Meeting with Paola Kwok NBBRIAN    The following Support Group information can also be found on our website:  [email or web address]      [facility] Weight Loss Surgery Support Group  The support group is a patient-lead forum that meets monthly to share experiences, encouragement and education. It is open to those who have had weight loss surgery, are scheduled for surgery, or are considering surgery.   WHEN: This group meets on the 3rd Wednesday of each month from 5:00PM - 6:00PM virtually using Microsoft Teams.   FACILITATOR: Led by Cynthia Mccall, RD, LD, RN, the program's Clinical Coordinator.   TO REGISTER: Please contact the clinic via [employer] or call the nurse line directly at [phone] to inform our staff that you would like an invite sent to you and to let us know the email you would like the invite sent to. Prior to the meeting, a link with directions on how to join the meeting will be sent to you.    2023 and 2024 Meetings   December 20  January 17  February 21  March 20  April 17  May 15  Melly 19      [facility] and [facility] Bariatric Care Support Group?  This is open to all pre- and post- operative bariatric surgery patients as well as their support system.   WHEN: This group meets the 3rd Tuesday of each month from 6:30 PM - 8:00 PM virtually using Microsoft Teams.   FACILITATOR: Led by Terry Nix, Ph.D who is a  "Licensed Psychologist with the [facility] Comprehensive Weight Management Program.   TO REGISTER: Please send an email to Terry Nix, Ph.D., LP at?[email or web address]?if you would like an invitation to the group. Prior to the meeting, a link with directions on how to join the meeting will be sent to you.    2023 and 2024 Meetings  December 19 January 16: \"Medication Management and Bariatric Surgery\", Irina Salazar, PharmD, Pharmacy Resident at [facility], [facility]  February 20: \"A Bariatric Surgery Patient's Perspective\", JEZ Lord, [facility], Behavioral Health Clinician at [facility]  March 19  April 16  May 21  Melly 18: \"Nutritional Labeling\", Dietitian speaker to be announced.  November 19: \"Holiday Eating\", Dietitian speaker to be announced.    [facility] Post-Operative Bariatric Surgery Support Group  This is a support group for [facility] bariatric patients (and those external to [facility]) who have had bariatric surgery and are at least 3 months post-surgery.  WHEN: This support group meets the 4th Wednesday of the month from 11:00 AM - 12:00 PM virtually using Microsoft Teams.   FACILITATOR: Led by Certified Bariatric Nurse, Paola Franks RN.   TO REGISTER: Please send an email to Paola at [email or web address] if you would like an invitation to the group.  Prior to the meeting, a link with directions on how to join the meeting will be sent to you.    2023 and 2024 Meetings  December 27  January 24  February 28  March 27  April 24  May 22  Melly 26    [facility] Healthy Lifestyle Group?  This is a 60 minute virtual coaching group for those who want to lead a healthier lifestyle. Come together to set goals and overcome barriers in a supportive group environment. We will address the four pillars of health: nutrition, exercise, sleep and " "emotional well-being.  This group is highly recommended for those who are participating in the 24 week Healthy Lifestyle Plan and our Health Coaching sessions.  WHEN: This group meets the 1st Friday of the month, 12:30 PM - 1:30 PM online, via a zoom meeting.    FACILITATOR: Led by National Board Certified Health and , Paola Kwok [facility].   TO REGISTER: Please call the Call Center at [phone] to register.  You will get an appointment to attend in [facility]. Fifteen minutes prior to the meeting, complete the e-check in and you will get the link to join the meeting.    There is no charge to attend this group and space is limited.     2023 and 2024 Meetings  December 1: \"Let's Talk\" (guided discussion on our wins and challenges)  January 5: \"New Years Vision: Manifest your Best 2024!\" (guided imagery,  journaling and discussion)  February 2: \"Let's Talk\"  March 1: \"10 Percent Happier\" by Brian Chakraborty (Book Bites - a guided discussion on the nuggets of wisdom from favorite wellness books, no need to read the book but highly encouraged)  April 5: \"Let's Talk\"  May 3: \"Essentialism: The Disciplined Pursuit of Less\" by Sheldon Louis (Book Bites discussion)  June 7: \"Let's Talk\"  July 5: NO MEETING, off for the 4th of July Holiday  August 2: \"The Blue Zones, Secrets for Living a Longer Life\" by Brian Mendiola (Book Bites discussion)                    "

## 2024-01-22 NOTE — LETTER
"1/22/2024       RE: Bigg Montez  [address]  [place] [address]     Dear Colleague,    Thank you for referring your patient, Bigg Montez, to the [facility] [place] at [facility]. Please see a copy of my visit note below.    Video-Visit Details    Type of service:  Video Visit    Video Start Time: 8:32 AM   Video End Time: 8:53 AM    Originating Location (pt. Location): Home    Distant Location (provider location):  Offsite (providers home) [facility] [place]     Platform used for Video Visit: [employer]        Bariatric Nutrition Consultation Note    Reason For Visit: Nutrition reassessment    Bigg Montez is a 38 year old presenting today for return bariatric nutrition consult and nutrition education regarding clear and low-fat full liquid diet for post-bariatric surgery.  Pt is interested in laparoscopic sleeve gastrectomy with TBD.  Patient is accompanied by self. This is patients 11th nutrition visit prior to surgery.       Pt referred by Brianda Chan NP on January 25, 2021.  Patient with Co-morbidities of obesity including:  Type II DM no  Renal Failure no  Sleep apnea yes  Hypertension yes   Dyslipidemia no  Joint pain no  Back pain no  GERD no   Prediabetes yes    H/o gout. Pt reports having a blood clot in mid-sept. 2021 1/21/2021    11:14 PM   Support System Reviewed With Patient   Who do you have in your support network that can be available to help you for the first 2 weeks after surgery? My wife   Who can you count on for support throughout your weight loss surgery journey? Kenny ceja       ANTHROPOMETRICS:  Initial weight (12/2018 with PCP): 386 lbs    Estimated body mass index is 47.03 kg/m  as calculated from the following:    Height as of this encounter: 1.816 m (5' 11.5\").    Weight as of this encounter: 155.1 kg (342 lb). (-9 lbs over last month, -44 " lbs from initial)     Required weight loss goal pre-op: 38 lbs from initial consult weight (goal weight 348 lbs or less before surgery).         1/21/2021    11:14 PM   --   I have tried the following methods to lose weight Watching portions or calories    Exercise    Weight Watchers    Slimfast    Physician directed program           1/21/2021    11:14 PM   Weight Loss Questions Reviewed With Patient   How long have you been overweight? Since puberty       SUPPLEMENT INFORMATION:  Milk thistle supplement pt reports for gout  [employer]'s MVI    Nutrition Related Labs:  2/27/23 - Vitamin D: 12 (L). 1/4/24 - Vitamin D: 24 (improved, however optimal above 30)    12/27/23 - HgbA1c: 5.2 (WNL- improved from 5.7 at previous check)    Medications for Weight Loss:  Wegovy    NUTRITION HISTORY:  No known food allergies/intolerances  H/o gout    He continues to work on reducing starches (rice, noodles). Increasing fruits, working on increase vegetables. No longer going back for seconds at dinner. Intermittently replacing breakfast with protein shake.     7/18/23 - Has started going to the gym, and working on increasing his water intake.     8/29/23 - Cut out energy drink intake. Went up on Wegovy to 1 mg.     9/18/23 - Continues Wegovy injections, losing consistently each month. Reduced starch portions. Reports eating ~1.5 meals daily. May have fruit for a snack or [employer] [place] granola bar.     10/23/23 - Only tolerates FairLife protein shakes.  As tried many others. Gets stomach ache from others.     11/20/23 - Feeling more bloated, constipated with 2.4 mg dose of Wegovy. Plans to start Miralax. Feels good energy. Most days getting in 3 meals daily, less often may only get 1-2 meals daily. Has not eaten rice in last 3-4 weeks. Focusing on high protein meals.     12/29/23 - Consuming 3 meals daily , but feels like food is staying in his stomach. Using a salad sized plate for meals. Continues to work on eating slowly, but needs  to work more on  out beverage intake.     Today - Reports feeling like he is weaker with weight loss and Wegovy. Most days does meet 60 gm protein. Working on increased physical activity as able.     Diet Recall:  Breakfast: Fairlife Protein Shakes   Lunch: deli meat (ham/turkey)   Dinner: chicken stew and occ rice; red beans and rice and sausage   Beverages: water (100 oz), sugar-free sports drinks.     Progress Towards Previous Goals:  1.Goal weight for surgery: 348 lbs or less - Met  2. Reduce portions. Use a saucer/dessert sized plate. - Met, continues   3. Increase physical activity as able. At least 30 mins walk daily. - Ongoing   4. Have PTH labs done - Met  5. Avoid drinking with meals and for 30 mins before and after. 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         1/21/2021    11:14 PM   Dining Out History Reviewed With Patient   How often do you dine out? Nearly every day.   Where do you dine out? (select all that apply) fast food chains    take out   What types of food do you order when you dine out? Protien rice dishes           1/21/2021    11:14 PM   Physical Activity Reviewed With Patient   How often do you exercise? 1 to 2 times per week   What is the duration of your exercise (in minutes)? 60+ Minutes   What types of exercise do you do? gym membership    weightlifting   What keeps you from being more active? Lack of Time       NUTRITION DIAGNOSIS:  Obesity r/t long history of self-monitoring deficit and excessive energy intake aeb BMI >30 kg/m2. - improving    Food- and Nutrition-related knowledge deficit r/t lack of prior exposure to information AEB pt scheduled for upcoming bariatric surgery and pt interest in diet education/review    INTERVENTION:  Intervention Provided/Education Provided/Reviewed previous goals and encouraged patient to continue goals prior to surgery.     Provided instruction on bariatric clear and low-fat full liquid  diets.    Provided the following handouts: Diet Guidelines for Bariatric Surgery, Your Stage 1-5 Diet, Keeping Track of Your Fluids, list of recommended vitamin/mineral supplementation after sleeve gastrectomy surgery and RD contact information.           1/21/2021    11:14 PM   Questions Reviewed With Patient   How ready are you to make changes regarding your weight? Number 1 = Not ready at all to make changes up to 10 = very ready. 10   How confident are you that you can change? 1 = Not confident that you will be successful making changes up to 10 = very confident. 10       Expected Engagement: good    Goals before surgery:  1.Goal weight for surgery: 348 lbs or less  2. Reduce portions. Use a saucer/dessert sized plate.  3. Increase physical activity as able. At least 30 mins walk daily.   4. Avoid drinking with meals and for 30 mins before and after.     Goals after surgery:   1. Follow the bariatric post-op diet advancement schedule (see below)  2. Sip on 48-64 oz (or greater) fluids daily, recording intake to help stay on-track.  - Drink at least 1-2 oz of fluid every 15-30 min.  3. Stop vitamins/minerals 1 week before surgery. We will discuss starting a chewable multivitamin at the one week post-op visit.   4. Work towards consuming 60 gm protein daily.     Post-op Diet Advancement Schedule:  Clear Liquid Diet (stage 1): starts the day before surgery  Low-Fat Full Liquid Diet (stage 2): starts one week post-op  Pureed Diet (stage 3): starts 2 weeks post-op  Soft Diet (stage 4): starts 4 weeks post-op  Regular Diet (stage 5): starts 8 weeks post-op     Post-op Diet Handouts:  Diet Guidelines after Weight-loss Surgery  [email or web address]     Your Stage 1 Diet: Clear Liquids  [email or web address]     Your Stage 2 Diet: Low-fat Full Liquids  [email or web address]     Your Stage 3 Diet: Pureed Foods  [email or web address]     Pureed Recipes  [email or web address]    Your  Stage 4 Diet: Soft Foods  [email or web address]    Your Stage 5 Diet: Regular Foods  [email or web address]    Supplements after Sleeve Gastrectomy, Gastric Bypass or Single Anastomosis Duodenal Switch  [email or web address]    Keeping Track of Fluids  [email or web address]      Protein Supplements for After Surgery:   Unflavored protein powder: Genepro, Isopure (25-30 gm protein per 1 scoop)  You may also use flavored protein powder if you prefer.   Protein shots: https://store.[employer].Swipesense/collections/protein-shots  Pre-made protein shakes (no more than 210 Calories, at least 20 grams of protein, and less than 10 grams of sugar):   Premier Protein (160 Calories, 30 g protein)  Boost/Ensure Max (160 calories, 30 gm protein)   Fairlife Core Power (170 calories, 26 gm protein)  Aldi's Elevation Protein Shake (160 calories, 30 gm protein)   Equate Protein Shake (160 calories, 30 gm protein)  Slim Fast Advanced Nutrition (180 Calories, 20 g protein)  Muscle Milk, lactose-free, 17 oz bottle (210 Calories, 30 g protein)  Glucerna Protein Smart (150 grover, 30 gm protein)  Clear Protein Drinks:  BiPro  Premier Protein clear  [email or web address]  M Health Protein 15 Concentrate  Bone broth  Beneprotein protein powder mixed with 4-6 oz of fluid  [id number]    Chewable Multivitamin Options for After Surgery:  Bariatric Advantage Advanced Multi EA chewable: https://www.[facility]ric[facility].Swipesense/item/chewable-advanced-multi-ea  Take 2 chews daily. Additional calcium citrate supplement needed.  - Validation code for Discount on Bariatric Advantage vitamin/mineral supplements: FSWLSBA    Celebrate Multi Complete 45: https://"[employer]".Swipesense/products/ijwmb-zjwdkpfe-76?xsweoly=[email or web address]  Take 2 chews daily. Additional calcium citrate supplement needed.    Sweet Valley's Complete, or generic (with 10 mg iron per chew)    (https://www.TNC.[employer]/p/kids-39-complete-multivitamin-chewable-tablets-orange-grape-38-cherry-150ct-up-38-up-8482/-/A-[email or web address]#lnk=sametab)   Take 2 chews per day. Additional B12 and calcium citrate supplements needed. Potential need for additional iron supplement (if needing more than 20 mg iron per day)      Time spent with patient: 21 mins    Angela Granger RD, LD

## 2024-01-22 NOTE — NURSING NOTE
Is the patient currently in the state of [place]? YES    Visit mode:VIDEO    If the visit is dropped, the patient can be reconnected by: VIDEO VISIT: Text to cell phone:   Telephone Information:   Mobile [phone]       Will anyone else be joining the visit? NO  (If patient encounters technical issues they should call [phone]    How would you like to obtain your AVS? MyChart    Are changes needed to the allergy or medication list? N/A    Reason for visit: DAX MEJIA

## 2024-01-25 ENCOUNTER — DOCUMENTATION ONLY (OUTPATIENT)
Dept: SLEEP MEDICINE | Facility: CLINIC | Age: 39
End: 2024-01-25
Payer: COMMERCIAL

## 2024-01-25 DIAGNOSIS — G47.33 OBSTRUCTIVE SLEEP APNEA (ADULT) (PEDIATRIC): Primary | ICD-10-CM

## 2024-01-25 NOTE — PROGRESS NOTES
Patient was offered choice of vendor and chose [facility] 234 beds at the [facility].  Patient Bigg Montez was set up at [facility]  on January 25, 2024. Patient received a Resmed Airsense 11 Pressures were set at  5-15 cm H2O.   Patient s ramp is 5 cm H2O for Auto and FLEX/EPR is EPR, 2.  Patient received a Resmed Mask name: AIRFIT N20  Nasal mask size Large, heated tubing and heated humidifier.  Patient has the following compliance requirements: usage only    Willie Romo

## 2024-01-26 ENCOUNTER — CARE COORDINATION (OUTPATIENT)
Dept: ENDOCRINOLOGY | Facility: CLINIC | Age: 39
End: 2024-01-26
Payer: COMMERCIAL

## 2024-01-26 ENCOUNTER — PREP FOR PROCEDURE (OUTPATIENT)
Dept: ENDOCRINOLOGY | Facility: CLINIC | Age: 39
End: 2024-01-26
Payer: COMMERCIAL

## 2024-01-26 DIAGNOSIS — E66.01 MORBID OBESITY (H): Primary | ICD-10-CM

## 2024-01-26 RX ORDER — ENOXAPARIN SODIUM 100 MG/ML
40 INJECTION SUBCUTANEOUS
Status: CANCELLED | OUTPATIENT
Start: 2024-01-26

## 2024-01-26 RX ORDER — ACETAMINOPHEN 325 MG/1
975 TABLET ORAL ONCE
Status: CANCELLED | OUTPATIENT
Start: 2024-01-26 | End: 2024-01-26

## 2024-01-26 RX ORDER — CEFAZOLIN SODIUM IN 0.9 % NACL 3 G/100 ML
3 INTRAVENOUS SOLUTION, PIGGYBACK (ML) INTRAVENOUS SEE ADMIN INSTRUCTIONS
Status: CANCELLED | OUTPATIENT
Start: 2024-01-26

## 2024-01-26 RX ORDER — ONDANSETRON 2 MG/ML
4 INJECTION INTRAMUSCULAR; INTRAVENOUS
Status: CANCELLED | OUTPATIENT
Start: 2024-01-26

## 2024-01-26 RX ORDER — CEFAZOLIN SODIUM IN 0.9 % NACL 3 G/100 ML
3 INTRAVENOUS SOLUTION, PIGGYBACK (ML) INTRAVENOUS
Status: CANCELLED | OUTPATIENT
Start: 2024-01-26

## 2024-01-26 NOTE — PROGRESS NOTES
Tasklist updated and sent to patient via [employer].    Bariatric Task List  Fax:  Please fax all paperwork to: [phone] -     Status:  Is patient a candidate for bariatric surgery?:  patient is a candidate for bariatric surgery -     Cleared to schedule surgeon consult?:  cleared to schedule surgeon consult - 11/29/23 appt. bks   Status:  surgery evaluation in process -     Surgeon: Karey  -     Tentative surgery month/year: To be determined. -        Insurance: Insurance:  [facility] -      Contact insurance to discuss coverage: Needed -          Patient Info: Initial Weight:  386 -     Date of Initial Weight/Height:  12/20/2018 -  consult 1/2021 with weight 378, started weight loss with primary care    Goal Weight (lbs):  348 -     Required Weight Loss:  38 -     Surgery Type:  sleeve gastrectomy -        Dietician Visits: Structured weight loss required by insurance?:  structured weight loss required -     Dietician Visit 1:  Completed - 3/28/23 KB   Dietician Visit 2:  Completed - 4/25/23 KB   Dietician Visit 3:  Completed - 5/16/23 KB   Dietician Visit 4:  Completed - 6/13/23 KB   Dietician Visit 5:  Completed - 7/18/23 KB   Dietician Visit 6:  Completed - 8/29/23 bks   Dietician Visit additional:  Needed - monthly until surgery for weight loss and postop diet teaching. bks   Dietician Notes:  RD Visits: 9/18/23, 10/23/23, 11/20/23, 12/29/23, 1/22/24 appt. bks -        Psychological Evaluation: Psych eval:  Completed - List and letter sent to pt 12/16/21 -AS; Dr Romana Villarreal appt 1/12/24 cleared. bks      Lab Work: Complete Blood Count:  Completed - Ordered 10/23/23. bks   Comprehensive Metabolic Panel:  Completed - Ordered 12/16/21 - AS; 2/27/23 bks   Vitamin D:  Completed - Ordered 12/16/21 - AS; 2/27/23=12, need recheck bks   PTH:  Completed - Ordered 10/23/23. bks   Hgb A1c:  Completed - Ordered 12/16/21 - AS; 2/27/23 bks    Lipids: Completed - 2/27/23 bks    TSH ([facility] by [facility], [place]): Completed - 2/27/23  "[facility]   Vitamin A: Completed - Ordered 10/23/23. [facility]   Vitamin B12: Completed -        Consults/ [facility]:  Completed - 11/1/23 Sleep referral entered. 1/16/24 Dr Almas Martino note -continue CPAP before and after surgery. Follow-up with sleep center. [facility]   Hematology:  Completed - hx of blood clot- 9/2021; 10/23/23 Dr Alamo Would recommend LMWH, enoxaparin 40mg initated one day postop  if no bleeding complications for 7 days total, to use compression stocking for 7 days after surgery (remove stockings at night). [facility]      PCP: Establish care with PCP:  Completed -     PCP letter of support:  Completed - 11/1/23 Referral entered. 12/27/23 Rafael almodovar [facility]      Patient Education:  Information Session:  Completed -     Attended New Consult Class?: Needed -     Given \"Making your decision\" handout?:  Yes -     Given \"A Roadmap to you Weight Loss Surgery\" handout?: Yes -     Given \"Epic Care Companion\" information?: Yes -     Attended support group?:  Completed - Joined a [employer] support group. [facility]   Support plan in place?:  Completed -        Final Tasks to do after surgery date is finalized.:  Before surgery online preop class:  Needed -     Nurse visit for information:  Needed -     Weight Check: Needed -     History and Physical: Pre-Assessment Clinic (PAC) -   Needed -     Final labs per clinic: Needed -     Schedule postop appointments: Needed -     Other:   -   -        Notes: Please register for the Weight Loss Surgery Care Companion Pathway through the [employer]ocket mobile eze or via web browser. You register by answering \"yes\" to the following question, \"Do you agree to take part in this free, online program?\"  Information from this Pathway can help you be more successful before surgery and for up to one year after surgery.  This Pathway through [employer]ocket can also answer questions you may have about your surgery.   The Pathway will start when you get your Tasklist after your initial consultation or " when your surgery is scheduled.  This is activated. bks   -

## 2024-01-31 ENCOUNTER — TELEPHONE (OUTPATIENT)
Dept: PHARMACY | Facility: CLINIC | Age: 39
End: 2024-01-31
Payer: COMMERCIAL

## 2024-01-31 NOTE — TELEPHONE ENCOUNTER
MT pharmacist was asked to call patient due to upcoming sleeve gastrectomy March 4, 2024.  He has a history of being on prednisone as needed for gouty attack.  Surgeon wanted to verify that the patient would not be taking prednisone within 6 weeks before or 12 weeks after surgery.  Was also requested that patient to sign up for MT visit to have before and after surgery to have discussion on perioperative management of medications.  Patient did not answer.  Did provide information in a ARDACO message as well.  Patient to call back with questions or concerns.    Lauren Bloch, PharmD, BCACP   Medication Therapy Management Pharmacist   [facility]

## 2024-02-08 ENCOUNTER — LAB (OUTPATIENT)
Dept: LAB | Facility: CLINIC | Age: 39
End: 2024-02-08
Payer: COMMERCIAL

## 2024-02-08 ENCOUNTER — ALLIED HEALTH/NURSE VISIT (OUTPATIENT)
Dept: FAMILY MEDICINE | Facility: CLINIC | Age: 39
End: 2024-02-08
Payer: COMMERCIAL

## 2024-02-08 VITALS — BODY MASS INDEX: 44.1 KG/M2 | HEIGHT: 71 IN | WEIGHT: 315 LBS

## 2024-02-08 DIAGNOSIS — E66.813 CLASS 3 SEVERE OBESITY DUE TO EXCESS CALORIES WITH SERIOUS COMORBIDITY AND BODY MASS INDEX (BMI) OF 50.0 TO 59.9 IN ADULT (H): ICD-10-CM

## 2024-02-08 DIAGNOSIS — Z98.84 BARIATRIC SURGERY STATUS: ICD-10-CM

## 2024-02-08 DIAGNOSIS — E66.01 CLASS 3 SEVERE OBESITY DUE TO EXCESS CALORIES WITH SERIOUS COMORBIDITY AND BODY MASS INDEX (BMI) OF 50.0 TO 59.9 IN ADULT (H): ICD-10-CM

## 2024-02-08 DIAGNOSIS — E66.01 CLASS 3 SEVERE OBESITY DUE TO EXCESS CALORIES WITH SERIOUS COMORBIDITY AND BODY MASS INDEX (BMI) OF 50.0 TO 59.9 IN ADULT (H): Primary | ICD-10-CM

## 2024-02-08 DIAGNOSIS — E66.813 CLASS 3 SEVERE OBESITY DUE TO EXCESS CALORIES WITH SERIOUS COMORBIDITY AND BODY MASS INDEX (BMI) OF 50.0 TO 59.9 IN ADULT (H): Primary | ICD-10-CM

## 2024-02-08 LAB
CA-I BLD-MCNC: 5.1 MG/DL (ref 4.4–5.2)
ERYTHROCYTE [DISTWIDTH] IN BLOOD BY AUTOMATED COUNT: 13.7 % (ref 10–15)
HCT VFR BLD AUTO: 45.5 % (ref 40–53)
HGB BLD-MCNC: 14.8 G/DL (ref 13.3–17.7)
MCH RBC QN AUTO: 27.7 PG (ref 26.5–33)
MCHC RBC AUTO-ENTMCNC: 32.5 G/DL (ref 31.5–36.5)
MCV RBC AUTO: 85 FL (ref 78–100)
PLATELET # BLD AUTO: 335 10E3/UL (ref 150–450)
RBC # BLD AUTO: 5.35 10E6/UL (ref 4.4–5.9)
WBC # BLD AUTO: 9.6 10E3/UL (ref 4–11)

## 2024-02-08 PROCEDURE — 83970 ASSAY OF PARATHORMONE: CPT

## 2024-02-08 PROCEDURE — 99000 SPECIMEN HANDLING OFFICE-LAB: CPT

## 2024-02-08 PROCEDURE — 99207 PR NO CHARGE NURSE ONLY: CPT

## 2024-02-08 PROCEDURE — 83735 ASSAY OF MAGNESIUM: CPT

## 2024-02-08 PROCEDURE — 84590 ASSAY OF VITAMIN A: CPT | Mod: 90

## 2024-02-08 PROCEDURE — 84100 ASSAY OF PHOSPHORUS: CPT

## 2024-02-08 PROCEDURE — 82330 ASSAY OF CALCIUM: CPT

## 2024-02-08 PROCEDURE — 85027 COMPLETE CBC AUTOMATED: CPT

## 2024-02-08 PROCEDURE — 36415 COLL VENOUS BLD VENIPUNCTURE: CPT

## 2024-02-08 PROCEDURE — 82565 ASSAY OF CREATININE: CPT

## 2024-02-08 NOTE — PROGRESS NOTES
Patient came in for weight check and height.        Wt Readings from Last 3 Encounters:   02/08/24 (!) 154 kg (339 lb 8 oz)   01/22/24 (!) 155.1 kg (342 lb)   12/29/23 (!) 159.3 kg (351 lb 3.2 oz)

## 2024-02-09 LAB
CREAT SERPL-MCNC: 1.17 MG/DL (ref 0.67–1.17)
EGFRCR SERPLBLD CKD-EPI 2021: 82 ML/MIN/1.73M2
MAGNESIUM SERPL-MCNC: 2.1 MG/DL (ref 1.7–2.3)
PHOSPHATE SERPL-MCNC: 3.5 MG/DL (ref 2.5–4.5)
PTH-INTACT SERPL-MCNC: 22 PG/ML (ref 15–65)

## 2024-02-11 LAB
ANNOTATION COMMENT IMP: NORMAL
RETINYL PALMITATE SERPL-MCNC: <0.02 MG/L
VIT A SERPL-MCNC: 0.55 MG/L

## 2024-02-13 ENCOUNTER — VIRTUAL VISIT (OUTPATIENT)
Dept: ENDOCRINOLOGY | Facility: CLINIC | Age: 39
End: 2024-02-13
Payer: COMMERCIAL

## 2024-02-13 VITALS — BODY MASS INDEX: 42.66 KG/M2 | WEIGHT: 315 LBS | HEIGHT: 72 IN

## 2024-02-13 DIAGNOSIS — E66.813 CLASS 3 SEVERE OBESITY DUE TO EXCESS CALORIES WITH SERIOUS COMORBIDITY AND BODY MASS INDEX (BMI) OF 50.0 TO 59.9 IN ADULT (H): Primary | ICD-10-CM

## 2024-02-13 DIAGNOSIS — E66.01 CLASS 3 SEVERE OBESITY DUE TO EXCESS CALORIES WITH SERIOUS COMORBIDITY AND BODY MASS INDEX (BMI) OF 50.0 TO 59.9 IN ADULT (H): Primary | ICD-10-CM

## 2024-02-13 DIAGNOSIS — Z91.89 AT HIGH RISK FOR POSTOPERATIVE COMPLICATIONS: ICD-10-CM

## 2024-02-13 PROCEDURE — 99207 PR NO CHARGE NURSE ONLY: CPT | Mod: 95

## 2024-02-13 RX ORDER — AMOXICILLIN 250 MG
2 CAPSULE ORAL DAILY PRN
Qty: 30 TABLET | Refills: 1 | Status: ON HOLD | OUTPATIENT
Start: 2024-02-13 | End: 2024-03-04

## 2024-02-13 RX ORDER — ONDANSETRON 4 MG/1
4 TABLET, ORALLY DISINTEGRATING ORAL EVERY 6 HOURS PRN
Qty: 15 TABLET | Refills: 0 | Status: SHIPPED | OUTPATIENT
Start: 2024-02-13 | End: 2024-03-11

## 2024-02-13 RX ORDER — HYOSCYAMINE SULFATE 0.125 MG
0.12 TABLET ORAL EVERY 4 HOURS PRN
Qty: 30 TABLET | Refills: 1 | Status: ON HOLD | OUTPATIENT
Start: 2024-02-13 | End: 2024-03-04

## 2024-02-13 ASSESSMENT — PAIN SCALES - GENERAL: PAINLEVEL: NO PAIN (0)

## 2024-02-13 NOTE — PROGRESS NOTES
PREOP NURSE VISIT     This patient is having laparoscopic gastric sleeve with Dr. Teddy Eden.    The following handouts were reviewed with the patient:  Before Your Surgery, Patient Checklist, Weight Loss Surgery Pre-operative Class, Preop Recommendations Quick Reference Guide, History and Physical, Medications to Avoid, Shower or Bathing Before Surgery, Bowel Preparation, Powerful Choices, and [place] [facility] Directive.  Questions were addressed and understanding of content was verbalized.  Contact information was provided.    WEIGHT CHECK:  Patient goal weight: 348    Weight today: 339    Surgery Date and Time: 3/4/24 at 7:30 AM   Call [phone] Josemanuel Gonzalez to confirm surgery date.     PAC Visit: Needs to schedule  Call [phone] to schedule your pre-operative history and physical with our PAC clinic.    MTM: 2/27/24    PROBLEM LIST:  Patient Active Problem List   Diagnosis    CARDIOVASCULAR SCREENING; LDL GOAL LESS THAN 160    Gout    Class 3 severe obesity due to excess calories with serious comorbidity and body mass index (BMI) of 50.0 to 59.9 in adult (H)    Essential hypertension    TRACEE (obstructive sleep apnea)    Prediabetes    Vitamin D deficiency    Deep vein thrombosis (DVT) of tibial vein of left lower extremity, unspecified chronicity (H)         MEDICAL CONDITIONS REVIEW:  Diabetic? No.   Diabetics who are on medications instructed to monitor blood sugar levels closely after surgery and contact prescribing provider if levels run low as they may need their medications adjusted.    On high blood pressure medications? Yes.   Patients on high blood pressure medications instructed to monitor blood pressure levels closely after surgery and contact prescribing provider if pressure are running low as they may need their medications adjusted.    CURRENT MEDICATIONS:   Current Outpatient Medications   Medication Sig Dispense Refill    amLODIPine (NORVASC) 2.5 MG tablet Take 1 tablet  (2.5 mg) by mouth daily 30 tablet 1    Cholecalciferol (VITAMIN D3) 75 MCG (3000 UT) TABS Take 2 tablets by mouth daily 120 tablet 0    colchicine (COLCRYS) 0.6 MG tablet Take 1 tablet (0.6 mg) by mouth daily 90 tablet 3    losartan (COZAAR) 100 MG tablet Take 1 tablet (100 mg) by mouth daily 90 tablet 3    predniSONE (DELTASONE) 20 MG tablet Take 3 tabs by mouth daily x 3 days, then 2 tabs daily x 3 days, then 1 tab daily x 3 days, then 1/2 tab daily x 3 days. 20 tablet 0    Semaglutide-Weight Management (WEGOVY) 2.4 MG/0.75ML pen Inject 2.4 mg Subcutaneous every 7 days 3 mL 3    benzonatate (TESSALON) 100 MG capsule Take 1 capsule (100 mg) by mouth 3 times daily as needed (Patient not taking: Reported on 2/13/2024) 30 capsule 0       Patient currently taking opioid/narcotic pain medications? No.    CURRENT ALLERGIES:     Allergies   Allergen Reactions    Cats Other (See Comments)    No Clinical Screening - See Comments Unknown     seasonal    Seasonal Allergies        POSTOP MEDICATIONS:  The following postop medications were sent to the patient's pharmacy.  Patient was instructed to  medications before surgery and to have them at home for discharge.    OMEPRAZOLE (PRILOSEC), HYSCYAMINE (LEVSIN), ODANSETRON (ZOFRAN), and SENNA (SENOKOT)    LOGISITICS:  Patient lives greater than 3 hours from hospital?  No.  If patient lives greater than 3 hours away, is patient planning on staying in the area after surgery?  NA   Patients instructed to take breaks each hour on car ride home.  To be out of vehicle, stretch and walk for at least 10 minutes.  To do ankle pump exercises in car.      SUPPORT SYSTEM  Wife will be helping patient with postop care.    PAPERWORK  FMLA/Time OFF:  Patient is anticipating taking 2 weeks off after surgery.    Patient instructed to have paperwork faxed to [phone] at the attention of the surgeon.    Bariatric Task List    Fax:  Please fax all paperwork to: [phone] -     Status:   Is patient a candidate for bariatric surgery?:  patient is a candidate for bariatric surgery -     Cleared to schedule surgeon consult?:  cleared to schedule surgeon consult - 11/29/23 appt. bks   Status:  surgery evaluation in process -     Surgeon: Karey  -     Tentative surgery month/year: To be determined. -        Insurance: Insurance:  [facility] -      Contact insurance to discuss coverage: Needed -       Cigna: PCP Recommendation and Medical Clearance:    -     HP Referral:    -      Advanced beneficiary notification (ABN) for Medicare patients for RD visits   and surgery:   -      Weight history:   -     Other:    -        Patient Info: Initial Weight:  386 -     Date of Initial Weight/Height:  12/20/2018 -  consult 1/2021 with weight 378, started weight loss with primary care    Goal Weight (lbs):  348 -     Required Weight Loss:  38 -     Surgery Type:  sleeve gastrectomy -     Multidisciplinary Meeting:    -        Dietician Visits: Structured weight loss required by insurance?:  structured weight loss required -     Dietician Visit 1:  Completed - 3/28/23 KB   Dietician Visit 2:  Completed - 4/25/23 KB   Dietician Visit 3:  Completed - 5/16/23 KB   Dietician Visit 4:  Completed - 6/13/23 KB   Dietician Visit 5:  Completed - 7/18/23 KB   Dietician Visit 6:  Completed - 8/29/23 bks   Dietician Visit additional:  Completed - monthly until surgery for weight loss and postop diet teaching. bks   Clearance from dietician to see surgeon?:    -     Dietician Notes:  RD Visits: 9/18/23, 10/23/23, 11/20/23, 12/29/23, 1/22/24 appt. bks -        Psychological Evaluation: Psych eval:  Completed - List and letter sent to pt 12/16/21 -AS; Dr Romana Villarreal appt 1/12/24 cleared. bks   Therapist letter of support:    -     Psychiatrist letter of support:    -     Establish care with therapist:    -     Complete eating disorder evaluation:    -     Letter of clearance from therapist/eating disorder program:    -    "  Other:    -        Lab Work: Complete Blood Count:  Completed - Ordered 10/23/23. bks   Comprehensive Metabolic Panel:  Completed - Ordered 12/16/21 - AS; 2/27/23 bks   Vitamin D:  Completed - Ordered 12/16/21 - AS; 2/27/23=12, need recheck bks   PTH:  Completed - Ordered 10/23/23. bks   Hgb A1c:  Completed - Ordered 12/16/21 - AS; 2/27/23 bks    Lipids: Completed - 2/27/23 bks    TSH ([facility], [facility], [place]): Completed - 2/27/23 bks     Ferritin:   -       Folate:   -       Testosterone, Total and Free:   -     Thiamine:   -     Vitamin A: Completed - Ordered 10/23/23. bks   Vitamin B12: Completed -     Zinc:   -     C-peptide:   -     H. pylori:    -     MRSA (2 swabs, minimum 48 hours apart):   -     Nicotine Testing:    -     Recheck Vitamin D:   -     Other:    - Data deleted      Consults/ Clearance Sleep Medicine:  Completed - 11/1/23 Sleep referral entered. 1/16/24 Dr Almas Martino note -continue CPAP before and after surgery. Follow-up with sleep center. bkdbks   Hematology:  Completed - hx of blood clot- 9/2021; 10/23/23 Dr Alamo Would recommend LMWH, enoxaparin 40mg initated one day postop  if no bleeding complications for 7 days total, to use compression stocking for 7 days after surgery (remove stockings at night. bks   Other:    -        PCP: Establish care with PCP:  Completed -     Follow up with PCP:    -     PCP letter of support:  Completed - 11/1/23 Referral entered. 12/27/23 Rafael [facility]berts. [facility]      Patient Education:  Information Session:  Completed -     Attended New Consult Class?: Needed -     Given \"Making your decision\" handout?:  Yes -     Given \"A Roadmap to you Weight Loss Surgery\" handout?: Yes -     Given \"Epic Care Companion\" information?: Yes -     Attended support group?:  Completed - Joined a [email or web address] support group. bks   Support plan in place?:  Completed -     Research consents signed?:    -     Avoid NSAIDS/ Alternate Plan for Pain:   -        Additional Surgery Requirements: " "Review Coag plan:    -     HgA1c <8:    -     Inpatient pain consult:    -     Final nicotine screen:    -     Dental work complete:    -     Birth control plan:    -     Gallstone prevention plan (Actigall for 6 months postop):   -     Other:   -     Other:   -        Final Tasks:  Before surgery online preop class:  Completed - 2/13/24 - AS   After surgery online class:  Completed - 2/13/24 - AS   Nurse visit for information:  Completed - 2/13/24 - AS   Weight Check: Completed - 2/13/24 - AS - current weight 339.   History and Physical: Pre-Assessment Clinic (PAC) -   Needed - REmineded to schedule - AS   Final labs per clinic: Needed -     See MTM Pharmacist for medication review and plan for after surgery (if DM, transplant hx, greater than 10 meds):   -     Chest xray per clinic:   -     Electrocardiogram (ECG) per clinic:   -     Schedule postop appointments: Completed -     Other:   -   -        Notes: Please register for the Weight Loss Surgery Care Companion Pathway through the [employer] mobile eze or via web browser. You register by answering \"yes\" to the following question, \"Do you agree to take part in this free, online program?\"  Information from this Pathway can help you be more successful before surgery and for up to one year after surgery.  This Pathway through [employer] can also answer questions you may have about your surgery.   The Pathway will start when you get your Tasklist after your initial consultation or when your surgery is scheduled.  This is activated. bks   -             "

## 2024-02-13 NOTE — NURSING NOTE
Is the patient currently in the state of [place]? YES    Visit mode:VIDEO    If the visit is dropped, the patient can be reconnected by: VIDEO VISIT: Text to cell phone:   Telephone Information:   Mobile [phone]       Will anyone else be joining the visit? NO  (If patient encounters technical issues they should call [phone])    How would you like to obtain your AVS? MyChart    Are changes needed to the allergy or medication list? Yes    Please remove any meds marked not taking and any flagged for removal.    Reason for visit: Nurse Visit    Wt/ht other than 24 hrs:  Last Thur  Pain more than one location:  no  Silvia MEJIA

## 2024-02-13 NOTE — LETTER
2/13/2024       RE: Bigg Montez  [address]  [place] [address]     Dear Colleague,    Thank you for referring your patient, Bigg Montez, to the [facility] [place] [facility]. Please see a copy of my visit note below.    PREOP NURSE VISIT     This patient is having laparoscopic gastric sleeve with Dr. Teddy Eden.    The following handouts were reviewed with the patient:  Before Your Surgery, Patient Checklist, Weight Loss Surgery Pre-operative Class, Preop Recommendations Quick Reference Guide, History and Physical, Medications to Avoid, Shower or Bathing Before Surgery, Bowel Preparation, Powerful Choices, and [place] [facility] Directive.  Questions were addressed and understanding of content was verbalized.  Contact information was provided.    WEIGHT CHECK:  Patient goal weight: 348    Weight today: 339    Surgery Date and Time: 3/4/24 at 7:30 AM   Call [phone] Josemanuel Gonzalez to confirm surgery date.     PAC Visit: Needs to schedule  Call [phone] to schedule your pre-operative history and physical with our PAC clinic.    MTM: 2/27/24    PROBLEM LIST:  Patient Active Problem List   Diagnosis    CARDIOVASCULAR SCREENING; LDL GOAL LESS THAN 160    Gout    Class 3 severe obesity due to excess calories with serious comorbidity and body mass index (BMI) of 50.0 to 59.9 in adult (H)    Essential hypertension    TRACEE (obstructive sleep apnea)    Prediabetes    Vitamin D deficiency    Deep vein thrombosis (DVT) of tibial vein of left lower extremity, unspecified chronicity (H)         MEDICAL CONDITIONS REVIEW:  Diabetic? No.   Diabetics who are on medications instructed to monitor blood sugar levels closely after surgery and contact prescribing provider if levels run low as they may need their medications adjusted.    On high blood pressure medications? Yes.   Patients on high blood pressure  medications instructed to monitor blood pressure levels closely after surgery and contact prescribing provider if pressure are running low as they may need their medications adjusted.    CURRENT MEDICATIONS:   Current Outpatient Medications   Medication Sig Dispense Refill    amLODIPine (NORVASC) 2.5 MG tablet Take 1 tablet (2.5 mg) by mouth daily 30 tablet 1    Cholecalciferol (VITAMIN D3) 75 MCG (3000 UT) TABS Take 2 tablets by mouth daily 120 tablet 0    colchicine (COLCRYS) 0.6 MG tablet Take 1 tablet (0.6 mg) by mouth daily 90 tablet 3    losartan (COZAAR) 100 MG tablet Take 1 tablet (100 mg) by mouth daily 90 tablet 3    predniSONE (DELTASONE) 20 MG tablet Take 3 tabs by mouth daily x 3 days, then 2 tabs daily x 3 days, then 1 tab daily x 3 days, then 1/2 tab daily x 3 days. 20 tablet 0    Semaglutide-Weight Management (WEGOVY) 2.4 MG/0.75ML pen Inject 2.4 mg Subcutaneous every 7 days 3 mL 3    benzonatate (TESSALON) 100 MG capsule Take 1 capsule (100 mg) by mouth 3 times daily as needed (Patient not taking: Reported on 2/13/2024) 30 capsule 0       Patient currently taking opioid/narcotic pain medications? No.    CURRENT ALLERGIES:     Allergies   Allergen Reactions    Cats Other (See Comments)    No Clinical Screening - See Comments Unknown     seasonal    Seasonal Allergies        POSTOP MEDICATIONS:  The following postop medications were sent to the patient's pharmacy.  Patient was instructed to  medications before surgery and to have them at home for discharge.    OMEPRAZOLE (PRILOSEC), HYSCYAMINE (LEVSIN), ODANSETRON (ZOFRAN), and SENNA (SENOKOT)    LOGISITICS:  Patient lives greater than 3 hours from hospital?  No.  If patient lives greater than 3 hours away, is patient planning on staying in the area after surgery?  NA   Patients instructed to take breaks each hour on car ride home.  To be out of vehicle, stretch and walk for at least 10 minutes.  To do ankle pump exercises in car.      SUPPORT  SYSTEM  Wife will be helping patient with postop care.    PAPERWORK  FMLA/Time OFF:  Patient is anticipating taking 2 weeks off after surgery.    Patient instructed to have paperwork faxed to [phone] at the attention of the surgeon.    Bariatric Task List    Fax:  Please fax all paperwork to: [phone] -     Status:  Is patient a candidate for bariatric surgery?:  patient is a candidate for bariatric surgery -     Cleared to schedule surgeon consult?:  cleared to schedule surgeon consult - 11/29/23 appt. bks   Status:  surgery evaluation in process -     Surgeon: Karey  -     Tentative surgery month/year: To be determined. -        Insurance: Insurance:  [facility] -      Contact insurance to discuss coverage: Needed -       Cigna: PCP Recommendation and Medical Clearance:    -     HP Referral:    -      Advanced beneficiary notification (ABN) for Medicare patients for RD visits   and surgery:   -      Weight history:   -     Other:    -        Patient Info: Initial Weight:  386 -     Date of Initial Weight/Height:  12/20/2018 -  consult 1/2021 with weight 378, started weight loss with primary care    Goal Weight (lbs):  348 -     Required Weight Loss:  38 -     Surgery Type:  sleeve gastrectomy -     Multidisciplinary Meeting:    -        Dietician Visits: Structured weight loss required by insurance?:  structured weight loss required -     Dietician Visit 1:  Completed - 3/28/23 KB   Dietician Visit 2:  Completed - 4/25/23 KB   Dietician Visit 3:  Completed - 5/16/23 KB   Dietician Visit 4:  Completed - 6/13/23 KB   Dietician Visit 5:  Completed - 7/18/23 KB   Dietician Visit 6:  Completed - 8/29/23 bks   Dietician Visit additional:  Completed - monthly until surgery for weight loss and postop diet teaching. bks   Clearance from dietician to see surgeon?:    -     Dietician Notes:  RD Visits: 9/18/23, 10/23/23, 11/20/23, 12/29/23, 1/22/24 appt. bks -        Psychological Evaluation: Psych eval:  Completed  "- List and letter sent to pt 12/16/21 -AS; Dr Romana Villarreal appt 1/12/24 cleared. bks   Therapist letter of support:    -     Psychiatrist letter of support:    -     Establish care with therapist:    -     Complete eating disorder evaluation:    -     Letter of clearance from therapist/eating disorder program:    -     Other:    -        Lab Work: Complete Blood Count:  Completed - Ordered 10/23/23. bks   Comprehensive Metabolic Panel:  Completed - Ordered 12/16/21 - AS; 2/27/23 bks   Vitamin D:  Completed - Ordered 12/16/21 - AS; 2/27/23=12, need recheck bks   PTH:  Completed - Ordered 10/23/23. bks   Hgb A1c:  Completed - Ordered 12/16/21 - AS; 2/27/23 bks    Lipids: Completed - 2/27/23 bks    TSH ([facility], [facility], [place] MA): Completed - 2/27/23 bks     Ferritin:   -       Folate:   -       Testosterone, Total and Free:   -     Thiamine:   -     Vitamin A: Completed - Ordered 10/23/23. bks   Vitamin B12: Completed -     Zinc:   -     C-peptide:   -     H. pylori:    -     MRSA (2 swabs, minimum 48 hours apart):   -     Nicotine Testing:    -     Recheck Vitamin D:   -     Other:    - Data deleted      Consults/ Clearance Sleep Medicine:  Completed - 11/1/23 Sleep referral entered. 1/16/24 Dr Almas Martino note -continue CPAP before and after surgery. Follow-up with sleep center. bkdbks   Hematology:  Completed - hx of blood clot- 9/2021; 10/23/23 Dr Alamo Would recommend LMWH, enoxaparin 40mg initated one day postop  if no bleeding complications for 7 days total, to use compression stocking for 7 days after surgery (remove stockings at night. bks   Other:    -        PCP: Establish care with PCP:  Completed -     Follow up with PCP:    -     PCP letter of support:  Completed - 11/1/23 Referral entered. 12/27/23 Rafael Bonner appt. bks      Patient Education:  Information Session:  Completed -     Attended New Consult Class?: Needed -     Given \"Making your decision\" handout?:  Yes -     Given \"A Roadmap to you Weight Loss " "Surgery\" handout?: Yes -     Given \"Epic Care Companion\" information?: Yes -     Attended support group?:  Completed - Joined a Facebook support group. bks   Support plan in place?:  Completed -     Research consents signed?:    -     Avoid NSAIDS/ Alternate Plan for Pain:   -        Additional Surgery Requirements: Review Coag plan:    -     HgA1c <8:    -     Inpatient pain consult:    -     Final nicotine screen:    -     Dental work complete:    -     Birth control plan:    -     Gallstone prevention plan (Actigall for 6 months postop):   -     Other:   -     Other:   -        Final Tasks:  Before surgery online preop class:  Completed - 2/13/24 - AS   After surgery online class:  Completed - 2/13/24 - AS   Nurse visit for information:  Completed - 2/13/24 - AS   Weight Check: Completed - 2/13/24 - AS - current weight 339.   History and Physical: Pre-Assessment Clinic (PAC) -   Needed - REmineded to schedule - AS   Final labs per clinic: Needed -     See MTM Pharmacist for medication review and plan for after surgery (if DM, transplant hx, greater than 10 meds):   -     Chest xray per clinic:   -     Electrocardiogram (ECG) per clinic:   -     Schedule postop appointments: Completed -     Other:   -   -        Notes: Please register for the Weight Loss Surgery Care Companion Pathway through the [employer] mobile eze or via web browser. You register by answering \"yes\" to the following question, \"Do you agree to take part in this free, online program?\"  Information from this Pathway can help you be more successful before surgery and for up to one year after surgery.  This Pathway through [employer] can also answer questions you may have about your surgery.   The Pathway will start when you get your Tasklist after your initial consultation or when your surgery is scheduled.  This is activated. bks   -         Brandi Edmonds RN    "

## 2024-02-14 ENCOUNTER — CARE COORDINATION (OUTPATIENT)
Dept: SLEEP MEDICINE | Facility: CLINIC | Age: 39
End: 2024-02-14
Payer: COMMERCIAL

## 2024-02-14 DIAGNOSIS — G47.33 OSA (OBSTRUCTIVE SLEEP APNEA): Primary | ICD-10-CM

## 2024-02-14 NOTE — TELEPHONE ENCOUNTER
FUTURE VISIT INFORMATION      SURGERY INFORMATION:  Date: 3/4/24  Location: u or  Surgeon:  Teddy Eden MD   Anesthesia Type:  general  Procedure: Laparoscopic Sleeve GASTRECTOMY possible Laparoscopic Hiatal HERNIORRHAPHY     RECORDS REQUESTED FROM:       Primary Care Provider: [facility]    Pertinent Medical History: hypertension, dvt    Most recent Sleep Study:  1/16/24

## 2024-02-14 NOTE — PROGRESS NOTES
Darshan is having a hard time going to sleep. He would like the pressure changed. It currently is at 5. Please advise.

## 2024-02-14 NOTE — TELEPHONE ENCOUNTER
Called Darshan to let him know that Dr. Martino change his pressure and to call back if he needs it changed again.

## 2024-02-16 ENCOUNTER — OFFICE VISIT (OUTPATIENT)
Dept: SURGERY | Facility: CLINIC | Age: 39
End: 2024-02-16
Payer: COMMERCIAL

## 2024-02-16 ENCOUNTER — ANESTHESIA EVENT (OUTPATIENT)
Dept: SURGERY | Facility: CLINIC | Age: 39
End: 2024-02-16
Payer: COMMERCIAL

## 2024-02-16 ENCOUNTER — PRE VISIT (OUTPATIENT)
Dept: SURGERY | Facility: CLINIC | Age: 39
End: 2024-02-16

## 2024-02-16 VITALS
HEART RATE: 79 BPM | WEIGHT: 315 LBS | BODY MASS INDEX: 44.1 KG/M2 | HEIGHT: 71 IN | TEMPERATURE: 98.3 F | OXYGEN SATURATION: 98 % | SYSTOLIC BLOOD PRESSURE: 138 MMHG | DIASTOLIC BLOOD PRESSURE: 91 MMHG

## 2024-02-16 DIAGNOSIS — Z01.818 PRE-OPERATIVE EXAMINATION: Primary | ICD-10-CM

## 2024-02-16 DIAGNOSIS — E66.01 MORBID OBESITY (H): ICD-10-CM

## 2024-02-16 PROCEDURE — 99203 OFFICE O/P NEW LOW 30 MIN: CPT | Performed by: PHYSICIAN ASSISTANT

## 2024-02-16 RX ORDER — MULTIVITAMIN
1 TABLET ORAL EVERY EVENING
COMMUNITY
End: 2024-02-27

## 2024-02-16 ASSESSMENT — LIFESTYLE VARIABLES: TOBACCO_USE: 1

## 2024-02-16 ASSESSMENT — PAIN SCALES - GENERAL: PAINLEVEL: NO PAIN (0)

## 2024-02-16 ASSESSMENT — ENCOUNTER SYMPTOMS: SEIZURES: 0

## 2024-02-16 NOTE — H&P
Pre-Operative H & P     CC:  Preoperative exam to assess for increased cardiopulmonary risk while undergoing surgery and anesthesia.    Date of Encounter: 2/16/2024  Primary Care Physician:  No Ref-Primary, Physician     Reason for visit:   Encounter Diagnoses   Name Primary?    Pre-operative examination Yes    Morbid obesity (H)        HPI  Bigg Montez is a 38 year old male who presents for pre-operative H & P in preparation for  Procedure Information       Case: [id number] Date/Time: 03/04/24 0730    Procedures:       Laparoscopic Sleeve GASTRECTOMY (Abdomen)      possible Laparoscopic Hiatal HERNIORRHAPHY (Abdomen)    Anesthesia type: General    Diagnosis: Morbid obesity (H) [E66.01]    Pre-op diagnosis: Morbid obesity (H) [E66.01]    Location:  OR [address] [place] [address] OR    Providers: Teddy Eden MD            The patient is a 38-year-old man with a past medical history significant for hypertension, obstructive sleep apnea, former cigar user, history of unprovoked DVT, gout, morbid obesity and elbow spur.  The patient has been working with the bariatric team and is now scheduled for the procedure as above.    History is obtained from the patient and chart review    Hx of abnormal bleeding or anti-platelet use: none      Past Medical History  Past Medical History:   Diagnosis Date    Gout     HTN (hypertension)     Morbid obesity (H)     TRACEE (obstructive sleep apnea)     Personal history of DVT (deep vein thrombosis)        Past Surgical History  Past Surgical History:   Procedure Laterality Date    VASECTOMY N/A 2020       Prior to Admission Medications  Current Outpatient Medications   Medication Sig Dispense Refill    amLODIPine (NORVASC) 2.5 MG tablet Take 1 tablet (2.5 mg) by mouth daily (Patient taking differently: Take 2.5 mg by mouth every evening) 30 tablet 1    Cholecalciferol (VITAMIN D3) 75 MCG (3000 UT) TABS Take 2 tablets by mouth daily (Patient taking differently: Take 2 tablets by mouth every  evening) 120 tablet 0    colchicine (COLCRYS) 0.6 MG tablet Take 1 tablet (0.6 mg) by mouth daily (Patient taking differently: Take 0.6 mg by mouth as needed) 90 tablet 3    losartan (COZAAR) 100 MG tablet Take 1 tablet (100 mg) by mouth daily (Patient taking differently: Take 100 mg by mouth every evening) 90 tablet 3    multivitamin w/minerals (MULTI-VITAMIN) tablet Take 1 tablet by mouth every evening      predniSONE (DELTASONE) 20 MG tablet Take 3 tabs by mouth daily x 3 days, then 2 tabs daily x 3 days, then 1 tab daily x 3 days, then 1/2 tab daily x 3 days. (Patient taking differently: as needed Take 3 tabs by mouth daily x 3 days, then 2 tabs daily x 3 days, then 1 tab daily x 3 days, then 1/2 tab daily x 3 days.) 20 tablet 0    Semaglutide-Weight Management (WEGOVY) 2.4 MG/0.75ML pen Inject 2.4 mg Subcutaneous every 7 days (Patient taking differently: Inject 2.4 mg Subcutaneous every 7 days Wed) 3 mL 3    benzonatate (TESSALON) 100 MG capsule Take 1 capsule (100 mg) by mouth 3 times daily as needed (Patient not taking: Reported on 2/13/2024) 30 capsule 0    hyoscyamine (LEVSIN) 0.125 MG tablet Take 1 tablet (125 mcg) by mouth every 4 hours as needed for cramping (Patient taking differently: Take 0.125 mg by mouth every 4 hours as needed for cramping Post op) 30 tablet 1    omeprazole (PRILOSEC) 20 MG DR capsule Take 1 capsule (20 mg) by mouth daily (Patient taking differently: Take 20 mg by mouth daily Post op) 30 capsule 3    ondansetron (ZOFRAN ODT) 4 MG ODT tab Take 1 tablet (4 mg) by mouth every 6 hours as needed for nausea (Patient taking differently: Take 4 mg by mouth every 6 hours as needed for nausea Post op) 15 tablet 0    senna-docusate (SENOKOT-S/PERICOLACE) 8.6-50 MG tablet Take 2 tablets by mouth daily as needed for constipation (While taking narcotic pain medications.  Stop taking if having loose stools.) (Patient taking differently: Take 2 tablets by mouth daily as needed for constipation  (While taking narcotic pain medications.  Stop taking if having loose stools.) Post op) 30 tablet 1       Allergies  Allergies   Allergen Reactions    Cats Other (See Comments)    Levonorgestrel-Ethinyl Estrad Other (See Comments) and Unknown    No Clinical Screening - See Comments Unknown     seasonal    Seasonal Allergies        Social History  Social History     Socioeconomic History    Marital status:      Spouse name: Not on file    Number of children: 2    Years of education: Not on file    Highest education level: Not on file   Occupational History     Employer: [employer]   Tobacco Use    Smoking status: Former     Types: Cigars     Quit date: 2/2/2021     Years since quitting: 3.0     Passive exposure: Past    Smokeless tobacco: Never   Vaping Use    Vaping Use: Never used   Substance and Sexual Activity    Alcohol use: Not Currently    Drug use: No    Sexual activity: Yes     Partners: Female   Other Topics Concern    Parent/sibling w/ CABG, MI or angioplasty before 65F 55M? No   Social History Narrative    Not on file     Social Determinants of Health     Financial Resource Strain: Low Risk  (12/27/2023)    Financial Resource Strain     Within the past 12 months, have you or your family members you live with been unable to get utilities (heat, electricity) when it was really needed?: No   Food Insecurity: Low Risk  (12/27/2023)    Food Insecurity     Within the past 12 months, did you worry that your food would run out before you got money to buy more?: No     Within the past 12 months, did the food you bought just not last and you didn t have money to get more?: No   Transportation Needs: Low Risk  (12/27/2023)    Transportation Needs     Within the past 12 months, has lack of transportation kept you from medical appointments, getting your medicines, non-medical meetings or appointments, work, or from getting things that you need?: No   Physical Activity: Not on file   Stress: Not on file  "  Social Connections: Not on file   Interpersonal Safety: Not on file   Housing Stability: Low Risk  (12/27/2023)    Housing Stability     Do you have housing? : Yes     Are you worried about losing your housing?: No       Family History  Family History   Problem Relation Age of Onset    Anesthesia Reaction No family hx of     Deep Vein Thrombosis (DVT) No family hx of        Review of Systems  The complete review of systems is negative other than noted in the HPI or here.   Anesthesia Evaluation   Pt has not had prior anesthetic         ROS/MED HX  ENT/Pulmonary:     (+) sleep apnea, uses CPAP,         allergic rhinitis,     tobacco use (cigars), Past use,                       Neurologic:  - neg neurologic ROS  (-) no seizures, no CVA and no TIA   Cardiovascular:     (+)  hypertension- -   -  - -                                      METS/Exercise Tolerance: 4 - Raking leaves, gardening    Hematologic:     (+) History of blood clots,    pt is not anticoagulated,           Musculoskeletal:  - neg musculoskeletal ROS     GI/Hepatic:  - neg GI/hepatic ROS  (-) GERD   Renal/Genitourinary:       Endo:     (+)               Obesity,       Psychiatric/Substance Use:  - neg psychiatric ROS     Infectious Disease: Comment: History of COVID-19 1/2024 - neg infectious disease ROS     Malignancy:  - neg malignancy ROS     Other:  - neg other ROS          BP (!) 138/91 (BP Location: Right arm, Patient Position: Sitting, Cuff Size: Adult Large)   Pulse 79   Temp 98.3  F (36.8  C) (Oral)   Ht 1.803 m (5' 11\")   Wt (!) 158.3 kg (349 lb)   SpO2 98%   BMI 48.68 kg/m      Physical Exam   Constitutional: Awake, alert, cooperative, no apparent distress, and appears stated age.  Eyes: Pupils equal, round and reactive to light, extra ocular muscles intact, sclera clear, conjunctiva normal.  HENT: Normocephalic, oral pharynx with moist mucus membranes, good dentition. No goiter appreciated.   Respiratory: Clear to auscultation " bilaterally, no crackles or wheezing.  Cardiovascular: Regular rate and rhythm, normal S1 and S2, and no murmur noted.  Carotids +2, no bruits. No edema. Palpable pulses to radial  DP and PT arteries.   GI: Normal bowel sounds, soft, non-distended, non-tender, no masses palpated, no hepatosplenomegaly.  Exam limited secondary to body habitus  Lymph/Hematologic: No cervical lymphadenopathy and no supraclavicular lymphadenopathy.  Genitourinary:  defer  Skin: Warm and dry.  No rashes at anticipated surgical site.   Musculoskeletal: Full ROM of neck. There is no redness, warmth, or swelling of the joints. Gross motor strength is normal.    Neurologic: Awake, alert, oriented to name, place and time. Cranial nerves II-XII are grossly intact. Gait is normal.   Neuropsychiatric: Calm, cooperative. Normal affect.     Prior Labs/Diagnostic Studies   All labs and imaging personally reviewed    Latest Reference Range & Units 02/08/24 09:51   Creatinine 0.67 - 1.17 mg/dL 1.17   GFR Estimate >60 mL/min/1.73m2 82   Magnesium 1.7 - 2.3 mg/dL 2.1   Phosphorus 2.5 - 4.5 mg/dL 3.5   Calcium Ionized Whole Blood 4.4 - 5.2 mg/dL 5.1   Retinol Palmitate 0.00 - 0.10 mg/L <0.02   Vitamin A 0.30 - 1.20 mg/L 0.55   Vitamin A Interp  Normal      Latest Reference Range & Units 12/27/23 11:45   Sodium 135 - 145 mmol/L 145   Potassium 3.4 - 5.3 mmol/L 4.5   Chloride 98 - 107 mmol/L 109 (H)   Carbon Dioxide (CO2) 22 - 29 mmol/L 27   Urea Nitrogen 6.0 - 20.0 mg/dL 15.1   Creatinine 0.67 - 1.17 mg/dL 1.14   GFR Estimate >60 mL/min/1.73m2 84   Calcium 8.6 - 10.0 mg/dL 9.0   Anion Gap 7 - 15 mmol/L 9   Albumin 3.5 - 5.2 g/dL 4.0   Protein Total 6.4 - 8.3 g/dL 6.8   Alkaline Phosphatase 40 - 150 U/L 72   ALT 0 - 70 U/L 18   AST 0 - 45 U/L 22   Bilirubin Total <=1.2 mg/dL 0.2   Glucose 70 - 99 mg/dL 80   Hemoglobin A1C 0.0 - 5.6 % 5.2   Uric Acid 3.4 - 7.0 mg/dL 8.8 (H)      Latest Reference Range & Units 02/08/24 09:51   WBC 4.0 - 11.0 10e3/uL 9.6  "  Hemoglobin 13.3 - 17.7 g/dL 14.8   Hematocrit 40.0 - 53.0 % 45.5   Platelet Count 150 - 450 10e3/uL 335   RBC Count 4.40 - 5.90 10e6/uL 5.35   MCV 78 - 100 fL 85   MCH 26.5 - 33.0 pg 27.7   MCHC 31.5 - 36.5 g/dL 32.5   RDW 10.0 - 15.0 % 13.7       EKG/ stress test - if available please see in ROS above   No results found.       No data to display                  The patient's records and results personally reviewed by this provider.     Outside records reviewed from: Care Everywhere    Assessment    Bigg Montez is a 38 year old male seen as a PAC referral for risk assessment and optimization for anesthesia.    Plan/Recommendations  Pt will be optimized for the proposed procedure.  See below for details on the assessment, risk, and preoperative recommendations    NEUROLOGY  - No history of TIA, CVA or seizure  -Post Op delirium risk factors:  No risk identified    ENT  - No current airway concerns.  Will need to be reassessed day of surgery.  Mallampati: I  TM: > 3    CARDIAC  - Hypertension  Well controlled  - METS (Metabolic Equivalents)  Patient performs 4 or more METS exercise without symptoms            Total Score: 0      RCRI-Low risk: Class 2 0.9% complication rate            Total Score: 1    RCRI: High Risk Surgery        PULMONARY  - Obstructive Sleep Apnea  TRACEE with home CPAP.  Patient will be instructed to bring their home CPAP device to the hospital with them.    - Denies asthma or inhaler use  - Tobacco History    History   Smoking Status    Former    Types: Cigars   Smokeless Tobacco    Never       GI  - Denies GERD - prilosec for after surgery.   PONV Medium Risk  Total Score: 2           1 AN PONV: Patient is not a current smoker    1 AN PONV: Intended Post Op Opioids        /RENAL  - Baseline Creatinine  1.17    ENDOCRINE    - BMI: Estimated body mass index is 48.68 kg/m  as calculated from the following:    Height as of this encounter: 1.803 m (5' 11\").    Weight as of this encounter: " 158.3 kg (349 lb).  Class 3 Obesity (BMI > 40) - hold Wegovy for 7 days prior  - Gout - continue colchicine. Hold prednisone per Dr. Eden    HEME  VTE Low Risk 0.5%            Total Score: 3    VTE: BMI greater than 39    VTE: Male      - No history of abnormal bleeding or antiplatelet use.  ~ History of unprovoked DVT in 9/2021 - The patient was seen by hematology for surgery by Philip Alamo PA-C with plan as below:  In regard to his bariatric surgery, he is cleared from a hematological standpoint to proceed with planned bariatric surgery. Given his history of DVT, it is our recommendation that he should be on pharmacological DVT/PE prophylaxis for 7 days post operatively. This can be easily achieved by using enoxaparin at 40 mg SubQ Q 24 hours starting the day after his surgery assuming that he has no bleeding complications and continue enoxaparin for 7 days post operatively. I will defer to bariatric surgery team to prescribe this at the time of the patient discharge from the hospital.      MSK  ~ Recent elbow spur - completed 5 day course of antibiotics and feels better.     ID  ~ History of COVID-19 1/2024. The patient reports since COVID infection when he wakes up in the morning he has some mucous from his CPAP. Denies URI symptoms.       Different anesthesia methods/types have been discussed with the patient, but they are aware that the final plan will be decided by the assigned anesthesia provider on the date of service.    The patient is optimized for their procedure. AVS with information on surgery time/arrival time, meds and NPO status given by nursing staff. No further diagnostic testing indicated.      On the day of service:     Prep time: 14 minutes  Visit time: 16 minutes  Documentation time: 4 minutes  ------------------------------------------  Total time: 34 minutes      Ashley Wilder PA-C  Preoperative Assessment Center  [facility]  Clinic and Surgery Center  Phone:  [phone]  Fax: [phone]

## 2024-02-16 NOTE — PATIENT INSTRUCTIONS
Preparing for Your Surgery      Name:  Bigg Montez   MRN:  [id number]   :  1985   Today's Date:  2024       Arriving for surgery:  Surgery date:  3/4/24  Arrival time:  5:30 am  Surgery time: 7:30 am    Please come to:     Please come to:      [facility] [address]  [address] [place]  [address]      The [facility] Patient /Visitor Ramp is located at [address]. Patients and visitors who self-park will receive the reduced hospital parking rate. If the Patient /Visitor Ramp is full, please follow the signs to the  parking located at the main hospital entrance.     parking is available ( 24 hours/ 7 days a week)    Discounted parking pass options are available for patients and visitors. They can be purchased at the [place] desk at the main hospital entrance.    -    Stop at the security desk and they will direct surgery patients to the 3rd floor Surgery Waiting Room. [phone]     -  If you are in need of directions, wheelchair or escort please stop at the Information/security desk in the lobby.       What can I eat or drink?  -  Follow instructions given to you by the Bariatric team regarding diet changes- Clear liquids the day before 3/3/24  -  You may have sips of water from 12 midnight until 3 hours prior to surgery time. (Until 4:30 am on 3/4/24)    Examples of clear liquids: nothing red  Water  Clear broth  Juices (apple, white grape, white cranberry  and cider) without pulp  Noncarbonated, powder based beverages  (lemonade and Tyrese-Aid)  Sodas (Sprite, 7-Up, ginger ale and seltzer)  Coffee or tea (without milk or cream)  Gatorade    -  No Alcohol or cannabis products for at least 24 hours before surgery.     Which medicines can I take?    Hold Multivitamins for 7 days before surgery.    Hold Ibuprofen (Advil, Motrin) for 7 day(s) before surgery--unless otherwise directed by surgeon.  Hold  Naproxen (Aleve) for 4 days before surgery.     Evening medications are ok to continue as normal.     Hold Wegovy dose Wed 2/28/24- This needs to be held for 1 week        -  Ok to TAKE these medications the day of surgery:   Colchicine as needed      How do I prepare myself?  - Please take 2 showers (one the night prior to surgery and one the morning of surgery) using Scrubcare or Hibiclens soap.    Use this soap only from the neck to your toes.     Leave the soap on your skin for one minute--then rinse thoroughly.      You may use your own shampoo and conditioner. No other hair products.   - Please remove all jewelry and body piercings.  - No lotions, deodorants or fragrance.  - Bring your ID and insurance card.    -If you use a CPAP machine, please bring the CPAP machine, tubing, and mask to hospital.    -If you have a Deep Brain Stimulator, Spinal Cord Stimulator, or any Neuro Stimulator device---you must bring the remote control to the hospital.        Covid testing policy as of 12/06/2022  Your surgeon will notify and schedule you for a COVID test if one is needed before surgery--please direct any questions or COVID symptoms to your surgeon      Questions or Concerns:    - For any questions regarding the day of surgery or your hospital stay, please contact the Pre Admission Nursing Office at [phone].       - If you have health changes between today and your surgery, please call your surgeon.       - For questions after surgery, please call your surgeons office.           Current Visitor Guidelines    You may have 2 visitors in the pre op area.    Visiting hours: 8 a.m. to 8:30 p.m.    Patients confirmed or suspected to have symptoms of COVID 19 or flu:     No visitors allowed for adult patients.   Children (under age 18) can have 1 named visitor.     People who are sick or showing symptoms of COVID 19 or flu:    Are not allowed to visit patients--we can only make exceptions in special situations.        Please follow these guidelines for your visit:          Please maintain social distance          Masking is optional--however at times you may be asked to wear a mask for the safety of yourself and others     Clean your hands with alcohol hand . Do this when you arrive at and leave the building and patient room,    And again after you touch your mask or anything in the room.     Go directly to and from the room you are visiting.     Stay in the patient s room during your visit. Limit going to other places in the hospital as much as possible     Leave bags and jackets at home or in the car.     For everyone s health, please don t come and go during your visit. That includes for smoking   during your visit.

## 2024-02-19 ENCOUNTER — CARE COORDINATION (OUTPATIENT)
Dept: ENDOCRINOLOGY | Facility: CLINIC | Age: 39
End: 2024-02-19
Payer: COMMERCIAL

## 2024-02-19 NOTE — PROGRESS NOTES
Tasklist updated and sent to patient via [employer].      Bariatric Task List    Fax:  Please fax all paperwork to: [phone] -     Status:  Is patient a candidate for bariatric surgery?:  patient is a candidate for bariatric surgery -     Cleared to schedule surgeon consult?:  cleared to schedule surgeon consult - 11/29/23 appt. bks   Status:  surgery evaluation in process -     Surgeon: Karey  -     Tentative surgery month/year: 3/4/24 -        Insurance: Insurance:  [facility] -      Contact insurance to discuss coverage: Completed -       Cigna: PCP Recommendation and Medical Clearance:    -     HP Referral:    -      Advanced beneficiary notification (ABN) for Medicare patients for RD visits   and surgery:   -      Weight history:   -     Other:    -        Patient Info: Initial Weight:  386 -     Date of Initial Weight/Height:  12/20/2018 -  consult 1/2021 with weight 378, started weight loss with primary care    Goal Weight (lbs):  348 -     Required Weight Loss:  38 -     Surgery Type:  sleeve gastrectomy -     Multidisciplinary Meeting:    -        Dietician Visits: Structured weight loss required by insurance?:  structured weight loss required -     Dietician Visit 1:  Completed - 3/28/23 KB   Dietician Visit 2:  Completed - 4/25/23 KB   Dietician Visit 3:  Completed - 5/16/23 KB   Dietician Visit 4:  Completed - 6/13/23 KB   Dietician Visit 5:  Completed - 7/18/23 KB   Dietician Visit 6:  Completed - 8/29/23 bks   Dietician Visit additional:  Completed - monthly until surgery for weight loss and postop diet teaching. bks   Clearance from dietician to see surgeon?:    -     Dietician Notes:  RD Visits: 9/18/23, 10/23/23, 11/20/23, 12/29/23, 1/22/24 appt. bks -        Psychological Evaluation: Psych eval:  Completed - List and letter sent to pt 12/16/21 -AS; Dr Romana Villarreal appt 1/12/24 cleared. bks   Therapist letter of support:  Completed - 2/14/2Mmisa Love ltr attached to 2/16/24 [employer] message.  bks   Psychiatrist letter of support:    -     Establish care with therapist:    -     Complete eating disorder evaluation:    -     Letter of clearance from therapist/eating disorder program:    -     Other:    -        Lab Work: Complete Blood Count:  Completed - Ordered 10/23/23. bks   Comprehensive Metabolic Panel:  Completed - Ordered 12/16/21 - AS; 2/27/23 bks   Vitamin D:  Completed - Ordered 12/16/21 - AS; 2/27/23=12, need recheck bks   PTH:  Completed - Ordered 10/23/23. bks   Hgb A1c:  Completed - Ordered 12/16/21 - AS; 2/27/23 bks    Lipids: Completed - 2/27/23 bks    TSH ([facility], [facility], [place] [facility]): Completed - 2/27/23 bks     Ferritin:   -       Folate:   -       Testosterone, Total and Free:   -     Thiamine:   -     Vitamin A: Completed - Ordered 10/23/23. bks   Vitamin B12: Completed -     Zinc:   -     C-peptide:   -     H. pylori:    -     MRSA (2 swabs, minimum 48 hours apart):   -     Nicotine Testing:    -     Recheck Vitamin D:   -     Other:    - Data deleted      Consults/ Clearance Sleep Medicine:  Completed - 11/1/23 Sleep referral entered. 1/16/24 Dr Almas Martino note -continue CPAP before and after surgery. Follow-up with sleep center. bkdbks   Cardiac:    -     Pain:   -     Dental:    -     Endocrine:    -     Gastroenterology:    -     Vascular Medicine:    -     Hematology:  Completed - hx of blood clot- 9/2021; 10/23/23 Dr Alamo Would recommend LMWH, enoxaparin 40mg initated one day postop  if no bleeding complications for 7 days total, to use compression stocking for 7 days after surgery (remove stockings at night. bks   Medical Weight Management:   -     Physical Therapy/Exercise:    -     Nephrology:    -     Neurology:    -     Pulmonology:    -     Rheumatology:    -     Other:    -     Other:    -     Other:    -        Testing: UGI:    -     EGD:    -     Sleep Study:   -     Other:   -     Other:    -        PCP: Establish care with PCP:  Completed -     Follow up with PCP:    -    "  PCP letter of support:  Completed - 11/1/23 Referral entered. 12/27/23 Rafael almodovar bks      Health Maintenance: Colonoscopy(> 50 yrs or family hx):    -     Mammogram (> 40 yrs or family hx):    -     Pap Smear (women):   -     Other:   -     Other:    -        Stopping Smoking/ Alcohol Use/Cannabis Use: Quit tobacco use (3 months smoke free)?:    - Data deleted   Quit date:    - cigars - let us know your quit date Jan 2023. bks   Quit alcohol use:   -     Quit date:   -     Other:   -     Quit date:   -        Patient Education:  Information Session:  Completed -     Attended New Consult Class?: Needed -     Given \"Making your decision\" handout?:  Yes -     Given \"A Roadmap to you Weight Loss Surgery\" handout?: Yes -     Given \"Epic Care Companion\" information?: Yes -     Attended support group?:  Completed - Joined a [employer] support group. s   Support plan in place?:  Completed -     Research consents signed?:    -     Avoid NSAIDS/ Alternate Plan for Pain:   -        Additional Surgery Requirements: Review Coag plan:    -     HgA1c <8:    -     Inpatient pain consult:    -     Final nicotine screen:    -     Dental work complete:    -     Birth control plan:    -     Gallstone prevention plan (Actigall for 6 months postop):   -     Other:   -     Other:   -        Final Tasks:  Before surgery online preop class:  Completed - 2/13/24 - AS   After surgery online class:  Completed - 2/13/24 - AS   Nurse visit for information:  Completed - 2/13/24 - AS   Weight Check: Completed - 2/13/24 - AS - current weight 339.   History and Physical: Pre-Assessment Clinic (PAC) - 2/16/24 [facility] Completed - REmineded to schedule - AS   Final labs per clinic: Needed -     See MTM Pharmacist for medication review and plan for after surgery (if DM, transplant hx, greater than 10 meds):   -     Chest xray per clinic:   -     Electrocardiogram (ECG) per clinic:   -     Schedule postop appointments: Completed -     Other:   -  " " -        Notes: Please register for the Weight Loss Surgery Care Companion Pathway through the [employer] mobile eze or via web browser. You register by answering \"yes\" to the following question, \"Do you agree to take part in this free, online program?\"  Information from this Pathway can help you be more successful before surgery and for up to one year after surgery.  This Pathway through ponUp can also answer questions you may have about your surgery.   The Pathway will start when you get your Tasklist after your initial consultation or when your surgery is scheduled.  This is activated. bks   -            "

## 2024-02-22 ENCOUNTER — TELEPHONE (OUTPATIENT)
Dept: ENDOCRINOLOGY | Facility: CLINIC | Age: 39
End: 2024-02-22

## 2024-02-22 NOTE — PROGRESS NOTES
"Video-Visit Details    Type of service:  Video Visit    Video Start Time: 10:36 AM   Video End Time: 10:51 AM     Originating Location (pt. Location): Home    Distant Location (provider location):  Offsite (providers home) [facility] [place]     Platform used for Video Visit: Harshil        Bariatric Nutrition Consultation Note    Reason For Visit: Nutrition reassessment    Bigg Montez is a 38 year old presenting today for return bariatric nutrition consult and nutrition education regarding clear and low-fat full liquid diet for post-bariatric surgery.  Pt is interested in laparoscopic sleeve gastrectomy with Dr. Eden expected surgery on March 4, 2024.  Patient is accompanied by self. This is patients 12th nutrition visit prior to surgery.       Pt referred by Brianda Chan NP on January 25, 2021.  Patient with Co-morbidities of obesity including:  Type II DM no  Renal Failure no  Sleep apnea yes  Hypertension yes   Dyslipidemia no  Joint pain no  Back pain no  GERD no   Prediabetes yes    H/o gout. Pt reports having a blood clot in mid-sept. 2021 1/21/2021    11:14 PM   Support System Reviewed With Patient   Who do you have in your support network that can be available to help you for the first 2 weeks after surgery? My wife   Who can you count on for support throughout your weight loss surgery journey? Kenny ceja       ANTHROPOMETRICS:  Initial weight (12/2018 with PCP): 386 lbs    Estimated body mass index is 47.42 kg/m  as calculated from the following:    Height as of this encounter: 1.803 m (5' 11\").    Weight as of this encounter: 154.2 kg (340 lb). (-2 lbs over last month, -46 lbs from initial)     Required weight loss goal pre-op: 38 lbs from initial consult weight (goal weight 348 lbs or less before surgery).         1/21/2021    11:14 PM   --   I have tried the following methods to lose weight Watching portions or calories    Exercise    Weight " [employer]    [facility]    Physician directed program           1/21/2021    11:14 PM   Weight Loss Questions Reviewed With Patient   How long have you been overweight? Since puberty       SUPPLEMENT INFORMATION:  Milk thistle supplement pt reports for gout  [employer]'s MVI    Nutrition Related Labs:  2/27/23 - Vitamin D: 12 (L). 1/4/24 - Vitamin D: 24 (improved, however optimal above 30)    12/27/23 - HgbA1c: 5.2 (WNL- improved from 5.7 at previous check)    Medications for Weight Loss:  Wegovy    NUTRITION HISTORY:  No known food allergies/intolerances  H/o gout    He continues to work on reducing starches (rice, noodles). Increasing fruits, working on increase vegetables. No longer going back for seconds at dinner. Intermittently replacing breakfast with protein shake.     7/18/23 - Has started going to the gym, and working on increasing his water intake.     8/29/23 - Cut out energy drink intake. Went up on Wegovy to 1 mg.     9/18/23 - Continues Wegovy injections, losing consistently each month. Reduced starch portions. Reports eating ~1.5 meals daily. May have fruit for a snack or [employer] granola bar.     10/23/23 - Only tolerates FairLife protein shakes.  As tried many others. Gets stomach ache from others.     11/20/23 - Feeling more bloated, constipated with 2.4 mg dose of Wegovy. Plans to start Miralax. Feels good energy. Most days getting in 3 meals daily, less often may only get 1-2 meals daily. Has not eaten rice in last 3-4 weeks. Focusing on high protein meals.     12/29/23 - Consuming 3 meals daily , but feels like food is staying in his stomach. Using a salad sized plate for meals. Continues to work on eating slowly, but needs to work more on  out beverage intake.     1/22/24 - Reports feeling like he is weaker with weight loss and Wegovy. Most days does meet 60 gm protein. Working on increased physical activity as able.     Diet Recall:  Breakfast: Fairlife Protein Shakes   Lunch: deli  meat (ham/turkey)   Dinner: chicken stew and occ rice; red beans and rice and sausage   Beverages: water (100 oz), sugar-free sports drinks.     Progress Towards Previous Goals:  1.Goal weight for surgery: 348 lbs or less - Met, surpassed wt loss goal   2. Reduce portions. Use a saucer/dessert sized plate. - Met, continues   3. Increase physical activity as able. At least 30 mins walk daily. - Ongoing  4. Avoid drinking with meals and for 30 mins before and after. 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         1/21/2021    11:14 PM   Dining Out History Reviewed With Patient   How often do you dine out? Nearly every day.   Where do you dine out? (select all that apply) fast food chains    take out   What types of food do you order when you dine out? Protien rice dishes           1/21/2021    11:14 PM   Physical Activity Reviewed With Patient   How often do you exercise? 1 to 2 times per week   What is the duration of your exercise (in minutes)? 60+ Minutes   What types of exercise do you do? gym membership    weightlifting   What keeps you from being more active? Lack of Time       NUTRITION DIAGNOSIS:  Obesity r/t long history of self-monitoring deficit and excessive energy intake aeb BMI >30 kg/m2. - improving    Food- and Nutrition-related knowledge deficit r/t lack of prior exposure to information AEB pt scheduled for upcoming bariatric surgery and pt interest in diet education/review    INTERVENTION:  Intervention Provided/Education Provided/Reviewed previous goals and encouraged patient to continue goals prior to surgery.     Provided instruction on bariatric clear and low-fat full liquid diets.    Provided the following handouts: Diet Guidelines for Bariatric Surgery, Your Stage 1-5 Diet, Keeping Track of Your Fluids, list of recommended vitamin/mineral supplementation after sleeve gastrectomy surgery and RD contact information.           1/21/2021    11:14 PM    Questions Reviewed With Patient   How ready are you to make changes regarding your weight? Number 1 = Not ready at all to make changes up to 10 = very ready. 10   How confident are you that you can change? 1 = Not confident that you will be successful making changes up to 10 = very confident. 10       Expected Engagement: good      Goals after surgery:   1. Follow the bariatric post-op diet advancement schedule (see below)  2. Sip on 48-64 oz (or greater) fluids daily, recording intake to help stay on-track.  - Drink at least 1-2 oz of fluid every 15-30 min.  3. Stop vitamins/minerals 1 week before surgery. We will discuss starting a chewable multivitamin at the one week post-op visit.   4. Work towards consuming 60 gm protein daily.     Post-op Diet Advancement Schedule:  Clear Liquid Diet (stage 1): starts 3/3  Low-Fat Full Liquid Diet (stage 2): starts 3/11  Pureed Diet (stage 3): starts 3/18  Soft Diet (stage 4): starts 4/1  Regular Diet (stage 5): starts 4/29     Post-op Diet Handouts:  Diet Guidelines after Weight-loss Surgery  [email or web address]     Your Stage 1 Diet: Clear Liquids  [email or web address]     Your Stage 2 Diet: Low-fat Full Liquids  [email or web address]     Your Stage 3 Diet: Pureed Foods  [email or web address]     Pureed Recipes  [email or web address]    Your Stage 4 Diet: Soft Foods  [email or web address]    Your Stage 5 Diet: Regular Foods  [email or web address]    Supplements after Sleeve Gastrectomy, Gastric Bypass or Single Anastomosis Duodenal Switch  [email or web address]    Keeping Track of Fluids  [email or web address]      Protein Supplements for After Surgery:   Unflavored protein powder: Genepro, Isopure (25-30 gm protein per 1 scoop)  You may also use flavored protein powder if you prefer.   Protein shots: https://store.DueProps/collections/protein-shots  Pre-made protein shakes (no more  than 210 Calories, at least 20 grams of protein, and less than 10 grams of sugar):   Premier Protein (160 Calories, 30 g protein)  Boost/Ensure Max (160 calories, 30 gm protein)   Fairlife Core Power (170 calories, 26 gm protein)  Aldi's Elevation Protein Shake (160 calories, 30 gm protein)   Equate Protein Shake (160 calories, 30 gm protein)  Slim Fast Advanced Nutrition (180 Calories, 20 g protein)  Muscle Milk, lactose-free, 17 oz bottle (210 Calories, 30 g protein)  Glucerna Protein Smart (150 grover, 30 gm protein)  Clear Protein Drinks:  BiPro  Premier Protein clear  [email or web address]  M Health Protein 15 Concentrate  Bone broth  Beneprotein protein powder mixed with 4-6 oz of fluid  [email or web address]    Chewable Multivitamin Options for After Surgery:  Bariatric Advantage Advanced Multi EA chewable: https://www.bigclix.com.LockerDome/item/chewable-advanced-multi-ea  Take 2 chews daily. Additional calcium citrate supplement needed.  - Validation code for Discount on Bariatric Advantage vitamin/mineral supplements: FSWLSBA    Celebrate Multi Complete 45: https://[email or web address]/products/wmdoj-yjlfldjn-[email or web address]  Take 2 chews daily. Additional calcium citrate supplement needed.    Reasnor's Complete, or generic (with 10 mg iron per chew)   (https://www.Scicasts.com/p/kids-39-complete-multivitamin-chewable-tablets-orange-grape-38-cherry-150ct-up-38-up-8482/-/[email or web address]#lnk=sametab)   Take 2 chews per day. Additional B12 and calcium citrate supplements needed. Potential need for additional iron supplement (if needing more than 20 mg iron per day)      Time spent with patient: 15 mins    Angela Granegr, BRO, LD

## 2024-02-22 NOTE — TELEPHONE ENCOUNTER
Called to cancel today's appointment with Brianda Cahn as she is out ill, however call went straight to dial tone.

## 2024-02-23 ENCOUNTER — VIRTUAL VISIT (OUTPATIENT)
Dept: ENDOCRINOLOGY | Facility: CLINIC | Age: 39
End: 2024-02-23
Payer: COMMERCIAL

## 2024-02-23 VITALS — WEIGHT: 315 LBS | BODY MASS INDEX: 44.1 KG/M2 | HEIGHT: 71 IN

## 2024-02-23 DIAGNOSIS — E66.01 OBESITY, CLASS III, BMI 40-49.9 (MORBID OBESITY) (H): Primary | ICD-10-CM

## 2024-02-23 DIAGNOSIS — Z71.3 NUTRITIONAL COUNSELING: ICD-10-CM

## 2024-02-23 PROCEDURE — 97803 MED NUTRITION INDIV SUBSEQ: CPT | Mod: 95 | Performed by: DIETITIAN, REGISTERED

## 2024-02-23 PROCEDURE — 99207 PR NO CHARGE LOS: CPT | Mod: 95 | Performed by: DIETITIAN, REGISTERED

## 2024-02-23 ASSESSMENT — PAIN SCALES - GENERAL: PAINLEVEL: NO PAIN (0)

## 2024-02-23 NOTE — PATIENT INSTRUCTIONS
Paul Sexton,    Follow-up with RD on 3/14    Thank you,    Angela Granger, RD, LD  If you would like to schedule or reschedule an appointment with the RD, please call [phone]    Nutrition Goals  Goals after surgery:   1. Follow the bariatric post-op diet advancement schedule (see below)  2. Sip on 48-64 oz (or greater) fluids daily, recording intake to help stay on-track.  - Drink at least 1-2 oz of fluid every 15-30 min.  3. Stop vitamins/minerals 1 week before surgery. We will discuss starting a chewable multivitamin at the one week post-op visit.   4. Work towards consuming 60 gm protein daily.     Post-op Diet Advancement Schedule:  Clear Liquid Diet (stage 1): starts 3/3  Low-Fat Full Liquid Diet (stage 2): starts 3/11  Pureed Diet (stage 3): starts 3/18  Soft Diet (stage 4): starts 4/1  Regular Diet (stage 5): starts 4/29     Post-op Diet Handouts:  Diet Guidelines after Weight-loss Surgery  [email or web address]     Your Stage 1 Diet: Clear Liquids  [email or web address]     Your Stage 2 Diet: Low-fat Full Liquids  [email or web address]     Your Stage 3 Diet: Pureed Foods  [email or web address]     Pureed Recipes  [email or web address]    Your Stage 4 Diet: Soft Foods  [email or web address]    Your Stage 5 Diet: Regular Foods  [email or web address]    Supplements after Sleeve Gastrectomy, Gastric Bypass or Single Anastomosis Duodenal Switch  [email or web address]    Keeping Track of Fluids  [email or web address]      Protein Supplements for After Surgery:   Unflavored protein powder: Genepro, Isopure (25-30 gm protein per 1 scoop)  You may also use flavored protein powder if you prefer.   Protein shots: https://store."[employer].Mofang/collections/protein-shots  Pre-made protein shakes (no more than 210 Calories, at least 20 grams of protein, and less than 10 grams of sugar):   Premier Protein (160 Calories, 30 g protein)  Boost/Ensure  Max (160 calories, 30 gm protein)   [facility] Core Power (170 calories, 26 gm protein)  Aldi's Elevation Protein Shake (160 calories, 30 gm protein)   Equate Protein Shake (160 calories, 30 gm protein)  Slim Fast Advanced Nutrition (180 Calories, 20 g protein)  Muscle Milk, lactose-free, 17 oz bottle (210 Calories, 30 g protein)  Glucerna Protein Smart (150 grover, 30 gm protein)  Clear Protein Drinks:  BiPro  Premier Protein clear  [id number]  CitySlicker Protein 15 Concentrate  Bone broth  Beneprotein protein powder mixed with 4-6 oz of fluid  [id number]    Chewable Multivitamin Options for After Surgery:  Bariatric Advantage Advanced Multi EA chewable: https://www.bariatricRenovagen.v2tel/item/chewable-advanced-multi-ea  Take 2 chews daily. Additional calcium citrate supplement needed.  - Validation code for Discount on Bariatric Advantage vitamin/mineral supplements: FSWLSBA    Celebrate Multi Complete 45: https://Etherpad/products/vwcvn-rzbtvfuz-75?[email or web address]  Take 2 chews daily. Additional calcium citrate supplement needed.    [place]'s Complete, or generic (with 10 mg iron per chew)   (https://www."[employer].v2tel/p/kids-39-complete-multivitamin-chewable-tablets-orange-grape-38-cherry-150ct-up-38-up-8482/-/A-64028388#lnk=sametab)   Take 2 chews per day. Additional B12 and calcium citrate supplements needed. Potential need for additional iron supplement (if needing more than 20 mg iron per day)      COMPREHENSIVE WEIGHT MANAGEMENT PROGRAM  VIRTUAL SUPPORT GROUPS    At [facility], our Comprehensive Weight Management program offers on-line support groups for patients who are working on weight loss and considering, preparing for, or have had weight loss surgery.     There is no cost for this opportunity.  You are invited to attend the?Virtual Support Groups?provided by any of the following locations:    [facility] via [employer] Teams with Cynthia Amor RN  2.   [place] via [employer]  Teams with Terry Nix, PhD, LP  3.   [place] via [employer] Teams with Paola Franks RN  4.   [facility] via a Zoom Meeting with YARIEL Velasquez-    The following Support Group information can also be found on our website:  [email or web address]      [facility] Weight Loss Surgery Support Group  The support group is a patient-lead forum that meets monthly to share experiences, encouragement and education. It is open to those who have had weight loss surgery, are scheduled for surgery, or are considering surgery.   WHEN: This group meets on the 3rd Wednesday of each month from 5:00PM - 6:00PM virtually using Microsoft Teams.   FACILITATOR: Led by Cynthia Mccall RD, LD, RN, the program's Clinical Coordinator.   TO REGISTER: Please contact the clinic via Powtoon or call the nurse line directly at [phone] to inform our staff that you would like an invite sent to you and to let us know the email you would like the invite sent to. Prior to the meeting, a link with directions on how to join the meeting will be sent to you.    2023 and 2024 Meetings   December 20  January 17  February 21  March 20  April 17  May 15  Melly 19      [facility] Bariatric Care Support Group?  This is open to all pre- and post- operative bariatric surgery patients as well as their support system.   WHEN: This group meets the 3rd Tuesday of each month from 6:30 PM - 8:00 PM virtually using Microsoft Teams.   FACILITATOR: Led by Terry Nix, Ph.D who is a Licensed Psychologist with the [facility] Comprehensive Weight Management Program.   TO REGISTER: Please send an email to Terry Nix, Ph.D., LP at?[email or web address]?if you would like an invitation to the group. Prior to the meeting, a link with directions on how to join the meeting will be sent to you.    2023  "and 2024 Meetings  December 19 January 16: \"Medication Management and Bariatric Surgery\", Irina Salazar, PharmD, Pharmacy Resident at [facility], [facility]  February 20: \"A Bariatric Surgery Patient's Perspective\", JEZ Lord, [facility], Behavioral Health Clinician at [facility]  March 19  April 16  May 21  Melly 18: \"Nutritional Labeling\", Dietitian speaker to be announced.  November 19: \"Holiday Eating\", Dietitian speaker to be announced.    [facility] Post-Operative Bariatric Surgery Support Group  This is a support group for [facility] bariatric patients (and those external to [facility]) who have had bariatric surgery and are at least 3 months post-surgery.  WHEN: This support group meets the 4th Wednesday of the month from 11:00 AM - 12:00 PM virtually using Microsoft Teams.   FACILITATOR: Led by Certified Bariatric Nurse, Paola Franks RN.   TO REGISTER: Please send an email to Paola at [email or web address] if you would like an invitation to the group.  Prior to the meeting, a link with directions on how to join the meeting will be sent to you.    2023 and 2024 Meetings  December 27  January 24  February 28  March 27  April 24  May 22  Melly 26    [facility] Healthy Lifestyle Group?  This is a 60 minute virtual coaching group for those who want to lead a healthier lifestyle. Come together to set goals and overcome barriers in a supportive group environment. We will address the four pillars of health: nutrition, exercise, sleep and emotional well-being.  This group is highly recommended for those who are participating in the 24 week Healthy Lifestyle Plan and our Health Coaching sessions.  WHEN: This group meets the 1st Friday of the month, 12:30 PM - 1:30 PM online, via a zoom meeting.    FACILITATOR: Led by National Board Certified Health and Wellness " ", Paola Kwok, On license of [facility].   TO REGISTER: Please call the Call Center at [phone] to register.  You will get an appointment to attend in [facility]. Fifteen minutes prior to the meeting, complete the e-check in and you will get the link to join the meeting.    There is no charge to attend this group and space is limited.     2023 and 2024 Meetings  December 1: \"Let's Talk\" (guided discussion on our wins and challenges)  January 5: \"New Years Vision: Manifest your Best 2024!\" (guided imagery,  journaling and discussion)  February 2: \"Let's Talk\"  March 1: \"10 Percent Happier\" by Brian Chakraborty (Book Bites - a guided discussion on the nuggets of wisdom from favorite wellness books, no need to read the book but highly encouraged)  April 5: \"Let's Talk\"  May 3: \"Essentialism: The Disciplined Pursuit of Less\" by Sheldon Louis (Book Bites discussion)  June 7: \"Let's Talk\"  July 5: NO MEETING, off for the 4th of July Holiday  August 2: \"The Blue Zones, Secrets for Living a Longer Life\" by Brian Mendiola (Book Bites discussion)                    "

## 2024-02-23 NOTE — LETTER
"2/23/2024       RE: Bigg Montez  [address]  [place] [address]     Dear Colleague,    Thank you for referring your patient, Bigg Montez, to the [facility] [place] at [facility]. Please see a copy of my visit note below.    Video-Visit Details    Type of service:  Video Visit    Video Start Time: 10:36 AM   Video End Time: 10:51 AM     Originating Location (pt. Location): Home    Distant Location (provider location):  Offsite (providers home) [facility] [place]     Platform used for Video Visit: Cardiovascular Simulation        Bariatric Nutrition Consultation Note    Reason For Visit: Nutrition reassessment    Bigg Montez is a 38 year old presenting today for return bariatric nutrition consult and nutrition education regarding clear and low-fat full liquid diet for post-bariatric surgery.  Pt is interested in laparoscopic sleeve gastrectomy with Dr. Eden expected surgery on March 4, 2024.  Patient is accompanied by self. This is patients 12th nutrition visit prior to surgery.       Pt referred by Brianda Chan NP on January 25, 2021.  Patient with Co-morbidities of obesity including:  Type II DM no  Renal Failure no  Sleep apnea yes  Hypertension yes   Dyslipidemia no  Joint pain no  Back pain no  GERD no   Prediabetes yes    H/o gout. Pt reports having a blood clot in mid-sept. 2021 1/21/2021    11:14 PM   Support System Reviewed With Patient   Who do you have in your support network that can be available to help you for the first 2 weeks after surgery? My wife   Who can you count on for support throughout your weight loss surgery journey? Kenny ceja       ANTHROPOMETRICS:  Initial weight (12/2018 with PCP): 386 lbs    Estimated body mass index is 47.42 kg/m  as calculated from the following:    Height as of this encounter: 1.803 m (5' 11\").    Weight as of this encounter: " 154.2 kg (340 lb). (-2 lbs over last month, -46 lbs from initial)     Required weight loss goal pre-op: 38 lbs from initial consult weight (goal weight 348 lbs or less before surgery).         1/21/2021    11:14 PM   --   I have tried the following methods to lose weight Watching portions or calories    Exercise    Weight Watchers    Slimfast    Physician directed program           1/21/2021    11:14 PM   Weight Loss Questions Reviewed With Patient   How long have you been overweight? Since puberty       SUPPLEMENT INFORMATION:  Milk thistle supplement pt reports for gout  Evans Men's MVI    Nutrition Related Labs:  2/27/23 - Vitamin D: 12 (L). 1/4/24 - Vitamin D: 24 (improved, however optimal above 30)    12/27/23 - HgbA1c: 5.2 (WNL- improved from 5.7 at previous check)    Medications for Weight Loss:  Wegovy    NUTRITION HISTORY:  No known food allergies/intolerances  H/o gout    He continues to work on reducing starches (rice, noodles). Increasing fruits, working on increase vegetables. No longer going back for seconds at dinner. Intermittently replacing breakfast with protein shake.     7/18/23 - Has started going to the gym, and working on increasing his water intake.     8/29/23 - Cut out energy drink intake. Went up on Wegovy to 1 mg.     9/18/23 - Continues Wegovy injections, losing consistently each month. Reduced starch portions. Reports eating ~1.5 meals daily. May have fruit for a snack or Visonys granola bar.     10/23/23 - Only tolerates FairLife protein shakes.  As tried many others. Gets stomach ache from others.     11/20/23 - Feeling more bloated, constipated with 2.4 mg dose of Wegovy. Plans to start Miralax. Feels good energy. Most days getting in 3 meals daily, less often may only get 1-2 meals daily. Has not eaten rice in last 3-4 weeks. Focusing on high protein meals.     12/29/23 - Consuming 3 meals daily , but feels like food is staying in his stomach. Using a salad sized plate for  meals. Continues to work on eating slowly, but needs to work more on  out beverage intake.     1/22/24 - Reports feeling like he is weaker with weight loss and Wegovy. Most days does meet 60 gm protein. Working on increased physical activity as able.     Diet Recall:  Breakfast: Fairlife Protein Shakes   Lunch: deli meat (ham/turkey)   Dinner: chicken stew and occ rice; red beans and rice and sausage   Beverages: water (100 oz), sugar-free sports drinks.     Progress Towards Previous Goals:  1.Goal weight for surgery: 348 lbs or less - Met, surpassed wt loss goal   2. Reduce portions. Use a saucer/dessert sized plate. - Met, continues   3. Increase physical activity as able. At least 30 mins walk daily. - Ongoing  4. Avoid drinking with meals and for 30 mins before and after. 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         1/21/2021    11:14 PM   Dining Out History Reviewed With Patient   How often do you dine out? Nearly every day.   Where do you dine out? (select all that apply) fast food chains    take out   What types of food do you order when you dine out? Protien rice dishes           1/21/2021    11:14 PM   Physical Activity Reviewed With Patient   How often do you exercise? 1 to 2 times per week   What is the duration of your exercise (in minutes)? 60+ Minutes   What types of exercise do you do? gym membership    weightlifting   What keeps you from being more active? Lack of Time       NUTRITION DIAGNOSIS:  Obesity r/t long history of self-monitoring deficit and excessive energy intake aeb BMI >30 kg/m2. - improving    Food- and Nutrition-related knowledge deficit r/t lack of prior exposure to information AEB pt scheduled for upcoming bariatric surgery and pt interest in diet education/review    INTERVENTION:  Intervention Provided/Education Provided/Reviewed previous goals and encouraged patient to continue goals prior to surgery.     Provided instruction  on bariatric clear and low-fat full liquid diets.    Provided the following handouts: Diet Guidelines for Bariatric Surgery, Your Stage 1-5 Diet, Keeping Track of Your Fluids, list of recommended vitamin/mineral supplementation after sleeve gastrectomy surgery and RD contact information.           1/21/2021    11:14 PM   Questions Reviewed With Patient   How ready are you to make changes regarding your weight? Number 1 = Not ready at all to make changes up to 10 = very ready. 10   How confident are you that you can change? 1 = Not confident that you will be successful making changes up to 10 = very confident. 10       Expected Engagement: good      Goals after surgery:   1. Follow the bariatric post-op diet advancement schedule (see below)  2. Sip on 48-64 oz (or greater) fluids daily, recording intake to help stay on-track.  - Drink at least 1-2 oz of fluid every 15-30 min.  3. Stop vitamins/minerals 1 week before surgery. We will discuss starting a chewable multivitamin at the one week post-op visit.   4. Work towards consuming 60 gm protein daily.     Post-op Diet Advancement Schedule:  Clear Liquid Diet (stage 1): starts 3/3  Low-Fat Full Liquid Diet (stage 2): starts 3/11  Pureed Diet (stage 3): starts 3/18  Soft Diet (stage 4): starts 4/1  Regular Diet (stage 5): starts 4/29     Post-op Diet Handouts:  Diet Guidelines after Weight-loss Surgery  [email or web address]     Your Stage 1 Diet: Clear Liquids  [email or web address]     Your Stage 2 Diet: Low-fat Full Liquids  [email or web address]     Your Stage 3 Diet: Pureed Foods  [email or web address]     Pureed Recipes  [email or web address]    Your Stage 4 Diet: Soft Foods  [email or web address]    Your Stage 5 Diet: Regular Foods  [email or web address]    Supplements after Sleeve Gastrectomy, Gastric Bypass or Single Anastomosis Duodenal Switch  [email or web address]    Keeping Track of  Fluids  [email or web address]      Protein Supplements for After Surgery:   Unflavored protein powder: Genepro, Isopure (25-30 gm protein per 1 scoop)  You may also use flavored protein powder if you prefer.   Protein shots: https://store.Countdown.Zagster/collections/protein-shots  Pre-made protein shakes (no more than 210 Calories, at least 20 grams of protein, and less than 10 grams of sugar):   Premier Protein (160 Calories, 30 g protein)  Boost/Ensure Max (160 calories, 30 gm protein)   [employer] Core Power (170 calories, 26 gm protein)  Aldi's Elevation Protein Shake (160 calories, 30 gm protein)   Equate Protein Shake (160 calories, 30 gm protein)  Slim Fast Advanced Nutrition (180 Calories, 20 g protein)  Muscle Milk, lactose-free, 17 oz bottle (210 Calories, 30 g protein)  Glucerna Protein Smart (150 grover, 30 gm protein)  Clear Protein Drinks:  BiPro  Premier Protein clear  Ayrextc8B  Twistle Health Protein 15 Concentrate  Bone broth  Beneprotein protein powder mixed with 4-6 oz of fluid  [id number]    Chewable Multivitamin Options for After Surgery:  Bariatric Advantage Advanced Multi EA chewable: https://www.bariatricIPXI.Zagster/item/chewable-advanced-multi-ea  Take 2 chews daily. Additional calcium citrate supplement needed.  - Validation code for Discount on Bariatric Advantage vitamin/mineral supplements: FSWLSBA    Celebrate Multi Complete 45: https://Goombal.Zagster/products/dxylk-quzfuaha-99?wkwojyh=[email or web address]  Take 2 chews daily. Additional calcium citrate supplement needed.    Dario's Complete, or generic (with 10 mg iron per chew)   (https://www.HuTerra.com/p/kids-39-complete-multivitamin-chewable-tablets-orange-grape-38-cherry-150ct-up-38-up-8482/-/[id number]#lnk=sametab)   Take 2 chews per day. Additional B12 and calcium citrate supplements needed. Potential need for additional iron supplement (if needing more than 20 mg iron per day)      Time spent with patient: 15  mins    Angela Granger RD, LD

## 2024-02-27 ENCOUNTER — TELEPHONE (OUTPATIENT)
Dept: ENDOCRINOLOGY | Facility: CLINIC | Age: 39
End: 2024-02-27
Payer: COMMERCIAL

## 2024-02-27 ENCOUNTER — VIRTUAL VISIT (OUTPATIENT)
Dept: CARDIOLOGY | Facility: CLINIC | Age: 39
End: 2024-02-27
Attending: NURSE PRACTITIONER
Payer: COMMERCIAL

## 2024-02-27 VITALS — WEIGHT: 315 LBS | BODY MASS INDEX: 44.1 KG/M2 | HEIGHT: 71 IN

## 2024-02-27 DIAGNOSIS — E66.01 CLASS 3 SEVERE OBESITY DUE TO EXCESS CALORIES WITH SERIOUS COMORBIDITY AND BODY MASS INDEX (BMI) OF 50.0 TO 59.9 IN ADULT (H): Primary | ICD-10-CM

## 2024-02-27 DIAGNOSIS — M1A.09X0 CHRONIC GOUT OF MULTIPLE SITES, UNSPECIFIED CAUSE: ICD-10-CM

## 2024-02-27 DIAGNOSIS — I10 ESSENTIAL HYPERTENSION: ICD-10-CM

## 2024-02-27 DIAGNOSIS — Z78.9 TAKES DIETARY SUPPLEMENTS: ICD-10-CM

## 2024-02-27 DIAGNOSIS — E66.813 CLASS 3 SEVERE OBESITY DUE TO EXCESS CALORIES WITH SERIOUS COMORBIDITY AND BODY MASS INDEX (BMI) OF 50.0 TO 59.9 IN ADULT (H): Primary | ICD-10-CM

## 2024-02-27 ASSESSMENT — PAIN SCALES - GENERAL: PAINLEVEL: NO PAIN (0)

## 2024-02-27 NOTE — LETTER
"2/27/2024      RE: Bigg CAZARES Montez  [address]  [place] [address]       Dear Colleague,    Thank you for the opportunity to participate in the care of your patient, Bigg Montez, at the [facility] at . Please see a copy of my visit note below.    Medication Therapy Management (MTM) Encounter    ASSESSMENT:                            Medication Adherence/Access: No issues identified    Weight Management:   Assessed and discussed potential administration changes of medications and potential holding/adjustment of medications post sleeve gastrectomy.  An alternative administration plan was formulated for medications that were greater than approximately ~6-8 mm for the first 3 months post Sleeve Gastrectomy.   Medication sizes were identified utilizing the medication's NDC # or drug identifier. Otherwise if medication size was not able to be identified, medications were compared to that of \"size of m&m\" for easy comparison for patient.  Medications found to be larger than specified below (plan below in plan section):   Omeprazole: open capsule  Evidence regarding medication plan for medications post-bariatric surgery is largely centralized around Kevin-en-Y gastric bypass per guidelines recommendations. Guideline recommendations are less known for medication adjustments for sleeve gastrectomy specifically.  As the sleeve gastrectomy includes solely removing a portion of stomach and does not impact small intestinal tract (compared to other bariatric surgeries like the Kevin-en-Y Gastric Bypass), medication adjustments may vary from guidelines and follow study data/expert recommendations/center protocol. Therefore, extended release or sustained release medications may be continued post-operatively if the benefit was found to outweigh the risk.   Discussed no NSAIDs post op, can use acetaminophen post-operatively   Per " post-bariatric protocol, patient to hold all supplements 1 week before surgery. Supplements that are larger than allowable post op, should be held for at least 3 months post op. Post-bariatric vitamin regimen start will be discussed at 1 week post op dietitian follow up appointment.   Multivitamin and Calcium supplementation should be in chewable formulation    Hypertension   Blood pressure not at goal < 130/80 mmHg. To continue to monitor post op as hope to see blood pressure improvements with weight loss. To remain on both medications post Sleeve Gastrectomy.     Gout:   Would benefit from getting uric acid repeat 3-6 months post op. Discussed if gouty attack does occur within first 3 months post op to contact clinic to discuss next steps as generally no NSAIDs or prednisone advised post op unless discussed with surgical team.     Supplements:   Plan already in place for supplements post op per patient and dietitian.     PLAN:                            Change how you take the following medications post Sleeve Gastrectomy:   Omeprazole: Open capsule and sprinkle contents over 1 tablespoon of applesauce, yogurt or sugar free pudding and swallow immediately (do not chew).     2. No NSAIDs (Non-Steroidal Anti-Inflammatory Drugs) after Sleeve Gastrectomy - meaning no ibuprofen, naproxen, Aleve, Advil, Motrin post-operatively - these medications can negatively impact the gut lining. Can use acetaminophen post op. Do not exceed 4000 mg/24 hour period.   -Oral dissolve powder Tylenol (acetaminophen) packets are available for use at retail stores or amazon - each packet contains 500 mg acetaminophen. Can use this formulation for the first 3 months post op. Otherwise can cut acetaminophen tablets in half and swallow both halves for dose.      3. Follow dietitian recommendations for plan for supplements after Sleeve Gastrectomy. Start chewable. multivitamin 1 week after surgery. Start chewable calcium/vitamin D supplement 1  month after surgery.      Follow-up: Return if medication questions/concerns arise after Sleeve Gastrectomy, for Medication Therapy Management Pharmacist Visit, Call [phone] to schedule.    SUBJECTIVE/OBJECTIVE:                          Darshan Montez is a 38 year old male called for a follow-up visit from 2022.       Reason for visit: comprehensive review of medications to prepare for Sleeve Gastrectomy on 3/4/2024..    Allergies/ADRs: Reviewed in chart  Past Medical History: Reviewed in chart  Tobacco: He reports that he quit smoking about 3 years ago. His smoking use included cigars. He has been exposed to tobacco smoke. He has never used smokeless tobacco.  Alcohol: not currently using    Medication Adherence/Access: no issues reported    Weight Management:   Post-Op Medications - not currently taking any of the below medications:   Omeprazole 20 mg daily - plans to open capsule post op  Ondansetron 4 mg as needed for nausea  Senna-S 8.6-50 mg as needed for constipation  Hyoscyamine 0.125 mg as needed for abdominal cramping    Weight Loss Medications  Wegovy 2.4 mg once weekly - not taking, stopped to be off 1 week before surgery     Scheduled for Sleeve Gastrectomy 3/4/2024 with Dr. Eden. Was on Wegovy to help to get to weight loss goals before surgery. He is now off to hold 1 week before Sleeve Gastrectomy. He was glad to be on it to get to weight loss goals. Was given all post op medications as of now. He understands use of all of the above medications.   Diet/Eating Habits: Patient reports getting in 3 meals per day, plans to turn to protein shakes to get in the protein necessary post op.    Exercise/Activity: Patient reports started exercise regimen January and has been successfully working out 2 times per week. Excited to get back to the work out after surgery once recovered and able to do so.     Current weight today: 340 lbs 0 oz  Initial Consult Weight: 386 lb   Cumulative Weight Loss: -46 lb,  "-11.9% from baseline  Wt Readings from Last 4 Encounters:   02/27/24 (!) 154.2 kg (340 lb)   02/23/24 (!) 154.2 kg (340 lb)   02/16/24 (!) 158.3 kg (349 lb)   02/13/24 (!) 153.8 kg (339 lb)       Estimated body mass index is 47.44 kg/m  as calculated from the following:    Height as of this encounter: 1.803 m (5' 10.98\").    Weight as of this encounter: 154.2 kg (340 lb).    Hypertension  Amlodipine 2.5 mg daily  Losartan 100 mg daily     Patient reports no current medication side effects. Amlodipine was started more recently with primary care provider to help to improve blood pressure before Sleeve Gastrectomy. Plan to re-evaluate post operatively.   Patient does not self-monitor blood pressure.  Has cuff at home but doesn't use.      BP Readings from Last 3 Encounters:   02/16/24 (!) 138/91   12/27/23 132/84   11/29/23 (!) 143/95     Pulse Readings from Last 3 Encounters:   02/16/24 79   12/27/23 76   11/29/23 90     Gout:   Colchicine 0.6 mg as needed  Prednisone as needed     Patient reports no current pain concerns. Last gout attack was months ago. Patient is experiencing the following medication side effects: none when taking. Hasn't taken prednisone in at least 6 weeks. Will typically have 3 episodes of gout per year. Has been steering clear of inflammatory foods, changed diet to decrease gout. Was able to get off allopurinol due to dietary changes.   Uric Acid   Date Value Ref Range Status   12/27/2023 8.8 (H) 3.4 - 7.0 mg/dL Final   12/13/2013 10.6 (H) 3.5 - 8.5 mg/dL Final     Supplements:   Vitamin D daily   Multivitamin daily     Had been taking these longstanding. Stopped both of these to hold 1 week before surgery. Plans on switching to flintstones post op.    Today's Vitals: Ht 1.803 m (5' 10.98\")   Wt (!) 154.2 kg (340 lb)   BMI 47.44 kg/m    ----------------      I spent 20 minutes with this patient today. All changes were made via collaborative practice agreement with Dr. Eden. A copy of " the visit note was provided to the patient's provider(s).    A summary of these recommendations was sent via [employer].         Medication Therapy Recommendations  No medication therapy recommendations to display         Please do not hesitate to contact me if you have any questions/concerns.     Sincerely,     Lauren T. Bloch, [facility]

## 2024-02-27 NOTE — NURSING NOTE
Is the patient currently in the state of [place]? YES    Visit mode:TELEPHONE    If the visit is dropped, the patient can be reconnected by: TELEPHONE VISIT: Phone number:   Telephone Information:   Mobile [phone]       Will anyone else be joining the visit? NO  (If patient encounters technical issues they should call [phone]    How would you like to obtain your AVS? MyChart    Are changes needed to the allergy or medication list? Pt stated no med changes    Reason for visit: RECHECK (Return MTM)    Brianda MEJIA

## 2024-02-27 NOTE — PROGRESS NOTES
"Medication Therapy Management (MTM) Encounter    ASSESSMENT:                            Medication Adherence/Access: No issues identified    Weight Management:   Assessed and discussed potential administration changes of medications and potential holding/adjustment of medications post sleeve gastrectomy.  An alternative administration plan was formulated for medications that were greater than approximately ~6-8 mm for the first 3 months post Sleeve Gastrectomy.   Medication sizes were identified utilizing the medication's NDC # or drug identifier. Otherwise if medication size was not able to be identified, medications were compared to that of \"size of m&m\" for easy comparison for patient.  Medications found to be larger than specified below (plan below in plan section):   Omeprazole: open capsule  Evidence regarding medication plan for medications post-bariatric surgery is largely centralized around Kevin-en-Y gastric bypass per guidelines recommendations. Guideline recommendations are less known for medication adjustments for sleeve gastrectomy specifically.  As the sleeve gastrectomy includes solely removing a portion of stomach and does not impact small intestinal tract (compared to other bariatric surgeries like the Kevin-en-Y Gastric Bypass), medication adjustments may vary from guidelines and follow study data/expert recommendations/center protocol. Therefore, extended release or sustained release medications may be continued post-operatively if the benefit was found to outweigh the risk.   Discussed no NSAIDs post op, can use acetaminophen post-operatively   Per post-bariatric protocol, patient to hold all supplements 1 week before surgery. Supplements that are larger than allowable post op, should be held for at least 3 months post op. Post-bariatric vitamin regimen start will be discussed at 1 week post op dietitian follow up appointment.   Multivitamin and Calcium supplementation should be in chewable " formulation    Hypertension   Blood pressure not at goal < 130/80 mmHg. To continue to monitor post op as hope to see blood pressure improvements with weight loss. To remain on both medications post Sleeve Gastrectomy.     Gout:   Would benefit from getting uric acid repeat 3-6 months post op. Discussed if gouty attack does occur within first 3 months post op to contact clinic to discuss next steps as generally no NSAIDs or prednisone advised post op unless discussed with surgical team.     Supplements:   Plan already in place for supplements post op per patient and dietitian.     PLAN:                            Change how you take the following medications post Sleeve Gastrectomy:   Omeprazole: Open capsule and sprinkle contents over 1 tablespoon of applesauce, yogurt or sugar free pudding and swallow immediately (do not chew).     2. No NSAIDs (Non-Steroidal Anti-Inflammatory Drugs) after Sleeve Gastrectomy - meaning no ibuprofen, naproxen, Aleve, Advil, Motrin post-operatively - these medications can negatively impact the gut lining. Can use acetaminophen post op. Do not exceed 4000 mg/24 hour period.   -Oral dissolve powder Tylenol (acetaminophen) packets are available for use at retail stores or amazon - each packet contains 500 mg acetaminophen. Can use this formulation for the first 3 months post op. Otherwise can cut acetaminophen tablets in half and swallow both halves for dose.      3. Follow dietitian recommendations for plan for supplements after Sleeve Gastrectomy. Start chewable. multivitamin 1 week after surgery. Start chewable calcium/vitamin D supplement 1 month after surgery.      Follow-up: Return if medication questions/concerns arise after Sleeve Gastrectomy, for Medication Therapy Management Pharmacist Visit, Call [phone] to schedule.    SUBJECTIVE/OBJECTIVE:                          Darshan Montez is a 38 year old male called for a follow-up visit from 2022.       Reason for visit:  "comprehensive review of medications to prepare for Sleeve Gastrectomy on 3/4/2024..    Allergies/ADRs: Reviewed in chart  Past Medical History: Reviewed in chart  Tobacco: He reports that he quit smoking about 3 years ago. His smoking use included cigars. He has been exposed to tobacco smoke. He has never used smokeless tobacco.  Alcohol: not currently using    Medication Adherence/Access: no issues reported    Weight Management:   Post-Op Medications - not currently taking any of the below medications:   Omeprazole 20 mg daily - plans to open capsule post op  Ondansetron 4 mg as needed for nausea  Senna-S 8.6-50 mg as needed for constipation  Hyoscyamine 0.125 mg as needed for abdominal cramping    Weight Loss Medications  Wegovy 2.4 mg once weekly - not taking, stopped to be off 1 week before surgery     Scheduled for Sleeve Gastrectomy 3/4/2024 with Dr. Eden. Was on Wegovy to help to get to weight loss goals before surgery. He is now off to hold 1 week before Sleeve Gastrectomy. He was glad to be on it to get to weight loss goals. Was given all post op medications as of now. He understands use of all of the above medications.   Diet/Eating Habits: Patient reports getting in 3 meals per day, plans to turn to protein shakes to get in the protein necessary post op.    Exercise/Activity: Patient reports started exercise regimen January and has been successfully working out 2 times per week. Excited to get back to the work out after surgery once recovered and able to do so.     Current weight today: 340 lbs 0 oz  Initial Consult Weight: 386 lb   Cumulative Weight Loss: -46 lb, -11.9% from baseline  Wt Readings from Last 4 Encounters:   02/27/24 (!) 154.2 kg (340 lb)   02/23/24 (!) 154.2 kg (340 lb)   02/16/24 (!) 158.3 kg (349 lb)   02/13/24 (!) 153.8 kg (339 lb)       Estimated body mass index is 47.44 kg/m  as calculated from the following:    Height as of this encounter: 1.803 m (5' 10.98\").    Weight as of " "this encounter: 154.2 kg (340 lb).    Hypertension   Amlodipine 2.5 mg daily  Losartan 100 mg daily     Patient reports no current medication side effects. Amlodipine was started more recently with primary care provider to help to improve blood pressure before Sleeve Gastrectomy. Plan to re-evaluate post operatively.   Patient does not self-monitor blood pressure.  Has cuff at home but doesn't use.      BP Readings from Last 3 Encounters:   02/16/24 (!) 138/91   12/27/23 132/84   11/29/23 (!) 143/95     Pulse Readings from Last 3 Encounters:   02/16/24 79   12/27/23 76   11/29/23 90     Gout:   Colchicine 0.6 mg as needed  Prednisone as needed     Patient reports no current pain concerns. Last gout attack was months ago. Patient is experiencing the following medication side effects: none when taking. Hasn't taken prednisone in at least 6 weeks. Will typically have 3 episodes of gout per year. Has been steering clear of inflammatory foods, changed diet to decrease gout. Was able to get off allopurinol due to dietary changes.   Uric Acid   Date Value Ref Range Status   12/27/2023 8.8 (H) 3.4 - 7.0 mg/dL Final   12/13/2013 10.6 (H) 3.5 - 8.5 mg/dL Final     Supplements:   Vitamin D daily   Multivitamin daily     Had been taking these longstanding. Stopped both of these to hold 1 week before surgery. Plans on switching to flintstones post op.    Today's Vitals: Ht 1.803 m (5' 10.98\")   Wt (!) 154.2 kg (340 lb)   BMI 47.44 kg/m    ----------------      I spent 20 minutes with this patient today. All changes were made via collaborative practice agreement with Dr. Eden. A copy of the visit note was provided to the patient's provider(s).    A summary of these recommendations was sent via [employer].    Lauren Bloch, PharmD, BCACP   Medication Therapy Management Pharmacist   [facility]    Telemedicine Visit Details  Type of service:  Telephone visit  Start Time:  1:35 " PM  End Time:  1:55 PM     Medication Therapy Recommendations  No medication therapy recommendations to display

## 2024-02-27 NOTE — PROGRESS NOTES
"Virtual Visit Details    Type of service:  Telephone Visit   Phone call duration: *** minutes   Originating Location (pt. Location): {patient location:432142::\"Home\"}  {PROVIDER LOCATION On-site should be selected for visits conducted from your clinic location or adjoining [facility] hospital, academic office, or other nearby [facility] building. Off-site should be selected for all other provider locations, including home:[id number]}  Distant Location (provider location):  {virtual location provider:108392}  "

## 2024-02-27 NOTE — TELEPHONE ENCOUNTER
Received the prior authorization from [facility] for the sleeve gastrectomy scheduled with Dr. Eden on March 4. Auth ref # [id number] effective 02/23/2024 - 02/22/2025 per letter dated 02/23/24.

## 2024-02-27 NOTE — TELEPHONE ENCOUNTER
MTM referral placed due to [facility] Medication Therapy Management (MTM) practice shift. CPA with Brianda Chan CNP .

## 2024-02-27 NOTE — PATIENT INSTRUCTIONS
"Recommendations from today's MTM visit:                                                    MTM (medication therapy management) is a service provided by a clinical pharmacist designed to help you get the most of out of your medicines.      Change how you take the following medications post Sleeve Gastrectomy:   Omeprazole: Open capsule and sprinkle contents over 1 tablespoon of applesauce, yogurt or sugar free pudding and swallow immediately (do not chew).     2. No NSAIDs (Non-Steroidal Anti-Inflammatory Drugs) after Sleeve Gastrectomy - meaning no ibuprofen, naproxen, Aleve, Advil, Motrin post-operatively - these medications can negatively impact the gut lining. Can use acetaminophen post op. Do not exceed 4000 mg/24 hour period.   -Oral dissolve powder Tylenol (acetaminophen) packets are available for use at retail stores or amazon - each packet contains 500 mg acetaminophen. Can use this formulation for the first 3 months post op. Otherwise can cut acetaminophen tablets in half and swallow both halves for dose.      3. Follow dietitian recommendations for plan for supplements after Sleeve Gastrectomy. Start chewable. multivitamin 1 week after surgery. Start chewable calcium/vitamin D supplement 1 month after surgery.      Follow-up: Return if medication questions/concerns arise after Sleeve Gastrectomy, for Medication Therapy Management Pharmacist Visit, Call [phone] to schedule.    It was great speaking with you today.  I value your experience and would be very thankful for your time in providing feedback in our clinic survey. In the next few days, you may receive an email or text message from [employer] with a link to a survey related to your  clinical pharmacist.\"     To schedule another MTM appointment, please call the clinic directly or you may call the MTM scheduling line at [phone] or toll-free at [phone].     My Clinical Pharmacist's contact information:                                    "                   Please feel free to contact me with any questions or concerns you have.      Lauren Bloch, PharmD, BCACP   Medication Therapy Management Pharmacist   [facility]

## 2024-03-04 ENCOUNTER — ANESTHESIA (OUTPATIENT)
Dept: SURGERY | Facility: CLINIC | Age: 39
End: 2024-03-04
Payer: COMMERCIAL

## 2024-03-04 ENCOUNTER — HOSPITAL ENCOUNTER (INPATIENT)
Facility: CLINIC | Age: 39
LOS: 1 days | Discharge: HOME | End: 2024-03-05
Attending: SURGERY | Admitting: SURGERY
Payer: COMMERCIAL

## 2024-03-04 DIAGNOSIS — Z98.84 S/P LAPAROSCOPIC SLEEVE GASTRECTOMY: Primary | ICD-10-CM

## 2024-03-04 DIAGNOSIS — I82.442 DEEP VEIN THROMBOSIS (DVT) OF TIBIAL VEIN OF LEFT LOWER EXTREMITY, UNSPECIFIED CHRONICITY (H): ICD-10-CM

## 2024-03-04 DIAGNOSIS — E66.01 MORBID OBESITY (H): ICD-10-CM

## 2024-03-04 DIAGNOSIS — Z86.718 HISTORY OF DEEP VENOUS THROMBOSIS: ICD-10-CM

## 2024-03-04 DIAGNOSIS — Z91.89 AT HIGH RISK FOR POSTOPERATIVE COMPLICATIONS: ICD-10-CM

## 2024-03-04 LAB
CREAT SERPL-MCNC: 1.19 MG/DL (ref 0.67–1.17)
EGFRCR SERPLBLD CKD-EPI 2021: 80 ML/MIN/1.73M2
GLUCOSE BLDC GLUCOMTR-MCNC: 81 MG/DL (ref 70–99)

## 2024-03-04 PROCEDURE — 250N000011 HC RX IP 250 OP 636: Performed by: STUDENT IN AN ORGANIZED HEALTH CARE EDUCATION/TRAINING PROGRAM

## 2024-03-04 PROCEDURE — 250N000011 HC RX IP 250 OP 636: Performed by: SURGERY

## 2024-03-04 PROCEDURE — 360N000077 HC SURGERY LEVEL 4, PER MIN: Performed by: SURGERY

## 2024-03-04 PROCEDURE — 82565 ASSAY OF CREATININE: CPT | Performed by: STUDENT IN AN ORGANIZED HEALTH CARE EDUCATION/TRAINING PROGRAM

## 2024-03-04 PROCEDURE — 43775 LAP SLEEVE GASTRECTOMY: CPT

## 2024-03-04 PROCEDURE — 250N000009 HC RX 250

## 2024-03-04 PROCEDURE — 999N000248 HC STATISTIC IV INSERT WITH US BY RN

## 2024-03-04 PROCEDURE — 88305 TISSUE EXAM BY PATHOLOGIST: CPT | Mod: TC | Performed by: SURGERY

## 2024-03-04 PROCEDURE — 999N000141 HC STATISTIC PRE-PROCEDURE NURSING ASSESSMENT: Performed by: SURGERY

## 2024-03-04 PROCEDURE — 710N000010 HC RECOVERY PHASE 1, LEVEL 2, PER MIN: Performed by: SURGERY

## 2024-03-04 PROCEDURE — 258N000003 HC RX IP 258 OP 636: Performed by: STUDENT IN AN ORGANIZED HEALTH CARE EDUCATION/TRAINING PROGRAM

## 2024-03-04 PROCEDURE — 250N000013 HC RX MED GY IP 250 OP 250 PS 637: Performed by: STUDENT IN AN ORGANIZED HEALTH CARE EDUCATION/TRAINING PROGRAM

## 2024-03-04 PROCEDURE — 120N000002 HC R&B MED SURG/OB UMMC

## 2024-03-04 PROCEDURE — 88305 TISSUE EXAM BY PATHOLOGIST: CPT | Mod: 26 | Performed by: PATHOLOGY

## 2024-03-04 PROCEDURE — 250N000011 HC RX IP 250 OP 636

## 2024-03-04 PROCEDURE — 43775 LAP SLEEVE GASTRECTOMY: CPT | Performed by: ANESTHESIOLOGY

## 2024-03-04 PROCEDURE — 0DB64Z3 EXCISION OF STOMACH, PERCUTANEOUS ENDOSCOPIC APPROACH, VERTICAL: ICD-10-PCS | Performed by: SURGERY

## 2024-03-04 PROCEDURE — 250N000011 HC RX IP 250 OP 636: Performed by: NURSE ANESTHETIST, CERTIFIED REGISTERED

## 2024-03-04 PROCEDURE — 370N000017 HC ANESTHESIA TECHNICAL FEE, PER MIN: Performed by: SURGERY

## 2024-03-04 PROCEDURE — 272N000001 HC OR GENERAL SUPPLY STERILE: Performed by: SURGERY

## 2024-03-04 PROCEDURE — 250N000009 HC RX 250: Performed by: ANESTHESIOLOGY

## 2024-03-04 PROCEDURE — 36415 COLL VENOUS BLD VENIPUNCTURE: CPT | Performed by: STUDENT IN AN ORGANIZED HEALTH CARE EDUCATION/TRAINING PROGRAM

## 2024-03-04 PROCEDURE — 250N000009 HC RX 250: Performed by: SURGERY

## 2024-03-04 PROCEDURE — 250N000009 HC RX 250: Performed by: STUDENT IN AN ORGANIZED HEALTH CARE EDUCATION/TRAINING PROGRAM

## 2024-03-04 PROCEDURE — 250N000013 HC RX MED GY IP 250 OP 250 PS 637: Performed by: SURGERY

## 2024-03-04 PROCEDURE — 258N000003 HC RX IP 258 OP 636

## 2024-03-04 PROCEDURE — 250N000011 HC RX IP 250 OP 636: Performed by: ANESTHESIOLOGY

## 2024-03-04 RX ORDER — LOSARTAN POTASSIUM 100 MG/1
100 TABLET ORAL EVERY EVENING
Status: DISCONTINUED | OUTPATIENT
Start: 2024-03-05 | End: 2024-03-05 | Stop reason: HOSPADM

## 2024-03-04 RX ORDER — SODIUM CHLORIDE, SODIUM LACTATE, POTASSIUM CHLORIDE, CALCIUM CHLORIDE 600; 310; 30; 20 MG/100ML; MG/100ML; MG/100ML; MG/100ML
INJECTION, SOLUTION INTRAVENOUS CONTINUOUS PRN
Status: DISCONTINUED | OUTPATIENT
Start: 2024-03-04 | End: 2024-03-04

## 2024-03-04 RX ORDER — SIMETHICONE 80 MG
80 TABLET,CHEWABLE ORAL EVERY 6 HOURS PRN
Status: ON HOLD | COMMUNITY
End: 2024-03-05

## 2024-03-04 RX ORDER — ONDANSETRON 2 MG/ML
4 INJECTION INTRAMUSCULAR; INTRAVENOUS EVERY 6 HOURS PRN
Status: DISCONTINUED | OUTPATIENT
Start: 2024-03-04 | End: 2024-03-05 | Stop reason: HOSPADM

## 2024-03-04 RX ORDER — OXYCODONE HYDROCHLORIDE 5 MG/1
5 TABLET ORAL
Status: DISCONTINUED | OUTPATIENT
Start: 2024-03-04 | End: 2024-03-05 | Stop reason: HOSPADM

## 2024-03-04 RX ORDER — NALOXONE HYDROCHLORIDE 0.4 MG/ML
0.2 INJECTION, SOLUTION INTRAMUSCULAR; INTRAVENOUS; SUBCUTANEOUS
Status: DISCONTINUED | OUTPATIENT
Start: 2024-03-04 | End: 2024-03-05 | Stop reason: HOSPADM

## 2024-03-04 RX ORDER — FENTANYL CITRATE 50 UG/ML
50 INJECTION, SOLUTION INTRAMUSCULAR; INTRAVENOUS EVERY 5 MIN PRN
Status: DISCONTINUED | OUTPATIENT
Start: 2024-03-04 | End: 2024-03-04 | Stop reason: HOSPADM

## 2024-03-04 RX ORDER — OXYCODONE HYDROCHLORIDE 10 MG/1
10 TABLET ORAL
Status: DISCONTINUED | OUTPATIENT
Start: 2024-03-04 | End: 2024-03-04 | Stop reason: HOSPADM

## 2024-03-04 RX ORDER — ONDANSETRON 2 MG/ML
4 INJECTION INTRAMUSCULAR; INTRAVENOUS
Status: COMPLETED | OUTPATIENT
Start: 2024-03-04 | End: 2024-03-04

## 2024-03-04 RX ORDER — ACETAMINOPHEN 325 MG/1
975 TABLET ORAL ONCE
Status: COMPLETED | OUTPATIENT
Start: 2024-03-04 | End: 2024-03-04

## 2024-03-04 RX ORDER — DEXAMETHASONE SODIUM PHOSPHATE 4 MG/ML
INJECTION, SOLUTION INTRA-ARTICULAR; INTRALESIONAL; INTRAMUSCULAR; INTRAVENOUS; SOFT TISSUE PRN
Status: DISCONTINUED | OUTPATIENT
Start: 2024-03-04 | End: 2024-03-04

## 2024-03-04 RX ORDER — ENALAPRILAT 1.25 MG/ML
1.25 INJECTION INTRAVENOUS EVERY 6 HOURS PRN
Status: DISCONTINUED | OUTPATIENT
Start: 2024-03-04 | End: 2024-03-05 | Stop reason: HOSPADM

## 2024-03-04 RX ORDER — HYDROMORPHONE HCL IN WATER/PF 6 MG/30 ML
0.2 PATIENT CONTROLLED ANALGESIA SYRINGE INTRAVENOUS
Status: DISCONTINUED | OUTPATIENT
Start: 2024-03-04 | End: 2024-03-05 | Stop reason: HOSPADM

## 2024-03-04 RX ORDER — NALOXONE HYDROCHLORIDE 0.4 MG/ML
0.1 INJECTION, SOLUTION INTRAMUSCULAR; INTRAVENOUS; SUBCUTANEOUS
Status: DISCONTINUED | OUTPATIENT
Start: 2024-03-04 | End: 2024-03-04 | Stop reason: HOSPADM

## 2024-03-04 RX ORDER — ENOXAPARIN SODIUM 100 MG/ML
40 INJECTION SUBCUTANEOUS
Status: COMPLETED | OUTPATIENT
Start: 2024-03-04 | End: 2024-03-04

## 2024-03-04 RX ORDER — PROPOFOL 10 MG/ML
INJECTION, EMULSION INTRAVENOUS PRN
Status: DISCONTINUED | OUTPATIENT
Start: 2024-03-04 | End: 2024-03-04

## 2024-03-04 RX ORDER — ONDANSETRON 4 MG/1
4 TABLET, ORALLY DISINTEGRATING ORAL EVERY 30 MIN PRN
Status: DISCONTINUED | OUTPATIENT
Start: 2024-03-04 | End: 2024-03-04 | Stop reason: HOSPADM

## 2024-03-04 RX ORDER — ONDANSETRON 2 MG/ML
4 INJECTION INTRAMUSCULAR; INTRAVENOUS EVERY 30 MIN PRN
Status: DISCONTINUED | OUTPATIENT
Start: 2024-03-04 | End: 2024-03-04 | Stop reason: HOSPADM

## 2024-03-04 RX ORDER — PROPOFOL 10 MG/ML
INJECTION, EMULSION INTRAVENOUS CONTINUOUS PRN
Status: DISCONTINUED | OUTPATIENT
Start: 2024-03-04 | End: 2024-03-04

## 2024-03-04 RX ORDER — GLUCOSAMINE HCL 500 MG
1 TABLET ORAL 2 TIMES DAILY
Status: ON HOLD | COMMUNITY
End: 2024-03-05

## 2024-03-04 RX ORDER — SODIUM CHLORIDE, SODIUM LACTATE, POTASSIUM CHLORIDE, CALCIUM CHLORIDE 600; 310; 30; 20 MG/100ML; MG/100ML; MG/100ML; MG/100ML
INJECTION, SOLUTION INTRAVENOUS CONTINUOUS
Status: DISCONTINUED | OUTPATIENT
Start: 2024-03-04 | End: 2024-03-05 | Stop reason: HOSPADM

## 2024-03-04 RX ORDER — NALOXONE HYDROCHLORIDE 0.4 MG/ML
0.4 INJECTION, SOLUTION INTRAMUSCULAR; INTRAVENOUS; SUBCUTANEOUS
Status: DISCONTINUED | OUTPATIENT
Start: 2024-03-04 | End: 2024-03-05 | Stop reason: HOSPADM

## 2024-03-04 RX ORDER — ONDANSETRON 2 MG/ML
INJECTION INTRAMUSCULAR; INTRAVENOUS PRN
Status: DISCONTINUED | OUTPATIENT
Start: 2024-03-04 | End: 2024-03-04

## 2024-03-04 RX ORDER — FENTANYL CITRATE 50 UG/ML
INJECTION, SOLUTION INTRAMUSCULAR; INTRAVENOUS PRN
Status: DISCONTINUED | OUTPATIENT
Start: 2024-03-04 | End: 2024-03-04

## 2024-03-04 RX ORDER — SODIUM CHLORIDE, SODIUM LACTATE, POTASSIUM CHLORIDE, CALCIUM CHLORIDE 600; 310; 30; 20 MG/100ML; MG/100ML; MG/100ML; MG/100ML
INJECTION, SOLUTION INTRAVENOUS CONTINUOUS
Status: DISCONTINUED | OUTPATIENT
Start: 2024-03-04 | End: 2024-03-04 | Stop reason: HOSPADM

## 2024-03-04 RX ORDER — AMLODIPINE BESYLATE 2.5 MG/1
2.5 TABLET ORAL DAILY
Status: DISCONTINUED | OUTPATIENT
Start: 2024-03-04 | End: 2024-03-05 | Stop reason: HOSPADM

## 2024-03-04 RX ORDER — HYDROMORPHONE HCL IN WATER/PF 6 MG/30 ML
0.2 PATIENT CONTROLLED ANALGESIA SYRINGE INTRAVENOUS EVERY 5 MIN PRN
Status: DISCONTINUED | OUTPATIENT
Start: 2024-03-04 | End: 2024-03-04 | Stop reason: HOSPADM

## 2024-03-04 RX ORDER — PROCHLORPERAZINE MALEATE 5 MG
10 TABLET ORAL EVERY 6 HOURS PRN
Status: DISCONTINUED | OUTPATIENT
Start: 2024-03-04 | End: 2024-03-05 | Stop reason: HOSPADM

## 2024-03-04 RX ORDER — ENOXAPARIN SODIUM 100 MG/ML
40 INJECTION SUBCUTANEOUS EVERY 24 HOURS
Status: CANCELLED | OUTPATIENT
Start: 2024-03-05

## 2024-03-04 RX ORDER — SIMETHICONE 80 MG
80 TABLET,CHEWABLE ORAL EVERY 6 HOURS PRN
Status: DISCONTINUED | OUTPATIENT
Start: 2024-03-04 | End: 2024-03-05 | Stop reason: HOSPADM

## 2024-03-04 RX ORDER — HYDROMORPHONE HCL IN WATER/PF 6 MG/30 ML
0.4 PATIENT CONTROLLED ANALGESIA SYRINGE INTRAVENOUS EVERY 5 MIN PRN
Status: DISCONTINUED | OUTPATIENT
Start: 2024-03-04 | End: 2024-03-04 | Stop reason: HOSPADM

## 2024-03-04 RX ORDER — LABETALOL HYDROCHLORIDE 5 MG/ML
10 INJECTION, SOLUTION INTRAVENOUS ONCE
Status: COMPLETED | OUTPATIENT
Start: 2024-03-04 | End: 2024-03-04

## 2024-03-04 RX ORDER — AMLODIPINE BESYLATE 2.5 MG/1
2.5 TABLET ORAL DAILY
Status: DISCONTINUED | OUTPATIENT
Start: 2024-03-05 | End: 2024-03-04

## 2024-03-04 RX ORDER — CEFAZOLIN SODIUM/WATER 3 G/30 ML
3 SYRINGE (ML) INTRAVENOUS
Status: COMPLETED | OUTPATIENT
Start: 2024-03-04 | End: 2024-03-04

## 2024-03-04 RX ORDER — DIPHENHYDRAMINE HCL 25 MG
25 CAPSULE ORAL EVERY 6 HOURS PRN
Status: DISCONTINUED | OUTPATIENT
Start: 2024-03-04 | End: 2024-03-05 | Stop reason: HOSPADM

## 2024-03-04 RX ORDER — KETAMINE HYDROCHLORIDE 10 MG/ML
INJECTION INTRAMUSCULAR; INTRAVENOUS PRN
Status: DISCONTINUED | OUTPATIENT
Start: 2024-03-04 | End: 2024-03-04

## 2024-03-04 RX ORDER — OXYCODONE HYDROCHLORIDE 5 MG/1
5 TABLET ORAL
Status: DISCONTINUED | OUTPATIENT
Start: 2024-03-04 | End: 2024-03-04 | Stop reason: HOSPADM

## 2024-03-04 RX ORDER — KETOROLAC TROMETHAMINE 30 MG/ML
15 INJECTION, SOLUTION INTRAMUSCULAR; INTRAVENOUS
Status: DISCONTINUED | OUTPATIENT
Start: 2024-03-04 | End: 2024-03-04 | Stop reason: HOSPADM

## 2024-03-04 RX ORDER — LIDOCAINE 40 MG/G
CREAM TOPICAL
Status: DISCONTINUED | OUTPATIENT
Start: 2024-03-04 | End: 2024-03-05 | Stop reason: HOSPADM

## 2024-03-04 RX ORDER — OXYCODONE HYDROCHLORIDE 10 MG/1
10 TABLET ORAL
Status: DISCONTINUED | OUTPATIENT
Start: 2024-03-04 | End: 2024-03-05 | Stop reason: HOSPADM

## 2024-03-04 RX ORDER — CEFAZOLIN SODIUM/WATER 3 G/30 ML
3 SYRINGE (ML) INTRAVENOUS SEE ADMIN INSTRUCTIONS
Status: DISCONTINUED | OUTPATIENT
Start: 2024-03-04 | End: 2024-03-04 | Stop reason: HOSPADM

## 2024-03-04 RX ORDER — HYDRALAZINE HYDROCHLORIDE 20 MG/ML
5 INJECTION INTRAMUSCULAR; INTRAVENOUS ONCE
Status: COMPLETED | OUTPATIENT
Start: 2024-03-04 | End: 2024-03-04

## 2024-03-04 RX ORDER — ACETAMINOPHEN 325 MG/1
975 TABLET ORAL 3 TIMES DAILY
Status: DISCONTINUED | OUTPATIENT
Start: 2024-03-04 | End: 2024-03-05 | Stop reason: HOSPADM

## 2024-03-04 RX ORDER — AMOXICILLIN 250 MG
2 CAPSULE ORAL 2 TIMES DAILY
Status: DISCONTINUED | OUTPATIENT
Start: 2024-03-04 | End: 2024-03-05 | Stop reason: HOSPADM

## 2024-03-04 RX ORDER — FENTANYL CITRATE 50 UG/ML
25 INJECTION, SOLUTION INTRAMUSCULAR; INTRAVENOUS EVERY 5 MIN PRN
Status: DISCONTINUED | OUTPATIENT
Start: 2024-03-04 | End: 2024-03-04 | Stop reason: HOSPADM

## 2024-03-04 RX ORDER — ONDANSETRON 4 MG/1
4 TABLET, ORALLY DISINTEGRATING ORAL EVERY 6 HOURS PRN
Status: DISCONTINUED | OUTPATIENT
Start: 2024-03-04 | End: 2024-03-05 | Stop reason: HOSPADM

## 2024-03-04 RX ORDER — ENOXAPARIN SODIUM 100 MG/ML
40 INJECTION SUBCUTANEOUS EVERY 24 HOURS
Status: DISCONTINUED | OUTPATIENT
Start: 2024-03-05 | End: 2024-03-05 | Stop reason: HOSPADM

## 2024-03-04 RX ORDER — DIPHENHYDRAMINE HYDROCHLORIDE 50 MG/ML
25 INJECTION INTRAMUSCULAR; INTRAVENOUS EVERY 6 HOURS PRN
Status: DISCONTINUED | OUTPATIENT
Start: 2024-03-04 | End: 2024-03-05 | Stop reason: HOSPADM

## 2024-03-04 RX ORDER — LIDOCAINE HYDROCHLORIDE 20 MG/ML
INJECTION, SOLUTION INFILTRATION; PERINEURAL PRN
Status: DISCONTINUED | OUTPATIENT
Start: 2024-03-04 | End: 2024-03-04

## 2024-03-04 RX ORDER — LABETALOL HYDROCHLORIDE 5 MG/ML
10 INJECTION, SOLUTION INTRAVENOUS EVERY 6 HOURS PRN
Status: DISCONTINUED | OUTPATIENT
Start: 2024-03-04 | End: 2024-03-05 | Stop reason: HOSPADM

## 2024-03-04 RX ADMIN — FENTANYL CITRATE 50 MCG: 50 INJECTION, SOLUTION INTRAMUSCULAR; INTRAVENOUS at 10:47

## 2024-03-04 RX ADMIN — HYDROMORPHONE HYDROCHLORIDE 0.4 MG: 0.2 INJECTION, SOLUTION INTRAMUSCULAR; INTRAVENOUS; SUBCUTANEOUS at 11:01

## 2024-03-04 RX ADMIN — HYDROMORPHONE HYDROCHLORIDE 0.2 MG: 1 INJECTION, SOLUTION INTRAMUSCULAR; INTRAVENOUS; SUBCUTANEOUS at 10:33

## 2024-03-04 RX ADMIN — Medication 10 MG: at 11:17

## 2024-03-04 RX ADMIN — HYDROMORPHONE HYDROCHLORIDE 0.2 MG: 0.2 INJECTION, SOLUTION INTRAMUSCULAR; INTRAVENOUS; SUBCUTANEOUS at 16:24

## 2024-03-04 RX ADMIN — Medication 20 MG: at 09:08

## 2024-03-04 RX ADMIN — Medication 100 MG: at 07:40

## 2024-03-04 RX ADMIN — PHENYLEPHRINE HYDROCHLORIDE 50 MCG: 10 INJECTION INTRAVENOUS at 09:07

## 2024-03-04 RX ADMIN — FENTANYL CITRATE 50 MCG: 50 INJECTION INTRAMUSCULAR; INTRAVENOUS at 08:41

## 2024-03-04 RX ADMIN — Medication 20 MG: at 08:09

## 2024-03-04 RX ADMIN — SUGAMMADEX 200 MG: 100 INJECTION, SOLUTION INTRAVENOUS at 09:57

## 2024-03-04 RX ADMIN — ACETAMINOPHEN 975 MG: 325 TABLET, FILM COATED ORAL at 06:28

## 2024-03-04 RX ADMIN — LABETALOL HYDROCHLORIDE 10 MG: 5 INJECTION, SOLUTION INTRAVENOUS at 18:09

## 2024-03-04 RX ADMIN — OXYCODONE HYDROCHLORIDE 5 MG: 5 TABLET ORAL at 19:02

## 2024-03-04 RX ADMIN — PROPOFOL 150 MCG/KG/MIN: 10 INJECTION, EMULSION INTRAVENOUS at 08:33

## 2024-03-04 RX ADMIN — Medication 3 G: at 07:46

## 2024-03-04 RX ADMIN — ONDANSETRON 4 MG: 2 INJECTION INTRAMUSCULAR; INTRAVENOUS at 14:09

## 2024-03-04 RX ADMIN — PROCHLORPERAZINE EDISYLATE 5 MG: 5 INJECTION INTRAMUSCULAR; INTRAVENOUS at 10:43

## 2024-03-04 RX ADMIN — Medication 10 MG: at 11:32

## 2024-03-04 RX ADMIN — SODIUM CHLORIDE, POTASSIUM CHLORIDE, SODIUM LACTATE AND CALCIUM CHLORIDE: 600; 310; 30; 20 INJECTION, SOLUTION INTRAVENOUS at 07:40

## 2024-03-04 RX ADMIN — OXYCODONE HYDROCHLORIDE 5 MG: 5 TABLET ORAL at 22:17

## 2024-03-04 RX ADMIN — ENALAPRILAT 1.25 MG: 1.25 INJECTION INTRAVENOUS at 15:22

## 2024-03-04 RX ADMIN — FENTANYL CITRATE 50 MCG: 50 INJECTION INTRAMUSCULAR; INTRAVENOUS at 08:11

## 2024-03-04 RX ADMIN — HYDRALAZINE HYDROCHLORIDE 5 MG: 20 INJECTION INTRAMUSCULAR; INTRAVENOUS at 12:19

## 2024-03-04 RX ADMIN — HYDROMORPHONE HYDROCHLORIDE 0.2 MG: 0.2 INJECTION, SOLUTION INTRAMUSCULAR; INTRAVENOUS; SUBCUTANEOUS at 14:08

## 2024-03-04 RX ADMIN — SODIUM CHLORIDE, POTASSIUM CHLORIDE, SODIUM LACTATE AND CALCIUM CHLORIDE: 600; 310; 30; 20 INJECTION, SOLUTION INTRAVENOUS at 09:41

## 2024-03-04 RX ADMIN — SODIUM CHLORIDE, POTASSIUM CHLORIDE, SODIUM LACTATE AND CALCIUM CHLORIDE: 600; 310; 30; 20 INJECTION, SOLUTION INTRAVENOUS at 11:03

## 2024-03-04 RX ADMIN — DEXAMETHASONE SODIUM PHOSPHATE 10 MG: 4 INJECTION, SOLUTION INTRA-ARTICULAR; INTRALESIONAL; INTRAMUSCULAR; INTRAVENOUS; SOFT TISSUE at 07:47

## 2024-03-04 RX ADMIN — PHENYLEPHRINE HYDROCHLORIDE 100 MCG: 10 INJECTION INTRAVENOUS at 08:21

## 2024-03-04 RX ADMIN — FENTANYL CITRATE 50 MCG: 50 INJECTION INTRAMUSCULAR; INTRAVENOUS at 07:54

## 2024-03-04 RX ADMIN — ACETAMINOPHEN 975 MG: 325 TABLET, FILM COATED ORAL at 20:38

## 2024-03-04 RX ADMIN — MIDAZOLAM 2 MG: 1 INJECTION INTRAMUSCULAR; INTRAVENOUS at 07:33

## 2024-03-04 RX ADMIN — AMLODIPINE BESYLATE 2.5 MG: 2.5 TABLET ORAL at 20:38

## 2024-03-04 RX ADMIN — ENOXAPARIN SODIUM 40 MG: 40 INJECTION SUBCUTANEOUS at 06:30

## 2024-03-04 RX ADMIN — FAMOTIDINE 20 MG: 10 INJECTION, SOLUTION INTRAVENOUS at 07:27

## 2024-03-04 RX ADMIN — HYOSCYAMINE SULFATE 125 MCG: 0.12 TABLET SUBLINGUAL at 15:22

## 2024-03-04 RX ADMIN — HYDROMORPHONE HYDROCHLORIDE 0.4 MG: 0.2 INJECTION, SOLUTION INTRAMUSCULAR; INTRAVENOUS; SUBCUTANEOUS at 11:09

## 2024-03-04 RX ADMIN — ONDANSETRON 4 MG: 2 INJECTION INTRAMUSCULAR; INTRAVENOUS at 09:42

## 2024-03-04 RX ADMIN — PROPOFOL 180 MG: 10 INJECTION, EMULSION INTRAVENOUS at 07:40

## 2024-03-04 RX ADMIN — FENTANYL CITRATE 50 MCG: 50 INJECTION INTRAMUSCULAR; INTRAVENOUS at 10:22

## 2024-03-04 RX ADMIN — SIMETHICONE 80 MG: 80 TABLET, CHEWABLE ORAL at 19:01

## 2024-03-04 RX ADMIN — PROPOFOL 20 MG: 10 INJECTION, EMULSION INTRAVENOUS at 07:54

## 2024-03-04 RX ADMIN — FENTANYL CITRATE 50 MCG: 50 INJECTION, SOLUTION INTRAMUSCULAR; INTRAVENOUS at 10:42

## 2024-03-04 RX ADMIN — LABETALOL HYDROCHLORIDE 10 MG: 5 INJECTION, SOLUTION INTRAVENOUS at 11:49

## 2024-03-04 RX ADMIN — LIDOCAINE HYDROCHLORIDE 40 MG: 20 INJECTION, SOLUTION INFILTRATION; PERINEURAL at 07:40

## 2024-03-04 RX ADMIN — Medication 30 MG: at 07:40

## 2024-03-04 RX ADMIN — FENTANYL CITRATE 50 MCG: 50 INJECTION INTRAMUSCULAR; INTRAVENOUS at 09:57

## 2024-03-04 RX ADMIN — LABETALOL HYDROCHLORIDE 10 MG: 5 INJECTION, SOLUTION INTRAVENOUS at 11:07

## 2024-03-04 RX ADMIN — ONDANSETRON 4 MG: 2 INJECTION INTRAMUSCULAR; INTRAVENOUS at 07:27

## 2024-03-04 RX ADMIN — PROPOFOL 100 MCG/KG/MIN: 10 INJECTION, EMULSION INTRAVENOUS at 07:43

## 2024-03-04 RX ADMIN — HYDROMORPHONE HYDROCHLORIDE 0.2 MG: 1 INJECTION, SOLUTION INTRAMUSCULAR; INTRAVENOUS; SUBCUTANEOUS at 10:26

## 2024-03-04 RX ADMIN — SUGAMMADEX 200 MG: 100 INJECTION, SOLUTION INTRAVENOUS at 10:03

## 2024-03-04 RX ADMIN — HYDROMORPHONE HYDROCHLORIDE 0.4 MG: 0.2 INJECTION, SOLUTION INTRAMUSCULAR; INTRAVENOUS; SUBCUTANEOUS at 10:55

## 2024-03-04 ASSESSMENT — ACTIVITIES OF DAILY LIVING (ADL)
ADLS_ACUITY_SCORE: 32
ADLS_ACUITY_SCORE: 32
ADLS_ACUITY_SCORE: 31
ADLS_ACUITY_SCORE: 31
ADLS_ACUITY_SCORE: 29
ADLS_ACUITY_SCORE: 31
ADLS_ACUITY_SCORE: 32
ADLS_ACUITY_SCORE: 31
ADLS_ACUITY_SCORE: 32
ADLS_ACUITY_SCORE: 31
ADLS_ACUITY_SCORE: 32

## 2024-03-04 NOTE — OP NOTE
Indications for surgery: This is a 38-year-old gentleman with a long history of struggling with being overweight with a body mass index of 47.5 kg/m  who is here for consideration of a laparoscopic sleeve gastrectomy.  He has been counseled to the potential risks of leak, bleeding, gastroesophageal reflux disease as well as weight regain amongst others and wishes to proceed.   Past Medical History:   Diagnosis Date    Gout     HTN (hypertension)     Morbid obesity (H)     TRACEE (obstructive sleep apnea)     Personal history of DVT (deep vein thrombosis)      Pre-op diagnosis: Sleep apnea and class III obesity  Postoperative diagnosis: Same  Procedure: Laparoscopic sleeve gastrectomy  Surgeon: Teddy Eden MD  Assistant: Enedina Selby MD  Estimated blood loss: 10 mL  Complications: None  Intraoperative findings: Significant visceral adiposity    Details of the procedure: The patient was brought to the operating room and placed under general anesthetic with arms out and on a footboard with full DVT prophylaxis.  Patient was prepped and draped about the abdomen in the usual sterile fashion.  A timeout was performed.  Left upper quadrant incision was placed and a Veress needle was inserted a 5 mm port was placed reinspected there was no evidence of injury.  4 additional ports were placed.  We began by taking down the epicardial fat pad.  We did need to use the extra long instruments.  Beginning approximately 8 cm from the pylorus we took down the the epiploic vessels and then the short gastric vessels all the way up to the angle of His.  12 mm clips were placed on any potential areas of bleeding.  With a 40 [place] bougie in place we then excised the greater curvature of the stomach using the bougie as a template using the [place] 3000 stapler with blue loads.  The specimen was then removed in a specimen retrieval bag.  We inspected for bleeding, any punctate areas were clipped along the staple line.  We did test the  staple line under saline at the superior most aspect and found no evidence of bubbling.  The sleeve was patent.  At this point we removed the liver retractor and instilled a total of 40 cc of a mixture of 1% lidocaine with quarter percent Marcaine.  This was done under direct visualization at this point the ports were removed after ensuring hemostasis staples were used on the skin.  The counts were all reported as correct.

## 2024-03-04 NOTE — ANESTHESIA CARE TRANSFER NOTE
Patient: Bigg Montez    Procedure: Procedure(s):  Laparoscopic Sleeve GASTRECTOMY       Diagnosis: Morbid obesity (H) [E66.01]  Diagnosis Additional Information: No value filed.    Anesthesia Type:   General     Note:    Oropharynx: oropharynx clear of all foreign objects and spontaneously breathing  Level of Consciousness: awake  Oxygen Supplementation: face mask  Level of Supplemental Oxygen (L/min / FiO2): 6  Independent Airway: airway patency satisfactory and stable  Dentition: dentition unchanged  Vital Signs Stable: post-procedure vital signs reviewed and stable  Report to RN Given: handoff report given  Patient transferred to: PACU    Handoff Report: Identifed the Patient, Identified the Reponsible Provider, Reviewed the pertinent medical history, Discussed the surgical course, Reviewed Intra-OP anesthesia mangement and issues during anesthesia, Set expectations for post-procedure period and Allowed opportunity for questions and acknowledgement of understanding      Vitals:  Vitals Value Taken Time   /88 03/04/24 1024   Temp     Pulse 85 03/04/24 1027   Resp 12    SpO2 97 % 03/04/24 1027   Vitals shown include unfiled device data.    Electronically Signed By: ERLIN Santiago CRNA  March 4, 2024  10:27 AM

## 2024-03-04 NOTE — PHARMACY-ADMISSION MEDICATION HISTORY
Pharmacist Admission Medication History    Admission medication history is complete. The information provided in this note is only as accurate as the sources available at the time of the update.    Information Source(s): Patient, Family member, Hospital records, and CareEverywhere/SureScripts via in-person    Pertinent Information: Spoke with patient and family member who was an accurate historian. Patient had just been given dilaudid so patient was not as cooperative or understanding of the questions that were asked. Due to this, the medication history may not be exact of those medications that the patient was taking prior to holding for his surgery.    Changes made to PTA medication list:  Added: Vitamin D3, simethicone  Have not started yet: Senna-docusate, hyoscyamine, omeprazole    Allergies reviewed with patient and updates made in EHR: no    Medication History Completed By: Dedrick Wilson [facility] 3/4/2024 2:33 PM  Prior to Admission medications    Medication Sig Last Dose Taking? Auth Provider Long Term End Date   amLODIPine (NORVASC) 2.5 MG tablet Take 1 tablet (2.5 mg) by mouth daily  Patient taking differently: Take 2.5 mg by mouth every evening 3/3/2024 at 0700 Yes Rafael Bonner MD Yes    hyoscyamine (LEVSIN/SL) 0.125 MG sublingual tablet Place 1 tablet (125 mcg) under the tongue every 4 hours as needed for cramping (spasms)  No Christin Blackwood PA-C     losartan (COZAAR) 100 MG tablet Take 1 tablet (100 mg) by mouth daily  Patient taking differently: Take 100 mg by mouth every evening 3/3/2024 at 0700 Yes Ruth Dinh MD Yes    omeprazole (PRILOSEC) 20 MG DR capsule Take 1 capsule (20 mg) by mouth every morning (Berfore breakfast) to prevent heartburn or acid reflux  No Christin Blackwood PA-C     ondansetron (ZOFRAN ODT) 4 MG ODT tab Take 1 tablet (4 mg) by mouth every 6 hours as needed for nausea More than a month Yes Teddy Eden MD     senna-docusate (SENOKOT-S/PERICOLACE) 8.6-50 MG tablet Take  2 tablets by mouth 2 times daily As needed while taking narcotic pain medications wuch as oxycodone to prevent constipation. Stay taking if having loose stools.  No Christin Blackwood PA-C     simethicone (MYLICON) 80 MG chewable tablet Take 2 tablets (160 mg) by mouth every 6 hours as needed for flatulence or cramping  Yes Christin Blackwood PA-C

## 2024-03-04 NOTE — ANESTHESIA PROCEDURE NOTES
Airway       Patient location during procedure: OR       Procedure Start/Stop Times: 3/4/2024 7:43 AM  Staff -        Anesthesiologist:  Chepe Walker MD       CRNA: Meet Menon APRN CRNA       Performed By: CRNA  Consent for Airway        Urgency: elective  Indications and Patient Condition       Indications for airway management: cody-procedural       Induction type:intravenous       Mask difficulty assessment: 1 - vent by mask    Final Airway Details       Final airway type: endotracheal airway       Successful airway: ETT - single and Oral  Endotracheal Airway Details        ETT size (mm): 7.5       Successful intubation technique: direct laryngoscopy       DL Blade Type: MAC 3       Grade View of Cords: 1       Adjucts: stylet       Position: Right       Measured from: gums/teeth       Secured at (cm): 22       Bite block used: None    Post intubation assessment        Placement verified by: capnometry, equal breath sounds and chest rise        Number of attempts at approach: 1       Number of other approaches attempted: 0       Secured with: other (comment) (TubeTamer)       Ease of procedure: easy       Dentition: Intact    Medication(s) Administered   Medication Administration Time: 3/4/2024 7:43 AM

## 2024-03-04 NOTE — BRIEF OP NOTE
[facility]    Brief Operative Note    Pre-operative diagnosis: Morbid obesity (H) [E66.01]  Post-operative diagnosis Same as pre-operative diagnosis    Procedure: Laparoscopic Sleeve GASTRECTOMY, N/A - Abdomen    Surgeon: Surgeon(s) and Role:     * Teddy Eden MD - Primary     * Enedina Selby MD - Resident - Assisting  Anesthesia: General   Estimated Blood Loss: Minimal    Drains: None  Specimens:   ID Type Source Tests Collected by Time Destination   1 : Partial Gastrectomy Tissue Stomach SURGICAL PATHOLOGY EXAM Teddy Eden MD 3/4/2024  9:32 AM      Findings:   None.  Complications: None.  Implants: * No implants in log *    Enedina Selby MD  PGY-7, General Surgery  [phone]

## 2024-03-04 NOTE — ANESTHESIA POSTPROCEDURE EVALUATION
Patient: Bigg Montez    Procedure: Procedure(s):  Laparoscopic Sleeve GASTRECTOMY       Anesthesia Type:  General    Note:  Disposition: Admission   Postop Pain Control: Uneventful            Sign Out: Well controlled pain   PONV: No   Neuro/Psych: Uneventful            Sign Out: Acceptable/Baseline neuro status   Airway/Respiratory: Uneventful            Sign Out: Acceptable/Baseline resp. status   CV/Hemodynamics: Uneventful            Sign Out: Acceptable CV status; No obvious hypovolemia; No obvious fluid overload   Other NRE: NONE   DID A NON-ROUTINE EVENT OCCUR? No           Last vitals:  Vitals:    03/04/24 1215 03/04/24 1230 03/04/24 1245   BP: (!) 198/136 (!) 173/120 (!) 178/123   Pulse: 78 96    Resp: 12 14    Temp:  36.8  C (98.3  F)    SpO2: 100% 99%        Electronically Signed By: Chepe Walker MD, MD  March 4, 2024  1:07 PM

## 2024-03-04 NOTE — ANESTHESIA PREPROCEDURE EVALUATION
Anesthesia Pre-Procedure Evaluation    Patient: Bigg Montez   MRN: [id number] : 1985        Procedure : Procedure(s):  Laparoscopic Sleeve GASTRECTOMY  possible Laparoscopic Hiatal HERNIORRHAPHY          Past Medical History:   Diagnosis Date    Gout     HTN (hypertension)     Morbid obesity (H)     TRACEE (obstructive sleep apnea)     Personal history of DVT (deep vein thrombosis)       Past Surgical History:   Procedure Laterality Date    VASECTOMY N/A       Allergies   Allergen Reactions    Cats Other (See Comments)    Levonorgestrel-Ethinyl Estrad Other (See Comments) and Unknown    No Clinical Screening - See Comments Unknown     seasonal    Seasonal Allergies       Social History     Tobacco Use    Smoking status: Former     Types: Cigars     Quit date: 2021     Years since quitting: 3.0     Passive exposure: Past    Smokeless tobacco: Never   Substance Use Topics    Alcohol use: Not Currently      Wt Readings from Last 1 Encounters:   24 (!) 154.5 kg (340 lb 9.8 oz)        Anesthesia Evaluation   Pt has not had prior anesthetic         ROS/MED HX  ENT/Pulmonary:     (+) sleep apnea, uses CPAP,                                      Neurologic:  - neg neurologic ROS     Cardiovascular:     (+)  hypertension- -   -  - -                                      METS/Exercise Tolerance:     Hematologic:     (+) History of blood clots,               Musculoskeletal:  - neg musculoskeletal ROS     GI/Hepatic:  - neg GI/hepatic ROS     Renal/Genitourinary:  - neg Renal ROS     Endo:     (+)               Obesity,       Psychiatric/Substance Use:       Infectious Disease:       Malignancy:       Other:            Physical Exam    Airway  airway exam normal           Respiratory Devices and Support         Dental       (+) Modest Abnormalities - crowns, retainers, 1 or 2 missing teeth      Cardiovascular   cardiovascular exam normal          Pulmonary   pulmonary exam normal                OUTSIDE  "LABS:  CBC:   Lab Results   Component Value Date    WBC 9.6 02/08/2024    WBC 10.2 01/04/2024    HGB 14.8 02/08/2024    HGB 14.6 01/04/2024    HCT 45.5 02/08/2024    HCT 45.9 01/04/2024     02/08/2024     01/04/2024     BMP:   Lab Results   Component Value Date     12/27/2023     02/27/2023    POTASSIUM 4.5 12/27/2023    POTASSIUM 3.6 02/27/2023    CHLORIDE 109 (H) 12/27/2023    CHLORIDE 104 02/27/2023    CO2 27 12/27/2023    CO2 25 02/27/2023    BUN 15.1 12/27/2023    BUN 11.4 02/27/2023    CR 1.17 02/08/2024    CR 1.14 12/27/2023    GLC 81 03/04/2024    GLC 80 12/27/2023     COAGS: No results found for: \"PTT\", \"INR\", \"FIBR\"  POC: No results found for: \"BGM\", \"HCG\", \"HCGS\"  HEPATIC:   Lab Results   Component Value Date    ALBUMIN 4.0 12/27/2023    PROTTOTAL 6.8 12/27/2023    ALT 18 12/27/2023    AST 22 12/27/2023    ALKPHOS 72 12/27/2023    BILITOTAL 0.2 12/27/2023     OTHER:   Lab Results   Component Value Date    A1C 5.2 12/27/2023    NICOLE 9.0 12/27/2023    PHOS 3.5 02/08/2024    MAG 2.1 02/08/2024    TSH 2.63 01/04/2024       Anesthesia Plan    ASA Status:  3    NPO Status:  NPO Appropriate    Anesthesia Type: General.     - Airway: ETT   Induction: Intravenous.   Maintenance: TIVA.        Consents    Anesthesia Plan(s) and associated risks, benefits, and realistic alternatives discussed. Questions answered and patient/representative(s) expressed understanding.     - Discussed: Risks, Benefits and Alternatives for BOTH SEDATION and the PROCEDURE were discussed     - Discussed with:  Patient            Postoperative Care    Pain management: Oral pain medications, Multi-modal analgesia.   PONV prophylaxis: Ondansetron (or other 5HT-3), Dexamethasone or Solumedrol     Comments:               Chepe Walker MD, MD    I have reviewed the pertinent notes and labs in the chart from the past 30 days and (re)examined the patient.  Any updates or changes from those notes are reflected in this " "note.              # Severe Obesity: Estimated body mass index is 47.51 kg/m  as calculated from the following:    Height as of this encounter: 1.803 m (5' 11\").    Weight as of this encounter: 154.5 kg (340 lb 9.8 oz).      "

## 2024-03-05 VITALS
HEIGHT: 71 IN | DIASTOLIC BLOOD PRESSURE: 84 MMHG | RESPIRATION RATE: 18 BRPM | BODY MASS INDEX: 44.1 KG/M2 | OXYGEN SATURATION: 99 % | SYSTOLIC BLOOD PRESSURE: 136 MMHG | WEIGHT: 315 LBS | HEART RATE: 101 BPM | TEMPERATURE: 98.1 F

## 2024-03-05 LAB
GLUCOSE BLDC GLUCOMTR-MCNC: 93 MG/DL (ref 70–99)
HGB BLD-MCNC: 12.5 G/DL (ref 13.3–17.7)
HOLD SPECIMEN: NORMAL
PLATELET # BLD AUTO: 362 10E3/UL (ref 150–450)

## 2024-03-05 PROCEDURE — 250N000011 HC RX IP 250 OP 636: Performed by: STUDENT IN AN ORGANIZED HEALTH CARE EDUCATION/TRAINING PROGRAM

## 2024-03-05 PROCEDURE — 250N000013 HC RX MED GY IP 250 OP 250 PS 637: Performed by: STUDENT IN AN ORGANIZED HEALTH CARE EDUCATION/TRAINING PROGRAM

## 2024-03-05 PROCEDURE — 85018 HEMOGLOBIN: CPT | Performed by: STUDENT IN AN ORGANIZED HEALTH CARE EDUCATION/TRAINING PROGRAM

## 2024-03-05 PROCEDURE — 258N000003 HC RX IP 258 OP 636: Performed by: STUDENT IN AN ORGANIZED HEALTH CARE EDUCATION/TRAINING PROGRAM

## 2024-03-05 PROCEDURE — 36415 COLL VENOUS BLD VENIPUNCTURE: CPT | Performed by: SURGERY

## 2024-03-05 PROCEDURE — 85049 AUTOMATED PLATELET COUNT: CPT | Performed by: SURGERY

## 2024-03-05 RX ORDER — AMOXICILLIN 250 MG
2 CAPSULE ORAL 2 TIMES DAILY
Qty: 30 TABLET | Refills: 0 | Status: SHIPPED | OUTPATIENT
Start: 2024-03-05 | End: 2024-03-11

## 2024-03-05 RX ORDER — HYOSCYAMINE SULFATE 0.125 MG
0.12 TABLET ORAL EVERY 4 HOURS PRN
COMMUNITY
End: 2024-03-11

## 2024-03-05 RX ORDER — SIMETHICONE 80 MG
160 TABLET,CHEWABLE ORAL EVERY 6 HOURS PRN
Qty: 20 TABLET | Refills: 0 | Status: SHIPPED | OUTPATIENT
Start: 2024-03-05 | End: 2024-03-14

## 2024-03-05 RX ORDER — ENOXAPARIN SODIUM 100 MG/ML
40 INJECTION SUBCUTANEOUS EVERY 12 HOURS
Qty: 5.6 ML | Refills: 0 | Status: SHIPPED | OUTPATIENT
Start: 2024-03-05 | End: 2024-03-14

## 2024-03-05 RX ORDER — OXYCODONE HYDROCHLORIDE 5 MG/1
5 TABLET ORAL EVERY 6 HOURS PRN
Qty: 12 TABLET | Refills: 0 | Status: SHIPPED | OUTPATIENT
Start: 2024-03-05 | End: 2024-03-11

## 2024-03-05 RX ORDER — AMOXICILLIN 250 MG
2 CAPSULE ORAL 2 TIMES DAILY PRN
Status: ON HOLD | COMMUNITY
End: 2024-03-05

## 2024-03-05 RX ORDER — AMOXICILLIN 250 MG
2 CAPSULE ORAL 2 TIMES DAILY
Qty: 30 TABLET | Refills: 0 | Status: SHIPPED | OUTPATIENT
Start: 2024-03-05 | End: 2024-03-05

## 2024-03-05 RX ORDER — ENOXAPARIN SODIUM 100 MG/ML
40 INJECTION SUBCUTANEOUS EVERY 24 HOURS
Qty: 2.8 ML | Refills: 0 | Status: SHIPPED | OUTPATIENT
Start: 2024-03-05 | End: 2024-03-05

## 2024-03-05 RX ADMIN — OXYCODONE HYDROCHLORIDE 5 MG: 5 TABLET ORAL at 04:35

## 2024-03-05 RX ADMIN — OMEPRAZOLE 20 MG: 20 CAPSULE, DELAYED RELEASE ORAL at 08:58

## 2024-03-05 RX ADMIN — OXYCODONE HYDROCHLORIDE 5 MG: 5 TABLET ORAL at 11:43

## 2024-03-05 RX ADMIN — OXYCODONE HYDROCHLORIDE 5 MG: 5 TABLET ORAL at 08:31

## 2024-03-05 RX ADMIN — SIMETHICONE 80 MG: 80 TABLET, CHEWABLE ORAL at 04:35

## 2024-03-05 RX ADMIN — SODIUM CHLORIDE, POTASSIUM CHLORIDE, SODIUM LACTATE AND CALCIUM CHLORIDE: 600; 310; 30; 20 INJECTION, SOLUTION INTRAVENOUS at 02:09

## 2024-03-05 RX ADMIN — DOCUSATE SODIUM 50 MG AND SENNOSIDES 8.6 MG 1 TABLET: 8.6; 5 TABLET, FILM COATED ORAL at 09:03

## 2024-03-05 RX ADMIN — ENOXAPARIN SODIUM 40 MG: 40 INJECTION SUBCUTANEOUS at 08:58

## 2024-03-05 RX ADMIN — ACETAMINOPHEN 975 MG: 325 TABLET, FILM COATED ORAL at 08:58

## 2024-03-05 RX ADMIN — SIMETHICONE 80 MG: 80 TABLET, CHEWABLE ORAL at 11:43

## 2024-03-05 ASSESSMENT — ACTIVITIES OF DAILY LIVING (ADL)
ADLS_ACUITY_SCORE: 32
ADLS_ACUITY_SCORE: 31
ADLS_ACUITY_SCORE: 32

## 2024-03-05 NOTE — PLAN OF CARE
Goal Outcome Evaluation:      Plan of Care Reviewed With: patient    Overall Patient Progress: no change       Neuro:A/O x4  Respiratory:Wdl on RA  Cardiac:hypertensive-managed with PRN and scheduled medications. Slightly tachy in the low 110s  Diet:NPO except meds and ice chips   GI/: voiding adequally, Not passing gas No BM  Incision/Drains:none   IV Access:R PIV infusing LR 75ml/hr  Pain:managed with PRN oxy  Labs:reviewed  VS:Temp: 98  F (36.7  C) Temp src: Oral BP: (!) 161/96 Pulse: 98   Resp: 18 SpO2: 99 % O2 Device: None (Room air) Oxygen Delivery: 2 LPM   Activity: ind/SBA       New Changes this shift: hypertensive-managed with scheduled medications  Plan: continue POC

## 2024-03-05 NOTE — PHARMACY-CONSULT NOTE
Bariatric Consult    Medications evaluated as requested per Bariatric Consult. Patient may swallow tablets/capsules ONLY if less than   inch.  Larger tablets/capsules should be broken, crushed or split.    No medication changes were needed based on the Bariatric Medication Management Policy.    The pharmacist will continue to follow as new medications are ordered.    Madai Sow, PharmD

## 2024-03-05 NOTE — DISCHARGE INSTRUCTIONS
After Bariatric Surgery Discharge Instructions  [facility]     Note: Ask your nurse to order your medications from the pharmacy. Be sure you have your medications with you when you leave.  Diet on discharge  post-op diet is bariatric clear liquids until 1 week follow up appt.  How much fluid should I drink?  Strive for 48-64 ounces daily.  Carry a water bottle with you without a straw or sports top. Drink from it often.  Keep track of how much fluid you drink in a day.  Remember:  -Do not use straws, chew gum or suck on hard candies. They may cause painful gas.    -Sip, don't slurp when you drink.    -Practice small sips using a medicine cup for the first week postop.   -No ice or cold drinks. This could cause gas or spasms.   -No coffee, soda pop or drinks with caffeine. These may cause stomach pain.   -No alcohol. It is bad for your liver and will cause stomach pain.    How often should I do my deep breathing and coughing?  Use your incentive spirometer (small plastic breathing device) every hour while awake after you get home. Using the incentive spirometer helps you deep breath. Continuing to cough and deep breath will help prevent fevers and pneumonia.   If you do not have a fever after one week, you can stop using the incentive spirometer and discard it.     You can continue to take deep breaths without the incentive spirometer every one to two hours while awake for the month after surgery.    What kinds of activity can I do?  Get plenty of rest the first few days after surgery and try to balance rest and activity. You will need some time to recover - you may be more tired than you realize at first. You'll feel better and heal faster if you take good care of yourself.  For 4 weeks after surgery (Please review restrictions at your one week visit, they could change based on how well you are doing):  -Don't lift more than 20 pounds.   -Take 4-5 short walks every day.  -Don't  jog, run, or do belly exercises.  Don't swim, bathe or use a hot tub until your cuts are healed (scabs are gone).  You may shower  Don't plan to fly or take a road trip within the first 1 to 3 months after surgery.  You could get a blood clot in your legs. If you must travel, get up and move around every hour for at least 5 minutes before continuing on your journey.  Your care team can help you decide when it's safe for you to travel.     What can I do for pain control?  You had major belly surgery that involves all layers of your belly muscles. Pain is expected, even for some as far out as 6-8 weeks after surgery. Moving, sneezing, coughing, and breathing will cause discomfort because these activities use your belly muscles.   Please see your after visit summary medication review for what pain medication will be continued, discontinued and newly started for you.    You can take opioid pain medicine if prescribed and if needed. Try to wean off from it as soon as you feel comfortable.   Do not drive while you are taking opioid pain medicine. This is dangerous.  You can take acetaminophen (Tylenol) between your prescribed pain medicine on a scheduled basis OR take it scheduled every 6 hours (check with your care team for specifics).  -Acetaminophen formulation options:    -Liquid   -Caplet (Cut caplet in half before taking)   -Do not take more than 3000 mg Tylenol in a 24 hour period.  -You may also take Tylenol for pain in place of the opioid pain medicine (check with your care team for specifics).   You can apply ice or heat to the affected area(s). Just remember to wrap the ice in something and limit icing sessions to 20 minutes. Excessive icing can irritate the skin or cause skin damage.  You can apply heat with a hot, wet towel or heating pad. Just like cold therapy, limit heat application to 20 minutes. Never sleep with a heating pad on. It could cause severe burns to your skin.  Wear your binder to support your  belly muscles if you have one.  Take this off a little more each day and try to be off completely by 2 weeks after surgery. If you don't need your binder for comfort or support, you don't need to wear it.   You may not be able to sleep in a comfortable position for a few weeks after surgery. This is normal. You may be more comfortable sleeping in a recliner or propped up with 3 pillows for the first couple of weeks after surgery.  Do not take NSAID's (Non-Steroidal Anti-Inflammatory Drugs) (examples: ibuprofen, Motrin, Advil, Aleve, and Naproxen), aspirin, or use pain patches with NSAID's. They will increase your risk of bleeding or getting an ulcer.    Please call the clinic for any of the following pain concerns, we would like to talk to you:  -pain that does not improve with rest  -pain that gets worse and worse  -pain that is not controlled by your pain medicine  -a sudden severe increase in pain    What medications will I need to take after surgery?  You may be discharged with the following types of medications: omeprazole 20 mg once daily for heartburn symptom prevention, zofran as needed for nausea and a medication called hyoscyamine (levsin) as needed for cramping.Please see your after visit summary medication review for what will be continued, discontinued and newly started.  It is important to reduce the amount of acid in your new stomach for a couple of months after surgery while it is still healing. We will prescribe an acid reducer or antacid. Take it as directed. This will help prevent ulcers, heartburn and acid reflux.  If you took an acid reducer before you had surgery, your care team will let you know what acid reducer you will take after surgery.   It is okay to swallow any medications smaller than   inch in size.   -If it is larger than   inch, it may need to be cut, crushed, or in a liquid form. Check with your care team about which way is most appropriate for you and your medications.    What  should I know about my incisions (cuts)?  Your incisions are covered with white steri strips or butterfly tape and have band aids or gauze over the top. If you have gauze or band aids, they can come off in the hospital.  Leave your steri strips on until they fall off on their own. If the steri strips don't fall off after 1 to 2 weeks, you can take them off. If they fall off earlier, replace them with clean band aids trying to avoid touching the incision itself.   If you have gauze covered by a clear dressing, remove 2-3 days after surgery or as directed by your care team.  You may shower in the hospital after surgery and can get your incision coverings wet.  Do not submerge in water (e.g. No baths, swimming pools, hot tubs) until your care team tells you it is okay and your incisions are completely healed.    Call the clinic if you have any of these signs or symptoms of infection:  -Redness around the site.  -Drainage that smells bad.  -Drainage that is thick yellow or green.  -An increase in pain around the incision site  -An increase in swelling around the incision site  -Heat or warmth around incision site   -Fever of 101.5  F (38.3  C) or higher when taken under the tongue.    -Chills    Will my urine or bowel movements change?   Your first bowel movements (stools) will likely be liquid. You may also notice old blood or a darker color (black or maroon color) in your bowel movements.  This is not unusual and usually goes away after the first week, if not sooner. You may not have a bowel movement for a week.   If you have not had a bowel movement for at least three days after your surgery date and are passing gas, you can use over the counter stool softeners.  Please stop taking the stool softeners and laxatives if your stools are loose.  Increasing fluids and activity as well as getting off narcotics will help prevent constipation.    Call the clinic if:   -You have stomach pain.   -You continue to have  "constipation.  -You have excessive bloating after walking and passing gas.    How can I prevent dehydration if I feel nauseated (sick to my stomach) and vomit (throw up)?   Vomiting is not normal after surgery. If you continue to have nausea and vomiting, call the clinic.   Nausea can be a sign of dehydration. That is why it is very important to track your fluids.  Do not nap more than one hour during the day. Set a timer to wake yourself up, if needed. Too much sleep will keep you from drinking enough fluid during the day and lead to dehydration.  No outside activity in hot, humid weather until you can drink 48 to 64 ounces of fluid in 24 hours. If you sweat a lot, your body may lose too much water.  Try to take a 1 ounce sip of water (one medicine cup) every 15 minutes.  Set a timer to remind yourself.    Call the clinic if you have any of these signs or symptoms of dehydration:  -Dark colored urine  -Urinating (pass water) less than 2-3 times per day  -Lack of energy  -Nausea  -Dizziness  -Headache    Call the clinic ANY TIME at [phone] if:  -Your pain medicine is not working.  -You have a fever ? 101.5 F.  -You have belly or left shoulder pain that gets worse and worse.  -You have a swollen leg with redness, warmth, or pain behind the knee or calf.  -You have chest pain   -You feel very short of breath.  -You have a sudden severe increase in heart rate.  -You have vomiting that gets worse and worse.  -You have constant nausea (feeling sick to your stomach) that does not go away with medication.  -You have trouble swallowing.  -You have an increasing feeling that \"something is not right\".  -You have hiccups that do not stop.  -You have any questions or concerns.    AFTER HOURS QUESTIONS OR CONCERNS: Call [phone] and ask to speak with surgery resident if you are having troubles in the evenings, at night, or on weekends. Please call if you experience increasing abdominal pain, nausea, vomiting, increasing " drainage from your wounds, chills, or fever >101.5    If you have to go to the Emergency Room, we prefer you go to the hospital that did your surgery. Please let them know that you had bariatric surgery and to notify your surgeon.    When should I go back to the clinic?  Follow up with your care team in 1-2 weeks.   If this appointment was not already made, please call: [phone]    Appointments located at [facility] clinic:  [facility]    [address]  [place] [address]

## 2024-03-05 NOTE — PLAN OF CARE
Goal Outcome Evaluation:      Plan of Care Reviewed With: patient, spouse    Overall Patient Progress: improvingOverall Patient Progress: improving     VS: A&Ox4. BP high averaging 170/100, appropriate medications (vasotec and labetalol) given and provider notified. All other vitals stable on RA. Pain managed w Diladid q2 and Levsin q4.  Neuro: WDL, patient drowsy after procedure, opens eyes and communicates when spoken to.   Cardiac: WDL x BP  Resp: WDL x snoring while sleeping, BiPAP in use, set up by wife.   GI/: Laparoscopic sleeve gastrectomy today, patient passed >1000 mL of urine in this unit, no BM as of time of writing.   Diet/Appetite: NPO x ice chips and meds, patient adhering to diet.   Activity: Up independently. Slow but stable. Walked around unit twice this PM  Pain: Relatively controlled with oxycodone, Dilaudid, Levsin, and simethicone. Increases w med intake, lying down, and twisting.   Skin: 5 surgical wounds covered with dressings on abdomen, Mepilex in place on bottom to prevent pressure sores.   LDA's: L PIV in hand discontinued today after infiltration. New PIV placement ordered.   Labs: No new labs during stay in unit.    New changes this shift:      Skin check at 1300:  Admitted/transferred from: PACU  2 RN full skin assessment completed by Wen Elam, JAMAL and Monserrat Lebron RN.  Skin assessment finding: skin intact, no problems x 5 laparoscopic sites from surgery today covered with dressings and one PIV.  Interventions/actions: None needed    Will continue to monitor.

## 2024-03-05 NOTE — DISCHARGE SUMMARY
"[facility] Discharge Summary    Date of Admission: 3/4/2024  Date of Discharge: 3/5/2024    Admission Diagnosis:  1. Morbid Obesity     Discharge Diagnosis:  1. Same as above  2. S/p laparoscopic sleeve gastrectomy    Consultations:  Pharmacy     Procedures:  1. Laparoscopic sleeve gastrectomy by Dr Eden    Brief HPI:  Bigg Montez is a 37yo male with a history of morbid obesity. Overweight onset in puberty. Has tried to lose weight through multiple diet plans and exercise with minimal weight loss. Proceeding with bariatric surgery for a long term solution to weight loss. Comorbidities include gout, pre-diabetes, HTN, TRACEE, and GERD.     Hospital Course:  The patient was admitted and underwent the above procedure. The patient tolerated the procedure well. There were no complications. The patient's diet was slowly advanced as bowel function returned. Pain was controlled with oral pain medication and the patient was able to ambulate and void without difficulty. The patient received appropriate education post operatively. On POD #1 the patient was discharged to home.    Discharge Physical Exam:  /84 (BP Location: Left arm)   Pulse 101   Temp 98.1  F (36.7  C) (Oral)   Resp 18   Ht 1.803 m (5' 11\")   Wt (!) 154.5 kg (340 lb 9.8 oz)   SpO2 99%   BMI 47.51 kg/m      Gen: NAD  Lungs: non-labored breathing  CV: regular rhythm, normal rate   Abd: obese, soft, nondistended, appropriately tender, incisions are c/d/i  Ext: no peripheral edema  Neuro: AOx3    Meds:       Review of your medicines        START taking        Dose / Directions   enoxaparin ANTICOAGULANT 40 MG/0.4ML syringe  Commonly known as: LOVENOX  Used for: S/P laparoscopic sleeve gastrectomy, History of deep venous thrombosis      Dose: 40 mg  Inject 0.4 mLs (40 mg) Subcutaneous every 12 hours For 7 days  Quantity: 5.6 mL  Refills: 0     oxyCODONE 5 MG tablet  Commonly known as: ROXICODONE  Used " for: S/P laparoscopic sleeve gastrectomy      Dose: 5 mg  Take 1 tablet (5 mg) by mouth every 6 hours as needed for moderate to severe pain  Quantity: 12 tablet  Refills: 0            CONTINUE these medicines which may have CHANGED, or have new prescriptions. If we are uncertain of the size of tablets/capsules you have at home, strength may be listed as something that might have changed.        Dose / Directions   amLODIPine 2.5 MG tablet  Commonly known as: NORVASC  This may have changed: when to take this  Used for: Essential hypertension      Dose: 2.5 mg  Take 1 tablet (2.5 mg) by mouth daily  Quantity: 30 tablet  Refills: 1     * hyoscyamine 0.125 MG sublingual tablet  Commonly known as: LEVSIN/SL  This may have changed: You were already taking a medication with the same name, and this prescription was added. Make sure you understand how and when to take each.  Used for: S/P laparoscopic sleeve gastrectomy, At high risk for postoperative complications      Dose: 125 mcg  Place 1 tablet (125 mcg) under the tongue every 4 hours as needed for cramping (spasms)  Quantity: 30 tablet  Refills: 0     * hyoscyamine 0.125 MG tablet  Commonly known as: LEVSIN  This may have changed: Another medication with the same name was added. Make sure you understand how and when to take each.      Dose: 0.125 mg  Take 0.125 mg by mouth every 4 hours as needed for cramping  Refills: 0     losartan 100 MG tablet  Commonly known as: COZAAR  This may have changed: when to take this  Used for: Essential hypertension      Dose: 100 mg  Take 1 tablet (100 mg) by mouth daily  Quantity: 90 tablet  Refills: 3     omeprazole 20 MG DR capsule  Commonly known as: PriLOSEC  This may have changed:   when to take this  additional instructions  Used for: S/P laparoscopic sleeve gastrectomy, At high risk for postoperative complications      Dose: 20 mg  Take 1 capsule (20 mg) by mouth every morning (Berfore breakfast) to prevent heartburn or acid  reflux  Quantity: 30 capsule  Refills: 0     senna-docusate 8.6-50 MG tablet  Commonly known as: SENOKOT-S/PERICOLACE  This may have changed:   when to take this  reasons to take this  additional instructions  Used for: S/P laparoscopic sleeve gastrectomy, At high risk for postoperative complications      Dose: 2 tablet  Take 2 tablets by mouth 2 times daily As needed while taking narcotic pain medications wuch as oxycodone to prevent constipation. Stop taking if having loose stools.  Quantity: 30 tablet  Refills: 0     simethicone 80 MG chewable tablet  Commonly known as: MYLICON  This may have changed: how much to take  Used for: S/P laparoscopic sleeve gastrectomy, At high risk for postoperative complications      Dose: 160 mg  Take 2 tablets (160 mg) by mouth every 6 hours as needed for flatulence or cramping  Quantity: 20 tablet  Refills: 0           * This list has 2 medication(s) that are the same as other medications prescribed for you. Read the directions carefully, and ask your doctor or other care provider to review them with you.                CONTINUE these medicines which have NOT CHANGED        Dose / Directions   colchicine 0.6 MG tablet  Commonly known as: COLCRYS  Used for: Chronic gout of multiple sites, unspecified cause      Dose: 0.6 mg  Take 1 tablet (0.6 mg) by mouth daily  Quantity: 90 tablet  Refills: 3     ondansetron 4 MG ODT tab  Commonly known as: ZOFRAN ODT  Used for: Class 3 severe obesity due to excess calories with serious comorbidity and body mass index (BMI) of 50.0 to 59.9 in adult (H), At high risk for postoperative complications      Dose: 4 mg  Take 1 tablet (4 mg) by mouth every 6 hours as needed for nausea  Quantity: 15 tablet  Refills: 0     predniSONE 20 MG tablet  Commonly known as: DELTASONE  Used for: Chronic gout of multiple sites, unspecified cause      Take 3 tabs by mouth daily x 3 days, then 2 tabs daily x 3 days, then 1 tab daily x 3 days, then 1/2 tab daily x 3  days.  Quantity: 20 tablet  Refills: 0            STOP taking      Vitamin D3 75 MCG (3000 UT) Tabs  Generic drug: Cholecalciferol                  Where to get your medicines        These medications were sent to [facility] - [place], [place] - [address]  [address] [address]      Phone: [phone]   enoxaparin ANTICOAGULANT 40 MG/0.4ML syringe  hyoscyamine 0.125 MG sublingual tablet  omeprazole 20 MG DR capsule  oxyCODONE 5 MG tablet  senna-docusate 8.6-50 MG tablet  simethicone 80 MG chewable tablet         Additional instructions:  After Care       Future Labs/Procedures    Activity     Comments:    Your activity upon discharge: activity as tolerated and no driving for today    Contact Surgeon     Comments:    Contact your Surgeon IF:  ~  Your pain is not controlled by pain medication or pain that suddenly increases;  ~  You develop a fever greater than 101 and/or chills 24 hours after surgery;  ~  You notice redness or bad smelling drainage at the surgery site;  ~  You notice a large amount of bleeding from the surgery site that does not stop when moderate pressure is applied;  ~  You are unable to pass urine within 8-10 hours after surgery;  ~  You have an upset stomach or vomiting lasting more than a day;  ~  You have a skin reaction to tape or dressing such as redness, itching or rash at the surgery site.    Diet     Comments:    Follow this diet upon discharge: Bariatric Diet Clear Liquids    Discharge diet     Comments:    Phase I: Clear liquid diet, room temperature with one protein supplement per day. Goal water intake: 48-64oz per day. Meet with Bariatric Dietitian in 1 week to discuss dietary changes.    Do not drive     Comments:    Do NOT drive until after you have been completely off narcotics for a minimum of 24 hours.    Follow-up Care - Bariatric Surgeon     Comments:    Follow-up with Christin Blackwood PA-C on 3/14/2024. Call your surgeon's office  with any questions or concerns.    Follow-up Care - Dietician     Comments:    Follow-up with your bariatric dietitian in 1 week.    Return to Work     Comments:    May return to work in 2 weeks if cleared by Bariatric surgeon.    Shower/Bathing     Comments:    Okay to shower. Do NOT soak in tub for 2-4 weeks.    Surgical Site Care     Comments:    If you have skin tapes on your surgical site(s), leave them on the surgical site(s) until they fall off on their own or 1 week after surgery date. May remove Band-Aid one day after surgery.    Wash your hands     Comments:    Wash your hands with soap and warm water before you touch the site of surgery.    Weight Restrictions     Comments:    Do not lift over 20 pounds for 4 weeks.                Follow Up:  -Follow up with Christin Blackwood PA-C in clinic on 3/14/2024.       BARIATRIC PATIENTS:  Call [phone] to schedule or to reach your care team    GENERAL SURGERY PATIENTS: Call [phone] for scheduling needs or to reach your care team

## 2024-03-05 NOTE — PROGRESS NOTES
Patient continues to do well this am  BP is improved compared to observed during my visit with him postoperatively  Patient is up and ambulating with pain well managed  Tolerating po  Will remove staples  OK for discharge.

## 2024-03-06 ENCOUNTER — PATIENT OUTREACH (OUTPATIENT)
Dept: ENDOCRINOLOGY | Facility: CLINIC | Age: 39
End: 2024-03-06
Payer: COMMERCIAL

## 2024-03-06 NOTE — PLAN OF CARE
Goal Outcome Evaluation:      Plan of Care Reviewed With: patient, spouse    Overall Patient Progress: improvingOverall Patient Progress: improving    Discharge to: Home  Transportation: Wife  Time: 1300  Prescriptions: All new or modified prescriptions sent to in house pharmacy, patient and wife verbally confirmed intent to grab them before leaving  Belongings: All packed and taken with patient upon discharge  Access: R PIV removed w no complications  Care plan and education discontinued: Yes  Paperwork: AVS completed with pertinent information highlighted/marked in pen. Patient and wife education about AVS contents. This RN answered all questions posed by patient and wife during AVS education, both had no more questions at time of discharge. AVS confirmed to be with patient upon leaving room.

## 2024-03-06 NOTE — PROGRESS NOTES
RN Post-Op/Post-Discharge Care Coordination Note    Mr. Bigg Montez is a 38 year old male who underwent laparoscopic gastric sleeve on 3/4 with Teddy Eden MD.  Spoke with Patient.    Support  Patient able to care for self independently     Health Status  Fevers/chills: Patient denies any fever or chills.    Pain: Rates pain a 4 on a 10 Scale.  Alternating Narcotic Analgesic with Tylenol.  Encouraged non-medication management modalities: Heating pad, with barrier, to abdomen, Frequent position changes, Walking, Ice packs, with barrier, to incisions, and Abdominal Binder    Nausea/Vomiting: Patient denies nausea/vomiting.    Eating/drinking: Tolerating clear liquid diet. Reminded to drink small sips slowly.  Encouraged room temperature/warm fluids.    Fluid Ounces per day: has had 10 ounces of fluid so far today. Working on getting to 48 ounces, will call if he does not meet fluid goal    Cramping: yes.    Bowel/Bladder habits: Patient reports no bowel movement since surgery. Last bowel movement 2 days prior to surgery .  Urine normal.    New Medications:  Omeprazole (opening capsules), Levsin, Zofran, Oxycodone, Tylenol, Senna, home medications, and patient was reminded not to take NSAIDS    Activity: walking    Breathing: Did the patient receive an incentive spirometer? yes.  Reminded patient to use incentive spirometer- 5 to 10 deep breaths each hour while awake.    Other symptoms: Tachycardia: no, Dizziness/lightheadedness: no, Shortness of breath, dyspnea with exertion, leg pain/swelling: no.      Drains (JIMMY): N/A    Incisions: Patient denies any signs and symptoms of infection..  Wound closure:  Skin Sealant  Steri-strips    Pathology reviewed with patient:  No, not yet available      Activity/Restrictions  No lifting in excess of 20 pounds for 4 weeks    Forms/Letters  No. All forms should be faxed to [phone].  Does not work    Postop Appointment Reminder  March 14 at 10 AM confirmed with the  patient:  Yes.    Additional Questions: All of his questions were answered including reviewing diet, restrictions, and wound care.  He will call this office if he has any further questions and/or concerns.        Whom and When to Call  Nurses: [phone]  Fax: [phone]     Patient advised if any concerns outside of clinic hours, to call hospital at [phone] and ask to speak to on-call bariatric resident. They should present to ED at [facility] to be assessed if urgency arises.    Risk for:  urinary tract infection, pneumonia, leg clots (deep vein thrombosis), lung clots (pulmonary emboli), injury to the bowels or other organs, bowel obstruction, hernia, and gastrointestinal bleeding.    Weight loss surgery side effects:  abdominal pain, cramping, bloating, difficulty swallowing, constipation, nausea, vomiting, diarrhea, frequent loose stools, dehydration, hair loss, excess skin, protein, iron and vitamin deficiencies, malnutrition, fatigue, and heartburn.  Bariatric surgery risks may include:  an internal leak at the staple line, narrowing of the esophagus, stomach or intestines (strictures), gallstones, bowel obstruction, inflammation of the esophagus or stomach, ulcers with or without bleeding, injuries to other organs, hernias, and iron deficiency.    Sleeve gastrectomy side effects:  leaks are more common after this procedure.  Risks include:  internal leak at the vertical sleeve staple line; nausea, vomiting, and dehydration for several months; bowel obstruction; gallstones during the first 6 months related to rapid weight loss; decreased absorption of vitamins and protein because of the reduced stomach size; weight regain if you increase food intake; narrowing of stomach, esophagus or intestines (stricture); injury to other organs; hernia; and ulcers.

## 2024-03-08 LAB
PATH REPORT.COMMENTS IMP SPEC: NORMAL
PATH REPORT.COMMENTS IMP SPEC: NORMAL
PATH REPORT.FINAL DX SPEC: NORMAL
PATH REPORT.GROSS SPEC: NORMAL
PATH REPORT.MICROSCOPIC SPEC OTHER STN: NORMAL
PATH REPORT.RELEVANT HX SPEC: NORMAL
PHOTO IMAGE: NORMAL

## 2024-03-11 ENCOUNTER — OFFICE VISIT (OUTPATIENT)
Dept: FAMILY MEDICINE | Facility: CLINIC | Age: 39
End: 2024-03-11
Payer: COMMERCIAL

## 2024-03-11 VITALS
OXYGEN SATURATION: 99 % | TEMPERATURE: 97.5 F | SYSTOLIC BLOOD PRESSURE: 117 MMHG | DIASTOLIC BLOOD PRESSURE: 83 MMHG | HEART RATE: 121 BPM | WEIGHT: 315 LBS | BODY MASS INDEX: 44.1 KG/M2 | RESPIRATION RATE: 18 BRPM | HEIGHT: 71 IN

## 2024-03-11 DIAGNOSIS — Z09 HOSPITAL DISCHARGE FOLLOW-UP: Primary | ICD-10-CM

## 2024-03-11 DIAGNOSIS — E66.813 CLASS 3 SEVERE OBESITY DUE TO EXCESS CALORIES WITH SERIOUS COMORBIDITY AND BODY MASS INDEX (BMI) OF 50.0 TO 59.9 IN ADULT (H): ICD-10-CM

## 2024-03-11 DIAGNOSIS — I10 ESSENTIAL HYPERTENSION: ICD-10-CM

## 2024-03-11 DIAGNOSIS — E66.01 CLASS 3 SEVERE OBESITY DUE TO EXCESS CALORIES WITH SERIOUS COMORBIDITY AND BODY MASS INDEX (BMI) OF 50.0 TO 59.9 IN ADULT (H): ICD-10-CM

## 2024-03-11 PROCEDURE — 99214 OFFICE O/P EST MOD 30 MIN: CPT | Mod: 24 | Performed by: FAMILY MEDICINE

## 2024-03-11 NOTE — PROGRESS NOTES
Assessment & Plan     Hospital discharge follow-up      Class 3 severe obesity due to excess calories with serious comorbidity and body mass index (BMI) of 50.0 to 59.9 in adult (H)  Status post gastric sleeve surgery, doing well, is adhering to the recommendation, his goal weight is about 250 pounds, discussed importance of adhering to the postsurgical recommendation he does have a follow-up appointment with the surgeon on the nutritionist.    Essential hypertension  Blood pressure is on the low side, will hold amlodipine 2.5 mg daily continue losartan 100 mg daily adjust medication based on blood pressure reading in the future.    Review of external notes as documented elsewhere in note  30 minutes spent by me on the date of the encounter doing chart review, review of outside records, review of test results, interpretation of tests, patient visit, and documentation     MED REC REQUIRED  Post Medication Reconciliation Status: discharge medications reconciled and changed, per note/orders    Regular exercise    Jass Sexton is a 38 year old, presenting for the following health issues:  Post-Op - General Surgery      3/11/2024     9:40 AM   Additional Questions   Roomed by Donita WOOD     Status post laparoscopic gastric sleeve surgery, about a week and a half ago, overall doing fine, is here for follow-up.  He has chronic gout, he has 1 attack, he took colchicine with improvement.  He is aware he should be taking the prednisone.  Blood pressure currently controlled with losartan 100 mg daily, amlodipine 2.5 mg daily.    Hospital Follow-up Visit:    Hospital/Nursing Home/IP Rehab Facility: [facility]  Date of Admission: 3/4/24  Date of Discharge: 3/5/24  Reason(s) for Admission: Morbid Obesity & S/P laparoscopic sleeve gastrectomy    Was your hospitalization related to COVID-19? No   Problems taking medications regularly:  None  Medication changes since  "discharge: yes  Problems adhering to non-medication therapy:  no    Summary of hospitalization:  [facility] discharge summary reviewed  Diagnostic Tests/Treatments reviewed.  Follow up needed: none  Other Healthcare Providers Involved in Patient s Care:         None  Update since discharge: improved.         Plan of care communicated with patient                   Review of Systems  Constitutional, HEENT, cardiovascular, pulmonary, gi and gu systems are negative, except as otherwise noted.      Objective    /83 (BP Location: Right arm, Patient Position: Sitting, Cuff Size: Thigh)   Pulse (!) 121   Temp 97.5  F (36.4  C)   Resp 18   Ht 1.803 m (5' 11\")   Wt 147.6 kg (325 lb 8 oz)   SpO2 99%   BMI 45.40 kg/m    Body mass index is 45.4 kg/m .  Physical Exam   GENERAL: alert and no distress  NECK: no adenopathy, no asymmetry, masses, or scars  RESP: lungs clear to auscultation - no rales, rhonchi or wheezes  CV: regular rate and rhythm, normal S1 S2, no S3 or S4, no murmur, click or rub, no peripheral edema  ABDOMEN: soft, nontender, no hepatosplenomegaly, no masses and bowel sounds normal  MS: no gross musculoskeletal defects noted, no edema    No results found for any visits on 03/11/24.        Signed Electronically by: Rafael Bonner MD    "

## 2024-03-14 ENCOUNTER — VIRTUAL VISIT (OUTPATIENT)
Dept: ENDOCRINOLOGY | Facility: CLINIC | Age: 39
End: 2024-03-14
Payer: COMMERCIAL

## 2024-03-14 ENCOUNTER — OFFICE VISIT (OUTPATIENT)
Dept: ENDOCRINOLOGY | Facility: CLINIC | Age: 39
End: 2024-03-14
Payer: COMMERCIAL

## 2024-03-14 VITALS
HEIGHT: 71 IN | TEMPERATURE: 98.3 F | HEART RATE: 91 BPM | WEIGHT: 315 LBS | BODY MASS INDEX: 44.1 KG/M2 | OXYGEN SATURATION: 100 % | DIASTOLIC BLOOD PRESSURE: 86 MMHG | SYSTOLIC BLOOD PRESSURE: 124 MMHG

## 2024-03-14 DIAGNOSIS — Z98.84 S/P LAPAROSCOPIC SLEEVE GASTRECTOMY: Primary | ICD-10-CM

## 2024-03-14 DIAGNOSIS — Z71.3 NUTRITIONAL COUNSELING: Primary | ICD-10-CM

## 2024-03-14 DIAGNOSIS — Z91.89 AT RISK FOR POSTOPERATIVE COMPLICATIONS: ICD-10-CM

## 2024-03-14 DIAGNOSIS — Z98.84 S/P LAPAROSCOPIC SLEEVE GASTRECTOMY: ICD-10-CM

## 2024-03-14 DIAGNOSIS — E66.01 OBESITY, CLASS III, BMI 40-49.9 (MORBID OBESITY) (H): ICD-10-CM

## 2024-03-14 PROCEDURE — 97803 MED NUTRITION INDIV SUBSEQ: CPT | Mod: 95 | Performed by: DIETITIAN, REGISTERED

## 2024-03-14 PROCEDURE — 99207 PR NO CHARGE LOS: CPT | Mod: 95 | Performed by: DIETITIAN, REGISTERED

## 2024-03-14 PROCEDURE — 99024 POSTOP FOLLOW-UP VISIT: CPT

## 2024-03-14 ASSESSMENT — PAIN SCALES - GENERAL
PAINLEVEL: MILD PAIN (3)
PAINLEVEL: MILD PAIN (3)

## 2024-03-14 NOTE — LETTER
"3/14/2024       RE: Bigg Montez  [address]  [place] [address]     Dear Colleague,    Thank you for referring your patient, Bigg Montez, to the [facility] [place] at [facility]. Please see a copy of my visit note below.    Postoperative bariatric surgery visit.    Patient underwent sleeve gastrectomy 1 weeks ago, on 3/4/2024 with Dr. Eden.      Tolerating liquids: 60oz of fluid, 30-45g of protein. Tolerating full liquid diet - soup, protein shakes, water, Gatorade zero, greek yogurt. No nausea, vomiting. Dysphagia at first, but has improved over the past 4 days. Has not needed zofran  Lightheadedness: No  Abdominal pain: Had cramping at first, but has improved since last week. Took levsin and gas-x until then. Took Tylenol PRN.    Bowel movements: Last BM was yesterday. Having one twice daily. Stools are soft/liquid. No blood in stool or melena. Not taking Senna.   Fevers/shakes/chills: No  GERD: No symptoms. Stop taking omeprazole on saturday  Leg/calf pain: No. Stopped lovenox on Monday.   Chest pain/SOB: No, No palpitations   Walking: Active around the house   Work: Unsure when going back. Does not need a note     HTN - does not check at home. Amlodipine was stopped by PCP. Taking losartan. No hypotension symptoms.     How many opioid pain medications used after surgery? 6 What did you do with extra pills? home  Were any opioid pain medication refills provided after surgery? No  Were any opioid pain medications needed after 30 days postop? N/A    /86 (BP Location: Left arm, Patient Position: Sitting, Cuff Size: Adult Large)   Pulse 91   Temp 98.3  F (36.8  C) (Oral)   Ht 1.803 m (5' 11\")   Wt 147 kg (324 lb)   SpO2 100%   BMI 45.19 kg/m    NAD  Overall looks well  Incisions c/d/i; Abdomen not tender to palpation    Wt Readings from Last 5 Encounters:   03/14/24 147 kg (324 lb)   03/11/24 147.6 " kg (325 lb 8 oz)   03/04/24 (!) 154.5 kg (340 lb 9.8 oz)   02/27/24 (!) 154.2 kg (340 lb)   02/23/24 (!) 154.2 kg (340 lb)         Pathology   Final Diagnosis   STOMACH, PARTIAL GASTRECTOMY:  - Gastric wall with no significant histopathologic abnormality        Plan:  1. RD visit today.       - Start vitamin supplements per RD directions.      - Advance diet per RD directions.  2. Follow-up: Brianda Chan CNP on 4/4/24    3. Actigall prescription - to be discussed further at 1 month   4. B12 SL or injection - to be discussed at one month   5. Pathology reviewed - no concerns  6. Weight loss medications - N/A  7. Need to restart statin - N/A   8. Discussed restrictions of no lifting, pushing pulling >20lbs till 4weeks post op.  9. Discussed no water submersion till incisions are completely healed.  10. Diarrhea - continue to monitor. Reach out to clinic if does not improve or is worsening.     Christin Blackwood PA-C

## 2024-03-14 NOTE — NURSING NOTE
"(   Chief Complaint   Patient presents with    Surgical Followup     1 week post op for sleeve gastrectomy with Dr. Dale    )    ( Weight: 147 kg (324 lb) )  ( Height: 180.3 cm (5' 11\") )  ( BMI (Calculated): 45.19 )  (   )  ( Cumulative weight loss (lbs): 54 )  (   )  (   )  (   )  (   )    ( BP: 124/86 )  (   )  ( Temp: 98.3  F (36.8  C) )  ( Temp src: Oral )  ( Pulse: 91 )  (   )  ( SpO2: 100 % )    (   Patient Active Problem List   Diagnosis    CARDIOVASCULAR SCREENING; LDL GOAL LESS THAN 160    Gout    Class 3 severe obesity due to excess calories with serious comorbidity and body mass index (BMI) of 50.0 to 59.9 in adult (H)    Essential hypertension    TRACEE (obstructive sleep apnea)    Prediabetes    Vitamin D deficiency    Deep vein thrombosis (DVT) of tibial vein of left lower extremity, unspecified chronicity (H)    Morbid obesity (H)    )  (   Current Outpatient Medications   Medication Sig Dispense Refill    colchicine (COLCRYS) 0.6 MG tablet Take 1 tablet (0.6 mg) by mouth daily 90 tablet 3    losartan (COZAAR) 100 MG tablet Take 1 tablet (100 mg) by mouth daily (Patient taking differently: Take 100 mg by mouth every evening) 90 tablet 3    predniSONE (DELTASONE) 20 MG tablet Take 3 tabs by mouth daily x 3 days, then 2 tabs daily x 3 days, then 1 tab daily x 3 days, then 1/2 tab daily x 3 days. 20 tablet 0    enoxaparin ANTICOAGULANT (LOVENOX) 40 MG/0.4ML syringe Inject 0.4 mLs (40 mg) Subcutaneous every 12 hours For 7 days (Patient not taking: Reported on 3/14/2024) 5.6 mL 0    simethicone (MYLICON) 80 MG chewable tablet Take 2 tablets (160 mg) by mouth every 6 hours as needed for flatulence or cramping (Patient not taking: Reported on 3/14/2024) 20 tablet 0    )  ( Diabetes Eval:    )    ( Pain Eval:  Mild Pain (3) )    ( Wound Eval:       )    (   History   Smoking Status    Former    Types: Cigars   Smokeless Tobacco    Never    )    ( Signed By:  Tiesha Swanson; March 14, 2024; 10:12 " AM )

## 2024-03-14 NOTE — PROGRESS NOTES
"Postoperative bariatric surgery visit.    Patient underwent sleeve gastrectomy 1 weeks ago, on 3/4/2024 with Dr. Eden.      Tolerating liquids: 60oz of fluid, 30-45g of protein. Tolerating full liquid diet - soup, protein shakes, water, Gatorade zero, greek yogurt. No nausea, vomiting. Dysphagia at first, but has improved over the past 4 days. Has not needed zofran  Lightheadedness: No  Abdominal pain: Had cramping at first, but has improved since last week. Took levsin and gas-x until then. Took Tylenol PRN.    Bowel movements: Last BM was yesterday. Having one twice daily. Stools are soft/liquid. No blood in stool or melena. Not taking Senna.   Fevers/shakes/chills: No  GERD: No symptoms. Stop taking omeprazole on saturday  Leg/calf pain: No. Stopped lovenox on Monday.   Chest pain/SOB: No, No palpitations   Walking: Active around the house   Work: Unsure when going back. Does not need a note     HTN - does not check at home. Amlodipine was stopped by PCP. Taking losartan. No hypotension symptoms.     How many opioid pain medications used after surgery? 6 What did you do with extra pills? home  Were any opioid pain medication refills provided after surgery? No  Were any opioid pain medications needed after 30 days postop? N/A    /86 (BP Location: Left arm, Patient Position: Sitting, Cuff Size: Adult Large)   Pulse 91   Temp 98.3  F (36.8  C) (Oral)   Ht 1.803 m (5' 11\")   Wt 147 kg (324 lb)   SpO2 100%   BMI 45.19 kg/m    NAD  Overall looks well  Incisions c/d/i; Abdomen not tender to palpation    Wt Readings from Last 5 Encounters:   03/14/24 147 kg (324 lb)   03/11/24 147.6 kg (325 lb 8 oz)   03/04/24 (!) 154.5 kg (340 lb 9.8 oz)   02/27/24 (!) 154.2 kg (340 lb)   02/23/24 (!) 154.2 kg (340 lb)         Pathology   Final Diagnosis   STOMACH, PARTIAL GASTRECTOMY:  - Gastric wall with no significant histopathologic abnormality        Plan:  1. RD visit today.       - Start vitamin supplements per " RD directions.      - Advance diet per RD directions.  2. Follow-up: Brianda Chan CNP on 4/4/24    3. Actigall prescription - to be discussed further at 1 month   4. B12 SL or injection - to be discussed at one month   5. Pathology reviewed - no concerns  6. Weight loss medications - N/A  7. Need to restart statin - N/A   8. Discussed restrictions of no lifting, pushing pulling >20lbs till 4weeks post op.  9. Discussed no water submersion till incisions are completely healed.  10. Diarrhea - continue to monitor. Reach out to clinic if does not improve or is worsening.     Christin Blackwood PA-C

## 2024-03-14 NOTE — PATIENT INSTRUCTIONS
Paul Sexton,    Follow-up with RD on 4/4    Thank you,    Angela Granger, RD, LD  If you would like to schedule or reschedule an appointment with the RD, please call [phone]    Nutrition Goals  1) Follow diet advancement schedule below.  2) Work towards 60 gm protein/day.  3) Consume 48-64+ oz fluids daily- between meals only once on puree diet  4) Eat slowly (>20 min/meal), chewing well to smooth consistency once on the bariatric soft diet.  5) Limit portions to ~1/4 to 1/2 cup/meal.  6) Start chewable/liquid multivitamin/minerals daily    Post-op Diet Advancement Schedule:  Clear Liquid Diet (stage 1): starts 3/3  Low-Fat Full Liquid Diet (stage 2): starts 3/11  Pureed Diet (stage 3): starts 3/18  Soft Diet (stage 4): starts 4/1  Regular Diet (stage 5): starts 4/29    Post-op Diet Handouts:  Diet Guidelines after Weight-loss Surgery  [email or web address]     Your Stage 1 Diet: Clear Liquids  [email or web address]     Your Stage 2 Diet: Low-fat Full Liquids  [email or web address]     Your Stage 3 Diet: Pureed Foods  [email or web address]     Pureed Recipes  [email or web address]    Your Stage 4 Diet: Soft Foods  [email or web address]    Your Stage 5 Diet: Regular Foods  [email or web address]    Supplements after Sleeve Gastrectomy, Gastric Bypass or Single Anastomosis Duodenal Switch  [email or web address]    Keeping Track of Fluids  [email or web address]    Exercise Guidelines after Weight Loss Surgery (1st 4-6 weeks)  [email or web address]      COMPREHENSIVE WEIGHT MANAGEMENT PROGRAM  VIRTUAL SUPPORT GROUPS    At [facility], our Comprehensive Weight Management program offers on-line support groups for patients who are working on weight loss and considering, preparing for, or have had weight loss surgery.     There is no cost for this opportunity.  You are invited to attend the?Virtual Support Groups?provided by any of  the following locations:    [facility] via Microsoft Teams with Cynthia Amor RN  2.   [facility] via Wit studio with Terry Nix, PhD, LP  3.   [facility] via Wit studio with Paola Franks RN  4.   [facility] via a Zoom Meeting with CARLOS Velasquez    The following Support Group information can also be found on our website:  [email or web address]      [facility] Weight Loss Surgery Support Group  The support group is a patient-lead forum that meets monthly to share experiences, encouragement and education. It is open to those who have had weight loss surgery, are scheduled for surgery, or are considering surgery.   WHEN: This group meets on the 3rd Wednesday of each month from 5:00PM - 6:00PM virtually using Microsoft Teams.   FACILITATOR: Led by Cynthia Mccall RD, HELEN, RN, the program's Clinical Coordinator.   TO REGISTER: Please contact the clinic via 4Less or call the nurse line directly at [phone] to inform our staff that you would like an invite sent to you and to let us know the email you would like the invite sent to. Prior to the meeting, a link with directions on how to join the meeting will be sent to you.    2023 and 2024 Meetings   December 20  January 17  February 21  March 20  April 17  May 15  Melly 19      [facility] and [facility] Bariatric Care Support Group?  This is open to all pre- and post- operative bariatric surgery patients as well as their support system.   WHEN: This group meets the 3rd Tuesday of each month from 6:30 PM - 8:00 PM virtually using Microsoft Teams.   FACILITATOR: Led by Terry Nix, Ph.D who is a Licensed Psychologist with the [facility] Comprehensive Weight Management Program.   TO REGISTER: Please send an email to Terry Nix, Ph.D., LP at?[email or web address]?if you would like an invitation  "to the group. Prior to the meeting, a link with directions on how to join the meeting will be sent to you.    2023 and 2024 Meetings  December 19 January 16: \"Medication Management and Bariatric Surgery\", Irina Salazar, PharmD, Pharmacy Resident at [facility]  February 20: \"A Bariatric Surgery Patient's Perspective\", JEZ Lord, [facility], Behavioral Health Clinician at [facility]  March 19  April 16  May 21  Melly 18: \"Nutritional Labeling\", Dietitian speaker to be announced.  November 19: \"Holiday Eating\", Dietitian speaker to be announced.    [facility] and [facility] Post-Operative Bariatric Surgery Support Group  This is a support group for [facility] bariatric patients (and those external to [facility]) who have had bariatric surgery and are at least 3 months post-surgery.  WHEN: This support group meets the 4th Wednesday of the month from 11:00 AM - 12:00 PM virtually using Microsoft Teams.   FACILITATOR: Led by Certified Bariatric Nurse, Paola Franks RN.   TO REGISTER: Please send an email to Paola at [email or web address] if you would like an invitation to the group.  Prior to the meeting, a link with directions on how to join the meeting will be sent to you.    2023 and 2024 Meetings  December 27  January 24  February 28  March 27  April 24  May 22  Melly 26    [facility] Healthy Lifestyle Group?  This is a 60 minute virtual coaching group for those who want to lead a healthier lifestyle. Come together to set goals and overcome barriers in a supportive group environment. We will address the four pillars of health: nutrition, exercise, sleep and emotional well-being.  This group is highly recommended for those who are participating in the 24 week Healthy Lifestyle Plan and our Health Coaching sessions.  WHEN: This group meets the 1st Friday of the month, " "12:30 PM - 1:30 PM online, via a zoom meeting.    FACILITATOR: Led by National Board Certified Health and , Paola Kwok [facility].   TO REGISTER: Please call the Call Center at [phone] to register.  You will get an appointment to attend in [facility]. Fifteen minutes prior to the meeting, complete the e-check in and you will get the link to join the meeting.    There is no charge to attend this group and space is limited.     2023 and 2024 Meetings  December 1: \"Let's Talk\" (guided discussion on our wins and challenges)  January 5: \"New Years Vision: Manifest your Best 2024!\" (guided imagery,  journaling and discussion)  February 2: \"Let's Talk\"  March 1: \"10 Percent Happier\" by Brian Chakraborty (Book Bites - a guided discussion on the nuggets of wisdom from favorite wellness books, no need to read the book but highly encouraged)  April 5: \"Let's Talk\"  May 3: \"Essentialism: The Disciplined Pursuit of Less\" by Sheldon Louis (Book Bites discussion)  June 7: \"Let's Talk\"  July 5: NO MEETING, off for the 4th of July Holiday  August 2: \"The Blue Zones, Secrets for Living a Longer Life\" by Brian Mendiola (Book Bites discussion)                    "

## 2024-03-14 NOTE — NURSING NOTE
Is the patient currently in the state of [place]? YES    Visit mode:VIDEO    If the visit is dropped, the patient can be reconnected by: VIDEO VISIT: Text to cell phone:   Telephone Information:   Mobile [phone]       Will anyone else be joining the visit? NO  (If patient encounters technical issues they should call [phone])    How would you like to obtain your AVS? MyChart    Are changes needed to the allergy or medication list? N/A    Reason for visit: RECHECK ([facility])    Tammi MEJIA

## 2024-03-14 NOTE — LETTER
"3/14/2024       RE: Bigg Montez  [address]  [place] [address]     Dear Colleague,    Thank you for referring your patient, Bigg Montez, to the [facility] [place] at [facility]. Please see a copy of my visit note below.    Video-Visit Details    Type of service:  Video Visit    Video Start Time: 12:34 PM   Video End Time: 12:49 PM    Originating Location (pt. Location): Home    Distant Location (provider location):  Offsite (providers home) [facility] [place]     Platform used for Video Visit: [employer] All natural    Nutrition Assessment  Reason For Visit:  Bigg Montez is a 38 year old male presenting today for nutrition follow-up, 1 week s/p laparoscopic sleeve gastrectomy with Dr Eden on 3/4/24.  Patient referred by Christin GERMAIN  on March 14, 2024.    Anthropometrics  Initial Consult Weight: 386 lbs  Day of Surgery Weight(3/4/24): 340 lbs  Current Weight:   Estimated body mass index is 45.19 kg/m  as calculated from the following:    Height as of an earlier encounter on 3/14/24: 1.803 m (5' 11\").    Weight as of an earlier encounter on 3/14/24: 147 kg (324 lb).    Weight loss: 62 lbs from initial consult; 16 lbs from day of surgery    Current Vitamins/Minerals: Flinstones Complete BID     Nutrition History  Pt reports consuming and tolerating bariatric clear and low-fat full liquid diets. Fluid intake appears adequate, consuming 48-64 oz/day. Consuming one protein shake daily (30 gm pro).   Denies reflux/heartburn, vomiting.   Walking as able.     Recent Diet Recall:  Breakfast: protein shake  Lunch: low-fat soup   Dinner: Greek yogurt (7-10 half tsp)   Beverages: Water (1L), coconut water, Gatorade Zero, green tea     Nutrition Prescription:  Grams Protein: 60 (minimum)  Amount of Fluid: 48-64 oz    Nutrition Diagnosis  Food and nutrition-related knowledge deficit r/t lack " of prior exposure to diet advancements beyond bariatric low-fat full liquid diet aeb recent bariatric surgery and pt interest in diet education/review    Intervention  Intervention At Appointment:  Materials/education provided on bariatric pureed and soft diets, protein intake, fluid intake, eating pace, portion control, avoiding excess sugar and fat, recommended vitamin/mineral supplements. Patient demonstrates understanding.     Expected Engagement: good    Goals:  1) Follow diet advancement schedule below.  2) Work towards 60 gm protein/day.  3) Consume 48-64+ oz fluids daily- between meals only once on puree diet  4) Eat slowly (>20 min/meal), chewing well to smooth consistency once on the bariatric soft diet.  5) Limit portions to ~1/4 to 1/2 cup/meal.  6) Start chewable/liquid multivitamin/minerals daily    Post-op Diet Advancement Schedule:  Clear Liquid Diet (stage 1): starts 3/3  Low-Fat Full Liquid Diet (stage 2): starts 3/11  Pureed Diet (stage 3): starts 3/18  Soft Diet (stage 4): starts 4/1  Regular Diet (stage 5): starts 4/29    Post-op Diet Handouts:  Diet Guidelines after Weight-loss Surgery  [email or web address]     Your Stage 1 Diet: Clear Liquids  [email or web address]     Your Stage 2 Diet: Low-fat Full Liquids  [email or web address]     Your Stage 3 Diet: Pureed Foods  [email or web address]     Pureed Recipes  [email or web address]    Your Stage 4 Diet: Soft Foods  [email or web address]    Your Stage 5 Diet: Regular Foods  [email or web address]    Supplements after Sleeve Gastrectomy, Gastric Bypass or Single Anastomosis Duodenal Switch  [email or web address]    Keeping Track of Fluids  [email or web address]    Exercise Guidelines after Weight Loss Surgery (1st 4-6 weeks)  [email or web address]      Follow-Up: 3 weeks or prn    Time spent with patient: 15 minutes.  Angela Granger RD, LD

## 2024-03-14 NOTE — PROGRESS NOTES
"Video-Visit Details    Type of service:  Video Visit    Video Start Time: 12:34 PM   Video End Time: 12:49 PM    Originating Location (pt. Location): Home    Distant Location (provider location):  Offsite (providers home) [facility] [place]     Platform used for Video Visit: I Like My Waitress    Nutrition Assessment  Reason For Visit:  Bigg Montez is a 38 year old male presenting today for nutrition follow-up, 1 week s/p laparoscopic sleeve gastrectomy with Dr Eden on 3/4/24.  Patient referred by Christin GERMAIN  on March 14, 2024.    Anthropometrics  Initial Consult Weight: 386 lbs  Day of Surgery Weight(3/4/24): 340 lbs  Current Weight:   Estimated body mass index is 45.19 kg/m  as calculated from the following:    Height as of an earlier encounter on 3/14/24: 1.803 m (5' 11\").    Weight as of an earlier encounter on 3/14/24: 147 kg (324 lb).    Weight loss: 62 lbs from initial consult; 16 lbs from day of surgery    Current Vitamins/Minerals: Flinstones Complete BID     Nutrition History  Pt reports consuming and tolerating bariatric clear and low-fat full liquid diets. Fluid intake appears adequate, consuming 48-64 oz/day. Consuming one protein shake daily (30 gm pro).   Denies reflux/heartburn, vomiting.   Walking as able.     Recent Diet Recall:  Breakfast: protein shake  Lunch: low-fat soup   Dinner: Greek yogurt (7-10 half tsp)   Beverages: Water (1L), coconut water, Gatorade Zero, green tea     Nutrition Prescription:  Grams Protein: 60 (minimum)  Amount of Fluid: 48-64 oz    Nutrition Diagnosis  Food and nutrition-related knowledge deficit r/t lack of prior exposure to diet advancements beyond bariatric low-fat full liquid diet aeb recent bariatric surgery and pt interest in diet education/review    Intervention  Intervention At Appointment:  Materials/education provided on bariatric pureed and soft diets, protein intake, fluid intake, eating pace, portion control, " avoiding excess sugar and fat, recommended vitamin/mineral supplements. Patient demonstrates understanding.     Expected Engagement: good    Goals:  1) Follow diet advancement schedule below.  2) Work towards 60 gm protein/day.  3) Consume 48-64+ oz fluids daily- between meals only once on puree diet  4) Eat slowly (>20 min/meal), chewing well to smooth consistency once on the bariatric soft diet.  5) Limit portions to ~1/4 to 1/2 cup/meal.  6) Start chewable/liquid multivitamin/minerals daily    Post-op Diet Advancement Schedule:  Clear Liquid Diet (stage 1): starts 3/3  Low-Fat Full Liquid Diet (stage 2): starts 3/11  Pureed Diet (stage 3): starts 3/18  Soft Diet (stage 4): starts 4/1  Regular Diet (stage 5): starts 4/29    Post-op Diet Handouts:  Diet Guidelines after Weight-loss Surgery  [email or web address]     Your Stage 1 Diet: Clear Liquids  [email or web address]     Your Stage 2 Diet: Low-fat Full Liquids  [email or web address]     Your Stage 3 Diet: Pureed Foods  [email or web address]     Pureed Recipes  [email or web address]    Your Stage 4 Diet: Soft Foods  [email or web address]    Your Stage 5 Diet: Regular Foods  [email or web address]    Supplements after Sleeve Gastrectomy, Gastric Bypass or Single Anastomosis Duodenal Switch  [email or web address]    Keeping Track of Fluids  [email or web address]    Exercise Guidelines after Weight Loss Surgery (1st 4-6 weeks)  [email or web address]      Follow-Up: 3 weeks or prn    Time spent with patient: 15 minutes.  Angela Granger, BRO, LD

## 2024-03-15 NOTE — PATIENT INSTRUCTIONS
Plan:  1. RD visit today.       - Start vitamin supplements per RD directions.      - Advance diet per RD directions.  2. Follow-up: Brianda Chan CNP on 4/4/24    3. Actigall prescription - to be discussed further at 1 month   4. B12 SL or injection - to be discussed at one month   5. Pathology reviewed - no concerns  6. Weight loss medications - N/A  7. Need to restart statin - N/A   8. Discussed restrictions of no lifting, pushing pulling >20lbs till 4weeks post op.  9. Discussed no water submersion till incisions are completely healed.  10. Diarrhea - continue to monitor. Reach out to clinic if does not improve or is worsening.

## 2024-03-21 ENCOUNTER — MYC REFILL (OUTPATIENT)
Dept: FAMILY MEDICINE | Facility: CLINIC | Age: 39
End: 2024-03-21
Payer: COMMERCIAL

## 2024-03-21 DIAGNOSIS — I10 ESSENTIAL HYPERTENSION: ICD-10-CM

## 2024-03-21 RX ORDER — LOSARTAN POTASSIUM 100 MG/1
100 TABLET ORAL DAILY
Qty: 90 TABLET | Refills: 1 | Status: SHIPPED | OUTPATIENT
Start: 2024-03-21 | End: 2024-07-17

## 2024-04-02 NOTE — PROGRESS NOTES
"Video-Visit Details    Type of service:  Video Visit    Video Start Time: 8:32 AM   Video End Time: 8:55 AM    Originating Location (pt. Location): Home    Distant Location (provider location): Offsite (providers home) [facility] [place]     Platform used for Video Visit: Well      Nutrition Reassessment  Reason For Visit:  Bigg Montez is a 38 year old male presenting today for nutrition follow-up, 1 month s/p laparoscopic sleeve gastrectomy with Dr Eden on 3/4/24.  Patient referred by Christin GERMAIN  on March 14, 2024.    Anthropometrics  Initial Consult Weight: 386 lbs  Day of Surgery Weight(3/4/24): 340 lbs.  Current Weight:   Estimated body mass index is 43.79 kg/m  as calculated from the following:    Height as of an earlier encounter on 4/4/24: 1.803 m (5' 11\").    Weight as of an earlier encounter on 4/4/24: 142.4 kg (314 lb).    Weight loss: 72 lbs from initial consult; 26 lbs from day of surgery    Current Vitamins/Minerals: MVI/minerals BID (Flinstones), chewable B12 (500 mcg/day).      Nutrition History:  Started and tolerating bariatric soft diet. Has tried beef, chicken, turkey, eggs.   Identifies needing to work on increasing fluid and protein intake. Drinking ~60 oz/day. Tries to drink a protein shake daily ([facility] Nutrition Plan).   Recent gout flare, working on avoiding beef and seafood.     Progress with Previous Goals:  1) Follow bariatric low-fat full liquid diet through day 13 post-op, then to progress to pureed diet x 2 weeks.  If tolerating, may advance on day 29 post-op to bariatric soft diet. Met, continues   2) Work towards 60 gm protein/day. - Working on increasing, may add in protein shots    3) Consume 48-64+ oz fluids daily- between meals. Met, continues    4) Eat slowly (>20 min/meal), chewing well to smooth consistency once on the bariatric soft diet. Met, continues    5) Limit portions to ~1/2 cup/meal. Met, continues    6) Start " chewable/liquid multivitamin/minerals daily - Met, continues    Nutrition Prescription:  Grams Protein: 60 (minimum)   Amount of Fluid: 48-64 oz    Nutrition Diagnosis  Previous: Food and nutrition-related knowledge deficit r/t lack of prior exposure to diet advancements beyond bariatric low-fat full liquid diet aeb recent bariatric surgery and pt interest in diet education/review     Current: Food and nutrition-related knowledge deficit r/t lack of prior exposure to diet advancements beyond bariatric pureed diet aeb recent bariatric surgery and pt interest in diet education/review     Intervention  Materials/Education provided on bariatric soft and regular consistency diets, protein intake, fluid intake, eating pace, chewing foods well, portion control, sugar/fat intake, recommended vitamin/mineral supplements. Patient demonstrates understanding.       Expected Engagement: good     Goals:  1) Follow soft diet for 4 weeks, then to progress to bariatric regular diet (4/29).   2) Consume 60+ grams of protein/day.  3) Sip on 48-64+ oz of fluids/day- between meals only.  4) Eat slowly (>20 min/meal), chewing foods well (to applesauce-like consistency).  5) Limit portions to ~1/2 cup/meal until 3 months post op  6) Schedule 3 month provider/RD appointments  7) Get 3 month labs done before next appointments   8) Take vitamins/minerals as recommended       Post-op Diet Handouts:  Diet Guidelines after Weight-loss Surgery  [email or web address]     Your Stage 4 Diet: Soft Foods  [email or web address]    Your Stage 5 Diet: Regular Foods  [email or web address]    Supplements after Sleeve Gastrectomy, Gastric Bypass or Single Anastomosis Duodenal Switch  [email or web address]    Keeping Track of Fluids  [email or web address]    Exercises after Weight Loss Surgery (strengthening, when no weight lifting restrictions)  [email or web address]      Tools for after  "surgery:  Portion control bowls:   - American Giant: https://www.amazon.com/Portion-Control-Cereal-Porcelain-Healthy/dp/M07ZGXV688?th=1  - Bariatric Pal: https://Thinkfuse.Musations/collections/bariatric-dinnerware    Books:  \"Fresh Start Bariatric Cookbook\" by Lamar Rosas, MS, RDN, CD - Excellent cookbook with post-op recipes and many other resources to check out for ongoing support after surgery    \"Eating Well After Weight Loss Surgery\" by Genevieve Benjamin and Henri Chaney-Amara - Great cookbook with recipes for each diet stage after surgery and recommended portion sizes. Slightly more intensive cooking recipes.    \"Bariatric Mindset Success\" by Bhumi Thompson - book that helps with prevention of weight regain after surgery. Helps train your brain for long term success with weight loss after surgery.    Apps:  "[employer] eze -  fluid and nutritional tracking. Also has bariatric recipes.     Post-Bariatric Surgery Online Recipe Resources:  Online:  https://Vimagino.LC Style.com/bariatric-recipes/   https://Ooshot/recipes/   https://www.Sherpa [employer] Media/mbd-recipes    Protein Supplements for After Surgery:   Unflavored protein powder: Genepro, Isopure (25-30 gm protein per 1 scoop)  You may also use flavored protein powder if you prefer.   Protein shots: https://DormNoise/collections/protein-shots  Pre-made protein shakes (no more than 210 Calories, at least 20 grams of protein, and less than 10 grams of sugar):   Premier Protein (160 Calories, 30 g protein)  Boost/Ensure Max (160 calories, 30 gm protein)   Fairlife Core Power (170 calories, 26 gm protein)  Aldi's Elevation Protein Shake (160 calories, 30 gm protein)   Equate Protein Shake (160 calories, 30 gm protein)  Slim Fast Advanced Nutrition (180 Calories, 20 g protein)  Muscle Milk, lactose-free, 17 oz bottle (210 Calories, 30 g protein)  Glucerna Protein Smart (150 grover, 30 gm protein)  Clear Protein Drinks:  BiPro  Premier " Protein clear  [id number]  M Health Protein 15 Concentrate  Bone broth  Beneprotein protein powder mixed with 4-6 oz of fluid  [id number]      Moderate-purine foods, limit servings:  Meat and Poultry  Crab, lobster, oysters and shrimp (limit to 1-2 servings* daily)  Dried beans, peas, and lentils (limit to 1 cup cooked daily)  Asparagus, cauliflower, spinach, mushrooms, green peas (do not eat more than 1/2 cup of these vegetables daily)  Meat- or fish-based soups, broths, or bouillons  Oatmeal (do not eat more than 2/3 cup uncooked, daily)  Wheat bran, wheat germ (do not eat more than 1/4 cup dry, daily)     High-purine foods to avoid:  Anchovies, sardines, herring, mussels, tuna, codfish, scallops, trout, and donovan; peace; organ meats (such as liver or kidney); tripe; sweetbreads; wild game; goose  Beer and other alcoholic beverages  Gravies and sauces made with meat  Yeast and yeast extracts (taken as supplements)            Follow-Up: 3 months post op/prn    Time spent with patient: 23 minutes.  BRO Barajas

## 2024-04-04 ENCOUNTER — VIRTUAL VISIT (OUTPATIENT)
Dept: ENDOCRINOLOGY | Facility: CLINIC | Age: 39
End: 2024-04-04
Payer: COMMERCIAL

## 2024-04-04 VITALS — HEIGHT: 71 IN | BODY MASS INDEX: 43.96 KG/M2 | WEIGHT: 314 LBS

## 2024-04-04 DIAGNOSIS — E66.01 CLASS 3 SEVERE OBESITY DUE TO EXCESS CALORIES WITH SERIOUS COMORBIDITY AND BODY MASS INDEX (BMI) OF 50.0 TO 59.9 IN ADULT (H): ICD-10-CM

## 2024-04-04 DIAGNOSIS — E66.813 CLASS 3 SEVERE OBESITY DUE TO EXCESS CALORIES WITH SERIOUS COMORBIDITY AND BODY MASS INDEX (BMI) OF 50.0 TO 59.9 IN ADULT (H): ICD-10-CM

## 2024-04-04 DIAGNOSIS — Z98.84 S/P LAPAROSCOPIC SLEEVE GASTRECTOMY: Primary | ICD-10-CM

## 2024-04-04 DIAGNOSIS — E66.01 OBESITY, CLASS III, BMI 40-49.9 (MORBID OBESITY) (H): ICD-10-CM

## 2024-04-04 DIAGNOSIS — Z71.3 NUTRITIONAL COUNSELING: ICD-10-CM

## 2024-04-04 PROCEDURE — 99024 POSTOP FOLLOW-UP VISIT: CPT | Mod: 95 | Performed by: NURSE PRACTITIONER

## 2024-04-04 PROCEDURE — 99207 PR NO CHARGE LOS: CPT | Mod: 95 | Performed by: DIETITIAN, REGISTERED

## 2024-04-04 PROCEDURE — 97803 MED NUTRITION INDIV SUBSEQ: CPT | Mod: 95 | Performed by: DIETITIAN, REGISTERED

## 2024-04-04 ASSESSMENT — PAIN SCALES - GENERAL: PAINLEVEL: MODERATE PAIN (5)

## 2024-04-04 NOTE — PROGRESS NOTES
"Postoperative bariatric surgery visit.    Patient underwent sleeve gastrectomy 4.5 weeks ago.  3/4/2024 Dr. Eden    Tolerating liquids: at least 60oz water daily plus protein shake, mentally exhausted trying to get enough in all day. Not tracking protein. Will do [employer] often.   Lightheadedness: none   Abdominal pain: none  Bowel movements: manageable if adequate hydration,   Fevers/shakes/chills: none  GERD: none Taking omeprazole once daily dysphagia just with over eating x 2 episodes   Leg/calf pain: none      Recent gout flare - taking colcrys   Knees improving     Tolerating most textures but sticking to soups and soft meals     Seen by PCP 3/11- held amlodipine, blood pressure normal with Christin Blackwood PA-C in clinic 3/14. Doesn't feel cuff at home is accurate.     How many opioid pain medications used after surgery?  What did you do with extra pills?  Were any opioid pain medication refills provided after surgery?  Were any opioid pain medications needed after 30 days postop?    Ht 1.803 m (5' 11\")   Wt 142.4 kg (314 lb)   BMI 43.79 kg/m     Wt Readings from Last 5 Encounters:   04/04/24 142.4 kg (314 lb)   03/14/24 147 kg (324 lb)   03/11/24 147.6 kg (325 lb 8 oz)   03/04/24 (!) 154.5 kg (340 lb 9.8 oz)   02/27/24 (!) 154.2 kg (340 lb)      NAD  Overall looks well   Incisions c/d/i; non-tender per report     Plan:  1. RD visit today.  2. Start vitamin supplements per RD directions.  3. Advance diet per RD directions.  4. Follow-up: 6/6/2024  5. Actigall prescription - discussed and declined   6. B12 SL or injection **  7. Pathology reviewed -normal   8. Weight loss medications NA  9. Need to restart statin NA    Can try taper off omeprazole as tolerated   Increase protein as tolerated   Look at post bariatric surgery recipes online with high protein   Bring blood pressure cuff to primary care visit to check accuracy         Brianda Chan CNP  [facility] " [facility]   Virtual Visit Details    Type of service:  Video Visit   Video Start Time: 0732  Video End Time:0758    Originating Location (pt. Location): Home    Distant Location (provider location):  Off-site  Platform used for Video Visit: Harshil

## 2024-04-04 NOTE — NURSING NOTE
Is the patient currently in the state of [place]? YES    Visit mode:VIDEO    If the visit is dropped, the patient can be reconnected by: VIDEO VISIT: Text to cell phone:   Telephone Information:   Mobile [phone]       Will anyone else be joining the visit? NO  (If patient encounters technical issues they should call [phone] :[phone])    How would you like to obtain your AVS? MyChart    Are changes needed to the allergy or medication list? N/A    Pt states 5/10 gout flare pain right ankle today when pressure applied.    Reason for visit: RECHANGELA MEJIA

## 2024-04-04 NOTE — LETTER
"4/4/2024       RE: Bigg Montez  [address]  [place] [address]     Dear Colleague,    Thank you for referring your patient, Bigg Montez, to the [facility] [place] at [facility]. Please see a copy of my visit note below.    Video-Visit Details    Type of service:  Video Visit    Video Start Time: 8:32 AM   Video End Time: 8:55 AM    Originating Location (pt. Location): Home    Distant Location (provider location): Offsite (providers home) [facility] [place]     Platform used for Video Visit: Send Word Now      Nutrition Reassessment  Reason For Visit:  Bigg Montez is a 38 year old male presenting today for nutrition follow-up, 1 month s/p laparoscopic sleeve gastrectomy with Dr Eden on 3/4/24.  Patient referred by Christin GERMAIN  on March 14, 2024.    Anthropometrics  Initial Consult Weight: 386 lbs  Day of Surgery Weight(3/4/24): 340 lbs.  Current Weight:   Estimated body mass index is 43.79 kg/m  as calculated from the following:    Height as of an earlier encounter on 4/4/24: 1.803 m (5' 11\").    Weight as of an earlier encounter on 4/4/24: 142.4 kg (314 lb).    Weight loss: 72 lbs from initial consult; 26 lbs from day of surgery    Current Vitamins/Minerals: MVI/minerals BID (Flinstones), chewable B12 (500 mcg/day).      Nutrition History:  Started and tolerating bariatric soft diet. Has tried beef, chicken, turkey, eggs.   Identifies needing to work on increasing fluid and protein intake. Drinking ~60 oz/day. Tries to drink a protein shake daily ([employer] Nutrition Plan).   Recent gout flare, working on avoiding beef and seafood.     Progress with Previous Goals:  1) Follow bariatric low-fat full liquid diet through day 13 post-op, then to progress to pureed diet x 2 weeks.  If tolerating, may advance on day 29 post-op to bariatric soft diet. Met, continues   2) Work " towards 60 gm protein/day. - Working on increasing, may add in protein shots    3) Consume 48-64+ oz fluids daily- between meals. Met, continues    4) Eat slowly (>20 min/meal), chewing well to smooth consistency once on the bariatric soft diet. Met, continues    5) Limit portions to ~1/2 cup/meal. Met, continues    6) Start chewable/liquid multivitamin/minerals daily - Met, continues    Nutrition Prescription:  Grams Protein: 60 (minimum)   Amount of Fluid: 48-64 oz    Nutrition Diagnosis  Previous: Food and nutrition-related knowledge deficit r/t lack of prior exposure to diet advancements beyond bariatric low-fat full liquid diet aeb recent bariatric surgery and pt interest in diet education/review     Current: Food and nutrition-related knowledge deficit r/t lack of prior exposure to diet advancements beyond bariatric pureed diet aeb recent bariatric surgery and pt interest in diet education/review     Intervention  Materials/Education provided on bariatric soft and regular consistency diets, protein intake, fluid intake, eating pace, chewing foods well, portion control, sugar/fat intake, recommended vitamin/mineral supplements. Patient demonstrates understanding.       Expected Engagement: good     Goals:  1) Follow soft diet for 4 weeks, then to progress to bariatric regular diet (4/29).   2) Consume 60+ grams of protein/day.  3) Sip on 48-64+ oz of fluids/day- between meals only.  4) Eat slowly (>20 min/meal), chewing foods well (to applesauce-like consistency).  5) Limit portions to ~1/2 cup/meal until 3 months post op  6) Schedule 3 month provider/RD appointments  7) Get 3 month labs done before next appointments   8) Take vitamins/minerals as recommended       Post-op Diet Handouts:  Diet Guidelines after Weight-loss Surgery  [email or web address]     Your Stage 4 Diet: Soft Foods  [email or web address]    Your Stage 5 Diet: Regular Foods  [email or web address]    Supplements after  "Sleeve Gastrectomy, Gastric Bypass or Single Anastomosis Duodenal Switch  [email or web address]    Keeping Track of Fluids  [email or web address]    Exercises after Weight Loss Surgery (strengthening, when no weight lifting restrictions)  [email or web address]      Tools for after surgery:  Portion control bowls:   - Decisyon: https://Sian's Plan.amazon.com/Portion-Control-Cereal-Porcelain-Healthy/dp/B73TEPB023?th=1  - Bariatric Pal: https://Betterment/collections/bariatric-dinnerware    Books:  \"Fresh Start Bariatric Cookbook\" by Lamar Rosas, MS, RDN, CD - Excellent cookbook with post-op recipes and many other resources to check out for ongoing support after surgery    \"Eating Well After Weight Loss Surgery\" by Genevieve Benjamin and Henri Chaney-Amara - Great cookbook with recipes for each diet stage after surgery and recommended portion sizes. Slightly more intensive cooking recipes.    \"Bariatric Mindset Success\" by Bhumi Thompson - book that helps with prevention of weight regain after surgery. Helps train your brain for long term success with weight loss after surgery.    Apps:  [employer] eze -  fluid and nutritional tracking. Also has bariatric recipes.     Post-Bariatric Surgery Online Recipe Resources:  Online:  https://DraftDay.Mizzen+Main/bariatric-recipes/   https://Ethos Lending/recipes/   https://www.ChartITright/mbd-recipes    Protein Supplements for After Surgery:   Unflavored protein powder: Genepro, Isopure (25-30 gm protein per 1 scoop)  You may also use flavored protein powder if you prefer.   Protein shots: https://Betterment/collections/protein-shots  Pre-made protein shakes (no more than 210 Calories, at least 20 grams of protein, and less than 10 grams of sugar):   Premier Protein (160 Calories, 30 g protein)  Boost/Ensure Max (160 calories, 30 gm protein)   Fairlife Core Power (170 calories, 26 gm protein)  Aldi's Elevation " Protein Shake (160 calories, 30 gm protein)   Equate Protein Shake (160 calories, 30 gm protein)  Slim Fast Advanced Nutrition (180 Calories, 20 g protein)  Muscle Milk, lactose-free, 17 oz bottle (210 Calories, 30 g protein)  Glucerna Protein Smart (150 grover, 30 gm protein)  Clear Protein Drinks:  BiPro  Premier Protein clear  [id number]  M Health Protein 15 Concentrate  Bone broth  Beneprotein protein powder mixed with 4-6 oz of fluid  [id number]      Moderate-purine foods, limit servings:  Meat and Poultry  Crab, lobster, oysters and shrimp (limit to 1-2 servings* daily)  Dried beans, peas, and lentils (limit to 1 cup cooked daily)  Asparagus, cauliflower, spinach, mushrooms, green peas (do not eat more than 1/2 cup of these vegetables daily)  Meat- or fish-based soups, broths, or bouillons  Oatmeal (do not eat more than 2/3 cup uncooked, daily)  Wheat bran, wheat germ (do not eat more than 1/4 cup dry, daily)     High-purine foods to avoid:  Anchovies, sardines, herring, mussels, tuna, codfish, scallops, trout, and donovan; peace; organ meats (such as liver or kidney); tripe; sweetbreads; wild game; goose  Beer and other alcoholic beverages  Gravies and sauces made with meat  Yeast and yeast extracts (taken as supplements)            Follow-Up: 3 months post op/prn    Time spent with patient: 23 minutes.  Angela Granger RD LD

## 2024-04-04 NOTE — PATIENT INSTRUCTIONS
"Paul Sexton,    Follow-up with RD on 6/6    Thank you,    Angela Granger, RD, LD  If you would like to schedule or reschedule an appointment with the RD, please call [phone]    Nutrition Goals  1) Follow soft diet for 4 weeks, then to progress to bariatric regular diet (4/29).   2) Consume 60+ grams of protein/day.  3) Sip on 48-64+ oz of fluids/day- between meals only.  4) Eat slowly (>20 min/meal), chewing foods well (to applesauce-like consistency).  5) Limit portions to ~1/2 cup/meal until 3 months post op  6) Schedule 3 month provider/RD appointments  7) Get 3 month labs done before next appointments   8) Take vitamins/minerals as recommended       Post-op Diet Handouts:  Diet Guidelines after Weight-loss Surgery  [email or web address]     Your Stage 4 Diet: Soft Foods  [email or web address]    Your Stage 5 Diet: Regular Foods  [email or web address]    Supplements after Sleeve Gastrectomy, Gastric Bypass or Single Anastomosis Duodenal Switch  [email or web address]    Keeping Track of Fluids  [email or web address]    Exercises after Weight Loss Surgery (strengthening, when no weight lifting restrictions)  [email or web address]      Tools for after surgery:  Portion control bowls:   - "M[employer] Touch[employer]: https://www.amazon.com/Portion-Control-Cereal-Porcelain-Healthy/dp/K96FFGX825?th=1  - Bariatric Pal: https://store.bariatricpal.[employer]/collections/bariatric-dinnerware    Books:  \"Fresh Start Bariatric Cookbook\" by Lamar Rosas, MS, RDN, CD - Excellent cookbook with post-op recipes and many other resources to check out for ongoing support after surgery    \"Eating Well After Weight Loss Surgery\" by Genevieve Benjamin and Henri Chaney-Amara - Great cookbook with recipes for each diet stage after surgery and recommended portion sizes. Slightly more intensive cooking recipes.    \"Bariatric Mindset Success\" by Bhumi Thompson - book that helps with prevention of weight regain after " surgery. Helps train your brain for long term success with weight loss after surgery.    Apps:  Delpor eze -  fluid and nutritional tracking. Also has bariatric recipes.     Post-Bariatric Surgery Online Recipe Resources:  Online:  https://bariatricmealreadness.com.com/bariatric-recipes/   https://Logopro/recipes/   https://www.Philly Runway Thief.Pink Rebel Shoes/mbd-recipes    Protein Supplements for After Surgery:   Unflavored protein powder: Genepro, Isopure (25-30 gm protein per 1 scoop)  You may also use flavored protein powder if you prefer.   Protein shots: https://store.Bimici.com/collections/protein-shots  Pre-made protein shakes (no more than 210 Calories, at least 20 grams of protein, and less than 10 grams of sugar):   Premier Protein (160 Calories, 30 g protein)  Boost/Ensure Max (160 calories, 30 gm protein)   Fairlife Core Power (170 calories, 26 gm protein)  Aldi's Elevation Protein Shake (160 calories, 30 gm protein)   Equate Protein Shake (160 calories, 30 gm protein)  Slim Fast Advanced Nutrition (180 Calories, 20 g protein)  Muscle Milk, lactose-free, 17 oz bottle (210 Calories, 30 g protein)  Glucerna Protein Smart (150 grover, 30 gm protein)  Clear Protein Drinks:  BiPro  Premier Protein clear  Bqdezoj4D  M Health Protein 15 Concentrate  Bone broth  Beneprotein protein powder mixed with 4-6 oz of fluid  Wyysbfqs30      Moderate-purine foods, limit servings:  Meat and Poultry  Crab, lobster, oysters and shrimp (limit to 1-2 servings* daily)  Dried beans, peas, and lentils (limit to 1 cup cooked daily)  Asparagus, cauliflower, spinach, mushrooms, green peas (do not eat more than 1/2 cup of these vegetables daily)  Meat- or fish-based soups, broths, or bouillons  Oatmeal (do not eat more than 2/3 cup uncooked, daily)  Wheat bran, wheat germ (do not eat more than 1/4 cup dry, daily)     High-purine foods to avoid:  Anchovies, sardines, herring, mussels, tuna, codfish, scallops, trout, and  donovan; peace; organ meats (such as liver or kidney); tripe; sweetbreads; wild game; goose  Beer and other alcoholic beverages  Gravies and sauces made with meat  Yeast and yeast extracts (taken as supplements)       COMPREHENSIVE WEIGHT MANAGEMENT PROGRAM  VIRTUAL SUPPORT GROUPS    At [facility], our Comprehensive Weight Management program offers on-line support groups for patients who are working on weight loss and considering, preparing for, or have had weight loss surgery.     There is no cost for this opportunity.  You are invited to attend the?Virtual Support Groups?provided by any of the following locations:    [facility] via Microsoft Teams with Cynthia Amor RN  2.   [place] via [employer] with Terry Nix, PhD, LP  3.   [place] via [employer] with Paola Franks RN  4.   [facility] via a Zoom Meeting with CARLOS Velasquez    The following Support Group information can also be found on our website:  [email or web address].[facility].org/treatments/weight-loss-and-weight-loss-surgery-support-groups      [facility] Weight Loss Surgery Support Group  The support group is a patient-lead forum that meets monthly to share experiences, encouragement and education. It is open to those who have had weight loss surgery, are scheduled for surgery, or are considering surgery.   WHEN: This group meets on the 3rd Wednesday of each month from 5:00PM - 6:00PM virtually using Microsoft Teams.   FACILITATOR: Led by Cynthia Mccall RD, HELEN, RN, the program's Clinical Coordinator.   TO REGISTER: Please contact the clinic via Cellerix or call the nurse line directly at [phone] to inform our staff that you would like an invite sent to you and to let us know the email you would like the invite sent to. Prior to the meeting, a link with directions on how to join the meeting will be sent to you.    2023 and 2024 Meetings   December 20 January 17 February 21 March 20 April  "17  May 15  Melly 19      [facility] Bariatric Care Support Group?  This is open to all pre- and post- operative bariatric surgery patients as well as their support system.   WHEN: This group meets the 3rd Tuesday of each month from 6:30 PM - 8:00 PM virtually using Microsoft Teams.   FACILITATOR: Led by Terry Nix, Ph.D who is a Licensed Psychologist with the [facility] Comprehensive Weight Management Program.   TO REGISTER: Please send an email to Terry Nix, Ph.D.,  at?[email or web address]?if you would like an invitation to the group. Prior to the meeting, a link with directions on how to join the meeting will be sent to you.    2023 and 2024 Meetings  December 19 January 16: \"Medication Management and Bariatric Surgery\", Irina Salazar, PharmD, Pharmacy Resident at [facility]  February 20: \"A Bariatric Surgery Patient's Perspective\", JEZ Lord, [facility], Behavioral Health Clinician at [facility]  March 19  April 16  May 21  Melly 18: \"Nutritional Labeling\", Dietitian speaker to be announced.  November 19: \"Holiday Eating\", Dietitian speaker to be announced.    [facility] Post-Operative Bariatric Surgery Support Group  This is a support group for [facility] bariatric patients (and those external to [facility]) who have had bariatric surgery and are at least 3 months post-surgery.  WHEN: This support group meets the 4th Wednesday of the month from 11:00 AM - 12:00 PM virtually using Microsoft Teams.   FACILITATOR: Led by Certified Bariatric Nurse, Paola Franks RN.   TO REGISTER: Please send an email to Paola at [email or web address] if you would like an invitation to the group.  Prior to the meeting, a link with directions on how to join the meeting will be sent to you.    2023 and 2024 Meetings  December " "27  January 24  February 28  March 27  April 24  May 22  Melly 26    [employer] Healthy Lifestyle Group?  This is a 60 minute virtual coaching group for those who want to lead a healthier lifestyle. Come together to set goals and overcome barriers in a supportive group environment. We will address the four pillars of health: nutrition, exercise, sleep and emotional well-being.  This group is highly recommended for those who are participating in the 24 week Healthy Lifestyle Plan and our Health Coaching sessions.  WHEN: This group meets the 1st Friday of the month, 12:30 PM - 1:30 PM online, via a zoom meeting.    FACILITATOR: Led by National Board Certified Health and , Paola Kwok [facility].   TO REGISTER: Please call the Call Center at [phone] to register.  You will get an appointment to attend in [place]. Fifteen minutes prior to the meeting, complete the e-check in and you will get the link to join the meeting.    There is no charge to attend this group and space is limited.     2023 and 2024 Meetings  December 1: \"Let's Talk\" (guided discussion on our wins and challenges)  January 5: \"New Years Vision: Manifest your Best 2024!\" (guided imagery,  journaling and discussion)  February 2: \"Let's Talk\"  March 1: \"10 Percent Happier\" by Brian Chakraborty (Book Bites - a guided discussion on the nuggets of wisdom from favorite wellness books, no need to read the book but highly encouraged)  April 5: \"Let's Talk\"  May 3: \"Essentialism: The Disciplined Pursuit of Less\" by Sheldon Louis (Book Bites discussion)  June 7: \"Let's Talk\"  July 5: NO MEETING, off for the 4th of July Holiday  August 2: \"The Blue Zones, Secrets for Living a Longer Life\" by Brian Mendiola (Book Bites discussion)                    "

## 2024-04-04 NOTE — NURSING NOTE
Is the patient currently in the state of [place]? YES    Visit mode:VIDEO    If the visit is dropped, the patient can be reconnected by: VIDEO VISIT: Text to cell phone:   Telephone Information:   Mobile [phone]       Will anyone else be joining the visit? NO  (If patient encounters technical issues they should call [phone]    How would you like to obtain your AVS? MyChart    Are changes needed to the allergy or medication list? No    Pt states 5/10 gout flare pain on right ankle today when pressure applied.    Reason for visit: RECHECK    Primo MEJIA

## 2024-04-04 NOTE — LETTER
"4/4/2024       RE: Bigg Montez  [address]  [place] [address]     Dear Colleague,    Thank you for referring your patient, Bigg Montez, to the [facility] [place] [facility]. Please see a copy of my visit note below.    Postoperative bariatric surgery visit.    Patient underwent sleeve gastrectomy 4.5 weeks ago.  3/4/2024 Dr. Eden    Tolerating liquids: at least 60oz water daily plus protein shake, mentally exhausted trying to get enough in all day. Not tracking protein. Will do Sarika's [place] often.   Lightheadedness: none   Abdominal pain: none  Bowel movements: manageable if adequate hydration,   Fevers/shakes/chills: none  GERD: none Taking omeprazole once daily dysphagia just with over eating x 2 episodes   Leg/calf pain: none      Recent gout flare - taking colcrys   Knees improving     Tolerating most textures but sticking to soups and soft meals     Seen by PCP 3/11- held amlodipine, blood pressure normal with Christin Blackwood PA-C in clinic 3/14. Doesn't feel cuff at home is accurate.     How many opioid pain medications used after surgery?  What did you do with extra pills?  Were any opioid pain medication refills provided after surgery?  Were any opioid pain medications needed after 30 days postop?    Ht 1.803 m (5' 11\")   Wt 142.4 kg (314 lb)   BMI 43.79 kg/m     Wt Readings from Last 5 Encounters:   04/04/24 142.4 kg (314 lb)   03/14/24 147 kg (324 lb)   03/11/24 147.6 kg (325 lb 8 oz)   03/04/24 (!) 154.5 kg (340 lb 9.8 oz)   02/27/24 (!) 154.2 kg (340 lb)      NAD  Overall looks well   Incisions c/d/i; non-tender per report     Plan:  1. RD visit today.  2. Start vitamin supplements per RD directions.  3. Advance diet per RD directions.  4. Follow-up: 6/6/2024  5. Actigall prescription - discussed and declined   6. B12 SL or injection **  7. Pathology reviewed -normal   8. Weight loss " medications NA  9. Need to restart statin NA    Can try taper off omeprazole as tolerated   Increase protein as tolerated   Look at post bariatric surgery recipes online with high protein   Bring blood pressure cuff to primary care visit to check accuracy         Brianda Chna CNP  [facility] WEIGHT MANAGEMENT CLINIC [place]   Virtual Visit Details    Type of service:  Video Visit   Video Start Time: 0732  Video End Time:0758    Originating Location (pt. Location): Home    Distant Location (provider location):  Off-site  Platform used for Video Visit: Harshil

## 2024-04-04 NOTE — PATIENT INSTRUCTIONS
"Thank you for allowing us the privilege of caring for you. We hope we provided you with the excellent service you deserve.   Please let us know if there is anything else we can do for you so that we can be sure you are completely satisfied with your care experience.    To ensure the quality of our services you may be receiving a patient satisfaction survey from an independent patient satisfaction monitoring company.    The greatest compliment you can give is a \"Likely to Recommend\"    Your visit was with Brianda Chan NP today.    Instructions per today's visit:     Paul Montez, it was great to visit with you today.  Here is a review of our visit.  If our clinic scheduler is not able to reach you please call [phone] to schedule your next appointments.    Bring blood pressure cuff to primary care visit to check accuracy   Can try taper off omeprazole as tolerated   Increase protein as tolerated   Look at post bariatric surgery recipes online with high protein   Keep up the great work!       Information about Video Visits with judge.meth [employer]ON [employer]: video visit information  _________________________________________________________________________________________________________________________________________________________  If you are asked by your clinic team to have your blood pressure checked:  [facility] do offer several locations for blood pressure checks. Please follow the below link to schedule an appointment. Scheduling an appointment at the pharmacy for a blood pressure check is now preferred.    Appointment Plus (appointment-plus.Netsket)  _________________________________________________________________________________________________________________________________________________________  Important contact and scheduling information:  Please call our contact center at [phone] to schedule your next appointments.  To find a lab location near you, please call [phone].  For any nursing " questions or concerns call Shey Valladares LPN at [phone] or Brandi Cazares RN at [phone]  Please call during clinic hours Monday through Friday 8:00a - 4:00p if you have questions or you can contact us via Adimabhart at anytime and we will reply during clinic hours.    Lab results will be communicated through My Chart or letter (if My Chart not used). Please call the clinic if you have not received communication after 1 week or if you have any questions.?  Clinic Fax: [phone]    _________________________________________________________________________________________________________________________________________________________  Meal Replacement Products:    Here is the link to our new e-store where you can purchase our meal replacement products    [facility] E-Store  NaPopravku.Bluebox Now!/store    The one week starter kit is a great way to sample a variety of products and see what works for you.    If you want more information about the product go to: Fresh Dataguise.Spotsetter    If you are an employee or [facility] or  Gigwell Ider please contact your care team for a 10% estore discount    Free Shipping for orders over $75     Benefits of meal replacements products:    Portion and calorie control  Improved nutrition  Structured eating  Simplified food choices  Avoid contact with trigger foods  _________________________________________________________________________________________________________________________________________________________  Interested in working with a health ?  Health coaches work with you to improve your overall health and wellbeing.  They look at the whole person, and may involve discussion of different areas of life, including, but not limited to the four pillars of health (sleep, exercise, nutrition, and stress management). Discuss with your care team if you would like to start working a health .  Health Coaching-3 Pack:  Schedule by calling [phone]    $99 for three health coaching visits    Visits may be done in person or via phone    Coaching is a partnership between the  and the client; Coaches do not prescribe or diagnose    Coaching helps inspire the client to reach his/her personal goals   _________________________________________________________________________________________________________________________________________________________  24 Week Healthy Lifestyle Plan:    Our mission in the 24-week Healthy Lifestyle Plan is to provide you with individualized care by giving you the tools, education and support you need to lose weight and maintain a healthy lifestyle. In your 24-week journey, you ll be supported by a dedicated weight loss team that includes registered dietitians, medical weight management providers, health coaches, and nurses -- all with special expertise in weight loss -- to help you every step of the way.     Monthly meetings with your registered dietician or medical weight management provider help to review your progress, update your care plan, and make any adjustments needed to ensure success. Between these visits, weekly and bi-weekly health  visits will help you focus on the four pillars of weight loss -- stress, sleep, nutrition, and exercise -- and how you can best adapt each to achieve sustainable weight loss results.    In addition, you will be given exclusive access to online wellbeing classes through [employer].  Your initial visit will be with a medical weight management provider who will help to understand your weight loss goals and ensure this program is the right fit for you. Please let our team know if you are interested in the 24 week plan by sending a message to your care team or calling [phone] to  schedule.  _________________________________________________________________________________________________________________________________________________________  __________  [facility] Get Moving Program  Our team of physical therapists is trained to help you understand and take control of your condition. They will perform a thorough evaluation to determine your ability for activity and develop a customized plan to fit your goals and physical ability.  Scheduling: Unsure if the Get Moving program is right for you? Discuss the program with your medical provider or diabetes educator. You can also call us at [phone] to ask questions or schedule an appointment.   [facility] Get Moving Program  ____________________________________________________________________________________________________________________________________________________________________________   [employer] Diabetes Prevention Program (DPP)  If you have prediabetes and Medicare please contact us via Spor Chargerst to learn more about the Diabetes Prevention Program (DPP)  Program Details:   [employer] offers the year-long Diabetes Prevention Program (DPP). The program helps you to make lifestyle changes that prevent or delay type 2 diabetes by supporting healthy eating, increased physical activity, stress reduction and use of coping skills.   On average, previous [facility] DPP cohorts have lost and maintained at least 5% of their starting weight throughout the program and averaged more than 150 minutes of physical activity per week.  Participants meet weekly for one-hour group sessions over sixteen weeks, every other week for the next 8 weeks, and monthly for the last six months.   A year-long maintenance program is also available for participants who complete the first year.   Location & Cost:   During the COVID-19 Public Health Emergency, the program is offered virtually. When in-person classes can resume, they  will be held at [facility].  For people with Medicare, the program is covered in full. A self-pay option will also be available for those with non-Medicare insurance plans.   ______________________________________________________________________________________________________________________________________________________________________________________________________________________________    To work with a Behavioral Health Psychologist:    Call to schedule:    Alton Pierre - [phone]  Elisa Rose - [phone]  Ashlie Portillo - [phone]  Debi Gomes - [phone]   Aracelis Carlson PhD (cannot accept Medicare) [phone]        Thank you,   [facility] Comprehensive Weight Management Team

## 2024-04-16 ENCOUNTER — OFFICE VISIT (OUTPATIENT)
Dept: FAMILY MEDICINE | Facility: CLINIC | Age: 39
End: 2024-04-16
Payer: COMMERCIAL

## 2024-04-16 VITALS
WEIGHT: 314 LBS | TEMPERATURE: 98.2 F | RESPIRATION RATE: 20 BRPM | OXYGEN SATURATION: 96 % | HEART RATE: 73 BPM | DIASTOLIC BLOOD PRESSURE: 78 MMHG | BODY MASS INDEX: 43.96 KG/M2 | SYSTOLIC BLOOD PRESSURE: 118 MMHG | HEIGHT: 71 IN

## 2024-04-16 DIAGNOSIS — I10 ESSENTIAL HYPERTENSION: ICD-10-CM

## 2024-04-16 DIAGNOSIS — E66.01 CLASS 3 SEVERE OBESITY DUE TO EXCESS CALORIES WITH SERIOUS COMORBIDITY AND BODY MASS INDEX (BMI) OF 50.0 TO 59.9 IN ADULT (H): Primary | ICD-10-CM

## 2024-04-16 DIAGNOSIS — E66.813 CLASS 3 SEVERE OBESITY DUE TO EXCESS CALORIES WITH SERIOUS COMORBIDITY AND BODY MASS INDEX (BMI) OF 50.0 TO 59.9 IN ADULT (H): Primary | ICD-10-CM

## 2024-04-16 PROCEDURE — 99214 OFFICE O/P EST MOD 30 MIN: CPT | Mod: 24 | Performed by: FAMILY MEDICINE

## 2024-04-16 NOTE — PROGRESS NOTES
Assessment & Plan     Class 3 severe obesity due to excess calories with serious comorbidity and body mass index (BMI) of 50.0 to 59.9 in adult (H)  Status post gastric sleeve surgery, initial weight was 408 pounds, currently at 314 pounds, encouraged to continue with healthy lifestyle changes, will be starting an exercise program soon.  Continue to monitor.    Essential hypertension  Blood pressure is within goal today, continue losartan 100 mg daily, consider lowering the dose as patient weight continues to decrease.    Review of external notes as documented elsewhere in note  30 minutes spent by me on the date of the encounter doing chart review, review of outside records, review of test results, interpretation of tests, patient visit, and documentation         Work on weight loss  Regular exercise    Jass Sexton is a 38 year old, presenting for the following health issues:  Follow Up      4/16/2024     8:07 AM   Additional Questions   Roomed by Drew CHAN   Accompanied by self     History of Present Illness       Reason for visit:  Follow up    He eats 0-1 servings of fruits and vegetables daily.He consumes 1 sweetened beverage(s) daily.He exercises with enough effort to increase his heart rate 9 or less minutes per day.  He exercises with enough effort to increase his heart rate 3 or less days per week.   He is taking medications regularly.       He had a gastric sleeve on March 4, 2024, initial weight was 408 pounds, while patient was on Wegovy, he was able to bring his weight down to 353 pounds, then after his sleeve his weight went down to 324 pounds, currently at 314 pounds, Current patient's weight goal is about 250 pound.He is taking his multivitamins, he had a gout flareup involving his ankle few weeks ago, possibly related to aspartame, he takes colchicine with improvement.  Was given the okay by the bariatric team to start exercising.  Blood pressure was still a bit high at home a few weeks ago, but  "appears normal today.  Currently on losartan 100 mg daily.      Review of Systems  Constitutional, HEENT, cardiovascular, pulmonary, gi and gu systems are negative, except as otherwise noted.      Objective    /78 (BP Location: Left arm, Patient Position: Sitting, Cuff Size: Adult Large)   Pulse 73   Temp 98.2  F (36.8  C) (Temporal)   Resp 20   Ht 1.803 m (5' 11\")   Wt 142.4 kg (314 lb)   SpO2 96%   BMI 43.79 kg/m    Body mass index is 43.79 kg/m .  Physical Exam   GENERAL: alert and no distress  NECK: no adenopathy, no asymmetry, masses, or scars  RESP: lungs clear to auscultation - no rales, rhonchi or wheezes  CV: regular rate and rhythm, normal S1 S2, no S3 or S4, no murmur, click or rub, no peripheral edema  ABDOMEN: soft, nontender, no hepatosplenomegaly, no masses and bowel sounds normal  MS: no gross musculoskeletal defects noted, no edema            Signed Electronically by: Rafael Bonner MD      Prior to immunization administration, verified patients identity using patient s name and date of birth. Please see Immunization Activity for additional information.     Screening Questionnaire for Adult Immunization    Are you sick today?   No   Do you have allergies to medications, food, a vaccine component or latex?   No   Have you ever had a serious reaction after receiving a vaccination?   No   Do you have a long-term health problem with heart, lung, kidney, or metabolic disease (e.g., diabetes), asthma, a blood disorder, no spleen, complement component deficiency, a cochlear implant, or a spinal fluid leak?  Are you on long-term aspirin therapy?   No   Do you have cancer, leukemia, HIV/AIDS, or any other immune system problem?   No   Do you have a parent, brother, or sister with an immune system problem?   No   In the past 3 months, have you taken medications that affect  your immune system, such as prednisone, other steroids, or anticancer drugs; drugs for the treatment of rheumatoid " arthritis, Crohn s disease, or psoriasis; or have you had radiation treatments?   No   Have you had a seizure, or a brain or other nervous system problem?   No   During the past year, have you received a transfusion of blood or blood    products, or been given immune (gamma) globulin or antiviral drug?   No   For women: Are you pregnant or is there a chance you could become       pregnant during the next month?   No   Have you received any vaccinations in the past 4 weeks?   No     Immunization questionnaire answers were all negative.      Patient instructed to remain in clinic for 15 minutes afterwards, and to report any adverse reactions.     Screening performed by Drew Gay MA on 4/16/2024 at 8:20 AM.

## 2024-04-27 ENCOUNTER — HEALTH MAINTENANCE LETTER (OUTPATIENT)
Age: 39
End: 2024-04-27

## 2024-05-20 ENCOUNTER — VIRTUAL VISIT (OUTPATIENT)
Dept: FAMILY MEDICINE | Facility: CLINIC | Age: 39
End: 2024-05-20
Payer: COMMERCIAL

## 2024-05-20 DIAGNOSIS — M1A.0720 CHRONIC GOUT OF LEFT FOOT, UNSPECIFIED CAUSE: Primary | ICD-10-CM

## 2024-05-20 PROCEDURE — 99213 OFFICE O/P EST LOW 20 MIN: CPT | Mod: 95 | Performed by: FAMILY MEDICINE

## 2024-05-20 RX ORDER — PREDNISONE 20 MG/1
20 TABLET ORAL 2 TIMES DAILY
Qty: 10 TABLET | Refills: 0 | Status: SHIPPED | OUTPATIENT
Start: 2024-05-20 | End: 2024-07-22

## 2024-05-20 NOTE — PROGRESS NOTES
Darshan is a 38 year old who is being evaluated via a billable video visit.    How would you like to obtain your AVS? MyChart  If the video visit is dropped, the invitation should be resent by: Text to cell phone: [phone]  Will anyone else be joining your video visit? No      Assessment & Plan     Chronic gout of left foot, unspecified cause  Acute gouty flareup involving the left foot, management discussed with rest ,activity modification elevation, burst prednisone prescribed, continue to avoid triggers.  - predniSONE (DELTASONE) 20 MG tablet; Take 1 tablet (20 mg) by mouth 2 times daily for 5 days            Work on weight loss  Regular exercise    Subjective   Darshan is a 38 year old, presenting for the following health issues:  Arthritis      5/20/2024     8:31 AM   Additional Questions   Roomed by Drew CHAN     Video Start Time:  08:40 am    Arthritis    History of Present Illness       Reason for visit:  Gout    He eats 2-3 servings of fruits and vegetables daily.He consumes 0 sweetened beverage(s) daily.He exercises with enough effort to increase his heart rate 9 or less minutes per day.  He exercises with enough effort to increase his heart rate 3 or less days per week.   He is taking medications regularly.       Had a barbecue of beef, chicken last weekend, has been experiencing gouty attack involving his left foot with mild to moderate throbbing pain.  Tried colchicine but did not seem to help.  No injury to his left foot no numbness tingling.  He had a gastric sleeve a few months ago, is not a candidate for NSAID, has benefited by using prednisone in the past and would like a refill.  Current weight at home is around 300 pounds      Review of Systems  Constitutional, HEENT, cardiovascular, pulmonary, gi and gu systems are negative, except as otherwise noted.      Objective           Vitals:  No vitals were obtained today due to virtual visit.    Physical Exam   GENERAL: alert and no distress  EYES: Eyes  grossly normal to inspection.  No discharge or erythema, or obvious scleral/conjunctival abnormalities.  RESP: No audible wheeze, cough, or visible cyanosis.    SKIN: Visible skin clear. No significant rash, abnormal pigmentation or lesions.  NEURO: Cranial nerves grossly intact.  Mentation and speech appropriate for age.  PSYCH: Appropriate affect, tone, and pace of words          Video-Visit Details    Type of service:  Video Visit   Video End Time: 08::55am  Originating Location (pt. Location): Home    Distant Location (provider location):  On-site  Platform used for Video Visit: Harshil  Signed Electronically by: Rafael Bonner MD

## 2024-05-29 ENCOUNTER — VIRTUAL VISIT (OUTPATIENT)
Dept: SLEEP MEDICINE | Facility: CLINIC | Age: 39
End: 2024-05-29
Payer: COMMERCIAL

## 2024-05-29 VITALS — BODY MASS INDEX: 40.77 KG/M2 | HEIGHT: 72 IN | WEIGHT: 301 LBS

## 2024-05-29 DIAGNOSIS — G47.33 OSA (OBSTRUCTIVE SLEEP APNEA): Primary | ICD-10-CM

## 2024-05-29 PROCEDURE — 99213 OFFICE O/P EST LOW 20 MIN: CPT | Mod: 95 | Performed by: PSYCHIATRY & NEUROLOGY

## 2024-05-29 ASSESSMENT — PAIN SCALES - GENERAL: PAINLEVEL: NO PAIN (0)

## 2024-05-29 NOTE — NURSING NOTE
Is the patient currently in the state of [place]? YES    Visit mode:VIDEO    If the visit is dropped, the patient can be reconnected by: VIDEO VISIT: Text to cell phone:   Telephone Information:   Mobile [phone]       Will anyone else be joining the visit? NO  (If patient encounters technical issues they should call [phone])    How would you like to obtain your AVS? MyChart    Are changes needed to the allergy or medication list? No    Are refills needed on medications prescribed by this physician? NO  Has patient had flu shot for current/most recent flu season? If so, when? No    Reason for visit: Consult    Deanna MEJIA

## 2024-05-29 NOTE — PROGRESS NOTES
Patient returns to discuss CPAP usage.     Recent sleeve gastrectomy and subsequent weight loss.  Doing well with weight loss but would like a different nasal interface so he can better use CPAP>    Patient was diagnosed with sleep disordered breathing in particular with an obstructive component in January with an AHI of 105.  The patient has been treated with autotitrating CPAP (pressures currently 8 to 11-he switched them himself and he feels these are the right pressures now after losing weight).  However, he is not using the device very much.       Per the download patient is using the device > 4 hours essentially 0 % of nights.  We talked about this at some length.  He is motivated to use it just wants a new mask.       Orders placed for a nasal interface and fitting per his request.     Asked him to keep us updated and we can adjust his pressures down if appropriate as he loses weight.      Plan will be a follow up sleep study in one year.  Follow up with KATE at that time as well.     Discussed the improvement in daytime alertness with PAP therapy as well as the cardiovascular benefits.      Patient has a history of HTN.     All questions have been answered.  Its a pleasure being asked to participate in the patients care.  Patient has been advised on the importance of never driving if tired or sleepy.     In addition to face to face time, time was spent in care coordination today (chart review, orders, documentation).  Total time spent in the care of the patient today was 25 minutes.

## 2024-06-04 NOTE — PROGRESS NOTES
"Video-Visit Details    Type of service:  Video Visit    Video Start Time: 7:42 AM  Video End Time: 8:02 AM    Originating Location (pt. Location): Home    Distant Location (provider location):  Offsite (providers home) [facility] [place]     Platform used for Video Visit: Harshil      Nutrition Reassessment  Reason For Visit:  Bigg Montez is a 38 year old male presenting today for nutrition follow-up, 3 month s/p laparoscopic sleeve gastrectomy with Dr Eden on 3/4/24.  Patient referred by Christin GERMAIN  on March 14, 2024.    Anthropometrics  Initial Consult Weight: 386 lbs  Day of Surgery Weight(3/4/24): 340 lbs.  Current Weight:   Estimated body mass index is 41.81 kg/m  as calculated from the following:    Height as of this encounter: 1.816 m (5' 11.5\").    Weight as of this encounter: 137.9 kg (304 lb).    Weight loss: 82 lbs from initial consult; 36 lbs from day of surgery    Current Vitamins/Minerals: MVI/minerals BID (Flinstones), chewable B12 (500 mcg/day).   Not taking calcium currently.      Nutrition History:  Feels like he has not been as consistent, but does have appropriate groceries/foods available. Identifies not consuming adequate protein or fluid. Has been busy with family needs and forgetting to break for himself. Planning to set out protein shake the night before so he remembers to drink. Needs to find new water bottle, does best when he has a big water bottle near him.   Exercising M/W/F  Typically eating 2-3 times daily.   Feels like things will settle down with summer break.     Diet Recall:  Protein shake am  Protein balls - 2 balls = 8g pro and 200 grover  Dinner: chicken breast with aioli       Progress with Previous Goals:  1) Continue bariatric regular diet, as tolerated - Met, continues   2) Consume 60+ grams of protein/day. - Not met, adding protein shake back in.  3) Sip on 48-64+ oz of fluids/day- between meals only. - Not met  4) Eat " slowly (>20 min/meal), chewing foods well (to applesauce-like consistency). - Met, continues   5) Limit portions to ~1/2 until 3 months post op  - Met, continues   6) Take the following after a Sleeve Gastrectomy:  - Multivitamin/minerals: adult dose 1-2 times daily  Ideally want one that provides:   5,000 - 10,000 international units vitamin A daily   800 mg oral folate daily  8 - 22 mg zinc and 1 - 2 mg copper daily  12 mg Thiamine  - Iron: 45-60 mg elemental (18-36 mg if low risk) - may partly or fully be covered in multivitamin   - Calcium Citrate containing vitamin D: 500 mg 3 times daily or 600 mg 2 times daily     - Separate the calcium from your multivitamin or iron by at least 2 hours.     - Must be a chewable calcium citrate until post-op 3 months     - Options for calcium citrate: Scott calcium citrate chewable, bariatric advantage calcium citrate chewable, Celebrate vitamins calcium citrate chewable, Bariatric Fusion calcium citrate chewable  - Vitamin D - at least 3,000 international units/day between all supplements  - Vitamin B12: sublingual form of at least 500 mcg daily or injection of 1000 mcg monthly     Nutrition Prescription:  Grams Protein: 60 (minimum)   Amount of Fluid: 48-64+ oz    Nutrition Diagnosis  Previous: Food and nutrition-related knowledge deficit r/t lack of prior exposure to diet advancements beyond bariatric pureed diet aeb recent bariatric surgery and pt interest in diet education/review    Current: Food and nutrition-related knowledge deficit r/t lack of prior exposure to diet instruction beyond 3 months s/p SG as evidenced by recent bariatric surgery and pt interest in diet education/review    Intervention  Materials/Education provided, reviewed bariatric regular consistency diet, protein intake, fluid intake, eating pace, chewing foods well, portion control, sugar/fat intake, recommended vitamin/mineral supplements. Patient demonstrates understanding.     Expected Engagement:  "good     Goals:  1) Follow bariatric regular diet.   2) Consume 60+ grams of protein/day.  3) Sip on 64+ oz of fluids/day- between meals only.  4) Eat slowly (>20 min/meal), chewing foods well (to applesauce-like consistency).  5) Limit portions to ~1/2 - 3/4 cup/meal until 6 months post op.  6) Start calcium supplement - ordered threw pharmacy     Quick/Easy Protein Sources:  Hard boiled eggs  Part-skim cheese sticks  Baby Bell cheese rounds  Low-fat/low-sugar Greek yogurt  Low-fat cottage cheese  Lean deli meat (chicken/turkey/ham)  Roasted chickpeas or lentils  Nuts   Turkey meat stick  Protein shake/bar  \"P3\" snack (cheese, nuts, deli meat)  Aldi's \"Protein Bread\"   \"Egglife\" egg white wrap    Tuna/salmon can/packet       Your Stage 5 Diet: Regular Foods  [email or web address]     Supplements after Sleeve Gastrectomy, Gastric Bypass or Single Anastomosis Duodenal Switch  [email or web address]    Tools for after surgery:  Portion control bowls:   - Kingspoke: https://[employer].amazon.com/Portion-Control-Cereal-Porcelain-Healthy/dp/I76RKRX587?th=1  - Bariatric Pal: https://store.Fiverr.compal.Garena/collections/bariatric-dinnerware    Books:  \"Fresh Start Bariatric Cookbook\" by Lamar Rosas, MS, RDN, CD - Excellent cookbook with post-op recipes and many other resources to check out for ongoing support after surgery    \"Eating Well After Weight Loss Surgery\" by Genevieve Benjamin and Henri Hinojosa - Great cookbook with recipes for each diet stage after surgery and recommended portion sizes. Slightly more intensive cooking recipes.    \"Bariatric Mindset Success\" by Bhumi Thompson - book that helps with prevention of weight regain after surgery. Helps train your brain for long term success with weight loss after surgery.    Apps:  BaritaMyRollic eze -  fluid and nutritional tracking. Also has bariatric recipes.     Post-Bariatric Surgery Online Recipe Resources:  Online:  Simpler Recipes: " https://www.eTukTukedAlleyWatch.com/bariatric-surgery/recipes  https://www.[employer].StuffBuff/bariatric-recipes/  https://bariatricUbiquigent.StuffBuff/bariatric-recipes/   Some recipes on this site have larger than 1 cup portion size pictures: http://www.[facility].org/weight-loss-surgery/nutrition/recipe-corner.html   https://www.[employer].me/25-bariatric-friendly-weeknight-meals/  https://[employer]/recipes/   https://www.Scopial Fashion/mbd-recipes  The World According to Alex and Ani Blog: https://theSien.McLarens.StuffBuff/    Protein Supplements for After Surgery:   Unflavored protein powder: Genepro, Isopure (25-30 gm protein per 1 scoop); Vital Proteins collagen powder (1 tbsp = 5 gm pro)  You may also use flavored protein powder if you prefer.   Protein shots: https://store.bariatricNanoscale Components.com/collections/protein-shots  Pre-made protein shakes (no more than 210 Calories, at least 20 grams of protein, and less than 10 grams of sugar):   Premier Protein (160 Calories, 30 g protein)  Boost/Ensure Max (160 calories, 30 gm protein)   Fairlife Core Power (170 calories, 26 gm protein)  Aldi's Elevation Protein Shake (160 calories, 30 gm protein)   Equate Protein Shake (160 calories, 30 gm protein)  Slim Fast Advanced Nutrition (180 Calories, 20 g protein)  Muscle Milk, lactose-free, 17 oz bottle (210 Calories, 30 g protein)  Glucerna Protein Smart (150 grover, 30 gm protein)  Clear Protein Drinks:  BiPro  Premier Protein clear  [id number]  M Health Protein 15 Concentrate  Bone broth  Beneprotein protein powder mixed with 4-6 oz of fluid  [id number]  Gatorade Zero with Protein   Vital Proteins collagen powder mixed with clear fluid           Follow-Up: 6 months post op/prn    Time spent with patient: 20 minutes.  BRO Barajas

## 2024-06-06 ENCOUNTER — VIRTUAL VISIT (OUTPATIENT)
Dept: ENDOCRINOLOGY | Facility: CLINIC | Age: 39
End: 2024-06-06
Payer: COMMERCIAL

## 2024-06-06 VITALS — WEIGHT: 304 LBS | HEIGHT: 72 IN | BODY MASS INDEX: 41.17 KG/M2

## 2024-06-06 VITALS — BODY MASS INDEX: 42.56 KG/M2 | WEIGHT: 304 LBS | HEIGHT: 71 IN

## 2024-06-06 DIAGNOSIS — Z98.84 S/P LAPAROSCOPIC SLEEVE GASTRECTOMY: Primary | ICD-10-CM

## 2024-06-06 DIAGNOSIS — Z71.3 NUTRITIONAL COUNSELING: ICD-10-CM

## 2024-06-06 DIAGNOSIS — R73.03 PREDIABETES: ICD-10-CM

## 2024-06-06 DIAGNOSIS — E66.01 CLASS 3 SEVERE OBESITY DUE TO EXCESS CALORIES WITH SERIOUS COMORBIDITY AND BODY MASS INDEX (BMI) OF 50.0 TO 59.9 IN ADULT (H): Primary | ICD-10-CM

## 2024-06-06 DIAGNOSIS — Z98.84 S/P LAPAROSCOPIC SLEEVE GASTRECTOMY: ICD-10-CM

## 2024-06-06 DIAGNOSIS — E66.813 CLASS 3 SEVERE OBESITY DUE TO EXCESS CALORIES WITH SERIOUS COMORBIDITY AND BODY MASS INDEX (BMI) OF 50.0 TO 59.9 IN ADULT (H): Primary | ICD-10-CM

## 2024-06-06 DIAGNOSIS — E66.01 OBESITY, CLASS III, BMI 40-49.9 (MORBID OBESITY) (H): ICD-10-CM

## 2024-06-06 PROCEDURE — 99214 OFFICE O/P EST MOD 30 MIN: CPT | Mod: 95 | Performed by: NURSE PRACTITIONER

## 2024-06-06 PROCEDURE — G2211 COMPLEX E/M VISIT ADD ON: HCPCS | Mod: 95 | Performed by: NURSE PRACTITIONER

## 2024-06-06 PROCEDURE — 99207 PR NO CHARGE LOS: CPT | Mod: 95 | Performed by: DIETITIAN, REGISTERED

## 2024-06-06 PROCEDURE — 97803 MED NUTRITION INDIV SUBSEQ: CPT | Mod: 95 | Performed by: DIETITIAN, REGISTERED

## 2024-06-06 ASSESSMENT — PAIN SCALES - GENERAL
PAINLEVEL: MODERATE PAIN (4)
PAINLEVEL: MILD PAIN (3)

## 2024-06-06 NOTE — PROGRESS NOTES
Virtual Visit Details    Type of service:  Video Visit   Video Start Time: 7:04 AM  Video End Time:7:35 AM    Originating Location (pt. Location): Home    Distant Location (provider location):  Off-site  Platform used for Video Visit: [facility] Bariatric Surgery Note    RE: Bigg Montez  MR#: [id number]  : 1985  VISIT DATE: 2024      Dear Rafael Bonner,    I had the pleasure of seeing your patient, Bigg Montez, in my post-bariatric surgery assessment clinic.    Assessment & Plan   Problem List Items Addressed This Visit          Digestive    Class 3 severe obesity due to excess calories with serious comorbidity and body mass index (BMI) of 50.0 to 59.9 in adult (H) - Primary       3mo postop. Doing well. No reflux. Minimal constipation- decreased frequency. Has gotten distracted some with busy schedule and having hard time focusing on protein and hydration. Hopes to get gain strength now that he's back at gym. Hopes with life calming down he can focus more. Some cravings/ snacking- going to continue monitoring, discussed restarting wegovy if needed in the future.     Can add fiber to your smoothie- benefiber or metamucil, go slowly   Can do miralax if feeling constipated  Increase fluids   Add back protein shake - look at [employer]  Be mindful of snacking   Have lunch ready   [facility] labs are due - any [facility] location   Follow up 3 months          Relevant Orders    CBC with platelets    Comprehensive metabolic panel    Hemoglobin A1c    Parathyroid Hormone Intact    Vitamin A    Vitamin B12    Vitamin D Deficiency       Endocrine    Prediabetes    Relevant Orders    CBC with platelets    Comprehensive metabolic panel    Hemoglobin A1c    Parathyroid Hormone Intact    Vitamin A    Vitamin B12    Vitamin D Deficiency       Other    S/P laparoscopic sleeve gastrectomy    Relevant Orders    CBC with platelets    Comprehensive metabolic panel    Hemoglobin A1c    Parathyroid Hormone  Intact    Vitamin A    Vitamin B12    Vitamin D Deficiency          33 minutes spent by me on the date of the encounter doing chart review, history and exam, documentation and further activities per the note    CHIEF COMPLAINT: Post-bariatric surgery follow-up. NBS 1/2021 BMI 51.99 with HTN, prediabetes, TRACEE, gout and reflux. S/p sleeve 3/4/2024 with Dr. Eden. Last seen 4/4/2024.     High weight in life 398lb   Consult weight: 378lb  Day of surgery: 340lb    HISTORY OF PRESENT ILLNESS:      6/6/2024     5:56 AM   Questions Regarding Prior Weight Loss Surgery Reviewed With Patient   I had the following weight loss procedure Sleeve Gastrectomy   What year was your surgery? 2024   How has your weight changed since your last visit? I have lost weight   Do you currently have any of the following Sleep Apnea    Hypertension (high blood pressure)?   Do you have any concerns today? Gout flares         Anti-obesity medications:     Current:   None     Failed/contraindicated:   Wegovy preop weight loss     Recent diet changes:   1 cup of food 3 times a day   Difficult to remember breakfast   Needing to find a different brand of protein shakes- burned out   Tries to stick to protein but getting tired of options  Some snacking- spicy pretzels- some cravings      Hungry at 11 and 6pm -   Has fallen away from paying attention to diet as much     Working on preparing lunch ahead of time so something ready right at 11 - otherwise having to find something like an unwich or crisp and green or soup from deli     -Protein- not tracking   -Water- not getting adequate     Recent exercise/activity changes:   Feeling weaker  Has gotten back to exercise   Cardio and weight lifting 3 days a week     Recent stressors:   Schedule is really busy last couple weeks so difficult to get everything done     Recent sleep changes: getting refitted for sleep mask     Vitamins/Labs: labs due     GERD symptoms: stopped omeprazole, no symptoms      Gout- pain almost weekly, has tried allopurinol which caused flares when he would miss any doses. Took predinsone last week. Limits trigger foods. Not getting enough fluids. Pcp sent colchicine    Decreased frequency of BM, would like to go daily     Weight History:      6/6/2024     5:56 AM   --   What is your highest lifetime weight? 408   What is your lowest weight since surgery? (In pounds) 298     Initial Weight (lbs): 378 lbs  Weight: 137.9 kg (304 lb)  Last Visits Weight: 147 kg (324 lb)  Cumulative weight loss (lbs): 74  Weight Loss Percentage: 19.58%        6/6/2024     5:56 AM   Questions Regarding Co-Morbidities and Health Concerns Reviewed With Patient   Pre-diabetes Improved   Diabetes II Never   High Blood Pressure Improved   High cholesterol Never   Heartburn/Reflux Improved   Sleep apnea Improved   PCOS Never   Back pain Improved   Joint pain Stayed the same   Lower leg swelling Stayed the same           6/6/2024     5:56 AM   Eating Habits   How many meals do you eat per day? 3   Do you snack between meals? Yes   How much food are you eating at each meal? 1/2 cup to 1 cup   Are you able to separate your meals and liquids by at least 30 minutes? Sometimes   Are you able to avoid liquid calories? No           6/6/2024     5:56 AM   Exercise Questions Reviewed With Patient   How often do you exercise? 3 to 4 times per week   What is the duration of your exercise (in minutes)? 45 Minutes   What types of exercise do you do? walking    swimming    weightlifting   What keeps you from being more active? I am as active as I can possbily be    My ability to walk or move around is limited       Social History:      6/6/2024     5:56 AM   --   Are you smoking? No   Are you drinking alcohol? Yes   How much alcohol? 0       Medications:  Current Outpatient Medications   Medication Sig Dispense Refill    Calcium Citrate-Vitamin D (CALCIUM CITRATE CHEWY BITE) 500-12.5 MG-MCG CHEW Take 1 chew tab by mouth 2  times daily 180 tablet 3    childrens multivitamin with iron (FLINTSTONES COMPLETE) CHEW Take 2 tablets by mouth daily 180 tablet 3    colchicine (COLCRYS) 0.6 MG tablet Take 1 tablet (0.6 mg) by mouth daily 90 tablet 3    losartan (COZAAR) 100 MG tablet Take 1 tablet (100 mg) by mouth daily 90 tablet 1     No current facility-administered medications for this visit.         6/6/2024     5:56 AM   --   Do you avoid NSAIDs such as (Ibuprofen, Aleve, Naproxen, Advil)? Yes       ROS:  GI:       6/6/2024     5:56 AM   --   Vomiting No   Diarrhea No   Constipation No   Swallowing trouble No   Abdominal pain No   Heartburn No     Skin:       6/6/2024     5:56 AM   BAR RBS ROS - SKIN   Rash in skin folds No     Psych:       6/6/2024     5:56 AM   --   Depression No   Anxiety No     Female Only:       6/6/2024     5:56 AM   BAR RBS ROS -    Stress urinary incontinence No       Admission on 03/04/2024, Discharged on 03/05/2024   Component Date Value Ref Range Status    GLUCOSE BY METER POCT 03/04/2024 81  70 - 99 mg/dL Final    Case Report 03/04/2024    Final                    Value:Surgical Pathology Report                         Case: [id number]                                  Authorizing Provider:  Teddy Eden MD     Collected:           03/04/2024 09:32 AM          Ordering Location:      MAIN OR                 Received:            03/04/2024 10:32 AM          Pathologist:           Monique Torres MD                                                             Specimen:    Stomach, Partial Gastrectomy                                                               Final Diagnosis 03/04/2024    Final                    Value:This result contains rich text formatting which cannot be displayed here.    Clinical Information 03/04/2024    Final                    Value:This result contains rich text formatting which cannot be displayed here.    Gross Description 03/04/2024    Final                    Value:This  "result contains rich text formatting which cannot be displayed here.    Microscopic Description 03/04/2024    Final                    Value:This result contains rich text formatting which cannot be displayed here.    Performing Labs 03/04/2024    Final                    Value:This result contains rich text formatting which cannot be displayed here.    Creatinine 03/04/2024 1.19 (H)  0.67 - 1.17 mg/dL Final    GFR Estimate 03/04/2024 80  >60 mL/min/1.73m2 Final    Platelet Count 03/05/2024 362  150 - 450 10e3/uL Final    GLUCOSE BY METER POCT 03/05/2024 93  70 - 99 mg/dL Final    Hemoglobin 03/05/2024 12.5 (L)  13.3 - 17.7 g/dL Final    Hold Specimen 03/05/2024 JIC   Final             6/1/2012     9:00 AM   CHEL Score (Last Two)   CHEL Raw Score 33   Activation Score 41.7   CHEL Level 1         PHYSICAL EXAM:  Objective    Ht 1.816 m (5' 11.5\")   Wt 137.9 kg (304 lb)   BMI 41.81 kg/m    Vitals - Patient Reported  Pain Score: Moderate Pain (4)  Pain Loc: Foot        Physical Exam   GENERAL: alert and no distress  EYES: Eyes grossly normal to inspection.  No discharge or erythema, or obvious scleral/conjunctival abnormalities.  RESP: No audible wheeze, cough, or visible cyanosis.    SKIN: Visible skin clear. No significant rash, abnormal pigmentation or lesions.  NEURO: Cranial nerves grossly intact.  Mentation and speech appropriate for age.  PSYCH: Appropriate affect, tone, and pace of words        Sincerely,    Brianda Chan NP  The longitudinal plan of care for the diagnosis(es)/condition(s) as documented were addressed during this visit. Due to the added complexity in care, I will continue to support Darshan in the subsequent management and with ongoing continuity of care.    "

## 2024-06-06 NOTE — PATIENT INSTRUCTIONS
"Thank you for allowing us the privilege of caring for you. We hope we provided you with the excellent service you deserve.   Please let us know if there is anything else we can do for you so that we can be sure you are completely satisfied with your care experience.    To ensure the quality of our services you may be receiving a patient satisfaction survey from an independent patient satisfaction monitoring company.    The greatest compliment you can give is a \"Likely to Recommend\"    Your visit was with Brianda Chan NP today.    Instructions per today's visit:     Paul Montez, it was great to visit with you today.  Here is a review of our visit.  If our clinic scheduler is not able to reach you please call [phone] to schedule your next appointments.    Can add fiber to your smoothie- benefiber or metamucil, go slowly   Can do miralax if feeling constipated  Increase fluids   Add back protein shake - look at [employer]  Be mindful of snacking   Have lunch ready   [facility] labs are due - any [facility] location   Follow up 3 months       Information about Video Visits with [place]: video visit information  _________________________________________________________________________________________________________________________________________________________  If you are asked by your clinic team to have your blood pressure checked:  [facility] do offer several locations for blood pressure checks. Please follow the below link to schedule an appointment. Scheduling an appointment at the pharmacy for a blood pressure check is now preferred.    Appointment Plus (appointment-plus.[employer])  _________________________________________________________________________________________________________________________________________________________  Important contact and scheduling information:  Please call our contact center at [phone] to schedule your next appointments.  To find a lab location near " you, please call [phone].  For any nursing questions or concerns call Shey Valladares LPN at [phone] or Brandi Cazares RN at [phone]  Please call during clinic hours Monday through Friday 8:00a - 4:00p if you have questions or you can contact us via [employer] at anytime and we will reply during clinic hours.    Lab results will be communicated through My Chart or letter (if My Chart not used). Please call the clinic if you have not received communication after 1 week or if you have any questions.?  Clinic Fax: [phone]    _________________________________________________________________________________________________________________________________________________________  Meal Replacement Products:    Here is the link to our new e-store where you can purchase our meal replacement products    [facility] E-Store  [email or web address]    The one week starter kit is a great way to sample a variety of products and see what works for you.    If you want more information about the product go to: [employer] Meals  [employer]    If you are an employee or [facility] or [facility] please contact your care team for a 10% estore discount    Free Shipping for orders over $75     Benefits of meal replacements products:    Portion and calorie control  Improved nutrition  Structured eating  Simplified food choices  Avoid contact with trigger foods  _________________________________________________________________________________________________________________________________________________________  Interested in working with a health ?  Health coaches work with you to improve your overall health and wellbeing.  They look at the whole person, and may involve discussion of different areas of life, including, but not limited to the four pillars of health (sleep, exercise, nutrition, and stress management). Discuss with your care team if you would like to start  working a health .  Health Coaching-3 Pack: Schedule by calling [phone]    $99 for three health coaching visits    Visits may be done in person or via phone    Coaching is a partnership between the  and the client; Coaches do not prescribe or diagnose    Coaching helps inspire the client to reach his/her personal goals   _________________________________________________________________________________________________________________________________________________________  24 Week Healthy Lifestyle Plan:    Our mission in the 24-week Healthy Lifestyle Plan is to provide you with individualized care by giving you the tools, education and support you need to lose weight and maintain a healthy lifestyle. In your 24-week journey, you ll be supported by a dedicated weight loss team that includes registered dietitians, medical weight management providers, health coaches, and nurses -- all with special expertise in weight loss -- to help you every step of the way.     Monthly meetings with your registered dietician or medical weight management provider help to review your progress, update your care plan, and make any adjustments needed to ensure success. Between these visits, weekly and bi-weekly health  visits will help you focus on the four pillars of weight loss -- stress, sleep, nutrition, and exercise -- and how you can best adapt each to achieve sustainable weight loss results.    In addition, you will be given exclusive access to online wellbeing classes through [employer].  Your initial visit will be with a medical weight management provider who will help to understand your weight loss goals and ensure this program is the right fit for you. Please let our team know if you are interested in the 24 week plan by sending a message to your care team or calling [phone] to  schedule.  _________________________________________________________________________________________________________________________________________________________  __________  [facility] Get Moving Program  Our team of physical therapists is trained to help you understand and take control of your condition. They will perform a thorough evaluation to determine your ability for activity and develop a customized plan to fit your goals and physical ability.  Scheduling: Unsure if the Get Moving program is right for you? Discuss the program with your medical provider or diabetes educator. You can also call us at [phone] to ask questions or schedule an appointment.   [facility] Get Moving Program  ____________________________________________________________________________________________________________________________________________________________________________   [facility] Diabetes Prevention Program (DPP)  If you have prediabetes and Medicare please contact us via [employer] to learn more about the Diabetes Prevention Program (DPP)  Program Details:   [facility] offers the year-long Diabetes Prevention Program (DPP). The program helps you to make lifestyle changes that prevent or delay type 2 diabetes by supporting healthy eating, increased physical activity, stress reduction and use of coping skills.   On average, previous [facility] DPP cohorts have lost and maintained at least 5% of their starting weight throughout the program and averaged more than 150 minutes of physical activity per week.  Participants meet weekly for one-hour group sessions over sixteen weeks, every other week for the next 8 weeks, and monthly for the last six months.   A year-long maintenance program is also available for participants who complete the first year.   Location & Cost:   During the COVID-19 Public Health Emergency, the program is offered virtually. When in-person classes can resume, they  will be held at [facility].  For people with Medicare, the program is covered in full. A self-pay option will also be available for those with non-Medicare insurance plans.   ______________________________________________________________________________________________________________________________________________________________________________________________________________________________    To work with a Behavioral Health Psychologist:    Call to schedule:    Alton Pierre - [phone]  Elisa Rose - [phone]  Ashlie Portillo - [phone]  Debi Gomes - [phone]   Aracelis Carlson PhD (cannot accept Medicare) [phone]        Thank you,   [facility] Comprehensive Weight Management Team

## 2024-06-06 NOTE — LETTER
"6/6/2024       RE: Bigg Montez  [address]  [place] [address]     Dear Colleague,    Thank you for referring your patient, Bigg Montez, to the [facility] [place] at [facility]. Please see a copy of my visit note below.    Video-Visit Details    Type of service:  Video Visit    Video Start Time: 7:42 AM  Video End Time: 8:02 AM    Originating Location (pt. Location): Home    Distant Location (provider location):  Offsite (providers home) [facility] [place]     Platform used for Video Visit: EcoTimber      Nutrition Reassessment  Reason For Visit:  Bigg Montez is a 38 year old male presenting today for nutrition follow-up, 3 month s/p laparoscopic sleeve gastrectomy with Dr Eden on 3/4/24.  Patient referred by Christin GERMAIN  on March 14, 2024.    Anthropometrics  Initial Consult Weight: 386 lbs  Day of Surgery Weight(3/4/24): 340 lbs.  Current Weight:   Estimated body mass index is 41.81 kg/m  as calculated from the following:    Height as of this encounter: 1.816 m (5' 11.5\").    Weight as of this encounter: 137.9 kg (304 lb).    Weight loss: 82 lbs from initial consult; 36 lbs from day of surgery    Current Vitamins/Minerals: MVI/minerals BID (Flinstones), chewable B12 (500 mcg/day).   Not taking calcium currently.      Nutrition History:  Feels like he has not been as consistent, but does have appropriate groceries/foods available. Identifies not consuming adequate protein or fluid. Has been busy with family needs and forgetting to break for himself. Planning to set out protein shake the night before so he remembers to drink. Needs to find new water bottle, does best when he has a big water bottle near him.   Exercising M/W/F  Typically eating 2-3 times daily.   Feels like things will settle down with summer break.     Diet Recall:  Protein shake am  Protein balls - " 2 balls = 8g pro and 200 grover  Dinner: chicken breast with aioli       Progress with Previous Goals:  1) Continue bariatric regular diet, as tolerated - Met, continues   2) Consume 60+ grams of protein/day. - Not met, adding protein shake back in.  3) Sip on 48-64+ oz of fluids/day- between meals only. - Not met  4) Eat slowly (>20 min/meal), chewing foods well (to applesauce-like consistency). - Met, continues   5) Limit portions to ~1/2 until 3 months post op  - Met, continues   6) Take the following after a Sleeve Gastrectomy:  - Multivitamin/minerals: adult dose 1-2 times daily  Ideally want one that provides:   5,000 - 10,000 international units vitamin A daily   800 mg oral folate daily  8 - 22 mg zinc and 1 - 2 mg copper daily  12 mg Thiamine  - Iron: 45-60 mg elemental (18-36 mg if low risk) - may partly or fully be covered in multivitamin   - Calcium Citrate containing vitamin D: 500 mg 3 times daily or 600 mg 2 times daily     - Separate the calcium from your multivitamin or iron by at least 2 hours.     - Must be a chewable calcium citrate until post-op 3 months     - Options for calcium citrate: Scott calcium citrate chewable, bariatric advantage calcium citrate chewable, Celebrate vitamins calcium citrate chewable, Bariatric Fusion calcium citrate chewable  - Vitamin D - at least 3,000 international units/day between all supplements  - Vitamin B12: sublingual form of at least 500 mcg daily or injection of 1000 mcg monthly     Nutrition Prescription:  Grams Protein: 60 (minimum)   Amount of Fluid: 48-64+ oz    Nutrition Diagnosis  Previous: Food and nutrition-related knowledge deficit r/t lack of prior exposure to diet advancements beyond bariatric pureed diet aeb recent bariatric surgery and pt interest in diet education/review    Current: Food and nutrition-related knowledge deficit r/t lack of prior exposure to diet instruction beyond 3 months s/p SG as evidenced by recent bariatric surgery and pt  "interest in diet education/review    Intervention  Materials/Education provided, reviewed bariatric regular consistency diet, protein intake, fluid intake, eating pace, chewing foods well, portion control, sugar/fat intake, recommended vitamin/mineral supplements. Patient demonstrates understanding.     Expected Engagement: good     Goals:  1) Follow bariatric regular diet.   2) Consume 60+ grams of protein/day.  3) Sip on 64+ oz of fluids/day- between meals only.  4) Eat slowly (>20 min/meal), chewing foods well (to applesauce-like consistency).  5) Limit portions to ~1/2 - 3/4 cup/meal until 6 months post op.  6) Start calcium supplement - ordered threw pharmacy     Quick/Easy Protein Sources:  Hard boiled eggs  Part-skim cheese sticks  Baby Bell cheese rounds  Low-fat/low-sugar Greek yogurt  Low-fat cottage cheese  Lean deli meat (chicken/turkey/ham)  Roasted chickpeas or lentils  Nuts   Turkey meat stick  Protein shake/bar  \"P3\" snack (cheese, nuts, deli meat)  Aldi's \"Protein Bread\"   \"Egglife\" egg white wrap    Tuna/salmon can/packet       Your Stage 5 Diet: Regular Foods  [email or web address]     Supplements after Sleeve Gastrectomy, Gastric Bypass or Single Anastomosis Duodenal Switch  [email or web address]    Tools for after surgery:  Portion control bowls:   - Aireon: https://Public Mobile.amazon.com/Portion-Control-Cereal-Porcelain-Healthy/dp/Z16ECLX316?th=1  - Bariatric Pal: https://store.bariatricpal.Epicsell/collections/bariatric-dinnerware    Books:  \"Fresh Start Bariatric Cookbook\" by Lamar Rosas, MS, RDN, CD - Excellent cookbook with post-op recipes and many other resources to check out for ongoing support after surgery    \"Eating Well After Weight Loss Surgery\" by Genevieve Benjamin and Henri Chaney-Amara - Great cookbook with recipes for each diet stage after surgery and recommended portion sizes. Slightly more intensive cooking recipes.    \"Bariatric Mindset Success\" by Bhumi Thompson - book that " helps with prevention of weight regain after surgery. Helps train your brain for long term success with weight loss after surgery.    Apps:  GeckoGo eze -  fluid and nutritional tracking. Also has bariatric recipes.     Post-Bariatric Surgery Online Recipe Resources:  Online:  Simpler Recipes: https://www.Nanovi/bariatric-surgery/recipes  https://www.Bluespec.Harbor Technologies/bariatric-recipes/  https://bariatricAppTank/bariatric-recipes/   Some recipes on this site have larger than 1 cup portion size pictures: http://www.[facility].org/weight-loss-surgery/nutrition/recipe-corner.html   https://www.Metail.me/25-bariatric-friendly-weeknight-meals/  https://Adteractive/recipes/   https://www.BioHealthonomics Inc..Harbor Technologies/mbd-recipes  The World According to Hark Blog: https://theMelon.Powertech Technology.Harbor Technologies/    Protein Supplements for After Surgery:   Unflavored protein powder: Genepro, Isopure (25-30 gm protein per 1 scoop); Vital Proteins collagen powder (1 tbsp = 5 gm pro)  You may also use flavored protein powder if you prefer.   Protein shots: https://store.NeoGuide Systems.com/collections/protein-shots  Pre-made protein shakes (no more than 210 Calories, at least 20 grams of protein, and less than 10 grams of sugar):   Premier Protein (160 Calories, 30 g protein)  Boost/Ensure Max (160 calories, 30 gm protein)   Fairlife Core Power (170 calories, 26 gm protein)  Aldi's Elevation Protein Shake (160 calories, 30 gm protein)   Equate Protein Shake (160 calories, 30 gm protein)  Slim Fast Advanced Nutrition (180 Calories, 20 g protein)  Muscle Milk, lactose-free, 17 oz bottle (210 Calories, 30 g protein)  Glucerna Protein Smart (150 grover, 30 gm protein)  Clear Protein Drinks:  BiPro  Premier Protein clear  [id number]  M [facility] Protein 15 Concentrate  Bone broth  Beneprotein protein powder mixed with 4-6 oz of fluid  [id number]  Gatorade Zero with Protein   Vital Proteins collagen powder mixed with  clear fluid           Follow-Up: 6 months post op/prn    Time spent with patient: 20 minutes.  Angela Granger RD LD

## 2024-06-06 NOTE — PATIENT INSTRUCTIONS
"Paul Sexton,    Follow-up with RD in 3 months.     Thank you,    Angela Granger, RD, LD  If you would like to schedule or reschedule an appointment with the RD, please call [phone]    Nutrition Goals  1) Follow bariatric regular diet.   2) Consume 60+ grams of protein/day.  3) Sip on 64+ oz of fluids/day- between meals only.  4) Eat slowly (>20 min/meal), chewing foods well (to applesauce-like consistency).  5) Limit portions to ~1/2 - 3/4 cup/meal until 6 months post op.  6) Start calcium supplement - ordered threw pharmacy     Quick/Easy Protein Sources:  Hard boiled eggs  Part-skim cheese sticks  Baby Bell cheese rounds  Low-fat/low-sugar Greek yogurt  Low-fat cottage cheese  Lean deli meat (chicken/turkey/ham)  Roasted chickpeas or lentils  Nuts   Turkey meat stick  Protein shake/bar  \"P3\" snack (cheese, nuts, deli meat)  Aldi's \"Protein Bread\"   \"Egglife\" egg white wrap    Tuna/salmon can/packet       Your Stage 5 Diet: Regular Foods  [email or web address]     Supplements after Sleeve Gastrectomy, Gastric Bypass or Single Anastomosis Duodenal Switch  [email or web address]    Tools for after surgery:  Portion control bowls:   - Icera: https://StormPins.amazon.com/Portion-Control-Cereal-Porcelain-Healthy/dp/S55BAOR342?th=1  - Bariatric Pal: https://store.bariatricpal.GenVec Inc./collections/bariatric-dinnerware    Books:  \"Fresh Start Bariatric Cookbook\" by Lamar Rosas, MS, RDN, CD - Excellent cookbook with post-op recipes and many other resources to check out for ongoing support after surgery    \"Eating Well After Weight Loss Surgery\" by Genevieve Benjamin and Henri Chaney-Amara - Great cookbook with recipes for each diet stage after surgery and recommended portion sizes. Slightly more intensive cooking recipes.    \"Bariatric Mindset Success\" by Bhumi Thompson - book that helps with prevention of weight regain after surgery. Helps train your brain for long term success with weight loss after " surgery.    Apps:  Shang[place] E&P International eze -  fluid and nutritional tracking. Also has bariatric recipes.     Post-Bariatric Surgery Online Recipe Resources:  Online:  Simpler Recipes: https://www.NetPress Digital/bariatric-surgery/recipes  https://www.Vasopharm.Applauze/bariatric-recipes/  https://bariatricmealBTCJam.Applauze/bariatric-recipes/   Some recipes on this site have larger than 1 cup portion size pictures: http://www.[facility].org/weight-loss-surgery/nutrition/recipe-corner.html   https://www.CommuniClique.me/25-bariatric-friendly-weeknight-meals/  https://Comeet/recipes/   https://www.varinode/mbd-recipes  The World According to Eggface Blog: https://theworldPricePandardingWejoeggface.Mobile365 (fka InphoMatch).Applauze/    Protein Supplements for After Surgery:   Unflavored protein powder: Genepro, Isopure (25-30 gm protein per 1 scoop); Vital Proteins collagen powder (1 tbsp = 5 gm pro)  You may also use flavored protein powder if you prefer.   Protein shots: https://store.Tamar Energy.Applauze/collections/protein-shots  Pre-made protein shakes (no more than 210 Calories, at least 20 grams of protein, and less than 10 grams of sugar):   Premier Protein (160 Calories, 30 g protein)  Boost/Ensure Max (160 calories, 30 gm protein)   Fairlife Core Power (170 calories, 26 gm protein)  Aldi's Elevation Protein Shake (160 calories, 30 gm protein)   Equate Protein Shake (160 calories, 30 gm protein)  Slim Fast Advanced Nutrition (180 Calories, 20 g protein)  Muscle Milk, lactose-free, 17 oz bottle (210 Calories, 30 g protein)  Glucerna Protein Smart (150 grover, 30 gm protein)  Clear Protein Drinks:  BiPro  Premier Protein clear  [id number]  [facility] Protein 15 Concentrate  Bone broth  Beneprotein protein powder mixed with 4-6 oz of fluid  [id number]  Gatorade Zero with Protein   Vital Proteins collagen powder mixed with clear fluid      COMPREHENSIVE WEIGHT MANAGEMENT PROGRAM  VIRTUAL SUPPORT GROUPS    At [facility], [facility]  Weight Management program offers on-line support groups for patients who are working on weight loss and considering, preparing for, or have had weight loss surgery.     There is no cost for this opportunity.  You are invited to attend the?Virtual Support Groups?provided by any of the following locations:    [facility] via Microsoft Teams with Cynthia Amor RN  2.   [place] via Future Domain with Terry Nix, PhD, LP  3.   [place] via Future Domain with Paola Franks RN  4.   [facility] via a Zoom Meeting with YARIEL Velasquez-    The following Support Group information can also be found on our website:  https://www.[facility].org/treatments/weight-loss-and-weight-loss-surgery-support-groups      [facility] Weight Loss Surgery Support Group  The support group is a patient-lead forum that meets monthly to share experiences, encouragement and education. It is open to those who have had weight loss surgery, are scheduled for surgery, or are considering surgery.   WHEN: This group meets on the 3rd Wednesday of each month from 5:00PM - 6:00PM virtually using Microsoft Teams.   FACILITATOR: Led by Cynthia Mccall RD, HELEN, RN, the program's Clinical Coordinator.   TO REGISTER: Please contact the clinic via [place] Kylin Net Information Technology or call the nurse line directly at [phone] to inform our staff that you would like an invite sent to you and to let us know the email you would like the invite sent to. Prior to the meeting, a link with directions on how to join the meeting will be sent to you.    2023 and 2024 Meetings   December 20  January 17  February 21  March 20  April 17  May 15  Melly 19      [facility] Bariatric Care Support Group?  This is open to all pre- and post- operative bariatric surgery patients as well as their support system.   WHEN: This group meets the 3rd Tuesday of each month from 6:30 PM - 8:00 PM virtually using  "Microsoft Teams.   FACILITATOR: Led by Terry Nix, Ph.D who is a Licensed Psychologist with the [facility] Comprehensive Weight Management Program.   TO REGISTER: Please send an email to Terry Nix, Ph.D.,  at?[email or web address]?if you would like an invitation to the group. Prior to the meeting, a link with directions on how to join the meeting will be sent to you.    2023 and 2024 Meetings  December 19 January 16: \"Medication Management and Bariatric Surgery\", Irina Salazar, PharmD, Pharmacy Resident at [facility], [facility]  February 20: \"A Bariatric Surgery Patient's Perspective\", JEZ Lord, [facility], Behavioral Health Clinician at [facility]  March 19  April 16  May 21  Melly 18: \"Nutritional Labeling\", Dietitian speaker to be announced.  November 19: \"Holiday Eating\", Dietitian speaker to be announced.    [facility] Post-Operative Bariatric Surgery Support Group  This is a support group for [facility] bariatric patients (and those external to [facility]) who have had bariatric surgery and are at least 3 months post-surgery.  WHEN: This support group meets the 4th Wednesday of the month from 11:00 AM - 12:00 PM virtually using Microsoft Teams.   FACILITATOR: Led by Certified Bariatric Nurse, Paola Franks RN.   TO REGISTER: Please send an email to Paola at [email or web address] if you would like an invitation to the group.  Prior to the meeting, a link with directions on how to join the meeting will be sent to you.    2023 and 2024 Meetings  December 27  January 24  February 28  March 27  April 24  May 22  Melly 26    [facility] Healthy Lifestyle Group?  This is a 60 minute virtual coaching group for those who want to lead a healthier lifestyle. Come together to set goals and overcome barriers in a supportive group environment. We will " "address the four pillars of health: nutrition, exercise, sleep and emotional well-being.  This group is highly recommended for those who are participating in the 24 week Healthy Lifestyle Plan and our Health Coaching sessions.  WHEN: This group meets the 1st Friday of the month, 12:30 PM - 1:30 PM online, via a zoom meeting.    FACILITATOR: Led by National Board Certified Health and , Paola Kwok, [facility].   TO REGISTER: Please call the Call Center at [phone] to register.  You will get an appointment to attend in [facility]. Fifteen minutes prior to the meeting, complete the e-check in and you will get the link to join the meeting.    There is no charge to attend this group and space is limited.     2023 and 2024 Meetings  December 1: \"Let's Talk\" (guided discussion on our wins and challenges)  January 5: \"New Years Vision: Manifest your Best 2024!\" (guided imagery,  journaling and discussion)  February 2: \"Let's Talk\"  March 1: \"10 Percent Happier\" by Brian Chakraborty (Book Bites - a guided discussion on the nuggets of wisdom from favorite wellness books, no need to read the book but highly encouraged)  April 5: \"Let's Talk\"  May 3: \"Essentialism: The Disciplined Pursuit of Less\" by Sheldon Louis (Book Bites discussion)  June 7: \"Let's Talk\"  July 5: NO MEETING, off for the 4th of July Holiday  August 2: \"The Blue Zones, Secrets for Living a Longer Life\" by Brian Mendiola (Book Bites discussion)                    "

## 2024-06-06 NOTE — ASSESSMENT & PLAN NOTE
3mo postop. Doing well. No reflux. Minimal constipation- decreased frequency. Has gotten distracted some with busy schedule and having hard time focusing on protein and hydration. Hopes to get gain strength now that he's back at gym. Hopes with life calming down he can focus more. Some cravings/ snacking- going to continue monitoring, discussed restarting wegovy if needed in the future.     Can add fiber to your smoothie- benefiber or metamucil, go slowly   Can do miralax if feeling constipated  Increase fluids   Add back protein shake - look at Unjury  Be mindful of snacking   Have lunch ready   [place] labs are due - any [facility] location   Follow up 3 months

## 2024-06-06 NOTE — LETTER
2024       RE: Bigg Montez  [address]  [place] [address]     Dear Colleague,    Thank you for referring your patient, Bigg Montez, to the [facility] [place] at [facility]. Please see a copy of my visit note below.    Virtual Visit Details    Type of service:  Video Visit   Video Start Time: 7:04 AM  Video End Time:7:35 AM    Originating Location (pt. Location): Home    Distant Location (provider location):  Off-site  Platform used for Video Visit: [facility] Bariatric Surgery Note    RE: Bigg Montez  MR#: [id number]  : 1985  VISIT DATE: 2024      Dear Rafael Bonner,    I had the pleasure of seeing your patient, Bigg Montez, in my post-bariatric surgery assessment clinic.    Assessment & Plan  Problem List Items Addressed This Visit          Digestive    Class 3 severe obesity due to excess calories with serious comorbidity and body mass index (BMI) of 50.0 to 59.9 in adult (H) - Primary       3mo postop. Doing well. No reflux. Minimal constipation- decreased frequency. Has gotten distracted some with busy schedule and having hard time focusing on protein and hydration. Hopes to get gain strength now that he's back at gym. Hopes with life calming down he can focus more. Some cravings/ snacking- going to continue monitoring, discussed restarting wegovy if needed in the future.     Can add fiber to your smoothie- benefiber or metamucil, go slowly   Can do miralax if feeling constipated  Increase fluids   Add back protein shake - look at [employer]  Be mindful of snacking   Have lunch ready   [facility] labs are due - [facility] [place] location   Follow up 3 months          Relevant Orders    CBC with platelets    Comprehensive metabolic panel    Hemoglobin A1c    Parathyroid Hormone Intact    Vitamin A    Vitamin B12    Vitamin D Deficiency       Endocrine    Prediabetes    Relevant Orders     CBC with platelets    Comprehensive metabolic panel    Hemoglobin A1c    Parathyroid Hormone Intact    Vitamin A    Vitamin B12    Vitamin D Deficiency       Other    S/P laparoscopic sleeve gastrectomy    Relevant Orders    CBC with platelets    Comprehensive metabolic panel    Hemoglobin A1c    Parathyroid Hormone Intact    Vitamin A    Vitamin B12    Vitamin D Deficiency          33 minutes spent by me on the date of the encounter doing chart review, history and exam, documentation and further activities per the note    CHIEF COMPLAINT: Post-bariatric surgery follow-up. NBS 1/2021 BMI 51.99 with HTN, prediabetes, TRACEE, gout and reflux. S/p sleeve 3/4/2024 with Dr. Eden. Last seen 4/4/2024.     High weight in life 398lb   Consult weight: 378lb  Day of surgery: 340lb    HISTORY OF PRESENT ILLNESS:      6/6/2024     5:56 AM   Questions Regarding Prior Weight Loss Surgery Reviewed With Patient   I had the following weight loss procedure Sleeve Gastrectomy   What year was your surgery? 2024   How has your weight changed since your last visit? I have lost weight   Do you currently have any of the following Sleep Apnea    Hypertension (high blood pressure)?   Do you have any concerns today? Gout flares         Anti-obesity medications:     Current:   None     Failed/contraindicated:   Wegovy preop weight loss     Recent diet changes:   1 cup of food 3 times a day   Difficult to remember breakfast   Needing to find a different brand of protein shakes- burned out   Tries to stick to protein but getting tired of options  Some snacking- spicy pretzels- some cravings      Hungry at 11 and 6pm -   Has fallen away from paying attention to diet as much     Working on preparing lunch ahead of time so something ready right at 11 - otherwise having to find something like an unwich or crisp and green or soup from deli     -Protein- not tracking   -Water- not getting adequate     Recent exercise/activity changes:   Feeling  weaker  Has gotten back to exercise   Cardio and weight lifting 3 days a week     Recent stressors:   Schedule is really busy last couple weeks so difficult to get everything done     Recent sleep changes: getting refitted for sleep mask     Vitamins/Labs: labs due     GERD symptoms: stopped omeprazole, no symptoms     Gout- pain almost weekly, has tried allopurinol which caused flares when he would miss any doses. Took predinsone last week. Limits trigger foods. Not getting enough fluids. Pcp sent colchicine    Decreased frequency of BM, would like to go daily     Weight History:      6/6/2024     5:56 AM   --   What is your highest lifetime weight? 408   What is your lowest weight since surgery? (In pounds) 298     Initial Weight (lbs): 378 lbs  Weight: 137.9 kg (304 lb)  Last Visits Weight: 147 kg (324 lb)  Cumulative weight loss (lbs): 74  Weight Loss Percentage: 19.58%        6/6/2024     5:56 AM   Questions Regarding Co-Morbidities and Health Concerns Reviewed With Patient   Pre-diabetes Improved   Diabetes II Never   High Blood Pressure Improved   High cholesterol Never   Heartburn/Reflux Improved   Sleep apnea Improved   PCOS Never   Back pain Improved   Joint pain Stayed the same   Lower leg swelling Stayed the same           6/6/2024     5:56 AM   Eating Habits   How many meals do you eat per day? 3   Do you snack between meals? Yes   How much food are you eating at each meal? 1/2 cup to 1 cup   Are you able to separate your meals and liquids by at least 30 minutes? Sometimes   Are you able to avoid liquid calories? No           6/6/2024     5:56 AM   Exercise Questions Reviewed With Patient   How often do you exercise? 3 to 4 times per week   What is the duration of your exercise (in minutes)? 45 Minutes   What types of exercise do you do? walking    swimming    weightlifting   What keeps you from being more active? I am as active as I can possbily be    My ability to walk or move around is limited        Social History:      6/6/2024     5:56 AM   --   Are you smoking? No   Are you drinking alcohol? Yes   How much alcohol? 0       Medications:  Current Outpatient Medications   Medication Sig Dispense Refill    Calcium Citrate-Vitamin D (CALCIUM CITRATE CHEWY BITE) 500-12.5 MG-MCG CHEW Take 1 chew tab by mouth 2 times daily 180 tablet 3    childrens multivitamin with iron (FLINTSTONES COMPLETE) CHEW Take 2 tablets by mouth daily 180 tablet 3    colchicine (COLCRYS) 0.6 MG tablet Take 1 tablet (0.6 mg) by mouth daily 90 tablet 3    losartan (COZAAR) 100 MG tablet Take 1 tablet (100 mg) by mouth daily 90 tablet 1     No current facility-administered medications for this visit.         6/6/2024     5:56 AM   --   Do you avoid NSAIDs such as (Ibuprofen, Aleve, Naproxen, Advil)? Yes       ROS:  GI:       6/6/2024     5:56 AM   --   Vomiting No   Diarrhea No   Constipation No   Swallowing trouble No   Abdominal pain No   Heartburn No     Skin:       6/6/2024     5:56 AM   BAR RBS ROS - SKIN   Rash in skin folds No     Psych:       6/6/2024     5:56 AM   --   Depression No   Anxiety No     Female Only:       6/6/2024     5:56 AM   BAR RBS ROS -    Stress urinary incontinence No       Admission on 03/04/2024, Discharged on 03/05/2024   Component Date Value Ref Range Status    GLUCOSE BY METER POCT 03/04/2024 81  70 - 99 mg/dL Final    Case Report 03/04/2024    Final                    Value:Surgical Pathology Report                         Case: [id number]                                  Authorizing Provider:  Teddy Eden MD     Collected:           03/04/2024 09:32 AM          Ordering Location:     [facility] MA[facility] OR                 Received:            03/04/2024 10:32 AM          Pathologist:           Monique Torres MD                                                             Specimen:    Stomach, Partial Gastrectomy                                                               Final Diagnosis 03/04/2024     "Final                    Value:This result contains rich text formatting which cannot be displayed here.    Clinical Information 03/04/2024    Final                    Value:This result contains rich text formatting which cannot be displayed here.    Gross Description 03/04/2024    Final                    Value:This result contains rich text formatting which cannot be displayed here.    Microscopic Description 03/04/2024    Final                    Value:This result contains rich text formatting which cannot be displayed here.    Performing Labs 03/04/2024    Final                    Value:This result contains rich text formatting which cannot be displayed here.    Creatinine 03/04/2024 1.19 (H)  0.67 - 1.17 mg/dL Final    GFR Estimate 03/04/2024 80  >60 mL/min/1.73m2 Final    Platelet Count 03/05/2024 362  150 - 450 10e3/uL Final    GLUCOSE BY METER POCT 03/05/2024 93  70 - 99 mg/dL Final    Hemoglobin 03/05/2024 12.5 (L)  13.3 - 17.7 g/dL Final    Hold Specimen 03/05/2024 JIC   Final             6/1/2012     9:00 AM   CHEL Score (Last Two)   CHEL Raw Score 33   Activation Score 41.7   CHEL Level 1         PHYSICAL EXAM:  Objective   Ht 1.816 m (5' 11.5\")   Wt 137.9 kg (304 lb)   BMI 41.81 kg/m    Vitals - Patient Reported  Pain Score: Moderate Pain (4)  Pain Loc: Foot        Physical Exam   GENERAL: alert and no distress  EYES: Eyes grossly normal to inspection.  No discharge or erythema, or obvious scleral/conjunctival abnormalities.  RESP: No audible wheeze, cough, or visible cyanosis.    SKIN: Visible skin clear. No significant rash, abnormal pigmentation or lesions.  NEURO: Cranial nerves grossly intact.  Mentation and speech appropriate for age.  PSYCH: Appropriate affect, tone, and pace of words      Sincerely,    Brianda Chan NP  The longitudinal plan of care for the diagnosis(es)/condition(s) as documented were addressed during this visit. Due to the added complexity in care, I will continue to support Darshan " in the subsequent management and with ongoing continuity of care.

## 2024-06-06 NOTE — NURSING NOTE
Is the patient currently in the state of [place]? YES    Visit mode:VIDEO    If the visit is dropped, the patient can be reconnected by: VIDEO VISIT: Text to cell phone:   Telephone Information:   Mobile [phone]       Will anyone else be joining the visit? NO  (If patient encounters technical issues they should call [phone]    How would you like to obtain your AVS? MyChart    Are changes needed to the allergy or medication list? No    Are refills needed on medications prescribed by this physician? NO    Reason for visit: RECHECK    Bobbilynn Grossaint VVF

## 2024-06-06 NOTE — NURSING NOTE
Is the patient currently in the state of [place]? YES    Visit mode:VIDEO    If the visit is dropped, the patient can be reconnected by: VIDEO VISIT: Text to cell phone:   Telephone Information:   Mobile [phone]       Will anyone else be joining the visit? NO  (If patient encounters technical issues they should call [phone]    How would you like to obtain your AVS? MyChart    Are changes needed to the allergy or medication list? No    Are refills needed on medications prescribed by this physician? NO    Reason for visit: RECHECK    Bobbilynn Grossaint VVF

## 2024-06-12 ENCOUNTER — TELEPHONE (OUTPATIENT)
Dept: ENDOCRINOLOGY | Facility: CLINIC | Age: 39
End: 2024-06-12
Payer: COMMERCIAL

## 2024-06-12 NOTE — TELEPHONE ENCOUNTER
Left Voicemail (1st Attempt) and Sent Mychart (1st Attempt) for the patient to call back and schedule the following:    Appointment type: DARRELL PLATT  Provider: Brianda Chan NP  Return date: 3 mos   Specialty phone number: [phone]  Additional appointment(s) needed: n/a  Additonal Notes: 6 mo post op, sleeve gastrectomy with  Dr Eden on 3/4/24

## 2024-07-17 ENCOUNTER — MYC REFILL (OUTPATIENT)
Dept: FAMILY MEDICINE | Facility: CLINIC | Age: 39
End: 2024-07-17
Payer: COMMERCIAL

## 2024-07-17 ENCOUNTER — MYC MEDICAL ADVICE (OUTPATIENT)
Dept: FAMILY MEDICINE | Facility: CLINIC | Age: 39
End: 2024-07-17
Payer: COMMERCIAL

## 2024-07-17 DIAGNOSIS — I10 ESSENTIAL HYPERTENSION: ICD-10-CM

## 2024-07-17 DIAGNOSIS — M1A.09X0 CHRONIC GOUT OF MULTIPLE SITES, UNSPECIFIED CAUSE: ICD-10-CM

## 2024-07-17 DIAGNOSIS — M1A.0720 CHRONIC GOUT OF LEFT FOOT, UNSPECIFIED CAUSE: ICD-10-CM

## 2024-07-17 RX ORDER — COLCHICINE 0.6 MG/1
0.6 TABLET ORAL DAILY
Qty: 90 TABLET | Refills: 3 | Status: CANCELLED | OUTPATIENT
Start: 2024-07-17

## 2024-07-18 RX ORDER — COLCHICINE 0.6 MG/1
0.6 TABLET ORAL DAILY
Qty: 90 TABLET | Refills: 0 | Status: SHIPPED | OUTPATIENT
Start: 2024-07-18

## 2024-07-19 RX ORDER — LOSARTAN POTASSIUM 100 MG/1
100 TABLET ORAL DAILY
Qty: 90 TABLET | Refills: 1 | Status: SHIPPED | OUTPATIENT
Start: 2024-07-19

## 2024-07-22 RX ORDER — PREDNISONE 20 MG/1
20 TABLET ORAL 2 TIMES DAILY
Qty: 10 TABLET | Refills: 0 | Status: SHIPPED | OUTPATIENT
Start: 2024-07-22 | End: 2024-08-07

## 2024-07-22 NOTE — TELEPHONE ENCOUNTER
Dr. Bonner,    Please see pt MyChart message and advise.   Historical medication pended for your review as previously ordered.    Thank you,  Sunitha Mendiola RN

## 2024-08-07 ENCOUNTER — MYC MEDICAL ADVICE (OUTPATIENT)
Dept: FAMILY MEDICINE | Facility: CLINIC | Age: 39
End: 2024-08-07
Payer: COMMERCIAL

## 2024-08-07 DIAGNOSIS — M1A.0720 CHRONIC GOUT OF LEFT FOOT, UNSPECIFIED CAUSE: ICD-10-CM

## 2024-08-07 RX ORDER — PREDNISONE 20 MG/1
20 TABLET ORAL 2 TIMES DAILY
Qty: 10 TABLET | Refills: 0 | Status: SHIPPED | OUTPATIENT
Start: 2024-08-07

## 2024-08-07 NOTE — TELEPHONE ENCOUNTER
S-(situation):   I m currently struggling to put weight on my left foot and is completely swollen. I was hoping you could write me a script for prednisone. Please contact me as soon as possible     B-(background):   Patient had a virtual visit on 5/20/24 with Dr Bonner for gout    A-(assessment):   Patient declined triage and an appointment  Mild ankle / foot swelling Pain when sitting 6 /10 and with activity / bearing weight 7.5 /10  Redness, but no drainage  View attachment    R-(recommendations):     Patient requesting message be sent to Dr Bonner for gout treatment. Pharmacy verified  Message sent to PCP, Dr Bonner for review / plan    Katelyn España RN  [facility]      [facility] [place] [facility] (supporting Rafael Bonner MD)     I m currently struggling to put weight on my left foot and is completely swollen. I was hoping you could write me a script for prednisone. Please contact me as soon as possible    Attachments   [email or web address]

## 2024-08-25 ENCOUNTER — PATIENT OUTREACH (OUTPATIENT)
Dept: CARE COORDINATION | Facility: CLINIC | Age: 39
End: 2024-08-25
Payer: COMMERCIAL

## 2024-09-03 NOTE — NURSING NOTE
Is the patient currently in the state of [place]? YES    Visit mode:VIDEO    If the visit is dropped, the patient can be reconnected by: VIDEO VISIT: Text to cell phone:   Telephone Information:   Mobile [phone]       Will anyone else be joining the visit? NO  (If patient encounters technical issues they should call [phone]    How would you like to obtain your AVS? MyChart    Are changes needed to the allergy or medication list?  Pt has medications assigned for post op so he is curently not taking.  The wegovy the pt just finished so he is no longer taking     Reason for visit: RECHECK (Pre surg)    Brianda DILLONF       [Routine On-Treatment] : a routine on-treatment visit for [Head and Neck Cancer] : head and neck cancer

## 2024-09-16 NOTE — PROGRESS NOTES
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  The patient has sleep apnea, prediabetes and hypertension.  The patient also has a history of.  The most she is overweight is approximately 408 pounds he weighs 355 pounds today.  He is currently on Wegovy and doing well.     Past Medical History:   Diagnosis Date    Gout      Past Surgical History:   Procedure Laterality Date    VASECTOMY N/A 2020     Current Outpatient Medications   Medication    allopurinol (ZYLOPRIM) 300 MG tablet    Cholecalciferol (VITAMIN D3) 75 MCG (3000 UT) TABS    colchicine (COLCYRS) 0.6 MG tablet    losartan (COZAAR) 100 MG tablet    predniSONE (DELTASONE) 20 MG tablet    Semaglutide-Weight Management (WEGOVY) 2.4 MG/0.75ML pen     No current facility-administered medications for this visit.        Allergies   Allergen Reactions    Cats Other (See Comments)    No Clinical Screening - See Comments Unknown     seasonal    Seasonal Allergies      BP (!) 143/95 (BP Location: Left arm, Patient Position: Sitting, Cuff Size: Adult Large)   Pulse 90   Ht 1.816 m (5' 11.5\")   Wt (!) 161.3 kg (355 lb 11.2 oz)   SpO2 100%   BMI 48.92 kg/m         On physical examination he has primarily central adipose tissue deposition.      Patient highly motivated for laparoscopic sleeve gastrectomy he still needs to lose additional weight.  He is awaiting a sleep study.  We will have our team reach out to him in terms of scheduling a date.  We talked about the procedure its risk potential complications and alternatives including the risk of leak, pulmonary embolus, wound infection and gastroesophageal reflux disease amongst others.  He understands the alternative procedures including the Kevin-en-Y gastric bypass and has selected the sleeve.  He also understands that he may need to fix a hiatal hernia if we find 1 at the time of surgery.    "
[FreeTextEntry1] : US reviewed with patients parent. All questions answered during the consultation. 
[FreeTextEntry1] : US reviewed with patients parent. All questions answered during the consultation.

## 2024-11-21 ENCOUNTER — DOCUMENTATION ONLY (OUTPATIENT)
Dept: SLEEP MEDICINE | Facility: CLINIC | Age: 39
End: 2024-11-21
Payer: COMMERCIAL

## 2024-11-21 NOTE — PROGRESS NOTES
Patient came to [facility] for mask fitting appointment on November 21, 2024. Patient requested to switch masks because general discomfort . Discussed the following masks: Dreamwear, Airfit N30i Patient selected a Resmed Mask name: N30i Nasal mask size Standard.  PT ALSO STATED HE IS GOING TO INCREASE THE HUMIDITY A LITTLE TO HELP W/ DRY MOUTH AND ORAL BREATHING.  HE WILL ALSO TRY CPAP TAPE.

## 2025-03-25 ENCOUNTER — MYC REFILL (OUTPATIENT)
Dept: FAMILY MEDICINE | Facility: CLINIC | Age: 40
End: 2025-03-25

## 2025-03-25 ENCOUNTER — E-VISIT (OUTPATIENT)
Dept: FAMILY MEDICINE | Facility: CLINIC | Age: 40
End: 2025-03-25
Payer: COMMERCIAL

## 2025-03-25 DIAGNOSIS — E66.01 MORBID OBESITY (H): Primary | ICD-10-CM

## 2025-03-25 DIAGNOSIS — M1A.09X0 CHRONIC GOUT OF MULTIPLE SITES, UNSPECIFIED CAUSE: ICD-10-CM

## 2025-03-25 DIAGNOSIS — M1A.0720 CHRONIC GOUT OF LEFT FOOT, UNSPECIFIED CAUSE: ICD-10-CM

## 2025-03-25 PROCEDURE — 99207 PR NON-BILLABLE SERV PER CHARTING: CPT | Performed by: FAMILY MEDICINE

## 2025-03-25 NOTE — TELEPHONE ENCOUNTER
Clinic RN: Please investigate with patient/pharmacy.    Colchicine - Gap in medication>90 days, patient should have run out October 2024.    Prednisone - Triage? Looks like this was prescribed for acute condition.    Deanna Nguyen RN  [facility]

## 2025-03-27 NOTE — PATIENT INSTRUCTIONS
Thank you for choosing us for your care. I have placed the below referral(s) for you:  Orders Placed This Encounter   Procedures     Adult Comprehensive Weight Management  Referral       Please click the link for your After Visit Summary to view scheduling instructions for your referral. In most cases, you will be contacted within 2 business days to schedule. If you do not hear from a representative within that time, please call [phone] to be connected to a .

## 2025-04-01 ENCOUNTER — E-VISIT (OUTPATIENT)
Dept: FAMILY MEDICINE | Facility: CLINIC | Age: 40
End: 2025-04-01
Payer: COMMERCIAL

## 2025-04-01 DIAGNOSIS — M10.9 ACUTE GOUTY ARTHRITIS: Primary | ICD-10-CM

## 2025-04-02 RX ORDER — PREDNISONE 20 MG/1
20 TABLET ORAL 2 TIMES DAILY
Qty: 10 TABLET | Refills: 0 | Status: SHIPPED | OUTPATIENT
Start: 2025-04-02

## 2025-04-02 RX ORDER — PREDNISONE 20 MG/1
20 TABLET ORAL 2 TIMES DAILY
Qty: 10 TABLET | Refills: 0 | Status: SHIPPED | OUTPATIENT
Start: 2025-04-02 | End: 2025-04-07

## 2025-04-02 RX ORDER — COLCHICINE 0.6 MG/1
0.6 TABLET ORAL DAILY
Qty: 90 TABLET | Refills: 0 | Status: SHIPPED | OUTPATIENT
Start: 2025-04-02

## 2025-04-02 NOTE — TELEPHONE ENCOUNTER
Dr Bonner,    Writer spoke to pt to follow-up on refill request for colchicine 0.6mg and predinsone 20mg. Pt should have ran out of colchicine in October 2024 if taken as prescribed and prednisone was for acute gout flare up.   Pt confirmed he only takes colchicine when he has gout flare-up and not as prescribed. Pt currently experiencing acute gout flare-up now.     Pt had e- visit with you yesterday and forgot to mentioned flare-up.     Please review pended order and advise.     Teresa YOUNG RN  [facility]

## 2025-04-02 NOTE — PATIENT INSTRUCTIONS
Thank you for checking in. I have Refilled the prednisone, make an appointment if you fail to improve.  Orders Placed This Encounter   Medications     predniSONE (DELTASONE) 20 MG tablet     Sig: Take 1 tablet (20 mg) by mouth 2 times daily for 5 days.     Dispense:  10 tablet     Refill:  0          View your full visit summary for details by clicking on the link below. Your pharmacist will be able to address any questions you may have about the medication.          Thank you for choosing us for your care.

## 2025-04-09 ENCOUNTER — TELEPHONE (OUTPATIENT)
Dept: ENDOCRINOLOGY | Facility: CLINIC | Age: 40
End: 2025-04-09

## 2025-04-09 NOTE — TELEPHONE ENCOUNTER
Patient confirmed scheduled appointment:     Date: 4/16/25  Time: 9AM  Visit type: Return Bariatric  Visit mode: Virtual Visit  Provider:  Brianda Chan NP  Location: [facility] [place]    Additional Notes:   Rescheduled from cancelled clinic on 4/9/25

## 2025-04-14 ENCOUNTER — OFFICE VISIT (OUTPATIENT)
Dept: FAMILY MEDICINE | Facility: CLINIC | Age: 40
End: 2025-04-14
Payer: COMMERCIAL

## 2025-04-14 VITALS
WEIGHT: 313 LBS | BODY MASS INDEX: 43.82 KG/M2 | TEMPERATURE: 97.4 F | DIASTOLIC BLOOD PRESSURE: 88 MMHG | HEART RATE: 105 BPM | OXYGEN SATURATION: 98 % | SYSTOLIC BLOOD PRESSURE: 148 MMHG | HEIGHT: 71 IN | RESPIRATION RATE: 16 BRPM

## 2025-04-14 DIAGNOSIS — M1A.09X0 CHRONIC GOUT OF MULTIPLE SITES, UNSPECIFIED CAUSE: Primary | ICD-10-CM

## 2025-04-14 DIAGNOSIS — E66.813 CLASS 3 SEVERE OBESITY DUE TO EXCESS CALORIES WITH SERIOUS COMORBIDITY AND BODY MASS INDEX (BMI) OF 50.0 TO 59.9 IN ADULT: ICD-10-CM

## 2025-04-14 DIAGNOSIS — I10 ESSENTIAL HYPERTENSION: ICD-10-CM

## 2025-04-14 PROCEDURE — 84550 ASSAY OF BLOOD/URIC ACID: CPT | Performed by: FAMILY MEDICINE

## 2025-04-14 PROCEDURE — 3079F DIAST BP 80-89 MM HG: CPT | Performed by: FAMILY MEDICINE

## 2025-04-14 PROCEDURE — 3077F SYST BP >= 140 MM HG: CPT | Performed by: FAMILY MEDICINE

## 2025-04-14 PROCEDURE — 36415 COLL VENOUS BLD VENIPUNCTURE: CPT | Performed by: FAMILY MEDICINE

## 2025-04-14 PROCEDURE — 99214 OFFICE O/P EST MOD 30 MIN: CPT | Performed by: FAMILY MEDICINE

## 2025-04-14 RX ORDER — LOSARTAN POTASSIUM 100 MG/1
100 TABLET ORAL DAILY
Qty: 90 TABLET | Refills: 1 | Status: SHIPPED | OUTPATIENT
Start: 2025-04-14

## 2025-04-14 NOTE — PROGRESS NOTES
"  Assessment & Plan     Chronic gout of multiple sites, unspecified cause  Management of acute and chronic gout discussed, allopurinol did cause flareup of gout at several different occasions in the past, colchicine caused nausea and vomiting, discussed healthy lifestyle changes, low purine diet, discussed uloric as an alternative option.  Patient will let us know .  Discussed importance of keeping uric acid level below 5 mg to minimize flareup of gout.  - Uric acid; Future  - Uric acid    Essential hypertension  Uncontrolled, discussed healthy lifestyle changes, resume losartan, consider adding a second agent.  - losartan (COZAAR) 100 MG tablet; Take 1 tablet (100 mg) by mouth daily.    Class 3 severe obesity due to excess calories with serious comorbidity and body mass index (BMI) of 50.0 to 59.9 in adult (H)  Initial weight was around 400, then decreased to 353, currently at 313, he will follow with the weight loss clinic.    Review of external notes as documented elsewhere in note      30 minutes spent by me on the date of the encounter doing chart review, history and exam, documentation and further activities per the note      BMI  Estimated body mass index is 43.65 kg/m  as calculated from the following:    Height as of this encounter: 1.803 m (5' 11\").    Weight as of this encounter: 142 kg (313 lb).   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  He exercises with enough effort to increase his heart " "rate 3 or less days per week. He is missing 1 dose(s) of medications per week.  He is not taking prescribed medications regularly due to remembering to take.        Recurrent episode of gout, most recent 1 was at the right second Carpometacarpal joint area few days ago, with throbbing pain, swelling,, symptoms improved with the steroid treatment.  Has had a couple of episodes last year, currently working on lifestyle, has been on allopurinol over the last 9 years, but it made his uric acid level increased with spike of flareup of gout while he was on the medication.  Colchicine caused some vomiting.  Discussed option of Uloric.  Hypertension, currently mildly elevated, no symptoms related to that.  Currently taking losartan 100 mg daily.      Review of Systems  Constitutional, HEENT, cardiovascular, pulmonary, gi and gu systems are negative, except as otherwise noted.      Objective    BP (!) 146/89 (BP Location: Left arm, Patient Position: Sitting, Cuff Size: Adult Large)   Pulse 105   Temp 97.4  F (36.3  C) (Temporal)   Resp 16   Ht 1.803 m (5' 11\")   Wt (!) 142 kg (313 lb)   SpO2 98%   BMI 43.65 kg/m    Body mass index is 43.65 kg/m .  Physical Exam   GENERAL: alert and no distress  NECK: no adenopathy, no asymmetry, masses, or scars  RESP: lungs clear to auscultation - no rales, rhonchi or wheezes  CV: regular rate and rhythm, normal S1 S2, no S3 or S4, no murmur, click or rub, no peripheral edema  MS: Mild swelling right second Carpometacarpal joint, minimal tenderness.  Overall improving according to patient.    Results for orders placed or performed in visit on 04/14/25   Uric acid     Status: Abnormal   Result Value Ref Range    Uric Acid 8.9 (H) 3.4 - 7.0 mg/dL         This note was completed in part using a voice recognition software, any grammatical or context distortion are unintentional and inherent to the software.    Signed Electronically by: Rafael Bonner MD    "

## 2025-04-14 NOTE — PROGRESS NOTES
Prior to immunization administration, verified patients identity using patient s name and date of birth. Please see Immunization Activity for additional information.     Screening Questionnaire for Adult Immunization    Are you sick today?   No   Do you have allergies to medications, food, a vaccine component or latex?   No   Have you ever had a serious reaction after receiving a vaccination?   No   Do you have a long-term health problem with heart, lung, kidney, or metabolic disease (e.g., diabetes), asthma, a blood disorder, no spleen, complement component deficiency, a cochlear implant, or a spinal fluid leak?  Are you on long-term aspirin therapy?   No   Do you have cancer, leukemia, HIV/AIDS, or any other immune system problem?   No   Do you have a parent, brother, or sister with an immune system problem?   No   In the past 3 months, have you taken medications that affect  your immune system, such as prednisone, other steroids, or anticancer drugs; drugs for the treatment of rheumatoid arthritis, Crohn s disease, or psoriasis; or have you had radiation treatments?   No   Have you had a seizure, or a brain or other nervous system problem?   No   During the past year, have you received a transfusion of blood or blood    products, or been given immune (gamma) globulin or antiviral drug?   No   For women: Are you pregnant or is there a chance you could become       pregnant during the next month?   No   Have you received any vaccinations in the past 4 weeks?   No     Immunization questionnaire answers were all negative.      Patient instructed to remain in clinic for 15 minutes afterwards, and to report any adverse reactions.     Screening performed by Bertha aPrker MA on 4/14/2025 at 12:41 PM.    Answers submitted by the patient for this visit:  General Questionnaire (Submitted on 4/11/2025)  Chief Complaint: Chronic problems general questions HPI Form  What is the reason for your visit today? : Hand joint  How many  servings of fruits and vegetables do you eat daily?: 0-1  On average, how many sweetened beverages do you drink each day (Examples: soda, juice, sweet tea, etc.  Do NOT count diet or artificially sweetened beverages)?: 0  How many minutes a day do you exercise enough to make your heart beat faster?: 9 or less  How many days a week do you exercise enough to make your heart beat faster?: 3 or less  How many days per week do you miss taking your medication?: 1  What makes it hard for you to take your medication every day?: remembering to take  Questionnaire about: Chronic problems general questions HPI Form (Submitted on 4/11/2025)  Chief Complaint: Chronic problems general questions HPI Form

## 2025-04-15 LAB — URATE SERPL-MCNC: 8.9 MG/DL (ref 3.4–7)

## 2025-04-16 ENCOUNTER — VIRTUAL VISIT (OUTPATIENT)
Dept: ENDOCRINOLOGY | Facility: CLINIC | Age: 40
End: 2025-04-16
Payer: COMMERCIAL

## 2025-04-16 VITALS — WEIGHT: 313 LBS | HEIGHT: 71 IN | BODY MASS INDEX: 43.82 KG/M2

## 2025-04-16 DIAGNOSIS — Z98.84 S/P LAPAROSCOPIC SLEEVE GASTRECTOMY: ICD-10-CM

## 2025-04-16 DIAGNOSIS — E66.813 CLASS 3 SEVERE OBESITY WITH SERIOUS COMORBIDITY AND BODY MASS INDEX (BMI) OF 40.0 TO 44.9 IN ADULT, UNSPECIFIED OBESITY TYPE: Primary | ICD-10-CM

## 2025-04-16 DIAGNOSIS — G47.33 OSA (OBSTRUCTIVE SLEEP APNEA): ICD-10-CM

## 2025-04-16 RX ORDER — SEMAGLUTIDE 0.5 MG/.5ML
0.5 INJECTION, SOLUTION SUBCUTANEOUS
Qty: 2 ML | Refills: 1 | Status: SHIPPED | OUTPATIENT
Start: 2025-04-16

## 2025-04-16 RX ORDER — SEMAGLUTIDE 0.25 MG/.5ML
0.25 INJECTION, SOLUTION SUBCUTANEOUS
Qty: 2 ML | Refills: 0 | Status: SHIPPED | OUTPATIENT
Start: 2025-04-16

## 2025-04-16 NOTE — LETTER
2025       RE: Bigg Montez  [address]  [place] [address]     Dear Colleague,    Thank you for referring your patient, Bigg Montez, to the [facility]. Please see a copy of my visit note below.    Return Bariatric Surgery Note    RE: Bigg Montez  MR#: [id number]  : 1985  VISIT DATE: 2025      Dear Rafael Bonner,    I had the pleasure of seeing your patient, Bigg Montez, in my post-bariatric surgery assessment clinic.    Assessment & Plan  Problem List Items Addressed This Visit          Respiratory    TRACEE (obstructive sleep apnea)       Digestive    Class 3 severe obesity with serious comorbidity and body mass index (BMI) of 40.0 to 44.9 in adult - Primary     4 years s/p sleeve with weight regain starting 2025 associated with increased hunger and portions. Noticing more palatable foods, crumbly foods, less proteins as hunger increases. Life is very busy and stressful so family is having to eat out more often, trying to make healthier choices. Would like to consider  antiobesity medications to help with both appetite and stopping weight regain. He'd love to lose additional weight.     Took ozempic preop with good benefit. Would like to restart. Will not likely have coverage for ozempic due to no hx DMII but will try for wegovy.     Wegovy 0.25mg x 4 weeks then increase to 0.5mg  Increase protein   See dietitian   Follow up 3 months          Relevant Medications    semaglutide-weight management (WEGOVY) 0.25 MG/0.5ML pen    Semaglutide-Weight Management (WEGOVY) 0.5 MG/0.5ML pen       Other    S/P laparoscopic sleeve gastrectomy    Relevant Medications    semaglutide-weight management (WEGOVY) 0.25 MG/0.5ML pen    Semaglutide-Weight Management (WEGOVY) 0.5 MG/0.5ML pen          20 minutes spent by me on the date of the encounter doing chart review, history and  exam, documentation and further activities per the note    The longitudinal plan of care for the diagnosis(es)/condition(s) as documented were addressed during this visit. Due to the added complexity in care, I will continue to support Darshan in the subsequent management and with ongoing continuity of care.    CHIEF COMPLAINT: Post-bariatric surgery follow-up.  NBS 1/2021 BMI 51.99 with HTN, prediabetes, TRACEE, gout and reflux. S/p sleeve 3/4/2024 with Dr. Eden.      High weight in life 398lb   Consult weight: 378lb  Day of surgery: 340lb  Low weight 292lb - 9/2024   Regain started 1/2025 - increased appetite - increased portions, no cravings,     HISTORY OF PRESENT ILLNESS:      4/13/2025    10:29 AM   Questions Regarding Prior Weight Loss Surgery Reviewed With Patient   I had the following weight loss procedure Laparoscopic Adjustable Gastric Band   What year was your surgery? 2024   How has your weight changed since your last visit? I have stayed about the same   Do you currently have any of the following Sleep Apnea   Do you have any concerns today? Weightloss     Doesn't want to do daily med     Recent diet changes:   Stopped protein shakes  Can't eat breakfast - not hungry   Trying to avoid high palatable foods- chips, bread   Craves chips   Family eating more take out - trying to avoid this     -Protein- trying to do low purine diet, doesn't like protein shakes or protein water, 30 to 60g daily    -Water- struggle to get enough because runs out of time     Recent stressors:   Gout flare stressful  Really busy- really hard to stick to plan   Lots of balancing with managing 4 kids   House under construction   New job     Recent sleep changes:   Uses CPAP     Vitamins/Labs:   Take smulti and D3   Labs done 3/2025     GERD symptoms:  +caffeine   No GERD  Stopped omeprazole     Weight History:      4/13/2025    10:29 AM   --   What is your highest lifetime weight? 408   What is your lowest weight since surgery?  (In pounds) 292     Initial Weight (lbs): 378 lbs  Weight: (!) 142 kg (313 lb)  Last Visits Weight: 137.9 kg (304 lb)  Cumulative weight loss (lbs): 65  Weight Loss Percentage: 17.2%        4/13/2025    10:29 AM   Questions Regarding Co-Morbidities and Health Concerns Reviewed With Patient   Pre-diabetes Gone away   Diabetes II Never   High Blood Pressure Improved   High cholesterol Improved   Heartburn/Reflux Never   Sleep apnea Stayed the same   PCOS Never   Back pain Improved   Joint pain Improved   Lower leg swelling Improved           4/13/2025    10:29 AM   Eating Habits   How many meals do you eat per day? 3   Do you snack between meals? Yes   How much food are you eating at each meal? 1/2 cup to 1 cup   Are you able to separate your meals and liquids by at least 30 minutes? No   Are you able to avoid liquid calories? Yes           4/13/2025    10:29 AM   Exercise Questions Reviewed With Patient   How often do you exercise? Less than 1 time per week   What is the duration of your exercise (in minutes)? 15 Minutes   What types of exercise do you do? walking   What keeps you from being more active? I am as active as I can possbily be       Social History:      4/13/2025    10:29 AM   --   Are you smoking? No   Are you drinking alcohol? No       Medications:  Current Outpatient Medications   Medication Sig Dispense Refill     semaglutide-weight management (WEGOVY) 0.25 MG/0.5ML pen Inject 0.5 mLs (0.25 mg) subcutaneously every 7 days. 2 mL 0     Semaglutide-Weight Management (WEGOVY) 0.5 MG/0.5ML pen Inject 0.5 mg subcutaneously every 7 days. 2 mL 1     Calcium Citrate-Vitamin D (CALCIUM CITRATE CHEWY BITE) 500-12.5 MG-MCG CHEW Take 1 chew tab by mouth 2 times daily 180 tablet 3     childrens multivitamin with iron (FLINTSTONES COMPLETE) CHEW Take 2 tablets by mouth daily 180 tablet 3     colchicine (COLCRYS) 0.6 MG tablet Take 1 tablet (0.6 mg) by mouth daily. 90 tablet 0     losartan (COZAAR) 100 MG tablet  "Take 1 tablet (100 mg) by mouth daily. 90 tablet 1     predniSONE (DELTASONE) 20 MG tablet Take 1 tablet (20 mg) by mouth 2 times daily. 10 tablet 0     No current facility-administered medications for this visit.         4/13/2025    10:29 AM   --   Do you avoid NSAIDs such as (Ibuprofen, Aleve, Naproxen, Advil)? No       ROS:  GI:       4/13/2025    10:29 AM   --   Vomiting Yes   Diarrhea No   Constipation No   Swallowing trouble No   Abdominal pain No   Heartburn No     Skin:       4/13/2025    10:29 AM   BAR RBS ROS - SKIN   Rash in skin folds No     Psych:       4/13/2025    10:29 AM   --   Depression Yes   Anxiety Yes     Female Only:       4/13/2025    10:29 AM   BAR RBS ROS -    Stress urinary incontinence No       Office Visit on 04/14/2025   Component Date Value Ref Range Status     Uric Acid 04/14/2025 8.9 (H)  3.4 - 7.0 mg/dL Final             6/1/2012     9:00 AM   CHEL Score (Last Two)   CHEL Raw Score 33   Activation Score 41.7   CHEL Level 1         PHYSICAL EXAM:  Objective   Ht 1.803 m (5' 11\")   Wt (!) 142 kg (313 lb)   BMI 43.65 kg/m           Vitals:  No vitals were obtained today due to virtual visit.    Physical Exam   GENERAL: alert and no distress  EYES: Eyes grossly normal to inspection.  No discharge or erythema, or obvious scleral/conjunctival abnormalities.  RESP: No audible wheeze, cough, or visible cyanosis.    SKIN: Visible skin clear. No significant rash, abnormal pigmentation or lesions.  NEURO: Cranial nerves grossly intact.  Mentation and speech appropriate for age.  PSYCH: Appropriate affect, tone, and pace of words        Sincerely,    Brianda Chan NP      Virtual Visit Details    Type of service:  Video Visit     Originating Location (pt. Location): Home    Distant Location (provider location):  Off-site  Platform used for Video Visit: AmWell      Again, thank you for allowing me to participate in the care of your patient.      Sincerely,    Brianda Chan NP    "

## 2025-04-16 NOTE — PATIENT INSTRUCTIONS
Wegovy 0.25mg x 4 weeks then increase to 0.5mg  Increase protein   See dietitian   Follow up 3 months

## 2025-04-16 NOTE — PROGRESS NOTES
Return Bariatric Surgery Note    RE: Bigg Montez  MR#: [id number]  : 1985  VISIT DATE: 2025      Dear Rafael Bonner,    I had the pleasure of seeing your patient, Bigg Montez, in my post-bariatric surgery assessment clinic.    Assessment & Plan   Problem List Items Addressed This Visit          Respiratory    TRACEE (obstructive sleep apnea)       Digestive    Class 3 severe obesity with serious comorbidity and body mass index (BMI) of 40.0 to 44.9 in adult - Primary     4 years s/p sleeve with weight regain starting 2025 associated with increased hunger and portions. Noticing more palatable foods, crumbly foods, less proteins as hunger increases. Life is very busy and stressful so family is having to eat out more often, trying to make healthier choices. Would like to consider  antiobesity medications to help with both appetite and stopping weight regain. He'd love to lose additional weight.     Took ozempic preop with good benefit. Would like to restart. Will not likely have coverage for ozempic due to no hx DMII but will try for wegovy.     Wegovy 0.25mg x 4 weeks then increase to 0.5mg  Increase protein   See dietitian   Follow up 3 months          Relevant Medications    semaglutide-weight management (WEGOVY) 0.25 MG/0.5ML pen    Semaglutide-Weight Management (WEGOVY) 0.5 MG/0.5ML pen       Other    S/P laparoscopic sleeve gastrectomy    Relevant Medications    semaglutide-weight management (WEGOVY) 0.25 MG/0.5ML pen    Semaglutide-Weight Management (WEGOVY) 0.5 MG/0.5ML pen          20 minutes spent by me on the date of the encounter doing chart review, history and exam, documentation and further activities per the note    The longitudinal plan of care for the diagnosis(es)/condition(s) as documented were addressed during this visit. Due to the added complexity in care, I will continue to support Darshan in the subsequent management and with ongoing continuity of care.    CHIEF COMPLAINT:  Post-bariatric surgery follow-up.  NBS 1/2021 BMI 51.99 with HTN, prediabetes, TRACEE, gout and reflux. S/p sleeve 3/4/2024 with Dr. Eden.      High weight in life 398lb   Consult weight: 378lb  Day of surgery: 340lb  Low weight 292lb - 9/2024   Regain started 1/2025 - increased appetite - increased portions, no cravings,     HISTORY OF PRESENT ILLNESS:      4/13/2025    10:29 AM   Questions Regarding Prior Weight Loss Surgery Reviewed With Patient   I had the following weight loss procedure Laparoscopic Adjustable Gastric Band   What year was your surgery? 2024   How has your weight changed since your last visit? I have stayed about the same   Do you currently have any of the following Sleep Apnea   Do you have any concerns today? Weightloss     Doesn't want to do daily med     Recent diet changes:   Stopped protein shakes  Can't eat breakfast - not hungry   Trying to avoid high palatable foods- chips, bread   Craves chips   Family eating more take out - trying to avoid this     -Protein- trying to do low purine diet, doesn't like protein shakes or protein water, 30 to 60g daily    -Water- struggle to get enough because runs out of time     Recent stressors:   Gout flare stressful  Really busy- really hard to stick to plan   Lots of balancing with managing 4 kids   House under construction   New job     Recent sleep changes:   Uses CPAP     Vitamins/Labs:   Take smulti and D3   Labs done 3/2025     GERD symptoms:  +caffeine   No GERD  Stopped omeprazole     Weight History:      4/13/2025    10:29 AM   --   What is your highest lifetime weight? 408   What is your lowest weight since surgery? (In pounds) 292     Initial Weight (lbs): 378 lbs  Weight: (!) 142 kg (313 lb)  Last Visits Weight: 137.9 kg (304 lb)  Cumulative weight loss (lbs): 65  Weight Loss Percentage: 17.2%        4/13/2025    10:29 AM   Questions Regarding Co-Morbidities and Health Concerns Reviewed With Patient   Pre-diabetes Gone away   Diabetes  II Never   High Blood Pressure Improved   High cholesterol Improved   Heartburn/Reflux Never   Sleep apnea Stayed the same   PCOS Never   Back pain Improved   Joint pain Improved   Lower leg swelling Improved           4/13/2025    10:29 AM   Eating Habits   How many meals do you eat per day? 3   Do you snack between meals? Yes   How much food are you eating at each meal? 1/2 cup to 1 cup   Are you able to separate your meals and liquids by at least 30 minutes? No   Are you able to avoid liquid calories? Yes           4/13/2025    10:29 AM   Exercise Questions Reviewed With Patient   How often do you exercise? Less than 1 time per week   What is the duration of your exercise (in minutes)? 15 Minutes   What types of exercise do you do? walking   What keeps you from being more active? I am as active as I can possbily be       Social History:      4/13/2025    10:29 AM   --   Are you smoking? No   Are you drinking alcohol? No       Medications:  Current Outpatient Medications   Medication Sig Dispense Refill    semaglutide-weight management (WEGOVY) 0.25 MG/0.5ML pen Inject 0.5 mLs (0.25 mg) subcutaneously every 7 days. 2 mL 0    Semaglutide-Weight Management (WEGOVY) 0.5 MG/0.5ML pen Inject 0.5 mg subcutaneously every 7 days. 2 mL 1    Calcium Citrate-Vitamin D (CALCIUM CITRATE CHEWY BITE) 500-12.5 MG-MCG CHEW Take 1 chew tab by mouth 2 times daily 180 tablet 3    childrens multivitamin with iron (FLINTSTONES COMPLETE) CHEW Take 2 tablets by mouth daily 180 tablet 3    colchicine (COLCRYS) 0.6 MG tablet Take 1 tablet (0.6 mg) by mouth daily. 90 tablet 0    losartan (COZAAR) 100 MG tablet Take 1 tablet (100 mg) by mouth daily. 90 tablet 1    predniSONE (DELTASONE) 20 MG tablet Take 1 tablet (20 mg) by mouth 2 times daily. 10 tablet 0     No current facility-administered medications for this visit.         4/13/2025    10:29 AM   --   Do you avoid NSAIDs such as (Ibuprofen, Aleve, Naproxen, Advil)? No       ROS:  GI:  "      4/13/2025    10:29 AM   --   Vomiting Yes   Diarrhea No   Constipation No   Swallowing trouble No   Abdominal pain No   Heartburn No     Skin:       4/13/2025    10:29 AM   BAR RBS ROS - SKIN   Rash in skin folds No     Psych:       4/13/2025    10:29 AM   --   Depression Yes   Anxiety Yes     Female Only:       4/13/2025    10:29 AM   BAR RBS ROS -    Stress urinary incontinence No       Office Visit on 04/14/2025   Component Date Value Ref Range Status    Uric Acid 04/14/2025 8.9 (H)  3.4 - 7.0 mg/dL Final             6/1/2012     9:00 AM   CHEL Score (Last Two)   CHEL Raw Score 33   Activation Score 41.7   CHEL Level 1         PHYSICAL EXAM:  Objective    Ht 1.803 m (5' 11\")   Wt (!) 142 kg (313 lb)   BMI 43.65 kg/m           Vitals:  No vitals were obtained today due to virtual visit.    Physical Exam   GENERAL: alert and no distress  EYES: Eyes grossly normal to inspection.  No discharge or erythema, or obvious scleral/conjunctival abnormalities.  RESP: No audible wheeze, cough, or visible cyanosis.    SKIN: Visible skin clear. No significant rash, abnormal pigmentation or lesions.  NEURO: Cranial nerves grossly intact.  Mentation and speech appropriate for age.  PSYCH: Appropriate affect, tone, and pace of words        Sincerely,    Brianda Chan NP      Virtual Visit Details    Type of service:  Video Visit     Originating Location (pt. Location): Home    Distant Location (provider location):  Off-site  Platform used for Video Visit: Harshil  "

## 2025-04-16 NOTE — NURSING NOTE
Is the patient currently in the state of [place]? YES    Location: home    Visit mode:VIDEO    If the visit is dropped, the patient can be reconnected by: VIDEO VISIT: Text to cell phone:   Telephone Information:   Mobile [phone]    and VIDEO VISIT: Send to e-mail at: [email or web address]    Will anyone else be joining the visit? NO  (If patient encounters technical issues they should call [phone]    Are changes needed to the allergy or medication list? No    Are refills needed on medications prescribed by this physician? NO    Reason for visit: DAX Lockhart, Virtual Visit Facilitator    QNR Status: completed

## 2025-04-17 ENCOUNTER — TELEPHONE (OUTPATIENT)
Dept: ENDOCRINOLOGY | Facility: CLINIC | Age: 40
End: 2025-04-17
Payer: COMMERCIAL

## 2025-04-17 NOTE — TELEPHONE ENCOUNTER
Prior Authorization Approval    Medication: WEGOVY 0.25 MG/0.5ML SC SOAJ  Authorization Effective Date: 3/18/2025  Authorization Expiration Date: 4/17/2026  Approved Dose/Quantity: uud  Reference #: Key: [id number]   Insurance Company: [employer] - Phone [phone] Fax [phone]  Expected CoPay: $    CoPay Card Available:      Financial Assistance Needed:    Which Pharmacy is filling the prescription:

## 2025-04-17 NOTE — TELEPHONE ENCOUNTER
PA Initiation    Medication: WEGOVY 0.25 MG/0.5ML SC SOAJ  Insurance Company: [employer] - Phone [phone] Fax [phone]  Pharmacy Filling the Rx:    Filling Pharmacy Phone:    Filling Pharmacy Fax:    Start Date: 4/17/2025    Key: [id number]

## 2025-04-18 NOTE — ASSESSMENT & PLAN NOTE
4 years s/p sleeve with weight regain starting Jan 2025 associated with increased hunger and portions. Noticing more palatable foods, crumbly foods, less proteins as hunger increases. Life is very busy and stressful so family is having to eat out more often, trying to make healthier choices. Would like to consider  antiobesity medications to help with both appetite and stopping weight regain. He'd love to lose additional weight.     Took ozempic preop with good benefit. Would like to restart. Will not likely have coverage for ozempic due to no hx DMII but will try for wegovy.     Wegovy 0.25mg x 4 weeks then increase to 0.5mg  Increase protein   See dietitian   Follow up 3 months

## 2025-05-11 ENCOUNTER — HEALTH MAINTENANCE LETTER (OUTPATIENT)
Age: 40
End: 2025-05-11

## 2025-07-21 ENCOUNTER — OFFICE VISIT (OUTPATIENT)
Dept: ENDOCRINOLOGY | Facility: CLINIC | Age: 40
End: 2025-07-21
Payer: COMMERCIAL

## 2025-07-21 VITALS
HEART RATE: 70 BPM | HEIGHT: 71 IN | BODY MASS INDEX: 42.95 KG/M2 | SYSTOLIC BLOOD PRESSURE: 139 MMHG | DIASTOLIC BLOOD PRESSURE: 92 MMHG | WEIGHT: 306.8 LBS | OXYGEN SATURATION: 99 %

## 2025-07-21 DIAGNOSIS — E66.813 CLASS 3 SEVERE OBESITY WITH SERIOUS COMORBIDITY AND BODY MASS INDEX (BMI) OF 40.0 TO 44.9 IN ADULT, UNSPECIFIED OBESITY TYPE (H): ICD-10-CM

## 2025-07-21 DIAGNOSIS — Z98.84 S/P LAPAROSCOPIC SLEEVE GASTRECTOMY: ICD-10-CM

## 2025-07-21 RX ORDER — SEMAGLUTIDE 0.25 MG/.5ML
0.25 INJECTION, SOLUTION SUBCUTANEOUS
Qty: 2 ML | Refills: 0 | Status: SHIPPED | OUTPATIENT
Start: 2025-07-21

## 2025-07-21 RX ORDER — SEMAGLUTIDE 0.5 MG/.5ML
0.5 INJECTION, SOLUTION SUBCUTANEOUS
Qty: 2 ML | Refills: 1 | Status: SHIPPED | OUTPATIENT
Start: 2025-07-21

## 2025-07-21 ASSESSMENT — PAIN SCALES - GENERAL: PAINLEVEL_OUTOF10: NO PAIN (0)

## 2025-07-21 NOTE — PROGRESS NOTES
Return Bariatric Surgery Note    RE: Bigg Montez  MR#: [id number]  : 1985  VISIT DATE: 2025      Dear Rafael Bonner,    I had the pleasure of seeing your patient, Bigg Montez, in my post-bariatric surgery assessment clinic.    Assessment & Plan   Problem List Items Addressed This Visit       S/P laparoscopic sleeve gastrectomy    Relevant Medications    semaglutide-weight management (WEGOVY) 0.25 MG/0.5ML pen    Semaglutide-Weight Management (WEGOVY) 0.5 MG/0.5ML pen     Other Visit Diagnoses         Class 3 severe obesity with serious comorbidity and body mass index (BMI) of 40.0 to 44.9 in adult, unspecified obesity type (H)        Relevant Medications    semaglutide-weight management (WEGOVY) 0.25 MG/0.5ML pen    Semaglutide-Weight Management (WEGOVY) 0.5 MG/0.5ML pen             Start Wegovy 0.25mg once weekly for 4 weeks, then increase to 0.5mg once weekly.   Continue to work on eating consistent meals through out the day   Christin Bush PA-C in 3 months       20 minutes spent by me on the date of the encounter doing chart review, history and exam, documentation and further activities per the note    CHIEF COMPLAINT: Post-bariatric surgery follow-up. 1.5 years s/p gastric sleeve with Dr. Eden.    HISTORY OF PRESENT ILLNESS:      2025     6:06 AM   Questions Regarding Prior Weight Loss Surgery Reviewed With Patient   I had the following weight loss procedure Sleeve Gastrectomy   What year was your surgery?    How has your weight changed since your last visit? I have gained weight   Do you currently have any of the following Sleep Apnea   Do you have any concerns today? Diet     Weight regain starting 2025 associated with increased hunger and portions. Noticing more palatable foods, crumbly foods, less proteins as hunger increases. Life is very busy and stressful so family is having to eat out more often, trying to make healthier choices.   Last seen by Brianda Chan CNP on  4/2025 and started Wegovy.     Anti-obesity medications:     Current:   Wegovy - took 2 weeks in April, but has not taken it since. Was hard to stay consistent with life stressors. Would like to restart today.       Recent diet changes: trying to eat consistently. Having higher hunger. Skips lunch. Has been hard to stay consistent. Portion sizes 1 cup. Can tolerate normal diet     -Protein? Is not tracking   -Water? 40oz daily. Also drinking 1 energy drink daily, 1.5 diet coke     Recent exercise/activity changes: very active with children. Trying to walk more.     Recent stressors: a lot of life stressors. Recently laid off.     Recent sleep changes: has been harder with increase stress. Has been using mouth tape, not as consistent with CPAP     Vitamins/Labs: Vitamin D and MV         Weight History:      7/21/2025     6:06 AM   --   What is your highest lifetime weight? 408   What is your lowest weight since surgery? (In pounds) 292     Initial Weight (lbs): 378 lbs  Weight: (!) 139.2 kg (306 lb 12.8 oz)  Last Visits Weight: 142 kg (313 lb)  Cumulative weight loss (lbs): 71.2  Weight Loss Percentage: 18.84%  Waist Circumference (cm): 134 cm    Wt Readings from Last 5 Encounters:   07/21/25 (!) 139.2 kg (306 lb 12.8 oz)   04/16/25 (!) 142 kg (313 lb)   04/14/25 (!) 142 kg (313 lb)   06/06/24 137.9 kg (304 lb)   06/06/24 137.9 kg (304 lb)             7/21/2025     6:06 AM   Questions Regarding Co-Morbidities and Health Concerns Reviewed With Patient   Pre-diabetes Gone away   Diabetes II Never   High Blood Pressure Stayed the same   High cholesterol Never   Heartburn/Reflux Improved   Sleep apnea Improved   PCOS Never   Back pain Improved   Joint pain Improved   Lower leg swelling Never           7/21/2025     6:06 AM   Eating Habits   How many meals do you eat per day? 2   Do you snack between meals? Yes   How much food are you eating at each meal? Greater than 1 cup   Are you able to separate your meals and  liquids by at least 30 minutes? No   Are you able to avoid liquid calories? No           7/21/2025     6:06 AM   Exercise Questions Reviewed With Patient   How often do you exercise? Less than 1 time per week   What is the duration of your exercise (in minutes)? 15 Minutes   What types of exercise do you do? walking   What keeps you from being more active? I should be more active but I just have not gotten around to it       Social History:      7/21/2025     6:06 AM   --   Are you smoking? No   Are you drinking alcohol? Yes   How much alcohol? 0       Medications:  Current Outpatient Medications   Medication Sig Dispense Refill    Calcium Citrate-Vitamin D (CALCIUM CITRATE CHEWY BITE) 500-12.5 MG-MCG CHEW Take 1 chew tab by mouth 2 times daily 180 tablet 3    childrens multivitamin with iron (FLINTSTONES COMPLETE) CHEW Take 2 tablets by mouth daily 180 tablet 3    colchicine (COLCRYS) 0.6 MG tablet Take 1 tablet (0.6 mg) by mouth daily. 90 tablet 0    losartan (COZAAR) 100 MG tablet Take 1 tablet (100 mg) by mouth daily. 90 tablet 1    predniSONE (DELTASONE) 20 MG tablet Take 1 tablet (20 mg) by mouth 2 times daily. 10 tablet 0    semaglutide-weight management (WEGOVY) 0.25 MG/0.5ML pen Inject 0.5 mLs (0.25 mg) subcutaneously every 7 days. 2 mL 0    Semaglutide-Weight Management (WEGOVY) 0.5 MG/0.5ML pen Inject 0.5 mg subcutaneously every 7 days. 2 mL 1     No current facility-administered medications for this visit.         7/21/2025     6:06 AM   --   Do you avoid NSAIDs such as (Ibuprofen, Aleve, Naproxen, Advil)? Yes       ROS:  GI:       7/21/2025     6:06 AM   --   Vomiting No   Diarrhea No   Constipation No   Swallowing trouble No   Abdominal pain No   Heartburn No     Skin:       7/21/2025     6:06 AM   BAR RBS ROS - SKIN   Rash in skin folds Yes     Psych:       7/21/2025     6:06 AM   --   Depression No   Anxiety Yes     Female Only:       7/21/2025     6:06 AM   BAR RBS ROS -    Stress urinary  "incontinence No           Objective      BP (!) 139/92 (BP Location: Left arm, Patient Position: Sitting, Cuff Size: Adult Large)   Pulse 70   Ht 1.803 m (5' 11\")   Wt (!) 139.2 kg (306 lb 12.8 oz)   SpO2 99%   BMI 42.79 kg/m    Body mass index is 42.79 kg/m .      Physical Exam   GENERAL: alert and no distress  EYES: Eyes grossly normal to inspection.  No discharge or erythema, or obvious scleral/conjunctival abnormalities.  RESP: No audible wheeze, cough, or visible cyanosis.    SKIN: Visible skin clear. No significant rash, abnormal pigmentation or lesions.  NEURO: Cranial nerves grossly intact.  Mentation and speech appropriate for age.  PSYCH: Appropriate affect, tone, and pace of words          Sincerely,    Christin Blackwood PA-C    The longitudinal plan of care for the diagnosis(es)/condition(s) as documented were addressed during this visit. Due to the added complexity in care, I will continue to support Darshan in the subsequent management and with ongoing continuity of care.  "

## 2025-07-21 NOTE — LETTER
2025       RE: Bigg Montez  [address]  [place] [address]     Dear Colleague,    Thank you for referring your patient, Bigg Montez, to the [facility]. Please see a copy of my visit note below.    Return Bariatric Surgery Note    RE: Bigg Montez  MR#: [id number]  : 1985  VISIT DATE: 2025      Dear Rafael Bonner,    I had the pleasure of seeing your patient, Bigg Montez, in my post-bariatric surgery assessment clinic.    Assessment & Plan  Problem List Items Addressed This Visit       S/P laparoscopic sleeve gastrectomy    Relevant Medications    semaglutide-weight management (WEGOVY) 0.25 MG/0.5ML pen    Semaglutide-Weight Management (WEGOVY) 0.5 MG/0.5ML pen     Other Visit Diagnoses         Class 3 severe obesity with serious comorbidity and body mass index (BMI) of 40.0 to 44.9 in adult, unspecified obesity type (H)        Relevant Medications    semaglutide-weight management (WEGOVY) 0.25 MG/0.5ML pen    Semaglutide-Weight Management (WEGOVY) 0.5 MG/0.5ML pen             Start Wegovy 0.25mg once weekly for 4 weeks, then increase to 0.5mg once weekly.   Continue to work on eating consistent meals through out the day   Christin Bush PA-C in 3 months       20 minutes spent by me on the date of the encounter doing chart review, history and exam, documentation and further activities per the note    CHIEF COMPLAINT: Post-bariatric surgery follow-up. 1.5 years s/p gastric sleeve with Dr. Eden.    HISTORY OF PRESENT ILLNESS:      2025     6:06 AM   Questions Regarding Prior Weight Loss Surgery Reviewed With Patient   I had the following weight loss procedure Sleeve Gastrectomy   What year was your surgery?    How has your weight changed since your last visit? I have gained weight   Do you currently have any of the following Sleep Apnea   Do you have any  concerns today? Diet     Weight regain starting Jan 2025 associated with increased hunger and portions. Noticing more palatable foods, crumbly foods, less proteins as hunger increases. Life is very busy and stressful so family is having to eat out more often, trying to make healthier choices.   Last seen by Brianda Chan CNP on 4/2025 and started Wegovy.     Anti-obesity medications:     Current:   Wegovy - took 2 weeks in April, but has not taken it since. Was hard to stay consistent with life stressors. Would like to restart today.       Recent diet changes: trying to eat consistently. Having higher hunger. Skips lunch. Has been hard to stay consistent. Portion sizes 1 cup. Can tolerate normal diet     -Protein? Is not tracking   -Water? 40oz daily. Also drinking 1 energy drink daily, 1.5 diet coke     Recent exercise/activity changes: very active with children. Trying to walk more.     Recent stressors: a lot of life stressors. Recently laid off.     Recent sleep changes: has been harder with increase stress. Has been using mouth tape, not as consistent with CPAP     Vitamins/Labs: Vitamin D and MV         Weight History:      7/21/2025     6:06 AM   --   What is your highest lifetime weight? 408   What is your lowest weight since surgery? (In pounds) 292     Initial Weight (lbs): 378 lbs  Weight: (!) 139.2 kg (306 lb 12.8 oz)  Last Visits Weight: 142 kg (313 lb)  Cumulative weight loss (lbs): 71.2  Weight Loss Percentage: 18.84%  Waist Circumference (cm): 134 cm    Wt Readings from Last 5 Encounters:   07/21/25 (!) 139.2 kg (306 lb 12.8 oz)   04/16/25 (!) 142 kg (313 lb)   04/14/25 (!) 142 kg (313 lb)   06/06/24 137.9 kg (304 lb)   06/06/24 137.9 kg (304 lb)             7/21/2025     6:06 AM   Questions Regarding Co-Morbidities and Health Concerns Reviewed With Patient   Pre-diabetes Gone away   Diabetes II Never   High Blood Pressure Stayed the same   High cholesterol Never   Heartburn/Reflux Improved   Sleep  apnea Improved   PCOS Never   Back pain Improved   Joint pain Improved   Lower leg swelling Never           7/21/2025     6:06 AM   Eating Habits   How many meals do you eat per day? 2   Do you snack between meals? Yes   How much food are you eating at each meal? Greater than 1 cup   Are you able to separate your meals and liquids by at least 30 minutes? No   Are you able to avoid liquid calories? No           7/21/2025     6:06 AM   Exercise Questions Reviewed With Patient   How often do you exercise? Less than 1 time per week   What is the duration of your exercise (in minutes)? 15 Minutes   What types of exercise do you do? walking   What keeps you from being more active? I should be more active but I just have not gotten around to it       Social History:      7/21/2025     6:06 AM   --   Are you smoking? No   Are you drinking alcohol? Yes   How much alcohol? 0       Medications:  Current Outpatient Medications   Medication Sig Dispense Refill     Calcium Citrate-Vitamin D (CALCIUM CITRATE CHEWY BITE) 500-12.5 MG-MCG CHEW Take 1 chew tab by mouth 2 times daily 180 tablet 3     childrens multivitamin with iron (FLINTSTONES COMPLETE) CHEW Take 2 tablets by mouth daily 180 tablet 3     colchicine (COLCRYS) 0.6 MG tablet Take 1 tablet (0.6 mg) by mouth daily. 90 tablet 0     losartan (COZAAR) 100 MG tablet Take 1 tablet (100 mg) by mouth daily. 90 tablet 1     predniSONE (DELTASONE) 20 MG tablet Take 1 tablet (20 mg) by mouth 2 times daily. 10 tablet 0     semaglutide-weight management (WEGOVY) 0.25 MG/0.5ML pen Inject 0.5 mLs (0.25 mg) subcutaneously every 7 days. 2 mL 0     Semaglutide-Weight Management (WEGOVY) 0.5 MG/0.5ML pen Inject 0.5 mg subcutaneously every 7 days. 2 mL 1     No current facility-administered medications for this visit.         7/21/2025     6:06 AM   --   Do you avoid NSAIDs such as (Ibuprofen, Aleve, Naproxen, Advil)? Yes       ROS:  GI:       7/21/2025     6:06 AM   --   Vomiting No  "  Diarrhea No   Constipation No   Swallowing trouble No   Abdominal pain No   Heartburn No     Skin:       7/21/2025     6:06 AM   BAR RBS ROS - SKIN   Rash in skin folds Yes     Psych:       7/21/2025     6:06 AM   --   Depression No   Anxiety Yes     Female Only:       7/21/2025     6:06 AM   BAR RBS ROS -    Stress urinary incontinence No           Objective     BP (!) 139/92 (BP Location: Left arm, Patient Position: Sitting, Cuff Size: Adult Large)   Pulse 70   Ht 1.803 m (5' 11\")   Wt (!) 139.2 kg (306 lb 12.8 oz)   SpO2 99%   BMI 42.79 kg/m    Body mass index is 42.79 kg/m .      Physical Exam   GENERAL: alert and no distress  EYES: Eyes grossly normal to inspection.  No discharge or erythema, or obvious scleral/conjunctival abnormalities.  RESP: No audible wheeze, cough, or visible cyanosis.    SKIN: Visible skin clear. No significant rash, abnormal pigmentation or lesions.  NEURO: Cranial nerves grossly intact.  Mentation and speech appropriate for age.  PSYCH: Appropriate affect, tone, and pace of words          Sincerely,    Christin Blackwood PA-C    The longitudinal plan of care for the diagnosis(es)/condition(s) as documented were addressed during this visit. Due to the added complexity in care, I will continue to support Darshan in the subsequent management and with ongoing continuity of care.    Again, thank you for allowing me to participate in the care of your patient.      Sincerely,    Christin Blackwood PA-C    "

## 2025-07-21 NOTE — NURSING NOTE
"(   Chief Complaint   Patient presents with    RECHECK     Return Bariatrics.     )    ( Weight: (!) 139.2 kg (306 lb 12.8 oz) )  ( Height: 180.3 cm (5' 11\") )  ( BMI (Calculated): 42.79 )  (   )  ( Cumulative weight loss (lbs): 71.2 )  ( Last Visits Weight: 142 kg (313 lb) )  ( Wt change since last visit (lbs): -6.2 )  ( Waist Circumference (cm): 134 cm )  (   )    ( BP: (!) 139/92 )  (   )  (   )  (   )  ( Pulse: 70 )  (   )  ( SpO2: 99 % )    (   Patient Active Problem List   Diagnosis    CARDIOVASCULAR SCREENING; LDL GOAL LESS THAN 160    Gout    Class 3 severe obesity with serious comorbidity and body mass index (BMI) of 40.0 to 44.9 in adult (H)    Essential hypertension    TRACEE (obstructive sleep apnea)    Prediabetes    Vitamin D deficiency    Deep vein thrombosis (DVT) of tibial vein of left lower extremity, unspecified chronicity (H)    Morbid obesity (H)    S/P laparoscopic sleeve gastrectomy    )  (   Current Outpatient Medications   Medication Sig Dispense Refill    Calcium Citrate-Vitamin D (CALCIUM CITRATE CHEWY BITE) 500-12.5 MG-MCG CHEW Take 1 chew tab by mouth 2 times daily 180 tablet 3    childrens multivitamin with iron (FLINTSTONES COMPLETE) CHEW Take 2 tablets by mouth daily 180 tablet 3    colchicine (COLCRYS) 0.6 MG tablet Take 1 tablet (0.6 mg) by mouth daily. 90 tablet 0    losartan (COZAAR) 100 MG tablet Take 1 tablet (100 mg) by mouth daily. 90 tablet 1    predniSONE (DELTASONE) 20 MG tablet Take 1 tablet (20 mg) by mouth 2 times daily. 10 tablet 0    semaglutide-weight management (WEGOVY) 0.25 MG/0.5ML pen Inject 0.5 mLs (0.25 mg) subcutaneously every 7 days. 2 mL 0    Semaglutide-Weight Management (WEGOVY) 0.5 MG/0.5ML pen Inject 0.5 mg subcutaneously every 7 days. (Patient not taking: Reported on 7/21/2025) 2 mL 1    )  ( Diabetes Eval:    )    ( Pain Eval:  No Pain (0) )    ( Wound Eval:       )    (   History   Smoking Status    Former    Types: Cigars   Smokeless Tobacco    Never "    )    ( Signed By:  Romana Betts, EMT July 21, 2025; 7:26 AM )

## 2025-07-22 NOTE — PATIENT INSTRUCTIONS
Visit Plan:     Start Wegovy 0.25mg once weekly for 4 weeks, then increase to 0.5mg once weekly.   Continue to work on eating consistent meals through out the day   Christin Bush PA-C in 3 months

## 2025-07-29 ENCOUNTER — PATIENT OUTREACH (OUTPATIENT)
Dept: CARE COORDINATION | Facility: CLINIC | Age: 40
End: 2025-07-29

## 2025-07-29 ENCOUNTER — VIRTUAL VISIT (OUTPATIENT)
Dept: FAMILY MEDICINE | Facility: CLINIC | Age: 40
End: 2025-07-29
Payer: COMMERCIAL

## 2025-07-29 DIAGNOSIS — N52.9 ERECTILE DYSFUNCTION, UNSPECIFIED ERECTILE DYSFUNCTION TYPE: Primary | ICD-10-CM

## 2025-07-29 DIAGNOSIS — I10 ESSENTIAL HYPERTENSION: ICD-10-CM

## 2025-07-29 PROCEDURE — 98006 SYNCH AUDIO-VIDEO EST MOD 30: CPT | Performed by: FAMILY MEDICINE

## 2025-07-29 PROCEDURE — 1126F AMNT PAIN NOTED NONE PRSNT: CPT | Mod: 95 | Performed by: FAMILY MEDICINE

## 2025-07-29 RX ORDER — SILDENAFIL CITRATE 20 MG/1
TABLET ORAL
Qty: 30 TABLET | Refills: 3 | Status: SHIPPED | OUTPATIENT
Start: 2025-07-29

## 2025-07-29 NOTE — PROGRESS NOTES
Darshan is a 39 year old who is being evaluated via a billable video visit.    How would you like to obtain your AVS? MyChart  If the video visit is dropped, the invitation should be resent by: Text to cell phone: [phone]  Will anyone else be joining your video visit? No      Assessment & Plan     Erectile dysfunction, unspecified erectile dysfunction type  Discussed healthy lifestyle changes, continued weight loss program.  Sildenafil prescription sent, possible side effects were discussed including hypotension etc.  - sildenafil (REVATIO) 20 MG tablet; Take 2-3 tabs PO 1 hr before sex    Essential hypertension  I discussed tight control of risk factor resume losartan recheck BP in about 4 weeks with MA.    Follow-up   No follow-ups on file.        Subjective   Darshan is a 39 year old, presenting for the following health issues:  Referral (For adhd )        4/14/2025    12:39 PM   Additional Questions   Roomed by merna   Accompanied by self     History of Present Illness       Reason for visit:  Erectile disfunction   He is taking medications regularly.      Difficulty attaining and maintaining an erection, progressive over the last few years, tried sildenafil from one of his friend with good symptoms improvement, would like to get a prescription.  He has chronic gout controlled with colchicine.  Has been also on prednisone in the past.  Had a weight loss surgery, still doing Wegovy, weight has stabilized, blood pressure was mildly elevated few weeks ago.  He is back on losartan.      Review of Systems  Constitutional, HEENT, cardiovascular, pulmonary, gi and gu systems are negative, except as otherwise noted.      Objective    Vitals - Patient Reported  Systolic (Patient Reported):  (unable to)  Diastolic (Patient Reported):  (unable to)  Weight (Patient Reported):  (unable to)  Pain Score: No Pain (0)  Pain Loc: Other - see comment        Physical Exam   GENERAL: alert and no distress  EYES: Eyes grossly normal to  inspection.  No discharge or erythema, or obvious scleral/conjunctival abnormalities.  RESP: No audible wheeze, cough, or visible cyanosis.    SKIN: Visible skin clear. No significant rash, abnormal pigmentation or lesions.  NEURO: Cranial nerves grossly intact.  Mentation and speech appropriate for age.  PSYCH: Appropriate affect, tone, and pace of words    No results found for any visits on 07/29/25.      Video-Visit Details    Type of service:  Video Visit   Originating Location (pt. Location): Home    Distant Location (provider location):  On-site  Platform used for Video Visit: Harshil  Signed Electronically by: Rafael Bonner MD

## 2025-07-29 NOTE — PROGRESS NOTES
Prior to immunization administration, verified patients identity using patient s name and date of birth. Please see Immunization Activity for additional information.     Screening Questionnaire for Adult Immunization    Are you sick today?   No   Do you have allergies to medications, food, a vaccine component or latex?   Yes   Have you ever had a serious reaction after receiving a vaccination?   No   Do you have a long-term health problem with heart, lung, kidney, or metabolic disease (e.g., diabetes), asthma, a blood disorder, no spleen, complement component deficiency, a cochlear implant, or a spinal fluid leak?  Are you on long-term aspirin therapy?   No   Do you have cancer, leukemia, HIV/AIDS, or any other immune system problem?   No   Do you have a parent, brother, or sister with an immune system problem?   No   In the past 3 months, have you taken medications that affect  your immune system, such as prednisone, other steroids, or anticancer drugs; drugs for the treatment of rheumatoid arthritis, Crohn s disease, or psoriasis; or have you had radiation treatments?   No   Have you had a seizure, or a brain or other nervous system problem?   No   During the past year, have you received a transfusion of blood or blood    products, or been given immune (gamma) globulin or antiviral drug?   No   For women: Are you pregnant or is there a chance you could become       pregnant during the next month?   No   Have you received any vaccinations in the past 4 weeks?   No     Immunization questionnaire was positive for at least one answer.  Notified provider.      Patient instructed to remain in clinic for 15 minutes afterwards, and to report any adverse reactions.     Screening performed by Opal Singh MA on 7/29/2025 at 12:58 PM.       Answers submitted by the patient for this visit:  General Questionnaire (Submitted on 7/28/2025)  Chief Complaint: Chronic problems general questions HPI Form  What is the reason for  your visit today? : Erectile disfunction  How many days per week do you miss taking your medication?: 0  Questionnaire about: Chronic problems general questions HPI Form (Submitted on 7/28/2025)  Chief Complaint: Chronic problems general questions HPI Form

## 2025-08-25 ENCOUNTER — MYC REFILL (OUTPATIENT)
Dept: ENDOCRINOLOGY | Facility: CLINIC | Age: 40
End: 2025-08-25
Payer: COMMERCIAL

## 2025-08-25 ENCOUNTER — MYC REFILL (OUTPATIENT)
Dept: FAMILY MEDICINE | Facility: CLINIC | Age: 40
End: 2025-08-25
Payer: COMMERCIAL

## 2025-08-25 DIAGNOSIS — Z98.84 S/P LAPAROSCOPIC SLEEVE GASTRECTOMY: ICD-10-CM

## 2025-08-25 DIAGNOSIS — M1A.09X0 CHRONIC GOUT OF MULTIPLE SITES, UNSPECIFIED CAUSE: ICD-10-CM

## 2025-08-25 DIAGNOSIS — E66.813 CLASS 3 SEVERE OBESITY WITH SERIOUS COMORBIDITY AND BODY MASS INDEX (BMI) OF 40.0 TO 44.9 IN ADULT, UNSPECIFIED OBESITY TYPE (H): ICD-10-CM

## 2025-08-25 DIAGNOSIS — M1A.0720 CHRONIC GOUT OF LEFT FOOT, UNSPECIFIED CAUSE: ICD-10-CM

## 2025-08-25 RX ORDER — SEMAGLUTIDE 0.5 MG/.5ML
0.5 INJECTION, SOLUTION SUBCUTANEOUS
Qty: 2 ML | Refills: 1 | Status: CANCELLED | OUTPATIENT
Start: 2025-08-25

## 2025-08-25 RX ORDER — COLCHICINE 0.6 MG/1
0.6 TABLET ORAL DAILY
Qty: 90 TABLET | Refills: 0 | Status: SHIPPED | OUTPATIENT
Start: 2025-08-25

## 2025-08-25 RX ORDER — PREDNISONE 20 MG/1
20 TABLET ORAL 2 TIMES DAILY
Qty: 10 TABLET | Refills: 0 | Status: SHIPPED | OUTPATIENT
Start: 2025-08-25

## (undated) DEVICE — STPL POWERED ECHELON LONG 60MM PLEE60A

## (undated) DEVICE — DRSG PRIMAPORE 02X3" 7133

## (undated) DEVICE — SOL NACL 0.9% INJ 1000ML BAG 2B1324X

## (undated) DEVICE — KIT CONNECTOR FOR OLYMPUS ENDOSCOPES DEFENDO 100310

## (undated) DEVICE — ENDO POUCH UNIVERSAL RETRIEVAL SYSTEM INZII 12/15MM CD004

## (undated) DEVICE — KIT ENDO FIRST STEP DISINFECTANT 200ML W/POUCH EP-4

## (undated) DEVICE — SU VICRYL 0 TIE 54" J608H

## (undated) DEVICE — JELLY LUBRICATING SURGILUBE 2OZ TUBE

## (undated) DEVICE — STPL SKIN 35W ROTATING HEAD PRW35

## (undated) DEVICE — GLOVE BIOGEL PI MICRO INDICATOR UNDERGLOVE SZ 7.5 48975

## (undated) DEVICE — SUCTION MANIFOLD NEPTUNE 2 SYS 4 PORT 0702-020-000

## (undated) DEVICE — ENDO TROCAR SLEEVE KII Z-THREADED 05X100MM CTS02

## (undated) DEVICE — DEVICE SUTURE PASSER 14GA WECK EFX EFXSP2

## (undated) DEVICE — CLIP APPLIER ENDO ROTATING 12MM LG ER420

## (undated) DEVICE — PREP CHLORAPREP 26ML TINTED HI-LITE ORANGE 930815

## (undated) DEVICE — LINEN TOWEL PACK X5 5464

## (undated) DEVICE — SOL WATER IRRIG 1000ML BOTTLE 2F7114

## (undated) DEVICE — ANTIFOG SOLUTION W/FOAM PAD 31142527

## (undated) DEVICE — SHEARS HARMONIC ULTRASONIC LAP 36CM CURVE TIP HAR1136

## (undated) DEVICE — LINEN TOWEL PACK X6 WHITE 5487

## (undated) DEVICE — ENDO TUBING CO2 SMARTCAP STERILE DISP 100145CO2EXT

## (undated) DEVICE — DRAPE SHEET REV FOLD 3/4 9349

## (undated) DEVICE — ENDO TROCAR FIRST ENTRY KII FIOS Z-THRD 12X100MM CTF73

## (undated) DEVICE — Device

## (undated) DEVICE — ESU GROUND PAD ADULT W/CORD E7507

## (undated) DEVICE — NDL INSUFFLATION 13GA 150MM C2202

## (undated) DEVICE — ESU HARMONIC ACE LAP SHEARS STRYKER ACE+ 7 5MMX45CM HARH45

## (undated) DEVICE — ENDO TROCAR FIRST ENTRY KII FIOS Z-THRD 05X100MM CTF03

## (undated) DEVICE — STPL RELOAD REG TISSUE ECHELON 60 X 3.6MM BLUE GST60B

## (undated) DEVICE — TUBING SMOKE EVAC PNEUMOCLEAR HIGH FLOW 0620050250

## (undated) DEVICE — TUBING SUCTION 10'X3/16" N510

## (undated) DEVICE — GLOVE BIOGEL PI ULTRATOUCH SZ 7.5 41175

## (undated) DEVICE — SU VICRYL 4-0 PS-2 18" UND J496H

## (undated) DEVICE — ESU ENDO SCISSORS 5MM CVD 5DCS

## (undated) RX ORDER — FENTANYL CITRATE-0.9 % NACL/PF 10 MCG/ML
PLASTIC BAG, INJECTION (ML) INTRAVENOUS
Status: DISPENSED
Start: 2024-03-04

## (undated) RX ORDER — HYDROMORPHONE HCL IN WATER/PF 6 MG/30 ML
PATIENT CONTROLLED ANALGESIA SYRINGE INTRAVENOUS
Status: DISPENSED
Start: 2024-03-04

## (undated) RX ORDER — HYDRALAZINE HYDROCHLORIDE 20 MG/ML
INJECTION INTRAMUSCULAR; INTRAVENOUS
Status: DISPENSED
Start: 2024-03-04

## (undated) RX ORDER — SODIUM CHLORIDE, SODIUM LACTATE, POTASSIUM CHLORIDE, CALCIUM CHLORIDE 600; 310; 30; 20 MG/100ML; MG/100ML; MG/100ML; MG/100ML
INJECTION, SOLUTION INTRAVENOUS
Status: DISPENSED
Start: 2024-03-04

## (undated) RX ORDER — PROPOFOL 10 MG/ML
INJECTION, EMULSION INTRAVENOUS
Status: DISPENSED
Start: 2024-03-04

## (undated) RX ORDER — LIDOCAINE HYDROCHLORIDE 10 MG/ML
INJECTION, SOLUTION EPIDURAL; INFILTRATION; INTRACAUDAL; PERINEURAL
Status: DISPENSED
Start: 2024-03-04

## (undated) RX ORDER — DEXAMETHASONE SODIUM PHOSPHATE 4 MG/ML
INJECTION, SOLUTION INTRA-ARTICULAR; INTRALESIONAL; INTRAMUSCULAR; INTRAVENOUS; SOFT TISSUE
Status: DISPENSED
Start: 2024-03-04

## (undated) RX ORDER — FENTANYL CITRATE 50 UG/ML
INJECTION, SOLUTION INTRAMUSCULAR; INTRAVENOUS
Status: DISPENSED
Start: 2024-03-04

## (undated) RX ORDER — ONDANSETRON 2 MG/ML
INJECTION INTRAMUSCULAR; INTRAVENOUS
Status: DISPENSED
Start: 2024-03-04

## (undated) RX ORDER — CEFAZOLIN SODIUM/WATER 3 G/30 ML
SYRINGE (ML) INTRAVENOUS
Status: DISPENSED
Start: 2024-03-04

## (undated) RX ORDER — BUPIVACAINE HYDROCHLORIDE 2.5 MG/ML
INJECTION, SOLUTION EPIDURAL; INFILTRATION; INTRACAUDAL
Status: DISPENSED
Start: 2024-03-04

## (undated) RX ORDER — ENOXAPARIN SODIUM 100 MG/ML
INJECTION SUBCUTANEOUS
Status: DISPENSED
Start: 2024-03-04

## (undated) RX ORDER — GLYCOPYRROLATE 0.2 MG/ML
INJECTION, SOLUTION INTRAMUSCULAR; INTRAVENOUS
Status: DISPENSED
Start: 2024-03-04

## (undated) RX ORDER — LIDOCAINE HYDROCHLORIDE AND EPINEPHRINE 10; 10 MG/ML; UG/ML
INJECTION, SOLUTION INFILTRATION; PERINEURAL
Status: DISPENSED
Start: 2024-03-04

## (undated) RX ORDER — ACETAMINOPHEN 325 MG/1
TABLET ORAL
Status: DISPENSED
Start: 2024-03-04

## (undated) RX ORDER — LABETALOL HYDROCHLORIDE 5 MG/ML
INJECTION, SOLUTION INTRAVENOUS
Status: DISPENSED
Start: 2024-03-04